# Patient Record
Sex: FEMALE | Race: WHITE | NOT HISPANIC OR LATINO | URBAN - METROPOLITAN AREA
[De-identification: names, ages, dates, MRNs, and addresses within clinical notes are randomized per-mention and may not be internally consistent; named-entity substitution may affect disease eponyms.]

---

## 2016-07-26 RX ORDER — TRAMADOL HYDROCHLORIDE 50 MG/1
2 TABLET ORAL
Qty: 0 | Refills: 0 | COMMUNITY
Start: 2016-07-26

## 2017-01-26 ENCOUNTER — INPATIENT (INPATIENT)
Facility: HOSPITAL | Age: 82
LOS: 1 days | Discharge: HOME HEALTH | End: 2017-01-28
Attending: FAMILY MEDICINE | Admitting: FAMILY MEDICINE
Payer: MEDICARE

## 2017-01-26 DIAGNOSIS — Z95.810 PRESENCE OF AUTOMATIC (IMPLANTABLE) CARDIAC DEFIBRILLATOR: Chronic | ICD-10-CM

## 2017-01-26 PROCEDURE — 71250 CT THORAX DX C-: CPT | Mod: 26

## 2017-01-26 PROCEDURE — 99285 EMERGENCY DEPT VISIT HI MDM: CPT

## 2017-01-26 PROCEDURE — 71010: CPT | Mod: 26

## 2017-01-26 PROCEDURE — 93010 ELECTROCARDIOGRAM REPORT: CPT

## 2017-01-27 PROCEDURE — 93010 ELECTROCARDIOGRAM REPORT: CPT

## 2017-01-28 PROCEDURE — 93010 ELECTROCARDIOGRAM REPORT: CPT

## 2017-01-28 NOTE — PROGRESS NOTE ADULT - SUBJECTIVE AND OBJECTIVE BOX
HPI:    86 y/o female with PMHx of HTN, Systolic CHF EF~30-45%, Atrial Fibrillation on Xarelto, Valvuar Cardiomyopathy, HLD, GERD, OA, and multiple hospitalizations for decompensated CHF. Patient is sent from [facility] with complaint of chest pain of acute onset that occurred at night. In ED CT Chest with pulmonary edema, elevated BNP 32,000. Patient admitted for diuresis with improvement in symptoms.           PAST MEDICAL & SURGICAL HISTORY:  Osteoarthritis of multiple joints, unspecified osteoarthritis type  Gastroesophageal reflux disease without esophagitis  Dyslipidemia  Essential hypertension  Paroxysmal atrial fibrillation  Coronary artery disease involving native coronary artery of native heart without angina pectoris  Chronic systolic congestive heart failure: EF 20-25%  AICD (automatic cardioverter/defibrillator) present      MEDICATIONS  (STANDING):    ACETAMINOPHEN (TYLENOL FORM) 650 MG=(2 x 325 MG TAB) ORAL Q6HP   ALUM - MAG HYDROXIDE-SIMETH (MAALOX PLUS FORM) 30 ML SUSP ORAL Q6HP   AMIODARONE HCL (CORDARONE FORM) 100 MG=(0.5 x 200 MG TAB) ORAL DAILY 11/08/2016 10:20   ASPIRIN 81 MG=1 CHEW ORAL DAILY 11/08/2016 10:20   ATORVASTATIN CALCIUM (LIPITOR FORM) 20 MG=1 TAB ORAL QHS 11/08/2016 22:22 BISACODYL (DULCOLAX FORM) 10 MG=(2 x 5 MG TBEC) ORAL DAILYP CALCIUM CARB./VIT.D 250-125 (OSCAL+D FORM) 1 TABLET TAB ORAL DAILY 11/08/2016 10:20   DOCUSATE SODIUM (COLACE FORM) 200 MG=(2 x 100 MG CAP) ORAL QHS 11/07/2016 21:53   ENALAPRIL MALEATE (VASOTEC FORM) 2.5 MG=1 TAB ORAL Q12H 11/08/2016 22:22   FAMOTIDINE (PEPCID FORM) 20 MG=1 TAB ORAL Q12H 11/08/2016 22:22   FUROSEMIDE (LASIX FORM) 40 MG=1 TAB ORAL DAILY 11/08/2016 10:20   LIDOCAINE 5 % PATCH (LIDODERM FORM) 2 PATCH TOPL Q24H 11/08/2016 10:20 LIDOCAINE --REMOVE-- PATCH(S) (LIDODERM **REMOVE** PATCH(S)) 1 REMOVE PTCH TDERMAL QHS 11/08/2016 22:22   METOPROLOL- XL(TOPROL-XL) (TOPROL XL FORM) 100 MG=1 TB24 ORAL DAILY 11/08/2016 10:20   Ondansetron IV[ZOFRAN] (ZOFRAN FORM) 4 MG=2 ML SOLN IV Q6HP   RIVAROXABAN (XARELTO) 15 MG=1 TAB ORAL CCS 11/08/2016 16:55   SENNA TABLETS (SENOKOT FORM) 2 TAB ORAL HSP SODIUM CHLORIDE FLUSH SOLUTION (NORMAL SALINE LOCK FLUSH) 5 ML SYRG IV PRN SODIUM CHLORIDE FLUSH SOLUTION (NORMAL SALINE LOCK FLUSH) 5 ML SYRG IV Q12H 11/08/2016 22:22   traMADol[ULTRAM] (ULTRAM FORM) 100 MG=(2 x 50 MG TAB) (7 Days) ORAL Q12HP 11/08/2016 22:27   Discontinued FUROSEMIDE IV (LASIX IV FORM) 20 MG=2 ML SOLN        Allergies    No Known Allergies    Intolerances        SOCIAL HISTORY: Denies tobacco, etoh abuse or illicit drug use    FAMILY HISTORY:  No pertinent family history in first degree relatives      REVIEW OF SYSTEMS:    CONSTITUTIONAL:  As per HPI.  HEENT:  Eyes:  No diplopia or blurred vision. ENT:  No earache, sore throat or runny nose.  CARDIOVASCULAR:  No pressure, squeezing, strangling, tightness, heaviness or aching about the chest, neck, axilla or epigastrium.  RESPIRATORY:  No cough, shortness of breath, PND or orthopnea.  GASTROINTESTINAL:  No nausea, vomiting or diarrhea.  GENITOURINARY:  No dysuria, frequency or urgency.  MUSCULOSKELETAL:  As per HPI.  SKIN:  No change in skin, hair or nails.  NEUROLOGIC:  No paresthesias, fasciculations, seizures or weakness.  PSYCHIATRIC:  No disorder of thought or mood.  ENDOCRINE:  No heat or cold intolerance, polyuria or polydipsia.  HEMATOLOGICAL:  No easy bruising or bleedings:  .     PHYSICAL EXAMINATION:    GENERAL APPEARANCE:  Pt. is not currently dyspneic, in no distress. Pt. is alert, oriented, and pleasant.  HEENT:  Pupils are normal and react normally. No icterus. Mucous membranes well colored.  NECK:  Supple. No lymphadenopathy. Jugular venous pressure not elevated. Carotids equal.   HEART:   The cardiac impulse has a normal quality. There are no murmurs, rubs or gallops noted  CHEST:  Chest is clear to auscultation. Normal respiratory effort.  ABDOMEN:  Soft and nontender.   EXTREMITIES:  There is no edema.   SKIN:  No rash or significant lesions are noted.    LABS:                  EKG:    TELEMETRY:    CARDIAC TESTS:    ICD Interrogation Performed: : [employer]; Model: [employer] CRT-D   Programmed Parameters: Mode: DDDRRate: 60 bpm; Zone I; :: 222 bpm; Therapies: ATP x 1, 30 J, 40 J, 40 J x 4; Zone II; :: 181 bpm; Therapies: ATP x 3, 30 J, 40 J, 40 J x 2   Atrial Lead: Atrial lead; P-wave amplitude: 1.0 mv; Impedance: 390 ohms; Threshold: 1.625 V@: 0.50 ms   Right Ventricular Lead: Right Ventricular lead; R-wave amplitude: 7.2 mv; RV Lead Impedance: 310 ohms; RV Threshold: 0.875 V@: 0.50 ms   Left Ventricular Lead: Left Ventricular lead; LV Lead Impedance: LV lead is OFF ohms   Measurements Comments: No VT/VF episodes detected or delivered; Shock Impedance: 36 ohms; Battery Status: Good; % Bi-V Paced: N/A; Underlying Rhythm: sinus rhythm; Comments: No VT detected or any therapies delivered.   Last episode of atrial fibrillation was in Nov. 2016. PAF <1%.   LV lead is OFF.   Assessment and Plan: :: Normal ICD function, RV only mode.   No detections or therapies delivered      RADIOLOGY & ADDITIONAL STUDIES:    ASSESSMENT & PLAN:    This is a 86 y/o female with HTN, Systolic CHF EF~30-45%, Atrial Fibrillation on Xarelto, Valvuar Cardiomyopathy, HLD, GERD, OA, and multiple hospitalizations for decompensated CHF admitted for pulmonary edema:     #Acute on chronic systolic heart failure  - daily weights  - strict I's & O's  - sodium restriction  Low sodium diet.  Read all food labels for sodium content.  Weigh youself every day on the same scale at the same time.  Record and track your weight.  Tell your doctor of any significant weight gain.  - unclear trigger for CHF exaccerbation.   - currently improving and not hypoxic.   - continue po lasix   - repeat Echo pending, already with AICD and normal interrogation on 1/26/17. Echo on 7/16 with EF 35%.   - trop neg x 2, not ACS.   - EKG largely unchanged from previous   - Continue Lopressor 100 mg Daily   - Continue Enalapril 2.5 mg BID   - Continue Atorvastatin

## 2017-02-01 DIAGNOSIS — I50.23 ACUTE ON CHRONIC SYSTOLIC (CONGESTIVE) HEART FAILURE: ICD-10-CM

## 2017-02-01 DIAGNOSIS — E78.5 HYPERLIPIDEMIA, UNSPECIFIED: ICD-10-CM

## 2017-02-01 DIAGNOSIS — I42.8 OTHER CARDIOMYOPATHIES: ICD-10-CM

## 2017-02-01 DIAGNOSIS — I11.0 HYPERTENSIVE HEART DISEASE WITH HEART FAILURE: ICD-10-CM

## 2017-02-01 DIAGNOSIS — I48.0 PAROXYSMAL ATRIAL FIBRILLATION: ICD-10-CM

## 2017-02-01 DIAGNOSIS — K21.9 GASTRO-ESOPHAGEAL REFLUX DISEASE WITHOUT ESOPHAGITIS: ICD-10-CM

## 2017-02-01 DIAGNOSIS — E87.6 HYPOKALEMIA: ICD-10-CM

## 2017-03-01 ENCOUNTER — INPATIENT (INPATIENT)
Facility: HOSPITAL | Age: 82
LOS: 1 days | Discharge: HOME | End: 2017-03-03
Attending: HOSPITALIST | Admitting: HOSPITALIST
Payer: MEDICARE

## 2017-03-01 VITALS
HEIGHT: 64 IN | HEART RATE: 75 BPM | SYSTOLIC BLOOD PRESSURE: 133 MMHG | WEIGHT: 145.06 LBS | RESPIRATION RATE: 18 BRPM | OXYGEN SATURATION: 100 % | DIASTOLIC BLOOD PRESSURE: 78 MMHG | TEMPERATURE: 98 F

## 2017-03-01 DIAGNOSIS — Z95.810 PRESENCE OF AUTOMATIC (IMPLANTABLE) CARDIAC DEFIBRILLATOR: Chronic | ICD-10-CM

## 2017-03-01 LAB
ALBUMIN SERPL ELPH-MCNC: 2.9 G/DL — LOW (ref 3.3–5)
ALP SERPL-CCNC: 101 U/L — SIGNIFICANT CHANGE UP (ref 40–120)
ALT FLD-CCNC: 80 U/L — HIGH (ref 12–78)
ANION GAP SERPL CALC-SCNC: 9 MMOL/L — SIGNIFICANT CHANGE UP (ref 5–17)
AST SERPL-CCNC: 82 U/L — HIGH (ref 15–37)
BASOPHILS # BLD AUTO: 0 K/UL — SIGNIFICANT CHANGE UP (ref 0–0.2)
BASOPHILS NFR BLD AUTO: 0.7 % — SIGNIFICANT CHANGE UP (ref 0–2)
BILIRUB SERPL-MCNC: 0.4 MG/DL — SIGNIFICANT CHANGE UP (ref 0.2–1.2)
BUN SERPL-MCNC: 13 MG/DL — SIGNIFICANT CHANGE UP (ref 7–23)
CALCIUM SERPL-MCNC: 8.6 MG/DL — SIGNIFICANT CHANGE UP (ref 8.5–10.1)
CHLORIDE SERPL-SCNC: 101 MMOL/L — SIGNIFICANT CHANGE UP (ref 96–108)
CK SERPL-CCNC: 46 U/L — SIGNIFICANT CHANGE UP (ref 26–192)
CO2 SERPL-SCNC: 29 MMOL/L — SIGNIFICANT CHANGE UP (ref 22–31)
CREAT SERPL-MCNC: 0.8 MG/DL — SIGNIFICANT CHANGE UP (ref 0.5–1.3)
EOSINOPHIL # BLD AUTO: 0.1 K/UL — SIGNIFICANT CHANGE UP (ref 0–0.5)
EOSINOPHIL NFR BLD AUTO: 1.4 % — SIGNIFICANT CHANGE UP (ref 0–6)
GLUCOSE SERPL-MCNC: 107 MG/DL — HIGH (ref 70–99)
HCT VFR BLD CALC: 31.8 % — LOW (ref 34.5–45)
HGB BLD-MCNC: 10 G/DL — LOW (ref 11.5–15.5)
INR BLD: 1.16 RATIO — SIGNIFICANT CHANGE UP (ref 0.88–1.16)
LYMPHOCYTES # BLD AUTO: 1 K/UL — SIGNIFICANT CHANGE UP (ref 1–3.3)
LYMPHOCYTES # BLD AUTO: 17.7 % — SIGNIFICANT CHANGE UP (ref 13–44)
MAGNESIUM SERPL-MCNC: 2 MG/DL — SIGNIFICANT CHANGE UP (ref 1.8–2.4)
MAGNESIUM SERPL-MCNC: 2 MG/DL — SIGNIFICANT CHANGE UP (ref 1.8–2.4)
MCHC RBC-ENTMCNC: 27.2 PG — SIGNIFICANT CHANGE UP (ref 27–34)
MCHC RBC-ENTMCNC: 31.5 GM/DL — LOW (ref 32–36)
MCV RBC AUTO: 86.5 FL — SIGNIFICANT CHANGE UP (ref 80–100)
MONOCYTES # BLD AUTO: 0.4 K/UL — SIGNIFICANT CHANGE UP (ref 0–0.9)
MONOCYTES NFR BLD AUTO: 6.9 % — SIGNIFICANT CHANGE UP (ref 2–14)
NEUTROPHILS # BLD AUTO: 4.1 K/UL — SIGNIFICANT CHANGE UP (ref 1.8–7.4)
NEUTROPHILS NFR BLD AUTO: 73.3 % — SIGNIFICANT CHANGE UP (ref 43–77)
NT-PROBNP SERPL-SCNC: HIGH PG/ML (ref 0–450)
PLATELET # BLD AUTO: 214 K/UL — SIGNIFICANT CHANGE UP (ref 150–400)
POTASSIUM SERPL-MCNC: 4 MMOL/L — SIGNIFICANT CHANGE UP (ref 3.5–5.3)
POTASSIUM SERPL-SCNC: 4 MMOL/L — SIGNIFICANT CHANGE UP (ref 3.5–5.3)
PROT SERPL-MCNC: 6.1 GM/DL — SIGNIFICANT CHANGE UP (ref 6–8.3)
PROTHROM AB SERPL-ACNC: 12.8 SEC — SIGNIFICANT CHANGE UP (ref 10–13.1)
RBC # BLD: 3.67 M/UL — LOW (ref 3.8–5.2)
RBC # FLD: 15.8 % — HIGH (ref 10.3–14.5)
SODIUM SERPL-SCNC: 139 MMOL/L — SIGNIFICANT CHANGE UP (ref 135–145)
TROPONIN I SERPL-MCNC: 0.04 NG/ML — SIGNIFICANT CHANGE UP (ref 0.01–0.04)
WBC # BLD: 5.6 K/UL — SIGNIFICANT CHANGE UP (ref 3.8–10.5)
WBC # FLD AUTO: 5.6 K/UL — SIGNIFICANT CHANGE UP (ref 3.8–10.5)

## 2017-03-01 PROCEDURE — 71010: CPT | Mod: 26

## 2017-03-01 PROCEDURE — 93010 ELECTROCARDIOGRAM REPORT: CPT

## 2017-03-01 PROCEDURE — 99285 EMERGENCY DEPT VISIT HI MDM: CPT

## 2017-03-01 RX ORDER — TRAMADOL HYDROCHLORIDE 50 MG/1
50 TABLET ORAL EVERY 6 HOURS
Qty: 0 | Refills: 0 | Status: DISCONTINUED | OUTPATIENT
Start: 2017-03-01 | End: 2017-03-03

## 2017-03-01 RX ORDER — ASPIRIN/CALCIUM CARB/MAGNESIUM 324 MG
81 TABLET ORAL DAILY
Qty: 0 | Refills: 0 | Status: DISCONTINUED | OUTPATIENT
Start: 2017-03-01 | End: 2017-03-03

## 2017-03-01 RX ORDER — ASPIRIN/CALCIUM CARB/MAGNESIUM 324 MG
324 TABLET ORAL DAILY
Qty: 0 | Refills: 0 | Status: DISCONTINUED | OUTPATIENT
Start: 2017-03-01 | End: 2017-03-01

## 2017-03-01 RX ORDER — METOPROLOL TARTRATE 50 MG
100 TABLET ORAL DAILY
Qty: 0 | Refills: 0 | Status: DISCONTINUED | OUTPATIENT
Start: 2017-03-01 | End: 2017-03-03

## 2017-03-01 RX ORDER — SPIRONOLACTONE 25 MG/1
25 TABLET, FILM COATED ORAL DAILY
Qty: 0 | Refills: 0 | Status: DISCONTINUED | OUTPATIENT
Start: 2017-03-01 | End: 2017-03-03

## 2017-03-01 RX ORDER — DIGOXIN 250 MCG
0.12 TABLET ORAL DAILY
Qty: 0 | Refills: 0 | Status: DISCONTINUED | OUTPATIENT
Start: 2017-03-01 | End: 2017-03-03

## 2017-03-01 RX ORDER — FUROSEMIDE 40 MG
40 TABLET ORAL ONCE
Qty: 0 | Refills: 0 | Status: COMPLETED | OUTPATIENT
Start: 2017-03-01 | End: 2017-03-01

## 2017-03-01 RX ORDER — ATORVASTATIN CALCIUM 80 MG/1
20 TABLET, FILM COATED ORAL AT BEDTIME
Qty: 0 | Refills: 0 | Status: DISCONTINUED | OUTPATIENT
Start: 2017-03-01 | End: 2017-03-03

## 2017-03-01 RX ORDER — FUROSEMIDE 40 MG
40 TABLET ORAL
Qty: 0 | Refills: 0 | Status: DISCONTINUED | OUTPATIENT
Start: 2017-03-01 | End: 2017-03-03

## 2017-03-01 RX ORDER — LATANOPROST 0.05 MG/ML
1 SOLUTION/ DROPS OPHTHALMIC; TOPICAL AT BEDTIME
Qty: 0 | Refills: 0 | Status: DISCONTINUED | OUTPATIENT
Start: 2017-03-01 | End: 2017-03-03

## 2017-03-01 RX ORDER — ASPIRIN/CALCIUM CARB/MAGNESIUM 324 MG
81 TABLET ORAL DAILY
Qty: 0 | Refills: 0 | Status: DISCONTINUED | OUTPATIENT
Start: 2017-03-01 | End: 2017-03-01

## 2017-03-01 RX ORDER — RIVAROXABAN 15 MG-20MG
20 KIT ORAL DAILY
Qty: 0 | Refills: 0 | Status: DISCONTINUED | OUTPATIENT
Start: 2017-03-01 | End: 2017-03-02

## 2017-03-01 RX ORDER — AMIODARONE HYDROCHLORIDE 400 MG/1
200 TABLET ORAL DAILY
Qty: 0 | Refills: 0 | Status: DISCONTINUED | OUTPATIENT
Start: 2017-03-01 | End: 2017-03-03

## 2017-03-01 RX ADMIN — SPIRONOLACTONE 25 MILLIGRAM(S): 25 TABLET, FILM COATED ORAL at 18:55

## 2017-03-01 RX ADMIN — Medication 324 MILLIGRAM(S): at 12:31

## 2017-03-01 RX ADMIN — Medication 100 MILLIGRAM(S): at 15:08

## 2017-03-01 RX ADMIN — Medication 40 MILLIGRAM(S): at 12:31

## 2017-03-01 RX ADMIN — Medication 0.12 MILLIGRAM(S): at 18:55

## 2017-03-01 RX ADMIN — AMIODARONE HYDROCHLORIDE 200 MILLIGRAM(S): 400 TABLET ORAL at 15:08

## 2017-03-01 RX ADMIN — Medication 40 MILLIGRAM(S): at 18:55

## 2017-03-01 NOTE — ED ADULT NURSE NOTE - CHIEF COMPLAINT QUOTE
pt states complaints of chest pain onset last night. pt states that she was given tramadol this am prior to arrival for pain.

## 2017-03-01 NOTE — H&P ADULT - ASSESSMENT
#Acute decompensated systolic heart failure  -iv lasix, intake and output, daily weight, cardiology evaluation     #Aflutter  -ct her home meds     #dvt pr- on xarelto     #Above discussed with pt, daughter at bedside, all questions have been answered

## 2017-03-01 NOTE — ED ADULT NURSE REASSESSMENT NOTE - NS ED NURSE REASSESS COMMENT FT1
pt denies any complaints at this time. states that she is hungry, told her that I will give her meal tray once her results return. pt on cardiac monitor. no s/s of distress present.

## 2017-03-01 NOTE — ED PROVIDER NOTE - OBJECTIVE STATEMENT
84 yo F with PMHx of HTN, Systolic CHF EF~30-45%, Atrial Fibrillation on Xarelto, Valvuar Cardiomyopathy, HLD, GERD, OA, and multiple hospitalizations for decompensated CHF, last January 28, 2017, presents via EMS with CC of chest pain.

## 2017-03-01 NOTE — ED PROVIDER NOTE - PMH
Chronic systolic congestive heart failure  EF 20-25%  Coronary artery disease involving native coronary artery of native heart without angina pectoris    Dyslipidemia    Essential hypertension    Gastroesophageal reflux disease without esophagitis    Osteoarthritis of multiple joints, unspecified osteoarthritis type    Paroxysmal atrial fibrillation

## 2017-03-01 NOTE — CONSULT NOTE ADULT - SUBJECTIVE AND OBJECTIVE BOX
Patient is a 85y old  Female who presents with a chief complaint of chest pain and shortness of breath.      HPI:  84 y/o female with prior medical history of ischemic cardiomyopathy, LVEF 29%, s/p BiV ICD implantation, chronic afib s/p ablation in 4/16, and ICD firing subsequently and the ICD battery was changed per family, hx of HTN, Hyperlipidemia, recently off xarelto secondary to gross hematuria, presented with c/o SOB and AYERS and PND, chest pain.  Chest pain described as substernal, 8/10 with radiation to the right shoulder, resolved spontaneously after the tramadol.  She walks with a walker secondary to gait instability.    NYHA class III-IV.  Hospitalized in 1/17 with decompensated heart failure.        PAST MEDICAL & SURGICAL HISTORY:  Osteoarthritis of multiple joints, unspecified osteoarthritis type  Gastroesophageal reflux disease without esophagitis  Dyslipidemia  Essential hypertension  Paroxysmal atrial fibrillation  Coronary artery disease involving native coronary artery of native heart without angina pectoris  Chronic systolic congestive heart failure: EF 20-25%  AICD (automatic cardioverter/defibrillator) present      MEDICATIONS  (STANDING):  amiodarone    Tablet 200milliGRAM(s) Oral daily  atorvastatin 20milliGRAM(s) Oral at bedtime  metoprolol succinate ER 100milliGRAM(s) Oral daily  latanoprost 0.005% Ophthalmic Solution 1Drop(s) Both EYES at bedtime  furosemide   Injectable 40milliGRAM(s) IV Push two times a day  aspirin  chewable 81milliGRAM(s) Oral daily  rivaroxaban 20milliGRAM(s) Oral daily    MEDICATIONS  (PRN):  traMADol 50milliGRAM(s) Oral every 6 hours PRN Moderate Pain (4 - 6)      FAMILY HISTORY:  No pertinent family history in first degree relatives      SOCIAL HISTORY:  non smoker, no alcohol use        REVIEW OF SYSTEMS:  CONSTITUTIONAL:  No night sweats.  c/o fatigue, malaise.  HEENT:  Eyes:  No visual changes.  No eye pain.  No eye discharge.   ENT:  No runny nose.  No epistaxis.  No sinus pain.  No sore throat.  No odynophagia.  No ear pain.  No congestion.  RESPIRATORY:  No cough.  No wheeze.  No hemoptysis.  c/o  shortness of breath, AYERS   CARDIOVASCULAR:  No chest pains.  No palpitations. c/o shortness of breath, orthopnea or PND.  GASTROINTESTINAL:  No abdominal pain.  No nausea or vomiting.  No diarrhea or constipation.  No hematemesis.  No hematochezia.  No melena.  GENITOURINARY:  No urgency.  No frequency.  No dysuria.  No hematuria.  No obstructive symptoms.  No discharge.  No pain.  No significant abnormal bleeding.  MUSCULOSKELETAL:  No musculoskeletal pain.  No joint swelling.  No arthritis.  NEUROLOGICAL:  No tingling or numbness or weakness.  PSYCHIATRIC:  No confusion  SKIN:  No rashes.  No lesions.  No wounds.  ENDOCRINE:  No unexplained weight loss.  No polydipsia.  No polyuria.  No polyphagia.  HEMATOLOGIC:  No anemia.  No purpura.  No petechiae.  No prolonged or excessive bleeding.   ALLERGIC AND IMMUNOLOGIC:  No pruritus.  No swelling.         Vital Signs Last 24 Hrs  T(C): 36.7, Max: 36.7 (03-01 @ 08:44)  T(F): 98, Max: 98 (03-01 @ 08:44)  HR: 74 (72 - 75)  BP: 132/78 (95/63 - 133/78)  BP(mean): --  RR: 16 (16 - 18)  SpO2: 99% (99% - 100%)    PHYSICAL EXAM-    Constitutional: no acute distress  Head: Head is normocephalic and atraumatic.      Neck: The patient's neck is supple without enlargement,positive JVD    Cardiovascular: Regular rate and rhythm without S3, S4. No murmurs or rubs are appreciated.      Respiratory: crackles b/l basal    Abdomen: Soft, nontender, nondistended with positive bowel sounds.      Extremity: No tenderness.trace pedal edema    Neurologic: The patient is alert and oriented.      Skin: No rash, no obvious lesions noted.      Psychiatric: The patient appears to be emotionally stable.      INTERPRETATION OF TELEMETRY:sinus rythm, occasional pacing.    ECG:sinus rythm, T wave inversion in inferior leads, I and aVL, poor R wave progression.artifact at baseline in V6    I&O's Detail    I & Os for current day (as of 01 Mar 2017 15:10)  =============================================  IN:    Total IN: 0 ml  ---------------------------------------------  OUT:    Voided: 300 ml    Total OUT: 300 ml  ---------------------------------------------  Total NET: -300 ml      LABS:                        10.0   5.6   )-----------( 214      ( 01 Mar 2017 09:05 )             31.8     01 Mar 2017 09:05    139    |  101    |  13     ----------------------------<  107    4.0     |  29     |  0.80     Ca    8.6        01 Mar 2017 09:05  Mg     2.0       01 Mar 2017 09:05    TPro  6.1    /  Alb  2.9    /  TBili  0.4    /  DBili  x      /  AST  82     /  ALT  80     /  AlkPhos  101    01 Mar 2017 09:05    CARDIAC MARKERS ( 01 Mar 2017 12:03 )  0.040 ng/mL / x     / x     / x     / x      CARDIAC MARKERS ( 01 Mar 2017 09:05 )  0.041 ng/mL / x     / 46 U/L / x     / x          PT/INR - ( 01 Mar 2017 09:05 )   PT: 12.8 sec;   INR: 1.16 ratio             I&O's Summary    I & Os for current day (as of 01 Mar 2017 15:10)  =============================================  IN: 0 ml / OUT: 300 ml / NET: -300 ml    BNPSerum Pro-Brain Natriuretic Peptide: [id number] pg/mL (03-01 @ 09:05)    RADIOLOGY & ADDITIONAL STUDIES:

## 2017-03-01 NOTE — H&P ADULT - NSHPPHYSICALEXAM_GEN_ALL_CORE
General: WN/WD NAD  Neurology: A&Ox3, nonfocal,   Respiratory: air entry equal b/l, rales present b/l   CVS:  S1S2, no murmurs  Abdominal: Soft, NT, ND +BS,   Extremities: pitting edema 1+  Skin- no rash, no ulcer  Psychiatric- mood stable

## 2017-03-01 NOTE — PATIENT PROFILE ADULT. - ABILITY TO HEAR (WITH HEARING AID OR HEARING APPLIANCE IF NORMALLY USED):
Mildly to Moderately Impaired: difficulty hearing in some environments or speaker may need to increase volume or speak distinctly/left hearing aids at [place]

## 2017-03-01 NOTE — ED ADULT NURSE REASSESSMENT NOTE - NS ED NURSE REASSESS COMMENT FT1
pt resting on stretcher, denies any complaints at this time. breathing even and unlabored. no s/s of distress present.

## 2017-03-01 NOTE — H&P ADULT - HISTORY OF PRESENT ILLNESS
84 y/o female was relatively alright till yesterday. Today in am she started short of breath was gradually getting worse associated with chest discomfort. No aggravating factor, no relieving factor. At assisted living facility noted to have increase in weight      -discharged in January from  after she was managed for systolic heart failure

## 2017-03-01 NOTE — H&P ADULT - NSHPLABSRESULTS_GEN_ALL_CORE
10.0   5.6   )-----------( 214      ( 01 Mar 2017 09:05 )             31.8   01 Mar 2017 09:05    139    |  101    |  13     ----------------------------<  107    4.0     |  29     |  0.80     Ca    8.6        01 Mar 2017 09:05  Mg     2.0       01 Mar 2017 09:05    TPro  6.1    /  Alb  2.9    /  TBili  0.4    /  DBili  x      /  AST  82     /  ALT  80     /  AlkPhos  101    01 Mar 2017 09:05

## 2017-03-01 NOTE — ED PROVIDER NOTE - NS ED MD SCRIBE ATTENDING SCRIBE SECTIONS
DISPOSITION/PAST MEDICAL/SURGICAL/SOCIAL HISTORY/REVIEW OF SYSTEMS/RESULTS/PROGRESS NOTE/HISTORY OF PRESENT ILLNESS/PHYSICAL EXAM

## 2017-03-01 NOTE — ED PROVIDER NOTE - MEDICAL DECISION MAKING DETAILS
Pt currently calm with plans to receive labs and imaging for further eval and tx. Pt currently calm with plans to receive labs and imaging for further eval and tx.    Pt with normal Troponin, Elevated BNP, signs of pulmonary vascular congestion on CXR and fluid overload on exam.  CHF exacerbation.  Lasix given in ED.  Admit to medicine for further evaluation and management.

## 2017-03-01 NOTE — CONSULT NOTE ADULT - ASSESSMENT
Acute on chronic decompensated HFrEF , known LVEF 29%, hypervolemic, NYHA class III- unclear etiology for decompensation.  Will get ICD interrogated.  WIll continue enalpril and toprol at home doses.  Will continue lasix with close monitoring of her renal function and electrolytes.  Strict I/O and daily wt checks. Low sodium diet. Nutrition education.  WIll start her on digoxin daily and add spirnolactone.  check magnesium level.    HTN, Hyperlipidemia- continue current meds    CAD- will need outpt ischemic evaluation. Pt to f/u with me after discharge in my office.    Chronic afib- s/p ablation in past, off anticoagulation secondary to hematuria.    Thank you for allowing me to participate in the care of this patient. Please feel free to contact me with any questions.

## 2017-03-02 LAB
ALBUMIN SERPL ELPH-MCNC: 2.9 G/DL — LOW (ref 3.3–5)
ANION GAP SERPL CALC-SCNC: 8 MMOL/L — SIGNIFICANT CHANGE UP (ref 5–17)
BUN SERPL-MCNC: 11 MG/DL — SIGNIFICANT CHANGE UP (ref 7–23)
CALCIUM SERPL-MCNC: 8.4 MG/DL — LOW (ref 8.5–10.1)
CHLORIDE SERPL-SCNC: 102 MMOL/L — SIGNIFICANT CHANGE UP (ref 96–108)
CO2 SERPL-SCNC: 31 MMOL/L — SIGNIFICANT CHANGE UP (ref 22–31)
CREAT SERPL-MCNC: 0.76 MG/DL — SIGNIFICANT CHANGE UP (ref 0.5–1.3)
GLUCOSE SERPL-MCNC: 94 MG/DL — SIGNIFICANT CHANGE UP (ref 70–99)
PHOSPHATE SERPL-MCNC: 3.5 MG/DL — SIGNIFICANT CHANGE UP (ref 2.5–4.5)
POTASSIUM SERPL-MCNC: 3.2 MMOL/L — LOW (ref 3.5–5.3)
POTASSIUM SERPL-SCNC: 3.2 MMOL/L — LOW (ref 3.5–5.3)
SODIUM SERPL-SCNC: 141 MMOL/L — SIGNIFICANT CHANGE UP (ref 135–145)

## 2017-03-02 PROCEDURE — 93010 ELECTROCARDIOGRAM REPORT: CPT

## 2017-03-02 RX ORDER — HEPARIN SODIUM 5000 [USP'U]/ML
5000 INJECTION INTRAVENOUS; SUBCUTANEOUS EVERY 8 HOURS
Qty: 0 | Refills: 0 | Status: DISCONTINUED | OUTPATIENT
Start: 2017-03-02 | End: 2017-03-03

## 2017-03-02 RX ORDER — POTASSIUM CHLORIDE 20 MEQ
40 PACKET (EA) ORAL EVERY 4 HOURS
Qty: 0 | Refills: 0 | Status: COMPLETED | OUTPATIENT
Start: 2017-03-02 | End: 2017-03-02

## 2017-03-02 RX ADMIN — Medication 0.12 MILLIGRAM(S): at 06:11

## 2017-03-02 RX ADMIN — Medication 2.5 MILLIGRAM(S): at 18:06

## 2017-03-02 RX ADMIN — AMIODARONE HYDROCHLORIDE 200 MILLIGRAM(S): 400 TABLET ORAL at 06:11

## 2017-03-02 RX ADMIN — ATORVASTATIN CALCIUM 20 MILLIGRAM(S): 80 TABLET, FILM COATED ORAL at 22:19

## 2017-03-02 RX ADMIN — HEPARIN SODIUM 5000 UNIT(S): 5000 INJECTION INTRAVENOUS; SUBCUTANEOUS at 18:03

## 2017-03-02 RX ADMIN — SPIRONOLACTONE 25 MILLIGRAM(S): 25 TABLET, FILM COATED ORAL at 06:09

## 2017-03-02 RX ADMIN — TRAMADOL HYDROCHLORIDE 50 MILLIGRAM(S): 50 TABLET ORAL at 18:06

## 2017-03-02 RX ADMIN — Medication 40 MILLIGRAM(S): at 06:10

## 2017-03-02 RX ADMIN — HEPARIN SODIUM 5000 UNIT(S): 5000 INJECTION INTRAVENOUS; SUBCUTANEOUS at 22:19

## 2017-03-02 RX ADMIN — ATORVASTATIN CALCIUM 20 MILLIGRAM(S): 80 TABLET, FILM COATED ORAL at 00:06

## 2017-03-02 RX ADMIN — Medication 100 MILLIGRAM(S): at 06:11

## 2017-03-02 RX ADMIN — Medication 40 MILLIGRAM(S): at 18:04

## 2017-03-02 RX ADMIN — TRAMADOL HYDROCHLORIDE 50 MILLIGRAM(S): 50 TABLET ORAL at 01:40

## 2017-03-02 NOTE — PROGRESS NOTE ADULT - ASSESSMENT
#Acute decompensated systolic heart failure  -iv lasix, intake and output, daily weight, cardiology evaluation appreciated   -ICD interrogation     #Aflutter  -ct her home meds   -off AC due to hematuria   -digoxin added to regimen    #DVT pr- SQ heparin

## 2017-03-02 NOTE — PROGRESS NOTE ADULT - ASSESSMENT
Acute on chronic decompensated HFrEF , known LVEF 29%, hypervolemic, NYHA class III- unclear etiology for decompensation.  ICD interrogated- no events noted. her SVC lead was turned off in past.  WIll continue enalpril and toprol at home doses.  Will continue  iv lasix with close monitoring of her renal function and electrolytes. ordered potassium.  Strict I/O and daily wt checks. Low sodium diet. Nutrition education.  continue digoxin  spirnolactone.      HTN, Hyperlipidemia- continue current meds    CAD- will need outpt ischemic evaluation. Pt to f/u with me after discharge in my office.    Chronic afib- s/p ablation in past, off anticoagulation secondary to hematuria. Pt reluctant to restart anticoagulation.    Thank you for allowing me to participate in the care of this patient. Please feel free to contact me with any questions.

## 2017-03-02 NOTE — PROGRESS NOTE ADULT - SUBJECTIVE AND OBJECTIVE BOX
Patient is a 85y old  Female who presents with a chief complaint of chest pain and shortness of breath.      HPI:  84 y/o female with prior medical history of ischemic cardiomyopathy, LVEF 29%, s/p BiV ICD implantation, chronic afib s/p ablation in 4/16, and ICD firing subsequently and the ICD battery was changed per family, hx of HTN, Hyperlipidemia, recently off xarelto secondary to gross hematuria, presented with c/o SOB and AYERS and PND, chest pain.  Chest pain described as substernal, 8/10 with radiation to the right shoulder, resolved spontaneously after the tramadol.  She walks with a walker secondary to gait instability.    NYHA class III-IV.  Hospitalized in 1/17 with decompensated heart failure.    3/2- Pt seen and examined by me. Says SOB is better today.        PAST MEDICAL & SURGICAL HISTORY:  Osteoarthritis of multiple joints, unspecified osteoarthritis type  Gastroesophageal reflux disease without esophagitis  Dyslipidemia  Essential hypertension  Paroxysmal atrial fibrillation  Coronary artery disease involving native coronary artery of native heart without angina pectoris  Chronic systolic congestive heart failure: EF 20-25%  AICD (automatic cardioverter/defibrillator) present      MEDICATIONS  (STANDING):  amiodarone    Tablet 200milliGRAM(s) Oral daily  atorvastatin 20milliGRAM(s) Oral at bedtime  metoprolol succinate ER 100milliGRAM(s) Oral daily  latanoprost 0.005% Ophthalmic Solution 1Drop(s) Both EYES at bedtime  furosemide   Injectable 40milliGRAM(s) IV Push two times a day  aspirin  chewable 81milliGRAM(s) Oral daily  rivaroxaban 20milliGRAM(s) Oral daily    MEDICATIONS  (PRN):  traMADol 50milliGRAM(s) Oral every 6 hours PRN Moderate Pain (4 - 6)      FAMILY HISTORY:  No pertinent family history in first degree relatives      SOCIAL HISTORY:  non smoker, no alcohol use        REVIEW OF SYSTEMS:  CONSTITUTIONAL:  No night sweats.  c/o fatigue, malaise.  HEENT:  Eyes:  No visual changes.  No eye pain.  No eye discharge.   ENT:  No runny nose.  No epistaxis.  No sinus pain.  No sore throat.  No odynophagia.  No ear pain.  No congestion.  RESPIRATORY:  No cough.  No wheeze.  No hemoptysis.  c/o  shortness of breath, AYERS   CARDIOVASCULAR:  No chest pains.  No palpitations. c/o shortness of breath, orthopnea or PND.  GASTROINTESTINAL:  No abdominal pain.  No nausea or vomiting.  No diarrhea or constipation.  No hematemesis.  No hematochezia.  No melena.  GENITOURINARY:  No urgency.  No frequency.  No dysuria.  No hematuria.  No obstructive symptoms.  No discharge.  No pain.  No significant abnormal bleeding.  MUSCULOSKELETAL:  No musculoskeletal pain.  No joint swelling.  No arthritis.  NEUROLOGICAL:  No tingling or numbness or weakness.  PSYCHIATRIC:  No confusion  SKIN:  No rashes.  No lesions.  No wounds.  ENDOCRINE:  No unexplained weight loss.  No polydipsia.  No polyuria.  No polyphagia.  HEMATOLOGIC:  No anemia.  No purpura.  No petechiae.  No prolonged or excessive bleeding.   ALLERGIC AND IMMUNOLOGIC:  No pruritus.  No swelling.         Vital Signs Last 24 Hrs  T(C): 36.7, Max: 36.7 (03-01 @ 08:44)  T(F): 98, Max: 98 (03-01 @ 08:44)  HR: 74 (72 - 75)  BP: 132/78 (95/63 - 133/78)  BP(mean): --  RR: 16 (16 - 18)  SpO2: 99% (99% - 100%)    PHYSICAL EXAM-    Constitutional: no acute distress  Head: Head is normocephalic and atraumatic.      Neck: The patient's neck is supple without enlargement,positive JVD    Cardiovascular: Regular rate and rhythm without S3, S4. No murmurs or rubs are appreciated.      Respiratory: crackles b/l basal    Abdomen: Soft, nontender, nondistended with positive bowel sounds.      Extremity: No tenderness.trace pedal edema    Neurologic: The patient is alert and oriented.      Skin: No rash, no obvious lesions noted.      Psychiatric: The patient appears to be emotionally stable.      INTERPRETATION OF TELEMETRY:sinus rythm, occasional pacing.    ECG:sinus rythm, T wave inversion in inferior leads, I and aVL, poor R wave progression.artifact at baseline in V6    I&O's Detail    I & Os for current day (as of 01 Mar 2017 15:10)  =============================================  IN:    Total IN: 0 ml  ---------------------------------------------  OUT:    Voided: 300 ml    Total OUT: 300 ml  ---------------------------------------------  Total NET: -300 ml      LABS:                        10.0   5.6   )-----------( 214      ( 01 Mar 2017 09:05 )             31.8     01 Mar 2017 09:05    139    |  101    |  13     ----------------------------<  107    4.0     |  29     |  0.80     Ca    8.6        01 Mar 2017 09:05  Mg     2.0       01 Mar 2017 09:05    TPro  6.1    /  Alb  2.9    /  TBili  0.4    /  DBili  x      /  AST  82     /  ALT  80     /  AlkPhos  101    01 Mar 2017 09:05    CARDIAC MARKERS ( 01 Mar 2017 12:03 )  0.040 ng/mL / x     / x     / x     / x      CARDIAC MARKERS ( 01 Mar 2017 09:05 )  0.041 ng/mL / x     / 46 U/L / x     / x          PT/INR - ( 01 Mar 2017 09:05 )   PT: 12.8 sec;   INR: 1.16 ratio             I&O's Summary    I & Os for current day (as of 01 Mar 2017 15:10)  =============================================  IN: 0 ml / OUT: 300 ml / NET: -300 ml    BNPSerum Pro-Brain Natriuretic Peptide: [id number] pg/mL (03-01 @ 09:05)    RADIOLOGY & ADDITIONAL STUDIES:

## 2017-03-02 NOTE — PROGRESS NOTE ADULT - SUBJECTIVE AND OBJECTIVE BOX
Patient is a 85y old  Female who presents with a chief complaint of I started to have fluttering and a little chest pain last night (01 Mar 2017 18:20)        HPI:  84 y/o female was relatively alright till one day prior to admission. on the day of admission she started short of breath was gradually getting worse associated with chest discomfort. No aggravating factor, no relieving factor. At assisted living facility noted to have increase in weight      -discharged in January from  after she was managed for systolic heart failure (01 Mar 2017 14:12)      #Review of system- rest of review of systems are negative except as mentioned in HPI    PHYSICAL EXAM:  Vital Signs Last 24 Hrs  T(C): 36.3, Max: 36.8 (03-01 @ 22:04)  T(F): 97.4, Max: 98.3 (03-01 @ 22:04)  HR: 71 (64 - 75)  BP: 113/56 (113/56 - 132/78)  BP(mean): --  RR: 17 (16 - 17)  SpO2: 100% (97% - 100%)  GENERAL: comfortable   HEAD:  Atraumatic, Normocephalic  EYES: EOMI, PERRLA, conjunctiva and sclera clear  HEENT: Moist mucous membranes  NECK: Supple, No JVD  NERVOUS SYSTEM:  Alert & Oriented X3, Motor Strength 5/5 B/L upper and lower extremities; DTRs 2+ intact and symmetric  CHEST/LUNG: Clear to auscultation bilaterally; No rales, rhonchi, wheezing, or rubs  HEART: Regular rate and rhythm; No murmurs, rubs, or gallops  ABDOMEN: Soft, Nontender, Nondistended; Bowel sounds present  GENITOURINARY- Voiding, no palpable bladder  EXTREMITIES:  2+ Peripheral Pulses, No clubbing, cyanosis, or edema  MUSCULOSKELETAL No muscle tenderness, Muscle tone normal, No joint tenderness, no Joint swelling, Joint range of motion-normal  SKIN-no rash, no lesion    LABS:                        10.0   5.6   )-----------( 214      ( 01 Mar 2017 09:05 )             31.8     02 Mar 2017 06:20    141    |  102    |  11     ----------------------------<  94     3.2     |  31     |  0.76     Ca    8.4        02 Mar 2017 06:20  Phos  3.5       02 Mar 2017 06:20  Mg     2.0       01 Mar 2017 16:54    TPro  x      /  Alb  2.9    /  TBili  x      /  DBili  x      /  AST  x      /  ALT  x      /  AlkPhos  x      02 Mar 2017 06:20    PT/INR - ( 01 Mar 2017 09:05 )   PT: 12.8 sec;   INR: 1.16 ratio               CAPILLARY BLOOD GLUCOSE      CARDIAC MARKERS ( 01 Mar 2017 12:03 )  0.040 ng/mL / x     / x     / x     / x      CARDIAC MARKERS ( 01 Mar 2017 09:05 )  0.041 ng/mL / x     / 46 U/L / x     / x            Standing medicine  traMADol 50milliGRAM(s) Oral every 6 hours PRN  amiodarone    Tablet 200milliGRAM(s) Oral daily  atorvastatin 20milliGRAM(s) Oral at bedtime  metoprolol succinate ER 100milliGRAM(s) Oral daily  latanoprost 0.005% Ophthalmic Solution 1Drop(s) Both EYES at bedtime  furosemide   Injectable 40milliGRAM(s) IV Push two times a day  aspirin  chewable 81milliGRAM(s) Oral daily  digoxin     Tablet 0.125milliGRAM(s) Oral daily  spironolactone 25milliGRAM(s) Oral daily  heparin  Injectable 5000Unit(s) SubCutaneous every 8 hours

## 2017-03-03 VITALS
OXYGEN SATURATION: 100 % | SYSTOLIC BLOOD PRESSURE: 119 MMHG | HEART RATE: 67 BPM | TEMPERATURE: 98 F | RESPIRATION RATE: 16 BRPM | DIASTOLIC BLOOD PRESSURE: 68 MMHG

## 2017-03-03 LAB
ALBUMIN SERPL ELPH-MCNC: 3 G/DL — LOW (ref 3.3–5)
ANION GAP SERPL CALC-SCNC: 9 MMOL/L — SIGNIFICANT CHANGE UP (ref 5–17)
BUN SERPL-MCNC: 14 MG/DL — SIGNIFICANT CHANGE UP (ref 7–23)
CALCIUM SERPL-MCNC: 8.4 MG/DL — LOW (ref 8.5–10.1)
CHLORIDE SERPL-SCNC: 102 MMOL/L — SIGNIFICANT CHANGE UP (ref 96–108)
CO2 SERPL-SCNC: 31 MMOL/L — SIGNIFICANT CHANGE UP (ref 22–31)
CREAT SERPL-MCNC: 0.78 MG/DL — SIGNIFICANT CHANGE UP (ref 0.5–1.3)
DIGOXIN SERPL-MCNC: 0.53 NG/ML — LOW (ref 0.8–2)
GLUCOSE SERPL-MCNC: 92 MG/DL — SIGNIFICANT CHANGE UP (ref 70–99)
PHOSPHATE SERPL-MCNC: 3.3 MG/DL — SIGNIFICANT CHANGE UP (ref 2.5–4.5)
POTASSIUM SERPL-MCNC: 3.3 MMOL/L — LOW (ref 3.5–5.3)
POTASSIUM SERPL-SCNC: 3.3 MMOL/L — LOW (ref 3.5–5.3)
SODIUM SERPL-SCNC: 142 MMOL/L — SIGNIFICANT CHANGE UP (ref 135–145)

## 2017-03-03 RX ORDER — SPIRONOLACTONE 25 MG/1
1 TABLET, FILM COATED ORAL
Qty: 30 | Refills: 0 | OUTPATIENT
Start: 2017-03-03 | End: 2017-04-02

## 2017-03-03 RX ORDER — POTASSIUM CHLORIDE 20 MEQ
10 PACKET (EA) ORAL ONCE
Qty: 0 | Refills: 0 | Status: COMPLETED | OUTPATIENT
Start: 2017-03-03 | End: 2017-03-03

## 2017-03-03 RX ORDER — POTASSIUM CHLORIDE 20 MEQ
1 PACKET (EA) ORAL
Qty: 7 | Refills: 0 | OUTPATIENT
Start: 2017-03-03 | End: 2017-03-10

## 2017-03-03 RX ORDER — FUROSEMIDE 40 MG
40 TABLET ORAL DAILY
Qty: 0 | Refills: 0 | Status: DISCONTINUED | OUTPATIENT
Start: 2017-03-03 | End: 2017-03-03

## 2017-03-03 RX ORDER — POTASSIUM CHLORIDE 20 MEQ
10 PACKET (EA) ORAL
Qty: 0 | Refills: 0 | Status: DISCONTINUED | OUTPATIENT
Start: 2017-03-03 | End: 2017-03-03

## 2017-03-03 RX ORDER — DIGOXIN 250 MCG
1 TABLET ORAL
Qty: 30 | Refills: 0 | OUTPATIENT
Start: 2017-03-03 | End: 2017-04-02

## 2017-03-03 RX ORDER — METOPROLOL TARTRATE 50 MG
1 TABLET ORAL
Qty: 30 | Refills: 0 | OUTPATIENT
Start: 2017-03-03 | End: 2017-04-02

## 2017-03-03 RX ORDER — ATORVASTATIN CALCIUM 80 MG/1
1 TABLET, FILM COATED ORAL
Qty: 0 | Refills: 0 | COMMUNITY
Start: 2017-03-03

## 2017-03-03 RX ORDER — POTASSIUM CHLORIDE 20 MEQ
20 PACKET (EA) ORAL ONCE
Qty: 0 | Refills: 0 | Status: COMPLETED | OUTPATIENT
Start: 2017-03-03 | End: 2017-03-03

## 2017-03-03 RX ADMIN — Medication 20 MILLIEQUIVALENT(S): at 15:20

## 2017-03-03 RX ADMIN — Medication 2.5 MILLIGRAM(S): at 11:05

## 2017-03-03 RX ADMIN — Medication 81 MILLIGRAM(S): at 11:05

## 2017-03-03 RX ADMIN — SPIRONOLACTONE 25 MILLIGRAM(S): 25 TABLET, FILM COATED ORAL at 06:54

## 2017-03-03 RX ADMIN — TRAMADOL HYDROCHLORIDE 50 MILLIGRAM(S): 50 TABLET ORAL at 00:37

## 2017-03-03 RX ADMIN — Medication 0.12 MILLIGRAM(S): at 11:05

## 2017-03-03 RX ADMIN — AMIODARONE HYDROCHLORIDE 200 MILLIGRAM(S): 400 TABLET ORAL at 06:53

## 2017-03-03 RX ADMIN — Medication 100 MILLIGRAM(S): at 06:53

## 2017-03-03 RX ADMIN — Medication 10 MILLIEQUIVALENT(S): at 11:07

## 2017-03-03 RX ADMIN — TRAMADOL HYDROCHLORIDE 50 MILLIGRAM(S): 50 TABLET ORAL at 00:07

## 2017-03-03 RX ADMIN — Medication 40 MILLIGRAM(S): at 06:53

## 2017-03-03 RX ADMIN — HEPARIN SODIUM 5000 UNIT(S): 5000 INJECTION INTRAVENOUS; SUBCUTANEOUS at 06:52

## 2017-03-03 NOTE — DISCHARGE NOTE ADULT - PLAN OF CARE
take your medicine, follow up in cardiology office for further management low salt diet, follow up in cardiology office for further management

## 2017-03-03 NOTE — DISCHARGE NOTE ADULT - CARE PLAN
Principal Discharge DX:	Acute systolic congestive heart failure  Goal:	take your medicine, follow up in cardiology office for further management  Instructions for follow-up, activity and diet:	low salt diet, follow up in cardiology office for further management

## 2017-03-03 NOTE — DISCHARGE NOTE ADULT - MEDICATION SUMMARY - MEDICATIONS TO TAKE
I will START or STAY ON the medications listed below when I get home from the hospital:    spironolactone 25 mg oral tablet  -- 1 tab(s) by mouth once a day  -- Indication: For heart failure    aspirin 81 mg oral tablet  -- 1 tab(s) by mouth once a day  -- Indication: For home meds    traMADol 50 mg oral tablet  -- 1 tab(s) by mouth every 6 hours, As needed, Moderate Pain (4 - 6)  -- Indication: For home meds for pain    enalapril 2.5 mg oral tablet  -- 1 tab(s) by mouth 2 times a day  -- Indication: For heart fialure    amiodarone 200 mg oral tablet  -- 1 tab(s) by mouth once a day  -- Indication: For Aflutter    digoxin 125 mcg (0.125 mg) oral tablet  -- 1 tab(s) by mouth once a day  -- Indication: For Atrail flutter    atorvastatin 20 mg oral tablet  -- 1 tab(s) by mouth once a day (at bedtime)  -- Indication: For hld    metoprolol succinate 100 mg oral tablet, extended release  -- 1 tab(s) by mouth once a day  -- Indication: For Atrial flutter and heart failure    Lasix 40 mg oral tablet  -- 1 tab(s) by mouth once a day  -- Indication: For heart failure    Zantac 150 oral tablet  -- 1 tab(s) by mouth 2 times a day  -- Indication: For home meds    K-Tab 20 mEq oral tablet, extended release  -- 1 tab(s) by mouth once a day  -- It is very important that you take or use this exactly as directed.  Do not skip doses or discontinue unless directed by your doctor.  Medication should be taken with plenty of water.  Take with food or milk.    -- Indication: For with lasix only    latanoprost 0.005% ophthalmic solution  -- 1 drop(s) to each affected eye once a day (in the evening)  -- Indication: For glaucoma    Calcium 600+D oral tablet  -- 1 tab(s) by mouth once a day  -- Indication: For supplement

## 2017-03-03 NOTE — DISCHARGE NOTE ADULT - HOSPITAL COURSE
Patient is a 85y old  Female who presents with a chief complaint of I started to have fluttering and a little chest pain last night (01 Mar 2017 18:20)      HPI:  86 y/o female was relatively alright till yesterday. Today in am she started short of breath was gradually getting worse associated with chest discomfort. No aggravating factor, no relieving factor. At assisted living facility noted to have increase in weight      -discharged in January from  after she was managed for systolic heart failure (01 Mar 2017 14:12)      #Review of system- rest of review of systems are negative except as mentioned in HPI    PHYSICAL EXAM:  Vital Signs Last 24 Hrs  T(C): 36.5, Max: 36.7 (03-02 @ 17:59)  T(F): 97.7, Max: 98.1 (03-02 @ 21:35)  HR: 69 (66 - 69)  BP: 112/56 (108/- - 116/53)  BP(mean): 53 (53 - 53)  RR: 18 (17 - 18)  SpO2: 99% (98% - 100%)  GENERAL: comfortable   HEAD:  Atraumatic, Normocephalic  EYES: EOMI, PERRLA, conjunctiva and sclera clear  HEENT: Moist mucous membranes  NECK: Supple, No JVD  NERVOUS SYSTEM:  Alert & Oriented X3, Motor Strength 5/5 B/L upper and lower extremities; DTRs 2+ intact and symmetric  CHEST/LUNG: Clear to auscultation bilaterally; No rales, rhonchi, wheezing, or rubs  HEART: Regular rate and rhythm; No murmurs, rubs, or gallops  ABDOMEN: Soft, Nontender, Nondistended; Bowel sounds present  GENITOURINARY- Voiding, no palpable bladder  EXTREMITIES:  2+ Peripheral Pulses, No clubbing, cyanosis, or edema  MUSCULOSKELTAL- No muscle tenderness, Muscle tone normal, No joint tenderness, no Joint swelling, Joint range of motion-normal  SKIN-no rash, no lesion    LABS:    03 Mar 2017 06:45    142    |  102    |  14     ----------------------------<  92     3.3     |  31     |  0.78     Ca    8.4        03 Mar 2017 06:45  Phos  3.3       03 Mar 2017 06:45  Mg     2.0       01 Mar 2017 16:54    TPro  x      /  Alb  3.0    /  TBili  x      /  DBili  x      /  AST  x      /  ALT  x      /  AlkPhos  x      03 Mar 2017 06:45      Standing medicine  traMADol 50milliGRAM(s) Oral every 6 hours PRN  amiodarone    Tablet 200milliGRAM(s) Oral daily  atorvastatin 20milliGRAM(s) Oral at bedtime  metoprolol succinate ER 100milliGRAM(s) Oral daily  latanoprost 0.005% Ophthalmic Solution 1Drop(s) Both EYES at bedtime  aspirin  chewable 81milliGRAM(s) Oral daily  digoxin     Tablet 0.125milliGRAM(s) Oral daily  spironolactone 25milliGRAM(s) Oral daily  heparin  Injectable 5000Unit(s) SubCutaneous every 8 hours  enalapril 2.5milliGRAM(s) Oral daily  furosemide   Injectable 40milliGRAM(s) IV Push daily  potassium chloride   Powder 20milliEquivalent(s) Oral once    A/P    #Acute decompensated systolic heart failure  -appears now euvolemic  -d/c on home dose of lasix      #Aflutter  -ct her home meds   -off AC due to hematuria and pt has refused to take anticoagulation. Discussed with her about risk of stroke, she understood and verbalized me back and still has refused it   -digoxin added to regimen during this hospitalization     #Further management as an outpt with cardiologist for possible ischemia evaluation and further management     #follow up with PMD on monday to get your electrolytes checked up for further management     #d/c today     #time spent more than 30 minutes

## 2017-03-03 NOTE — PROGRESS NOTE ADULT - ASSESSMENT
Acute on chronic decompensated HFrEF , known LVEF 29%, hypervolemic, NYHA class III- unclear etiology for decompensation.  ICD interrogated- no events noted. her SVC lead was turned off in past.  WIll continue enalpril and toprol at home doses.  Will decrease  iv lasix  to daily dose and with close monitoring of her renal function and electrolytes. ordered potassium.  Strict I/O and daily wt checks. Low sodium diet. Nutrition education.  continue digoxin  spirnolactone.      HTN, Hyperlipidemia- continue current meds    CAD- will need outpt ischemic evaluation. Pt to f/u with me after discharge in my office.    Chronic afib- s/p ablation in past, off anticoagulation secondary to hematuria. Pt reluctant to restart anticoagulation.    Thank you for allowing me to participate in the care of this patient. Please feel free to contact me with any questions.

## 2017-03-03 NOTE — DISCHARGE NOTE ADULT - MEDICATION SUMMARY - MEDICATIONS TO STOP TAKING
I will STOP taking the medications listed below when I get home from the hospital:    Xarelto 15 mg oral tablet  -- 1 tab(s) by mouth once a day (in the evening)

## 2017-03-03 NOTE — DISCHARGE NOTE ADULT - MEDICATION SUMMARY - MEDICATIONS TO CHANGE
I will SWITCH the dose or number of times a day I take the medications listed below when I get home from the hospital:    metoprolol succinate 200 mg oral tablet, extended release  -- 1 tab(s) by mouth once a day    potassium chloride 20 mEq oral granule, extended release  -- 1 tab(s) by mouth 2 times a day

## 2017-03-03 NOTE — PROGRESS NOTE ADULT - SUBJECTIVE AND OBJECTIVE BOX
Patient is a 85y old  Female who presents with a chief complaint of chest pain and shortness of breath.      HPI:  86 y/o female with prior medical history of ischemic cardiomyopathy, LVEF 29%, s/p BiV ICD implantation, chronic afib s/p ablation in 4/16, and ICD firing subsequently and the ICD battery was changed per family, hx of HTN, Hyperlipidemia, recently off xarelto secondary to gross hematuria, presented with c/o SOB and AYERS and PND, chest pain.  Chest pain described as substernal, 8/10 with radiation to the right shoulder, resolved spontaneously after the tramadol.  She walks with a walker secondary to gait instability.    NYHA class III-IV.  Hospitalized in 1/17 with decompensated heart failure.    3/2- Pt seen and examined by me. Says SOB is better today.  3/3- Pt seen and examined today. Imporvement in SOB noted.        PAST MEDICAL & SURGICAL HISTORY:  Osteoarthritis of multiple joints, unspecified osteoarthritis type  Gastroesophageal reflux disease without esophagitis  Dyslipidemia  Essential hypertension  Paroxysmal atrial fibrillation  Coronary artery disease involving native coronary artery of native heart without angina pectoris  Chronic systolic congestive heart failure: EF 20-25%  AICD (automatic cardioverter/defibrillator) present      MEDICATIONS  (STANDING):  amiodarone    Tablet 200milliGRAM(s) Oral daily  atorvastatin 20milliGRAM(s) Oral at bedtime  metoprolol succinate ER 100milliGRAM(s) Oral daily  latanoprost 0.005% Ophthalmic Solution 1Drop(s) Both EYES at bedtime  furosemide   Injectable 40milliGRAM(s) IV Push two times a day  aspirin  chewable 81milliGRAM(s) Oral daily  rivaroxaban 20milliGRAM(s) Oral daily    MEDICATIONS  (PRN):  traMADol 50milliGRAM(s) Oral every 6 hours PRN Moderate Pain (4 - 6)      FAMILY HISTORY:  No pertinent family history in first degree relatives      SOCIAL HISTORY:  non smoker, no alcohol use        REVIEW OF SYSTEMS:  CONSTITUTIONAL:  No night sweats.  c/o fatigue, malaise.  HEENT:  Eyes:  No visual changes.  No eye pain.  No eye discharge.   ENT:  No runny nose.  No epistaxis.  No sinus pain.  No sore throat.  No odynophagia.  No ear pain.  No congestion.  RESPIRATORY:  No cough.  No wheeze.  No hemoptysis.  c/o  shortness of breath, AYERS   CARDIOVASCULAR:  No chest pains.  No palpitations. c/o shortness of breath, orthopnea or PND.  GASTROINTESTINAL:  No abdominal pain.  No nausea or vomiting.  No diarrhea or constipation.  No hematemesis.  No hematochezia.  No melena.  GENITOURINARY:  No urgency.  No frequency.  No dysuria.  No hematuria.  No obstructive symptoms.  No discharge.  No pain.  No significant abnormal bleeding.  MUSCULOSKELETAL:  No musculoskeletal pain.  No joint swelling.  No arthritis.  NEUROLOGICAL:  No tingling or numbness or weakness.  PSYCHIATRIC:  No confusion  SKIN:  No rashes.  No lesions.  No wounds.  ENDOCRINE:  No unexplained weight loss.  No polydipsia.  No polyuria.  No polyphagia.  HEMATOLOGIC:  No anemia.  No purpura.  No petechiae.  No prolonged or excessive bleeding.   ALLERGIC AND IMMUNOLOGIC:  No pruritus.  No swelling.         Vital Signs Last 24 Hrs  T(C): 36.7, Max: 36.7 (03-01 @ 08:44)  T(F): 98, Max: 98 (03-01 @ 08:44)  HR: 74 (72 - 75)  BP: 132/78 (95/63 - 133/78)  BP(mean): --  RR: 16 (16 - 18)  SpO2: 99% (99% - 100%)    PHYSICAL EXAM-    Constitutional: no acute distress  Head: Head is normocephalic and atraumatic.      Neck: The patient's neck is supple without enlargement,positive JVD    Cardiovascular: Regular rate and rhythm without S3, S4. No murmurs or rubs are appreciated.      Respiratory: crackles b/l basal    Abdomen: Soft, nontender, nondistended with positive bowel sounds.      Extremity: No tenderness.trace pedal edema    Neurologic: The patient is alert and oriented.      Skin: No rash, no obvious lesions noted.      Psychiatric: The patient appears to be emotionally stable.      INTERPRETATION OF TELEMETRY:sinus rythm, occasional pacing.    ECG:sinus rythm, T wave inversion in inferior leads, I and aVL, poor R wave progression.artifact at baseline in V6    I&O's Detail    I & Os for current day (as of 01 Mar 2017 15:10)  =============================================  IN:    Total IN: 0 ml  ---------------------------------------------  OUT:    Voided: 300 ml    Total OUT: 300 ml  ---------------------------------------------  Total NET: -300 ml      LABS:                        10.0   5.6   )-----------( 214      ( 01 Mar 2017 09:05 )             31.8     01 Mar 2017 09:05    139    |  101    |  13     ----------------------------<  107    4.0     |  29     |  0.80     Ca    8.6        01 Mar 2017 09:05  Mg     2.0       01 Mar 2017 09:05    TPro  6.1    /  Alb  2.9    /  TBili  0.4    /  DBili  x      /  AST  82     /  ALT  80     /  AlkPhos  101    01 Mar 2017 09:05    CARDIAC MARKERS ( 01 Mar 2017 12:03 )  0.040 ng/mL / x     / x     / x     / x      CARDIAC MARKERS ( 01 Mar 2017 09:05 )  0.041 ng/mL / x     / 46 U/L / x     / x          PT/INR - ( 01 Mar 2017 09:05 )   PT: 12.8 sec;   INR: 1.16 ratio             I&O's Summary    I & Os for current day (as of 01 Mar 2017 15:10)  =============================================  IN: 0 ml / OUT: 300 ml / NET: -300 ml    BNPSerum Pro-Brain Natriuretic Peptide: [id number] pg/mL (03-01 @ 09:05)    RADIOLOGY & ADDITIONAL STUDIES:

## 2017-03-03 NOTE — DISCHARGE NOTE ADULT - CONDITIONS AT DISCHARGE
Follow up with Dr. Iverson 3/9 at 11:30 am  [facility]  [address]  [phone]

## 2017-03-03 NOTE — DISCHARGE NOTE ADULT - PATIENT PORTAL LINK FT
“You can access the [facility] Patient Portal, offered by [facility], by registering with the following website: [email or web address]”

## 2017-03-03 NOTE — DISCHARGE NOTE ADULT - ABILITY TO HEAR (WITH HEARING AID OR HEARING APPLIANCE IF NORMALLY USED):
Mildly to Moderately Impaired: difficulty hearing in some environments or speaker may need to increase volume or speak distinctly/left hearing aids at [place]

## 2017-03-03 NOTE — DISCHARGE NOTE ADULT - NS AS DC HF EDUCATION INSTRUCTIONS
Report weight gain of 2 or more pounds in one day or 3 or more pounds in one week, worsening shortness of breath, fatigue, weakness, increased swelling of hands and feet to primary care provider/Low salt diet/Activities as tolerated/Monitor Weight Daily/Call Primary Care Provider for follow-up after discharge

## 2017-03-03 NOTE — DISCHARGE NOTE ADULT - NS AS DC STROKE ED MATERIALS
Risk Factors for Stroke/Need for Followup After Discharge/Stroke Warning Signs and Symptoms/Call 911 for Stroke/Prescribed Medications/Stroke Education Booklet

## 2017-03-06 DIAGNOSIS — Z95.0 PRESENCE OF CARDIAC PACEMAKER: ICD-10-CM

## 2017-03-06 DIAGNOSIS — K21.9 GASTRO-ESOPHAGEAL REFLUX DISEASE WITHOUT ESOPHAGITIS: ICD-10-CM

## 2017-03-06 DIAGNOSIS — I25.5 ISCHEMIC CARDIOMYOPATHY: ICD-10-CM

## 2017-03-06 DIAGNOSIS — I48.0 PAROXYSMAL ATRIAL FIBRILLATION: ICD-10-CM

## 2017-03-06 DIAGNOSIS — I11.0 HYPERTENSIVE HEART DISEASE WITH HEART FAILURE: ICD-10-CM

## 2017-03-06 DIAGNOSIS — I25.10 ATHEROSCLEROTIC HEART DISEASE OF NATIVE CORONARY ARTERY WITHOUT ANGINA PECTORIS: ICD-10-CM

## 2017-03-06 DIAGNOSIS — E78.5 HYPERLIPIDEMIA, UNSPECIFIED: ICD-10-CM

## 2017-03-06 DIAGNOSIS — I50.21 ACUTE SYSTOLIC (CONGESTIVE) HEART FAILURE: ICD-10-CM

## 2017-03-09 DIAGNOSIS — I48.92 UNSPECIFIED ATRIAL FLUTTER: ICD-10-CM

## 2017-08-13 ENCOUNTER — EMERGENCY (EMERGENCY)
Facility: HOSPITAL | Age: 82
LOS: 0 days | Discharge: HOME | End: 2017-08-13
Attending: EMERGENCY MEDICINE | Admitting: INTERNAL MEDICINE
Payer: MEDICARE

## 2017-08-13 VITALS
DIASTOLIC BLOOD PRESSURE: 80 MMHG | SYSTOLIC BLOOD PRESSURE: 134 MMHG | WEIGHT: 130.07 LBS | TEMPERATURE: 98 F | HEART RATE: 60 BPM | OXYGEN SATURATION: 100 % | RESPIRATION RATE: 16 BRPM

## 2017-08-13 VITALS
TEMPERATURE: 98 F | OXYGEN SATURATION: 100 % | SYSTOLIC BLOOD PRESSURE: 145 MMHG | RESPIRATION RATE: 15 BRPM | HEART RATE: 66 BPM | DIASTOLIC BLOOD PRESSURE: 74 MMHG
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
## 2017-08-13 DIAGNOSIS — Z95.810 PRESENCE OF AUTOMATIC (IMPLANTABLE) CARDIAC DEFIBRILLATOR: ICD-10-CM

## 2017-08-13 DIAGNOSIS — I48.0 PAROXYSMAL ATRIAL FIBRILLATION: ICD-10-CM
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
## 2017-08-13 DIAGNOSIS — I50.22 CHRONIC SYSTOLIC (CONGESTIVE) HEART FAILURE: ICD-10-CM

## 2017-08-13 DIAGNOSIS — E78.5 HYPERLIPIDEMIA, UNSPECIFIED: ICD-10-CM

## 2017-08-13 DIAGNOSIS — Z95.810 PRESENCE OF AUTOMATIC (IMPLANTABLE) CARDIAC DEFIBRILLATOR: Chronic | ICD-10-CM

## 2017-08-13 DIAGNOSIS — S01.91XA LACERATION WITHOUT FOREIGN BODY OF UNSPECIFIED PART OF HEAD, INITIAL ENCOUNTER: ICD-10-CM
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
## 2017-08-13 DIAGNOSIS — Z79.82 LONG TERM (CURRENT) USE OF ASPIRIN: ICD-10-CM

## 2017-08-13 DIAGNOSIS — K21.9 GASTRO-ESOPHAGEAL REFLUX DISEASE WITHOUT ESOPHAGITIS: ICD-10-CM

## 2017-08-13 DIAGNOSIS — I10 ESSENTIAL (PRIMARY) HYPERTENSION: ICD-10-CM

## 2017-08-13 DIAGNOSIS — M50.30 OTHER CERVICAL DISC DEGENERATION, UNSPECIFIED CERVICAL REGION: ICD-10-CM

## 2017-08-13 DIAGNOSIS — M15.9 POLYOSTEOARTHRITIS, UNSPECIFIED: ICD-10-CM

## 2017-08-13 DIAGNOSIS — I25.10 ATHEROSCLEROTIC HEART DISEASE OF NATIVE CORONARY ARTERY WITHOUT ANGINA PECTORIS: ICD-10-CM

## 2017-08-13 DIAGNOSIS — S01.81XA LACERATION WITHOUT FOREIGN BODY OF OTHER PART OF HEAD, INITIAL ENCOUNTER: ICD-10-CM

## 2017-08-13 PROCEDURE — 72125 CT NECK SPINE W/O DYE: CPT | Mod: 26

## 2017-08-13 PROCEDURE — 12013 RPR F/E/E/N/L/M 2.6-5.0 CM: CPT

## 2017-08-13 PROCEDURE — 93010 ELECTROCARDIOGRAM REPORT: CPT

## 2017-08-13 PROCEDURE — 99284 EMERGENCY DEPT VISIT MOD MDM: CPT | Mod: 25

## 2017-08-13 PROCEDURE — 70450 CT HEAD/BRAIN W/O DYE: CPT | Mod: 26

## 2017-08-13 NOTE — ED PROVIDER NOTE - CONSTITUTIONAL, MLM
normal... Well appearing elderly female, well nourished, awake, alert, oriented to person, place, time/situation and in no apparent distress.

## 2017-08-13 NOTE — ED PROVIDER NOTE - MEDICAL DECISION MAKING DETAILS
86F on asa with head laceration s/p trip and fall. no LOC, no neuro deficit. pt acting at [facility], no complaints other than laceration. will obtain ct head, c-spine eval for intracranaial bleed or cervical fracture. no need for tdap, given within last year. laceration repair. plan to d/c home if scans wnl. - Michele Raya MD 86F on asa with head laceration s/p trip and fall. no LOC, no neuro deficit. [facility] other [facility]. will obtain ct head, c-spine eval for intracranial bleed or cervical fracture. no need for tdap, given within last year. laceration repair. plan to d/c home if scans wnl. - Michele Raya MD

## 2017-08-13 NOTE — ED PROVIDER NOTE - OBJECTIVE STATEMENT
86F with pmh HTN, Systolic CHF EF~30-45%, Atrial Fibrillation on Xarelto, Valvuar Cardiomyopathy, HLD, GERD, OA, presents with head laceration s/p mechanical trip and fall. tripped over feet while walking without walker (typically uses walker for ambulation), feet tangled, fell striking head on air conditioner. no LOC. no headache, dizziness, blurry vision, numbness, weakness. no neck pain. no cp, sob. no recent illnesses, f/c, n/v/d. acting at baseline per daughter. 86F with pmh HTN, Systolic CHF EF~30-45%, Atrial Fibrillation (only on asa), Valvuar Cardiomyopathy, HLD, GERD, OA, presents with head laceration s/p mechanical trip and fall. tripped over feet while walking without walker (typically uses walker for ambulation), feet tangled, fell striking head on air conditioner. +bleeding from forehead. no LOC. no symptoms preceding fall. no headache, dizziness, blurry vision, numbness, weakness. no neck pain. no cp, sob. no recent illnesses, f/c, n/v/d. acting at baseline per daughter. last tetanus <1 year ago.

## 2017-08-13 NOTE — ED PROVIDER NOTE - MUSCULOSKELETAL, MLM
No midline C, T, L ttp. or stepoffs, Full ROm bilat UE and LE, no joint or bony ttp,. +joint deformity to bilat fingers (per pt at baseline)

## 2017-08-13 NOTE — ED PROVIDER NOTE - CARE PLAN
Principal Discharge DX:	Laceration of forehead, initial encounter  Secondary Diagnosis:	Traumatic injury of head, initial encounter Principal Discharge DX:	Laceration of forehead, initial encounter  Instructions for follow-up, activity and diet:	1. Please keep wound clean and dry, may wash, do not scrub area  2. Follow-up with your primary care doctor or in ED in 7 days for suture removal/wound eval  3. Return immediately for any worsening or concerning symptoms including any signs or symptoms of infection, headache, dizziness, neurological symptoms  Secondary Diagnosis:	Traumatic injury of head, initial encounter

## 2017-08-13 NOTE — ED ADULT NURSE REASSESSMENT NOTE - NS ED NURSE REASSESS COMMENT FT1
Pt recvd from night shift. no acute distress, daughter at the bedside. Call bell in reach  continued monitoring in place.

## 2017-08-13 NOTE — ED PROVIDER NOTE - PROGRESS NOTE DETAILS
pt updated on imaging results. feels well. lac being repaired by PA. discussed proper follow-up and indications to return to ED with pt and daughter. stable for d/c once lac repaired. - Michele Raya MD

## 2017-08-13 NOTE — ED PROVIDER NOTE - PLAN OF CARE
1. Please keep wound clean and dry, may wash, do not scrub area  2. Follow-up with your primary care doctor or in ED in 7 days for suture removal/wound eval  3. Return immediately for any worsening or concerning symptoms including any signs or symptoms of infection, headache, dizziness, neurological symptoms

## 2017-08-30 PROBLEM — Z00.00 ENCOUNTER FOR PREVENTIVE HEALTH EXAMINATION: Status: ACTIVE | Noted: 2017-08-30

## 2017-10-13 NOTE — ED ADULT TRIAGE NOTE - BMI (KG/M2)
Flank Pain  Flank pain refers to pain that is located on the side of the body between the upper abdomen and the back. The pain may occur over a short period of time (acute) or may be long-term or reoccurring (chronic). It may be mild or severe. Flank pain can be caused by many things.  CAUSES   Some of the more common causes of flank pain include:  · Muscle strains.    · Muscle spasms.    · A disease of your spine (vertebral disk disease).    · A lung infection (pneumonia).    · Fluid around your lungs (pulmonary edema).    · A kidney infection.    · Kidney stones.    · A very painful skin rash caused by the chickenpox virus (shingles).    · Gallbladder disease.    HOME CARE INSTRUCTIONS   Home care will depend on the cause of your pain. In general,  · Rest as directed by your caregiver.  · Drink enough fluids to keep your urine clear or pale yellow.  · Only take over-the-counter or prescription medicines as directed by your caregiver. Some medicines may help relieve the pain.  · Tell your caregiver about any changes in your pain.  · Follow up with your caregiver as directed.  SEEK IMMEDIATE MEDICAL CARE IF:   · Your pain is not controlled with medicine.    · You have new or worsening symptoms.  · Your pain increases.    · You have abdominal pain.    · You have shortness of breath.    · You have persistent nausea or vomiting.    · You have swelling in your abdomen.    · You feel faint or pass out.    · You have blood in your urine.  · You have a fever or persistent symptoms for more than 2-3 days.  · You have a fever and your symptoms suddenly get worse.  MAKE SURE YOU:   · Understand these instructions.  · Will watch your condition.  · Will get help right away if you are not doing well or get worse.     This information is not intended to replace advice given to you by your health care provider. Make sure you discuss any questions you have with your health care provider.     Document Released: 02/08/2007 Document  Revised: 09/11/2013 Document Reviewed: 08/01/2013  [employer] Interactive Patient Education ©2016 [employer]      Muscle Strain  A muscle strain is an injury that occurs when a muscle is stretched beyond its normal length. Usually a small number of muscle fibers are torn when this happens. Muscle strain is rated in degrees. First-degree strains have the least amount of muscle fiber tearing and pain. Second-degree and third-degree strains have increasingly more tearing and pain.   Usually, recovery from muscle strain takes 1-2 weeks. Complete healing takes 5-6 weeks.   CAUSES   Muscle strain happens when a sudden, violent force placed on a muscle stretches it too far. This may occur with lifting, sports, or a fall.   RISK FACTORS  Muscle strain is especially common in athletes.   SIGNS AND SYMPTOMS  At the site of the muscle strain, there may be:  · Pain.  · Bruising.  · Swelling.  · Difficulty using the muscle due to pain or lack of normal function.  DIAGNOSIS   Your health care provider will perform a physical exam and ask about your medical history.  TREATMENT   Often, the best treatment for a muscle strain is resting, icing, and applying cold compresses to the injured area.    HOME CARE INSTRUCTIONS   · Use the PRICE method of treatment to promote muscle healing during the first 2-3 days after your injury. The PRICE method involves:  ¨ Protecting the muscle from being injured again.  ¨ Restricting your activity and resting the injured body part.  ¨ Icing your injury. To do this, put ice in a plastic bag. Place a towel between your skin and the bag. Then, apply the ice and leave it on from 15-20 minutes each hour. After the third day, switch to moist heat packs.  ¨ Apply compression to the injured area with a splint or elastic bandage. Be careful not to wrap it too tightly. This may interfere with blood circulation or increase swelling.  ¨ Elevate the injured body part above the level of your heart as often as  you can.  · Only take over-the-counter or prescription medicines for pain, discomfort, or fever as directed by your health care provider.  · Warming up prior to exercise helps to prevent future muscle strains.  SEEK MEDICAL CARE IF:   · You have increasing pain or swelling in the injured area.  · You have numbness, tingling, or a significant loss of strength in the injured area.  MAKE SURE YOU:   · Understand these instructions.  · Will watch your condition.  · Will get help right away if you are not doing well or get worse.     This information is not intended to replace advice given to you by your health care provider. Make sure you discuss any questions you have with your health care provider.     Document Released: 12/18/2006 Document Revised: 10/08/2014 Document Reviewed: 07/17/2014  [employer] Interactive Patient Education ©2016 [employer]       24.9

## 2018-02-16 ENCOUNTER — INPATIENT (INPATIENT)
Facility: HOSPITAL | Age: 83
LOS: 5 days | Discharge: NURSING, REHAB OR LONG-TERM CARE | End: 2018-02-22
Attending: INTERNAL MEDICINE | Admitting: INTERNAL MEDICINE
Payer: MEDICARE

## 2018-02-16 VITALS
HEART RATE: 66 BPM | DIASTOLIC BLOOD PRESSURE: 79 MMHG | HEIGHT: 63 IN | WEIGHT: 130.07 LBS | OXYGEN SATURATION: 95 % | RESPIRATION RATE: 16 BRPM | TEMPERATURE: 100 F | SYSTOLIC BLOOD PRESSURE: 144 MMHG

## 2018-02-16 DIAGNOSIS — Z95.810 PRESENCE OF AUTOMATIC (IMPLANTABLE) CARDIAC DEFIBRILLATOR: Chronic | ICD-10-CM

## 2018-02-16 LAB
ANISOCYTOSIS BLD QL: SLIGHT — SIGNIFICANT CHANGE UP
BASOPHILS # BLD AUTO: 0.1 K/UL — SIGNIFICANT CHANGE UP (ref 0–0.2)
BASOPHILS NFR BLD AUTO: 1.8 % — SIGNIFICANT CHANGE UP (ref 0–2)
ELLIPTOCYTES BLD QL SMEAR: SLIGHT — SIGNIFICANT CHANGE UP
EOSINOPHIL # BLD AUTO: 0 K/UL — SIGNIFICANT CHANGE UP (ref 0–0.5)
EOSINOPHIL NFR BLD AUTO: 0.4 % — SIGNIFICANT CHANGE UP (ref 0–6)
HCT VFR BLD CALC: 32.6 % — LOW (ref 34.5–45)
HGB BLD-MCNC: 10.6 G/DL — LOW (ref 11.5–15.5)
HYPOCHROMIA BLD QL: SLIGHT — SIGNIFICANT CHANGE UP
LACTATE SERPL-SCNC: 0.7 MMOL/L — SIGNIFICANT CHANGE UP (ref 0.7–2)
LYMPHOCYTES # BLD AUTO: 0.4 K/UL — LOW (ref 1–3.3)
LYMPHOCYTES # BLD AUTO: 6 % — LOW (ref 13–44)
MANUAL DIF COMMENT BLD-IMP: SIGNIFICANT CHANGE UP
MCHC RBC-ENTMCNC: 29.4 PG — SIGNIFICANT CHANGE UP (ref 27–34)
MCHC RBC-ENTMCNC: 32.4 GM/DL — SIGNIFICANT CHANGE UP (ref 32–36)
MCV RBC AUTO: 90.6 FL — SIGNIFICANT CHANGE UP (ref 80–100)
MONOCYTES # BLD AUTO: 0.5 K/UL — SIGNIFICANT CHANGE UP (ref 0–0.9)
MONOCYTES NFR BLD AUTO: 8.1 % — SIGNIFICANT CHANGE UP (ref 2–14)
NEUTROPHILS # BLD AUTO: 5.4 K/UL — SIGNIFICANT CHANGE UP (ref 1.8–7.4)
NEUTROPHILS NFR BLD AUTO: 83.8 % — HIGH (ref 43–77)
OVALOCYTES BLD QL SMEAR: SLIGHT — SIGNIFICANT CHANGE UP
PLAT MORPH BLD: NORMAL — SIGNIFICANT CHANGE UP
PLATELET # BLD AUTO: 182 K/UL — SIGNIFICANT CHANGE UP (ref 150–400)
PLATELET COUNT - ESTIMATE: NORMAL — SIGNIFICANT CHANGE UP
POIKILOCYTOSIS BLD QL AUTO: SLIGHT — SIGNIFICANT CHANGE UP
RBC # BLD: 3.6 M/UL — LOW (ref 3.8–5.2)
RBC # FLD: 13.2 % — SIGNIFICANT CHANGE UP (ref 10.3–14.5)
RBC BLD AUTO: (no result)
SCHISTOCYTES BLD QL AUTO: SLIGHT — SIGNIFICANT CHANGE UP
WBC # BLD: 6.5 K/UL — SIGNIFICANT CHANGE UP (ref 3.8–10.5)
WBC # FLD AUTO: 6.5 K/UL — SIGNIFICANT CHANGE UP (ref 3.8–10.5)

## 2018-02-16 PROCEDURE — 93010 ELECTROCARDIOGRAM REPORT: CPT

## 2018-02-16 PROCEDURE — 71045 X-RAY EXAM CHEST 1 VIEW: CPT | Mod: 26

## 2018-02-16 PROCEDURE — 99284 EMERGENCY DEPT VISIT MOD MDM: CPT

## 2018-02-16 RX ORDER — SODIUM CHLORIDE 9 MG/ML
3 INJECTION INTRAMUSCULAR; INTRAVENOUS; SUBCUTANEOUS ONCE
Qty: 0 | Refills: 0 | Status: COMPLETED | OUTPATIENT
Start: 2018-02-16 | End: 2018-02-16

## 2018-02-16 RX ORDER — ACETAMINOPHEN 500 MG
650 TABLET ORAL ONCE
Qty: 0 | Refills: 0 | Status: COMPLETED | OUTPATIENT
Start: 2018-02-16 | End: 2018-02-16

## 2018-02-16 RX ORDER — SODIUM CHLORIDE 9 MG/ML
1000 INJECTION INTRAMUSCULAR; INTRAVENOUS; SUBCUTANEOUS ONCE
Qty: 0 | Refills: 0 | Status: COMPLETED | OUTPATIENT
Start: 2018-02-16 | End: 2018-02-16

## 2018-02-16 RX ADMIN — SODIUM CHLORIDE 1000 MILLILITER(S): 9 INJECTION INTRAMUSCULAR; INTRAVENOUS; SUBCUTANEOUS at 23:08

## 2018-02-16 RX ADMIN — Medication 650 MILLIGRAM(S): at 20:55

## 2018-02-16 RX ADMIN — SODIUM CHLORIDE 3 MILLILITER(S): 9 INJECTION INTRAMUSCULAR; INTRAVENOUS; SUBCUTANEOUS at 23:08

## 2018-02-16 NOTE — ED ADULT NURSE NOTE - OBJECTIVE STATEMENT
Pt sent in from [facility] d/t fevers and abdominal discomfort. Pt poor historian hx of dementia.

## 2018-02-16 NOTE — ED PROVIDER NOTE - OBJECTIVE STATEMENT
85 y/o f w/ pmhx of CHF, Afib, AICD, arthritis, presents to ED c/o fever and feeling sick. Pt lives in assisted living, this AM pt was weak and [facility] staff said she had low-grade fever, was weak/tired and had nausea. Some dysuria and coughing. Denies vomiting, diarrhea, HA, SOB, CP, or any other acute complaints. Currently on ASA. NKDA.

## 2018-02-16 NOTE — ED PROVIDER NOTE - NEUROLOGICAL, MLM
Alert and oriented to self, place, season but not day/month. Speech normal. CN II-XII intact. No focal motor-sensory deficit.

## 2018-02-16 NOTE — ED PROVIDER NOTE - MEDICAL DECISION MAKING DETAILS
87 y/o female BIBA from assisted living regarding low grade fever, cough, no SOB, generalized weakness, ?dysuria. Pt not acutely ill appearing, plan for labs including urine, RVP, CXR, monitor, observe, reassess. 85 y/o female BIBA from assisted living regarding low grade fever, cough, no SOB, generalized weakness, ?dysuria. Pt not acutely ill appearing, plan for fever w/u: labs including urine, RVP, CXR, monitor, observe, reassess.

## 2018-02-16 NOTE — ED PROVIDER NOTE - ENMT, MLM
Airway patent, Nasal mucosa clear. Mouth with moist mucosa. Throat has no vesicles, no oropharyngeal exudates and uvula is midline. +dentures.

## 2018-02-17 LAB
APPEARANCE UR: CLEAR — SIGNIFICANT CHANGE UP
BACTERIA # UR AUTO: (no result)
BILIRUB UR-MCNC: NEGATIVE — SIGNIFICANT CHANGE UP
COLOR SPEC: YELLOW — SIGNIFICANT CHANGE UP
DIFF PNL FLD: (no result)
EPI CELLS # UR: SIGNIFICANT CHANGE UP
FLUAV H1 2009 PAND RNA SPEC QL NAA+PROBE: DETECTED
GLUCOSE UR QL: NEGATIVE MG/DL — SIGNIFICANT CHANGE UP
KETONES UR-MCNC: NEGATIVE — SIGNIFICANT CHANGE UP
LEUKOCYTE ESTERASE UR-ACNC: NEGATIVE — SIGNIFICANT CHANGE UP
NITRITE UR-MCNC: NEGATIVE — SIGNIFICANT CHANGE UP
PH UR: 5 — SIGNIFICANT CHANGE UP (ref 5–8)
PROT UR-MCNC: 15 MG/DL
RAPID RVP RESULT: DETECTED
RBC CASTS # UR COMP ASSIST: (no result) /HPF (ref 0–4)
SP GR SPEC: 1.01 — SIGNIFICANT CHANGE UP (ref 1.01–1.02)
UROBILINOGEN FLD QL: NEGATIVE MG/DL — SIGNIFICANT CHANGE UP
WBC UR QL: SIGNIFICANT CHANGE UP

## 2018-02-17 PROCEDURE — 76705 ECHO EXAM OF ABDOMEN: CPT | Mod: 26

## 2018-02-17 RX ORDER — ACETAMINOPHEN 500 MG
650 TABLET ORAL EVERY 6 HOURS
Qty: 0 | Refills: 0 | Status: DISCONTINUED | OUTPATIENT
Start: 2018-02-17 | End: 2018-02-17

## 2018-02-17 RX ORDER — TRAMADOL HYDROCHLORIDE 50 MG/1
100 TABLET ORAL
Qty: 0 | Refills: 0 | Status: DISCONTINUED | OUTPATIENT
Start: 2018-02-17 | End: 2018-02-22

## 2018-02-17 RX ORDER — ALBUTEROL 90 UG/1
1 AEROSOL, METERED ORAL EVERY 4 HOURS
Qty: 0 | Refills: 0 | Status: DISCONTINUED | OUTPATIENT
Start: 2018-02-17 | End: 2018-02-22

## 2018-02-17 RX ORDER — AMIODARONE HYDROCHLORIDE 400 MG/1
200 TABLET ORAL DAILY
Qty: 0 | Refills: 0 | Status: DISCONTINUED | OUTPATIENT
Start: 2018-02-17 | End: 2018-02-18

## 2018-02-17 RX ORDER — POTASSIUM CHLORIDE 20 MEQ
20 PACKET (EA) ORAL DAILY
Qty: 0 | Refills: 0 | Status: DISCONTINUED | OUTPATIENT
Start: 2018-02-17 | End: 2018-02-22

## 2018-02-17 RX ORDER — LATANOPROST 0.05 MG/ML
1 SOLUTION/ DROPS OPHTHALMIC; TOPICAL AT BEDTIME
Qty: 0 | Refills: 0 | Status: DISCONTINUED | OUTPATIENT
Start: 2018-02-17 | End: 2018-02-22

## 2018-02-17 RX ORDER — ONDANSETRON 8 MG/1
4 TABLET, FILM COATED ORAL EVERY 6 HOURS
Qty: 0 | Refills: 0 | Status: DISCONTINUED | OUTPATIENT
Start: 2018-02-17 | End: 2018-02-22

## 2018-02-17 RX ORDER — ATORVASTATIN CALCIUM 80 MG/1
20 TABLET, FILM COATED ORAL AT BEDTIME
Qty: 0 | Refills: 0 | Status: DISCONTINUED | OUTPATIENT
Start: 2018-02-17 | End: 2018-02-17

## 2018-02-17 RX ORDER — SENNA PLUS 8.6 MG/1
2 TABLET ORAL AT BEDTIME
Qty: 0 | Refills: 0 | Status: DISCONTINUED | OUTPATIENT
Start: 2018-02-17 | End: 2018-02-22

## 2018-02-17 RX ORDER — ENOXAPARIN SODIUM 100 MG/ML
30 INJECTION SUBCUTANEOUS EVERY 24 HOURS
Qty: 0 | Refills: 0 | Status: DISCONTINUED | OUTPATIENT
Start: 2018-02-17 | End: 2018-02-22

## 2018-02-17 RX ORDER — ALBUTEROL 90 UG/1
2.5 AEROSOL, METERED ORAL EVERY 6 HOURS
Qty: 0 | Refills: 0 | Status: DISCONTINUED | OUTPATIENT
Start: 2018-02-17 | End: 2018-02-22

## 2018-02-17 RX ORDER — LACTULOSE 10 G/15ML
20 SOLUTION ORAL AT BEDTIME
Qty: 0 | Refills: 0 | Status: DISCONTINUED | OUTPATIENT
Start: 2018-02-17 | End: 2018-02-22

## 2018-02-17 RX ORDER — PANTOPRAZOLE SODIUM 20 MG/1
40 TABLET, DELAYED RELEASE ORAL
Qty: 0 | Refills: 0 | Status: DISCONTINUED | OUTPATIENT
Start: 2018-02-17 | End: 2018-02-22

## 2018-02-17 RX ORDER — ASPIRIN/CALCIUM CARB/MAGNESIUM 324 MG
81 TABLET ORAL DAILY
Qty: 0 | Refills: 0 | Status: DISCONTINUED | OUTPATIENT
Start: 2018-02-17 | End: 2018-02-22

## 2018-02-17 RX ORDER — DIGOXIN 250 MCG
0.12 TABLET ORAL DAILY
Qty: 0 | Refills: 0 | Status: DISCONTINUED | OUTPATIENT
Start: 2018-02-17 | End: 2018-02-18

## 2018-02-17 RX ORDER — METOPROLOL TARTRATE 50 MG
100 TABLET ORAL DAILY
Qty: 0 | Refills: 0 | Status: DISCONTINUED | OUTPATIENT
Start: 2018-02-17 | End: 2018-02-18

## 2018-02-17 RX ORDER — IBUPROFEN 200 MG
200 TABLET ORAL EVERY 6 HOURS
Qty: 0 | Refills: 0 | Status: DISCONTINUED | OUTPATIENT
Start: 2018-02-17 | End: 2018-02-22

## 2018-02-17 RX ADMIN — Medication 81 MILLIGRAM(S): at 12:07

## 2018-02-17 RX ADMIN — PANTOPRAZOLE SODIUM 40 MILLIGRAM(S): 20 TABLET, DELAYED RELEASE ORAL at 12:07

## 2018-02-17 RX ADMIN — Medication 30 MILLIGRAM(S): at 18:07

## 2018-02-17 RX ADMIN — LATANOPROST 1 DROP(S): 0.05 SOLUTION/ DROPS OPHTHALMIC; TOPICAL at 22:13

## 2018-02-17 RX ADMIN — ENOXAPARIN SODIUM 30 MILLIGRAM(S): 100 INJECTION SUBCUTANEOUS at 10:46

## 2018-02-17 RX ADMIN — Medication 75 MILLIGRAM(S): at 00:29

## 2018-02-17 RX ADMIN — TRAMADOL HYDROCHLORIDE 100 MILLIGRAM(S): 50 TABLET ORAL at 18:08

## 2018-02-17 RX ADMIN — Medication 1 TABLET(S): at 12:07

## 2018-02-17 RX ADMIN — Medication 20 MILLIEQUIVALENT(S): at 12:07

## 2018-02-17 NOTE — H&P ADULT - HISTORY OF PRESENT ILLNESS
poor historian. patient is an 85 y/o f w/ pmhx of chronic systolic dysfunction with EF 25-30% s/p AICD placement, pAfib not on AC, arthritis, presents to ED from assisted ling c/o fever and feeling sick. Pt lives in assisted living and states she was having fever and was sent to ED for further workup, states occasional dry cough but feels okay otherwise.  no SOB currently but feeling very tired and weak.  able to and eat and drink well but decreased appetite compared to before.    PMD - as per HPI  PSH - as per HPI  ALL - see below

## 2018-02-17 NOTE — H&P ADULT - ASSESSMENT
pt is 87 yo female with PMH as per hPI here with fever and found be positive for flu    * influenza A - start tamiflu and contineu spportive care  - add cough suppresant  - oxygen prn  - will not given IVF at this time as pt tolerating po and given CHF hx     * chronic systolic dysfunction/pafib - EF 25-30 % on last echo  - given elevated cr 1.3 (baseline ~0.8) will hold off ACEI and lasix for today and resume in AM  - monitor and replete lytes as needed  - continue dig, aldactone, amio, BB, statin, aspirin  - off AC ?falls  - EKG - paced rhythm 60s    * ARF on CKD 2 - baseline cr ~0.8  - will urge po intake and check in AM  - hold nephrotoxic meds and renal dose     * Arthritis - ? Rheumatoid vs severe OA  - pt is 85 yo female with PMH as per hPI here with fever and found be positive for flu    * influenza A - start tamiflu and contineu spportive care  - add cough suppresant  - oxygen prn  - will not given IVF at this time as pt tolerating po and given CHF hx     * chronic systolic dysfunction/pafib - EF 25-30 % on last echo  - given elevated cr 1.3 (baseline ~0.8) will hold off ACEI and lasix for today and resume in AM  - monitor and replete lytes as needed  - continue dig, aldactone, amio, BB, statin, aspirin  - off AC ?falls  - EKG - paced rhythm 60s    * ARF on CKD 2 - baseline cr ~0.8  - will urge po intake and check in AM  - hold nephrotoxic meds and renal dose     * Arthritis - ? Rheumatoid vs severe OA  - will d/w family to see if we can obtain more info  - continue tramadol and Tylenol for pain prn     code status - DNR/DNI  dispo - AL early next week. await PT hao pt is 87 yo female with PMH as per hPI here with fever and found be positive for flu    * influenza A - start tamiflu and contineu spportive care  - add cough suppresant  - oxygen prn  - will not given IVF at this time as pt tolerating po and given CHF hx     * tranaminitis - ?2/2 flu  - will discontinue tylenol and given motrin prn mild pain or for temp   - check hepatitis panel and hepatic panel in AM  - uS ABD - plan for liver CT once improved inpt vs outpt    * chronic systolic dysfunction/pafib - EF 25-30 % on last echo  - given elevated cr 1.3 (baseline ~0.8) will hold off ACEI and lasix for today and resume in AM  - monitor and replete lytes as needed  - continue dig, aldactone, amio, BB, statin, aspirin  - off AC ?falls  - EKG - paced rhythm 60s    * ARF on CKD 2 - baseline cr ~0.8  - will urge po intake and check in AM  - hold nephrotoxic meds and renal dose     * Arthritis - ? Rheumatoid vs severe OA  - will d/w family to see if we can obtain more info  - continue tramadol and Tylenol for pain prn     code status - DNR/DNI  dispo - AL early next week. await PT eval

## 2018-02-17 NOTE — H&P ADULT - NSHPLABSRESULTS_GEN_ALL_CORE
RVP + flu                          10.6   6.5   )-----------( 182      ( 2018 20:22 )             32.6         136  |  99  |  24<H>  ----------------------------<  91  4.0   |  29  |  1.36<H>    Ca    8.1<L>      2018 23:51    TPro  6.6  /  Alb  3.2<L>  /  TBili  0.6  /  DBili  x   /  AST  756<H>  /  ALT  838<H>  /  AlkPhos  33<L>          LIVER FUNCTIONS - ( 2018 23:51 )  Alb: 3.2 g/dL / Pro: 6.6 gm/dL / ALK PHOS: 33 U/L / ALT: 838 U/L / AST: 756 U/L / GGT: x             Urinalysis Basic - ( 2018 00:00 )    Color: Yellow / Appearance: Clear / S.015 / pH: x  Gluc: x / Ketone: Negative  / Bili: Negative / Urobili: Negative mg/dL   Blood: x / Protein: 15 mg/dL / Nitrite: Negative   Leuk Esterase: Negative / RBC: 3-5 /HPF / WBC 3-5   Sq Epi: x / Non Sq Epi: Few / Bacteria: Few        Lactate, Blood: 0.7 mmol/L ( @ 20:22)        < from: US Transthoracic Echocardiogram w/Doppler Complete (16 @ 08:18) >  IMPRESSION: Enlargement of the left ventricle with generalized   hypokinesia and an estimated ejection fraction of 20-25%. Enlargement of   the left atrium with severe mitral insufficiency. Enlargement of the   right atrium with moderate tricuspid insufficiency. Mild aortic   insufficiency. A pacemaker wire was incidentally noted.    < end of copied text >    < from: Xray Chest 1 View AP/PA. (17 @ 09:34) >    Minimal pulmonary vascular congestion with trace bilateral pleural   effusions    < end of copied text >    EKG - paced rhythm 60s (my read)

## 2018-02-17 NOTE — H&P ADULT - NSHPPHYSICALEXAM_GEN_ALL_CORE
GEN: lying in bed, NAD, not ill appearing   HEENT:   NC/AT, EOMI  CV:  +S1, +S2, RRR  RESP:   few crackles at bases b/l no wheezing good air entry b/l   BREAST:  not examined  GI:  abdomen soft, non-tender, non-distended, normoactive BS  RECTAL:  not examined  :  not examined  MSK:   normal muscle tone  EXT:  deformities of hand c/w arthiritis  NEURO:  AAOX3, no focal neurological deficits  SKIN:  no rashes

## 2018-02-17 NOTE — H&P ADULT - NSHPREVIEWOFSYSTEMS_GEN_ALL_CORE
ROS:   All 10 systems reviewed and found to be negative with the exception of what has been described above.as per HPI

## 2018-02-18 LAB
ALBUMIN SERPL ELPH-MCNC: 2.8 G/DL — LOW (ref 3.3–5)
ALP SERPL-CCNC: 34 U/L — LOW (ref 40–120)
ALT FLD-CCNC: 1049 U/L — HIGH (ref 12–78)
ANION GAP SERPL CALC-SCNC: 5 MMOL/L — SIGNIFICANT CHANGE UP (ref 5–17)
AST SERPL-CCNC: 829 U/L — HIGH (ref 15–37)
BILIRUB DIRECT SERPL-MCNC: 0.2 MG/DL — SIGNIFICANT CHANGE UP (ref 0–0.2)
BILIRUB INDIRECT FLD-MCNC: 0.4 MG/DL — SIGNIFICANT CHANGE UP (ref 0.2–1)
BILIRUB SERPL-MCNC: 0.6 MG/DL — SIGNIFICANT CHANGE UP (ref 0.2–1.2)
BUN SERPL-MCNC: 26 MG/DL — HIGH (ref 7–23)
CALCIUM SERPL-MCNC: 8.1 MG/DL — LOW (ref 8.5–10.1)
CHLORIDE SERPL-SCNC: 101 MMOL/L — SIGNIFICANT CHANGE UP (ref 96–108)
CK SERPL-CCNC: 48 U/L — SIGNIFICANT CHANGE UP (ref 26–192)
CK SERPL-CCNC: 51 U/L — SIGNIFICANT CHANGE UP (ref 26–192)
CO2 SERPL-SCNC: 31 MMOL/L — SIGNIFICANT CHANGE UP (ref 22–31)
CREAT SERPL-MCNC: 1.29 MG/DL — SIGNIFICANT CHANGE UP (ref 0.5–1.3)
CULTURE RESULTS: SIGNIFICANT CHANGE UP
DIGOXIN SERPL-MCNC: 3.56 NG/ML — CRITICAL HIGH (ref 0.8–2)
GLUCOSE BLDC GLUCOMTR-MCNC: 124 MG/DL — HIGH (ref 70–99)
GLUCOSE SERPL-MCNC: 110 MG/DL — HIGH (ref 70–99)
HAV IGM SER-ACNC: SIGNIFICANT CHANGE UP
HAV IGM SER-ACNC: SIGNIFICANT CHANGE UP
HBV CORE IGM SER-ACNC: SIGNIFICANT CHANGE UP
HBV CORE IGM SER-ACNC: SIGNIFICANT CHANGE UP
HBV SURFACE AG SER-ACNC: SIGNIFICANT CHANGE UP
HBV SURFACE AG SER-ACNC: SIGNIFICANT CHANGE UP
HCT VFR BLD CALC: 33.4 % — LOW (ref 34.5–45)
HCV AB S/CO SERPL IA: 0.14 S/CO — SIGNIFICANT CHANGE UP
HCV AB S/CO SERPL IA: 0.14 S/CO — SIGNIFICANT CHANGE UP
HCV AB SERPL-IMP: SIGNIFICANT CHANGE UP
HCV AB SERPL-IMP: SIGNIFICANT CHANGE UP
HGB BLD-MCNC: 10.4 G/DL — LOW (ref 11.5–15.5)
INR BLD: 1.19 RATIO — HIGH (ref 0.88–1.16)
LACTATE SERPL-SCNC: 2.1 MMOL/L — HIGH (ref 0.7–2)
LACTATE SERPL-SCNC: 2.2 MMOL/L — HIGH (ref 0.7–2)
MCHC RBC-ENTMCNC: 28.6 PG — SIGNIFICANT CHANGE UP (ref 27–34)
MCHC RBC-ENTMCNC: 31.2 GM/DL — LOW (ref 32–36)
MCV RBC AUTO: 91.6 FL — SIGNIFICANT CHANGE UP (ref 80–100)
PLATELET # BLD AUTO: 150 K/UL — SIGNIFICANT CHANGE UP (ref 150–400)
POTASSIUM SERPL-MCNC: 4.3 MMOL/L — SIGNIFICANT CHANGE UP (ref 3.5–5.3)
POTASSIUM SERPL-SCNC: 4.3 MMOL/L — SIGNIFICANT CHANGE UP (ref 3.5–5.3)
PROT SERPL-MCNC: 5.9 GM/DL — LOW (ref 6–8.3)
PROTHROM AB SERPL-ACNC: 12.9 SEC — HIGH (ref 9.8–12.7)
RBC # BLD: 3.65 M/UL — LOW (ref 3.8–5.2)
RBC # FLD: 13.5 % — SIGNIFICANT CHANGE UP (ref 10.3–14.5)
SODIUM SERPL-SCNC: 137 MMOL/L — SIGNIFICANT CHANGE UP (ref 135–145)
SPECIMEN SOURCE: SIGNIFICANT CHANGE UP
TROPONIN I SERPL-MCNC: 0.08 NG/ML — HIGH (ref 0.01–0.04)
TROPONIN I SERPL-MCNC: 0.09 NG/ML — HIGH (ref 0.01–0.04)
TROPONIN I SERPL-MCNC: 0.1 NG/ML — HIGH (ref 0.01–0.04)
WBC # BLD: 4.9 K/UL — SIGNIFICANT CHANGE UP (ref 3.8–10.5)
WBC # FLD AUTO: 4.9 K/UL — SIGNIFICANT CHANGE UP (ref 3.8–10.5)

## 2018-02-18 PROCEDURE — 71045 X-RAY EXAM CHEST 1 VIEW: CPT | Mod: 26

## 2018-02-18 PROCEDURE — 74177 CT ABD & PELVIS W/CONTRAST: CPT | Mod: 26

## 2018-02-18 RX ORDER — SODIUM CHLORIDE 9 MG/ML
500 INJECTION INTRAMUSCULAR; INTRAVENOUS; SUBCUTANEOUS
Qty: 0 | Refills: 0 | Status: DISCONTINUED | OUTPATIENT
Start: 2018-02-18 | End: 2018-02-22

## 2018-02-18 RX ORDER — SODIUM CHLORIDE 9 MG/ML
500 INJECTION INTRAMUSCULAR; INTRAVENOUS; SUBCUTANEOUS ONCE
Qty: 0 | Refills: 0 | Status: DISCONTINUED | OUTPATIENT
Start: 2018-02-18 | End: 2018-02-22

## 2018-02-18 RX ORDER — OVINE DIGOXIN IMMUNE FAB 40 MG/1
80 INJECTION, POWDER, FOR SOLUTION INTRAVENOUS ONCE
Qty: 0 | Refills: 0 | Status: COMPLETED | OUTPATIENT
Start: 2018-02-18 | End: 2018-02-18

## 2018-02-18 RX ORDER — SODIUM CHLORIDE 9 MG/ML
1000 INJECTION INTRAMUSCULAR; INTRAVENOUS; SUBCUTANEOUS
Qty: 0 | Refills: 0 | Status: DISCONTINUED | OUTPATIENT
Start: 2018-02-18 | End: 2018-02-22

## 2018-02-18 RX ADMIN — AMIODARONE HYDROCHLORIDE 200 MILLIGRAM(S): 400 TABLET ORAL at 05:34

## 2018-02-18 RX ADMIN — ONDANSETRON 4 MILLIGRAM(S): 8 TABLET, FILM COATED ORAL at 18:38

## 2018-02-18 RX ADMIN — ENOXAPARIN SODIUM 30 MILLIGRAM(S): 100 INJECTION SUBCUTANEOUS at 13:45

## 2018-02-18 RX ADMIN — OVINE DIGOXIN IMMUNE FAB 100 MILLIGRAM(S): 40 INJECTION, POWDER, FOR SOLUTION INTRAVENOUS at 16:43

## 2018-02-18 RX ADMIN — Medication 30 MILLIGRAM(S): at 17:38

## 2018-02-18 RX ADMIN — PANTOPRAZOLE SODIUM 40 MILLIGRAM(S): 20 TABLET, DELAYED RELEASE ORAL at 05:33

## 2018-02-18 RX ADMIN — TRAMADOL HYDROCHLORIDE 100 MILLIGRAM(S): 50 TABLET ORAL at 06:31

## 2018-02-18 RX ADMIN — Medication 30 MILLIGRAM(S): at 05:34

## 2018-02-18 RX ADMIN — Medication 0.12 MILLIGRAM(S): at 05:34

## 2018-02-18 RX ADMIN — ALBUTEROL 2.5 MILLIGRAM(S): 90 AEROSOL, METERED ORAL at 11:06

## 2018-02-18 RX ADMIN — TRAMADOL HYDROCHLORIDE 100 MILLIGRAM(S): 50 TABLET ORAL at 22:27

## 2018-02-18 RX ADMIN — Medication 81 MILLIGRAM(S): at 13:44

## 2018-02-18 RX ADMIN — Medication 1 TABLET(S): at 13:46

## 2018-02-18 RX ADMIN — Medication 100 MILLIGRAM(S): at 05:34

## 2018-02-18 RX ADMIN — Medication 20 MILLIEQUIVALENT(S): at 13:45

## 2018-02-18 RX ADMIN — TRAMADOL HYDROCHLORIDE 100 MILLIGRAM(S): 50 TABLET ORAL at 05:34

## 2018-02-18 NOTE — PROGRESS NOTE ADULT - ASSESSMENT
pt is 85 yo female with PMH as per hPI here with fever and found be positive for flu    * influenza A - start Tamiflu and continue supportive care  - add cough suppressant  - oxygen prn  - will not given IVF at this time as pt tolerating po and given CHF hx     *hypotension, nausea - likely secondary to dig toxicity, less likely worsening sepsis  - rapid response called 2/18  - Dig level 3.5 --> digifab     * transaminitis - ?2/2 flu  - will discontinue tylenol and given motrin prn mild pain or for temp   - check hepatitis panel and hepatic panel in AM  - uS ABD - plan for liver CT once improved inpt vs outpt  -GI consult appreciated - CT abdomen/pelvis, hepatitis panel and autoimmune labs pending.     * chronic systolic dysfunction/pafib - EF 25-30 % on last echo  - given elevated cr 1.3 (baseline ~0.8) will hold off ACEI and lasix for today and resume in AM  - monitor and replete lytes as needed  - continue dig, aldactone, BB, statin, aspirin  - off AC ?falls  - EKG - paced rhythm 60s  - d/c amio    * ARF on CKD 2 - baseline cr ~0.8  - will urge po intake and check in AM  - hold nephrotoxic meds and renal dose     * Arthritis - ? Rheumatoid vs severe OA  - will d/w family to see if we can obtain more info  - continue tramadol and Tylenol for pain prn     code status - DNR/DNI  dispo - AL early next week. await PT eval       Plan discussed with pt, will notiify dtr. pt is 87 yo female with PMH as per hPI here with fever and found be positive for flu    * hypotension/n/v/d/abd pain - 2/2 dig toxicity and unlikley sepsis  - lactate improving with IVF and hypotension resolved  - digifab 2 vials given as per protocol and will check level in AM  - case d/w dr vazquez who is covering dr navarro.   - d/c amio, dig, metoprolol.  will resume BB once BP improves     * elevated troponin - likely 2/2 ischemia 2/2 hypoperfusion in setting of hypotension  - trending down  - case d/w dr. vazquez, dr navarro to see in AM     * influenza A - cont Tamiflu 30 mg bid renal dosing and continue supportive care  - cough suppressant  - oxygen prn    * transaminitis - ?2/2 flu vs amio toxicity vs autoimmune hepatitis  - case d/w dr dyer will send autoimmune workup  - stopped statin, tylenol, amio   - will discontinue tylenol and given motrin prn mild pain or for temp   - check hepatitis panel and hepatic panel in AM  - uS ABD - plan for liver CT once improved inpt vs outpt  -GI consult appreciated - CT abdomen/pelvis, hepatitis panel and autoimmune labs pending.     * chronic systolic dysfunction/pafib - EF 25-30 % on last echo  - cautous with fluid administration.  - given elevated cr 1.3 (baseline ~0.8) will hold off ACEI and lasix for today and resume in AM  - monitor and replete lytes as needed  - continue dig, aldactone, BB, statin, aspirin  - off AC 2/2 heamturia as per previous cardio notes and pt was also reluctant to start this  - EKG - paced rhythm 60s  - d/c amio, dig, metoprolol     * ARF on CKD 2 - baseline cr ~0.8  - will urge po intake and check in AM  - hold nephrotoxic meds and renal dose     * Arthritis - ? Rheumatoid vs severe OA  - will d/w family to see if we can obtain more info  - continue tramadol and Tylenol for pain prn     code status - DNR/DNI  dispo - AL early next week. await PT eval

## 2018-02-18 NOTE — PROGRESS NOTE ADULT - SUBJECTIVE AND OBJECTIVE BOX
PMD - Dr Penny at Assisted living    History of Present Illness:  Chief Complaint/Reason for Admission: fever  History of Present Illness:   poor historian. patient is an 85 y/o f w/ pmhx of chronic systolic dysfunction with EF 25-30% s/p AICD placement, pAfib not on AC, arthritis, presents to ED from assisted ling c/o fever and feeling sick. Pt lives in assisted living and states she was having fever and was sent to ED for further workup, states occasional dry cough but feels okay otherwise.  no SOB currently but feeling very tired and weak.  able to and eat and drink well but decreased appetite compared to before.    2/18: Rapid response called for hypotension, nausea and confusion.     Review of Systems: All 10 systems reviewed and found to be negative with the exception of what has been described above.as per HPI    ICU Vital Signs Last 24 Hrs  T(C): 36.5 (18 Feb 2018 11:03), Max: 36.9 (17 Feb 2018 19:20)  T(F): 97.7 (18 Feb 2018 11:03), Max: 98.4 (17 Feb 2018 19:20)  HR: 3 (18 Feb 2018 11:06) (3 - 67)  BP: 113/55 (18 Feb 2018 11:03) (50/31 - 133/65)  BP(mean): --  ABP: --  ABP(mean): --  RR: 16 (18 Feb 2018 11:03) (16 - 18)  SpO2: 97% (18 Feb 2018 11:06) (95% - 100%)      Physical Exam:   GEN: lying in bed, NAD, not ill appearing   HEENT:   NC/AT, EOMI  CV:  +S1, +S2, RRR  RESP:   few crackles at bases b/l no wheezing good air entry b/l   BREAST:  not examined  GI:  abdomen soft, non-tender, non-distended, normoactive BS  RECTAL:  not examined  :  not examined  MSK:   normal muscle tone  EXT:  deformities of hand c/w arthiritis  NEURO:  AAOX3, no focal neurological deficits  SKIN:  no rashes      Labs   Labs, Radiology, Cardiology, and Other Results: RVP + flu                  10.4   4.9   )-----------( 150      ( 18 Feb 2018 06:30 )             33.4     02-18    137  |  101  |  26<H>  ----------------------------<  110<H>  4.3   |  31  |  1.29    Ca    8.1<L>      18 Feb 2018 06:30    TPro  5.9<L>  /  Alb  2.8<L>  /  TBili  0.6  /  DBili  0.2  /  AST  829<H>  /  ALT  1049<H>  /  AlkPhos  34<L>  02-18    CARDIAC MARKERS ( 18 Feb 2018 09:50 )  0.093 ng/mL / x     / 51 U/L / x     / x        LIVER FUNCTIONS - ( 18 Feb 2018 06:30 )  Alb: 2.8 g/dL / Pro: 5.9 gm/dL / ALK PHOS: 34 U/L / ALT: 1049 U/L / AST: 829 U/L / GGT: x           PT/INR - ( 18 Feb 2018 06:30 )   PT: 12.9 sec;   INR: 1.19 ratio      Lactate, Blood: 2.2 mmol/L (02-18 @ 10:17)        Radiology:    	< from: US Transthoracic Echocardiogram w/Doppler Complete (07.22.16 @ 08:18) >  	IMPRESSION: Enlargement of the left ventricle with generalized   	hypokinesia and an estimated ejection fraction of 20-25%. Enlargement of   	the left atrium with severe mitral insufficiency. Enlargement of the   	right atrium with moderate tricuspid insufficiency. Mild aortic   	insufficiency. A pacemaker wire was incidentally noted.    	< end of copied text >    	< from: Xray Chest 1 View AP/PA. (03.01.17 @ 09:34) >    	Minimal pulmonary vascular congestion with trace bilateral pleural   	effusions    	< end of copied text >    	EKG - paced rhythm 60s (my read)      < from: Xray Chest 1 View-PORTABLE IMMEDIATE (02.18.18 @ 11:26) >      Impression:    No focal consolidation    < end of copied text > PMD - Dr Penny at [facility]isted living    History of Present Illness:  Chief Complaint/Reason for Admission: fever  History of Present Illness:   poor historian. patient is an 87 y/o f w/ pmhx of chronic systolic dysfunction with EF 25-30% s/p AICD placement, pAfib not on AC, arthritis, presents to ED from assisted ling c/o fever and feeling sick. Pt lives in assisted living and states she was having fever and was sent to ED for further workup, states occasional dry cough but feels okay otherwise.  no SOB currently but feeling very tired and weak.  able to and eat and drink well but decreased appetite compared to before.    2/18: Rapid response called for hypotension, nausea and confusion. pt noted to have SBP 50s and AMS 2/2 hypotension with ?syncope episode, case d/w residents and intensivist dr parrish.   pt c/o abd pain and had dig level sent which was elevated to 3.5 and pt transferred to CCU for digifab.  BP improved with 1 liter fluid bolus with mentation improving.  states she feels weak no cp, sob. paced rhythm on monitor. dtr updated via phone and hen at bedside.     Review of Systems: All 10 systems reviewed and found to be negative with the exception of what has been described above as per HPI    ICU Vital Signs Last 24 Hrs  T(C): 36.5 (18 Feb 2018 11:03), Max: 36.9 (17 Feb 2018 19:20)  T(F): 97.7 (18 Feb 2018 11:03), Max: 98.4 (17 Feb 2018 19:20)  HR: 3 (18 Feb 2018 11:06) (3 - 67)  BP: 113/55 (18 Feb 2018 11:03) (50/31 - 133/65)  BP(mean): --  ABP: --  ABP(mean): --  RR: 16 (18 Feb 2018 11:03) (16 - 18)  SpO2: 97% (18 Feb 2018 11:06) (95% - 100%)      Physical Exam:   GEN: lying in bed, NAD, not ill appearing   HEENT:   NC/AT, EOMI  CV:  +S1, +S2, RRR  RESP:   few crackles at bases b/l no wheezing good air entry b/l   BREAST:  not examined  GI:  abdomen soft, non-tender, non-distended, normoactive BS  RECTAL:  not examined  :  not examined  MSK:   normal muscle tone  EXT:  deformities of hand c/w arthiritis  NEURO:  somnolent   SKIN:  no rashes      Labs   Labs, Radiology, Cardiology, and Other Results: RVP + flu                  10.4   4.9   )-----------( 150      ( 18 Feb 2018 06:30 )             33.4     02-18    137  |  101  |  26<H>  ----------------------------<  110<H>  4.3   |  31  |  1.29    Ca    8.1<L>      18 Feb 2018 06:30    TPro  5.9<L>  /  Alb  2.8<L>  /  TBili  0.6  /  DBili  0.2  /  AST  829<H>  /  ALT  1049<H>  /  AlkPhos  34<L>  02-18    CARDIAC MARKERS ( 18 Feb 2018 09:50 )  0.093 ng/mL / x     / 51 U/L / x     / x        LIVER FUNCTIONS - ( 18 Feb 2018 06:30 )  Alb: 2.8 g/dL / Pro: 5.9 gm/dL / ALK PHOS: 34 U/L / ALT: 1049 U/L / AST: 829 U/L / GGT: x           PT/INR - ( 18 Feb 2018 06:30 )   PT: 12.9 sec;   INR: 1.19 ratio      Lactate, Blood: 2.2 mmol/L (02-18 @ 10:17)        Radiology:    	< from: US Transthoracic Echocardiogram w/Doppler Complete (07.22.16 @ 08:18) >  	IMPRESSION: Enlargement of the left ventricle with generalized   	hypokinesia and an estimated ejection fraction of 20-25%. Enlargement of   	the left atrium with severe mitral insufficiency. Enlargement of the   	right atrium with moderate tricuspid insufficiency. Mild aortic   	insufficiency. A pacemaker wire was incidentally noted.    	< end of copied text >    	< from: Xray Chest 1 View AP/PA. (03.01.17 @ 09:34) >    	Minimal pulmonary vascular congestion with trace bilateral pleural   	effusions    	< end of copied text >    	EKG - paced rhythm 60s (my read)      < from: Xray Chest 1 View-PORTABLE IMMEDIATE (02.18.18 @ 11:26) >      Impression:    No focal consolidation    < end of copied text >

## 2018-02-18 NOTE — CHART NOTE - NSCHARTNOTEFT_GEN_A_CORE
RAPID RESPONSE RESIDENT NOTE    Rapid response was called as the pt was found to be confused and hypotensive at her bed. Was 'fine' earlier today as per RN- had her breakfast. Now pt is somnolent but arousable, unable to provide hx, does c/o abdominal pain    PE:  HR 66 BP 50/31 RR 14   GEN: somnolent, arousable , no distress  CV: [id number]   Pulm: CTA b/l, no r/r/w  Abd- mild tenderness, no guarding/rebound  ext: no edema    A/P  Confusion due to hypotension, hypovolemia. Stat 1 liter NS now  monitor for volume overload in this pt  check lactate, troponin, CXR, blood culture    Repeat BP after bolus    d/w Nahid Mittal

## 2018-02-19 LAB
ALBUMIN SERPL ELPH-MCNC: 2.4 G/DL — LOW (ref 3.3–5)
ALP SERPL-CCNC: 56 U/L — SIGNIFICANT CHANGE UP (ref 40–120)
ALT FLD-CCNC: 722 U/L — HIGH (ref 12–78)
ANION GAP SERPL CALC-SCNC: 7 MMOL/L — SIGNIFICANT CHANGE UP (ref 5–17)
AST SERPL-CCNC: 454 U/L — HIGH (ref 15–37)
BILIRUB DIRECT SERPL-MCNC: 0.2 MG/DL — SIGNIFICANT CHANGE UP (ref 0–0.2)
BILIRUB INDIRECT FLD-MCNC: 0.3 MG/DL — SIGNIFICANT CHANGE UP (ref 0.2–1)
BILIRUB SERPL-MCNC: 0.5 MG/DL — SIGNIFICANT CHANGE UP (ref 0.2–1.2)
BUN SERPL-MCNC: 17 MG/DL — SIGNIFICANT CHANGE UP (ref 7–23)
CALCIUM SERPL-MCNC: 7.5 MG/DL — LOW (ref 8.5–10.1)
CHLORIDE SERPL-SCNC: 104 MMOL/L — SIGNIFICANT CHANGE UP (ref 96–108)
CO2 SERPL-SCNC: 26 MMOL/L — SIGNIFICANT CHANGE UP (ref 22–31)
CREAT SERPL-MCNC: 0.87 MG/DL — SIGNIFICANT CHANGE UP (ref 0.5–1.3)
DIGOXIN SERPL-MCNC: 2.35 NG/ML — HIGH (ref 0.8–2)
DIGOXIN SERPL-MCNC: 2.63 NG/ML — CRITICAL HIGH (ref 0.8–2)
GLUCOSE SERPL-MCNC: 92 MG/DL — SIGNIFICANT CHANGE UP (ref 70–99)
HCT VFR BLD CALC: 30.9 % — LOW (ref 34.5–45)
HGB BLD-MCNC: 9.9 G/DL — LOW (ref 11.5–15.5)
MAGNESIUM SERPL-MCNC: 1.8 MG/DL — SIGNIFICANT CHANGE UP (ref 1.6–2.6)
MCHC RBC-ENTMCNC: 29.2 PG — SIGNIFICANT CHANGE UP (ref 27–34)
MCHC RBC-ENTMCNC: 32 GM/DL — SIGNIFICANT CHANGE UP (ref 32–36)
MCV RBC AUTO: 91.2 FL — SIGNIFICANT CHANGE UP (ref 80–100)
PHOSPHATE SERPL-MCNC: 2.1 MG/DL — LOW (ref 2.5–4.5)
PLATELET # BLD AUTO: 138 K/UL — LOW (ref 150–400)
POTASSIUM SERPL-MCNC: 4 MMOL/L — SIGNIFICANT CHANGE UP (ref 3.5–5.3)
POTASSIUM SERPL-SCNC: 4 MMOL/L — SIGNIFICANT CHANGE UP (ref 3.5–5.3)
PROT SERPL-MCNC: 5.4 GM/DL — LOW (ref 6–8.3)
RBC # BLD: 3.39 M/UL — LOW (ref 3.8–5.2)
RBC # FLD: 13.4 % — SIGNIFICANT CHANGE UP (ref 10.3–14.5)
SODIUM SERPL-SCNC: 137 MMOL/L — SIGNIFICANT CHANGE UP (ref 135–145)
WBC # BLD: 9.5 K/UL — SIGNIFICANT CHANGE UP (ref 3.8–10.5)
WBC # FLD AUTO: 9.5 K/UL — SIGNIFICANT CHANGE UP (ref 3.8–10.5)

## 2018-02-19 RX ORDER — CIPROFLOXACIN LACTATE 400MG/40ML
400 VIAL (ML) INTRAVENOUS ONCE
Qty: 0 | Refills: 0 | Status: COMPLETED | OUTPATIENT
Start: 2018-02-19 | End: 2018-02-19

## 2018-02-19 RX ORDER — METRONIDAZOLE 500 MG
500 TABLET ORAL ONCE
Qty: 0 | Refills: 0 | Status: COMPLETED | OUTPATIENT
Start: 2018-02-19 | End: 2018-02-19

## 2018-02-19 RX ORDER — CIPROFLOXACIN LACTATE 400MG/40ML
400 VIAL (ML) INTRAVENOUS EVERY 12 HOURS
Qty: 0 | Refills: 0 | Status: DISCONTINUED | OUTPATIENT
Start: 2018-02-19 | End: 2018-02-22

## 2018-02-19 RX ORDER — METRONIDAZOLE 500 MG
TABLET ORAL
Qty: 0 | Refills: 0 | Status: DISCONTINUED | OUTPATIENT
Start: 2018-02-19 | End: 2018-02-22

## 2018-02-19 RX ORDER — CIPROFLOXACIN LACTATE 400MG/40ML
VIAL (ML) INTRAVENOUS
Qty: 0 | Refills: 0 | Status: DISCONTINUED | OUTPATIENT
Start: 2018-02-19 | End: 2018-02-22

## 2018-02-19 RX ORDER — SODIUM,POTASSIUM PHOSPHATES 278-250MG
2 POWDER IN PACKET (EA) ORAL THREE TIMES A DAY
Qty: 0 | Refills: 0 | Status: COMPLETED | OUTPATIENT
Start: 2018-02-19 | End: 2018-02-20

## 2018-02-19 RX ORDER — METRONIDAZOLE 500 MG
500 TABLET ORAL EVERY 8 HOURS
Qty: 0 | Refills: 0 | Status: DISCONTINUED | OUTPATIENT
Start: 2018-02-19 | End: 2018-02-22

## 2018-02-19 RX ADMIN — ALBUTEROL 2.5 MILLIGRAM(S): 90 AEROSOL, METERED ORAL at 14:15

## 2018-02-19 RX ADMIN — Medication 81 MILLIGRAM(S): at 11:47

## 2018-02-19 RX ADMIN — Medication 2 PACKET(S): at 17:52

## 2018-02-19 RX ADMIN — ENOXAPARIN SODIUM 30 MILLIGRAM(S): 100 INJECTION SUBCUTANEOUS at 10:19

## 2018-02-19 RX ADMIN — Medication 200 MILLIGRAM(S): at 22:38

## 2018-02-19 RX ADMIN — TRAMADOL HYDROCHLORIDE 100 MILLIGRAM(S): 50 TABLET ORAL at 06:49

## 2018-02-19 RX ADMIN — TRAMADOL HYDROCHLORIDE 100 MILLIGRAM(S): 50 TABLET ORAL at 17:52

## 2018-02-19 RX ADMIN — PANTOPRAZOLE SODIUM 40 MILLIGRAM(S): 20 TABLET, DELAYED RELEASE ORAL at 09:53

## 2018-02-19 RX ADMIN — Medication 1 TABLET(S): at 11:47

## 2018-02-19 RX ADMIN — Medication 100 MILLIGRAM(S): at 22:39

## 2018-02-19 RX ADMIN — Medication 30 MILLIGRAM(S): at 17:52

## 2018-02-19 RX ADMIN — ALBUTEROL 2.5 MILLIGRAM(S): 90 AEROSOL, METERED ORAL at 09:28

## 2018-02-19 RX ADMIN — LATANOPROST 1 DROP(S): 0.05 SOLUTION/ DROPS OPHTHALMIC; TOPICAL at 06:45

## 2018-02-19 RX ADMIN — Medication 200 MILLIGRAM(S): at 10:19

## 2018-02-19 RX ADMIN — SODIUM CHLORIDE 75 MILLILITER(S): 9 INJECTION INTRAMUSCULAR; INTRAVENOUS; SUBCUTANEOUS at 15:10

## 2018-02-19 RX ADMIN — LATANOPROST 1 DROP(S): 0.05 SOLUTION/ DROPS OPHTHALMIC; TOPICAL at 22:39

## 2018-02-19 RX ADMIN — Medication 100 MILLIGRAM(S): at 10:19

## 2018-02-19 RX ADMIN — Medication 20 MILLIEQUIVALENT(S): at 11:47

## 2018-02-19 RX ADMIN — Medication 30 MILLIGRAM(S): at 06:49

## 2018-02-19 RX ADMIN — Medication 2 PACKET(S): at 22:39

## 2018-02-19 NOTE — PHYSICAL THERAPY INITIAL EVALUATION ADULT - PERTINENT HX OF CURRENT PROBLEM, REHAB EVAL
86M 86F presents to ED from assisted ling c/o fever and feeling sick. Pt lives in assisted living and states she was having fever and was sent to ED for further workup, states occasional dry cough but feels okay otherwise. pt is a poor historian. on 2/18/18, Rapid response called for hypotension, nausea and confusion, pt transferred to CCU.

## 2018-02-19 NOTE — PROGRESS NOTE ADULT - SUBJECTIVE AND OBJECTIVE BOX
Patient is a 86y old  Female who presents with a chief complaint of fever (17 Feb 2018 09:13)      Subective:  Still some left sided pain.  No diarrhea or bleeding    PAST MEDICAL & SURGICAL HISTORY:  Osteoarthritis of multiple joints, unspecified osteoarthritis type  Gastroesophageal reflux disease without esophagitis  Dyslipidemia  Essential hypertension  Paroxysmal atrial fibrillation  Coronary artery disease involving native coronary artery of native heart without angina pectoris  Chronic systolic congestive heart failure: EF 20-25%  AICD (automatic cardioverter/defibrillator) present      MEDICATIONS  (STANDING):  ALBUTerol    0.083% 2.5 milliGRAM(s) Nebulizer every 6 hours  ALBUTerol    90 MICROgram(s) HFA Inhaler 1 Puff(s) Inhalation every 4 hours  aspirin  chewable 81 milliGRAM(s) Oral daily  ciprofloxacin   IVPB      ciprofloxacin   IVPB 400 milliGRAM(s) IV Intermittent every 12 hours  enoxaparin Injectable 30 milliGRAM(s) SubCutaneous every 24 hours  latanoprost 0.005% Ophthalmic Solution 1 Drop(s) Both EYES at bedtime  metroNIDAZOLE  IVPB      metroNIDAZOLE  IVPB 500 milliGRAM(s) IV Intermittent every 8 hours  multivitamin 1 Tablet(s) Oral daily  oseltamivir 30 milliGRAM(s) Oral two times a day  pantoprazole    Tablet 40 milliGRAM(s) Oral before breakfast  potassium chloride    Tablet ER 20 milliEquivalent(s) Oral daily  sodium chloride 0.9% Bolus 500 milliLiter(s) IV Bolus once  sodium chloride 0.9%. 500 milliLiter(s) (500 mL/Hr) IV Continuous <Continuous>  sodium chloride 0.9%. 1000 milliLiter(s) (75 mL/Hr) IV Continuous <Continuous>  traMADol 100 milliGRAM(s) Oral two times a day    MEDICATIONS  (PRN):  aluminum hydroxide/magnesium hydroxide/simethicone Suspension 30 milliLiter(s) Oral every 4 hours PRN Dyspepsia  guaiFENesin    Syrup 200 milliGRAM(s) Oral every 6 hours PRN Cough  ibuprofen  Tablet 200 milliGRAM(s) Oral every 6 hours PRN pain 1-4 fever> 100.4F  lactulose Syrup 20 Gram(s) Oral at bedtime PRN constipation  ondansetron Injectable 4 milliGRAM(s) IV Push every 6 hours PRN Nausea  senna 2 Tablet(s) Oral at bedtime PRN Constipation      REVIEW OF SYSTEMS:    RESPIRATORY: No shortness of breath  CARDIOVASCULAR: No chest pain  All other review of systems is negative unless indicated above.    Vital Signs Last 24 Hrs  T(C): 36.7 (19 Feb 2018 16:03), Max: 37 (19 Feb 2018 00:46)  T(F): 98 (19 Feb 2018 16:03), Max: 98.6 (19 Feb 2018 00:46)  HR: 64 (19 Feb 2018 14:26) (60 - 67)  BP: 101/51 (19 Feb 2018 14:00) (83/55 - 130/64)  BP(mean): 63 (19 Feb 2018 14:00) (46 - 82)  RR: 19 (19 Feb 2018 14:00) (12 - 21)  SpO2: 92% (19 Feb 2018 12:00) (76% - 100%)    PHYSICAL EXAM:    Constitutional: NAD, well-developed  Respiratory: CTAB  Cardiovascular: S1 and S2, RRR  Gastrointestinal: BS+, soft, mild left sided tenderness  Extremities: No peripheral edema  Psychiatric: Normal mood, normal affect    LABS:                        9.9    9.5   )-----------( 138      ( 19 Feb 2018 05:50 )             30.9     02-19    137  |  104  |  17  ----------------------------<  92  4.0   |  26  |  0.87    Ca    7.5<L>      19 Feb 2018 05:50  Phos  2.1     02-19  Mg     1.8     02-19    TPro  5.4<L>  /  Alb  2.4<L>  /  TBili  0.5  /  DBili  0.2  /  AST  454<H>  /  ALT  722<H>  /  AlkPhos  56  02-19    PT/INR - ( 18 Feb 2018 06:30 )   PT: 12.9 sec;   INR: 1.19 ratio           LIVER FUNCTIONS - ( 19 Feb 2018 05:50 )  Alb: 2.4 g/dL / Pro: 5.4 gm/dL / ALK PHOS: 56 U/L / ALT: 722 U/L / AST: 454 U/L / GGT: x             RADIOLOGY & ADDITIONAL STUDIES:

## 2018-02-19 NOTE — PROGRESS NOTE ADULT - ASSESSMENT
pt is 87 yo female with PMH as per hPI here with fever and found be positive for flu    * hypotension/n/v/d/abd pain - 2/2 dig toxicity and unlikley sepsis  - lactate improving with IVF and hypotension resolved  - digifab 2 vials given as per protocol - dig level 2.6 this AM. Will recheck in afternoon.  - case d/w dr vazquez who is covering dr navarro.   - d/c amio, dig, metoprolol.  will resume BB once BP improves     * elevated troponin - likely 2/2 ischemia 2/2 hypoperfusion in setting of hypotension  - trending down  - case d/w dr. vazquez, dr navarro     * influenza A - cont Tamiflu 30 mg bid renal dosing and continue supportive care  - cough suppressant  - oxygen prn    * transaminitis - ?2/2 flu vs amio toxicity vs autoimmune hepatitis  - case d/w dr dyer will send autoimmune workup  - stopped statin, tylenol, amio   - will discontinue tylenol and given motrin prn mild pain or for temp   - hepatic panel normal, autoimmune labs pending  - uS ABD - plan for liver CT once improved inpt vs outpt  -GI consult appreciated     *colitis of descending and sigmoid colon, likely infectious vs ischemic  -CT abdomen/pelvis shows multiple small liver cysts and hypodensities, but no solid mass. Also noted was colitis in the descending colon and sigmoid colon, likely of infectious etiology.  -Start cipro and flagyl     * chronic systolic dysfunction/pafib - EF 25-30 % on last echo  - cautous with fluid administration.  - given elevated cr 1.3 (baseline ~0.8) will resume ACEI and lasix as Cr improves  - monitor and replete lytes as needed  -continue statin and ASA  - off AC 2/2 hematuria as per previous cardio notes and pt was also reluctant to start this  - EKG - paced rhythm 60s  - d/c amio, dig, metoprolol     * ARF on CKD 2 - baseline cr ~0.8  - will urge po intake and check in AM  - hold nephrotoxic meds and renal dose     * Arthritis - ? Rheumatoid vs severe OA  - will d/w family to see if we can obtain more info  - continue tramadol and Tylenol for pain prn     code status - DNR/DNI  dispo - likely rehab in 2-3 days pt is 87 yo female with PMH as per hPI here with fever and found be positive for flu    * digoxin toxicity - pt with hypotension/n/v/d and abd pain  - hypotension n/v/d resolved after digifab   - digifab 2 vials given as per protocol - dig level 2.35 this AM. Will recheck in am.  can remain elevated for 20 hrs after digifab adminsitration.  - await cardio input   - d/c amio, dig, metoprolol.  will resume BB once BP improves     * elevated troponin - likely 2/2 ischemia 2/2 hypoperfusion in setting of hypotension  - cardio eval pending  - continue aspirin. statin held due to elvated LFTs    * influenza A - cont Tamiflu 30 mg bid renal dosing and continue supportive care  - cough suppressant  - oxygen prn    * transaminitis - ?2/2 flu vs amio toxicity vs autoimmune hepatitis  - case d/w dr dyer will send autoimmune workup  - stopped statin, tylenol, amio   - will discontinue tylenol and given motrin prn mild pain or for temp   - hepatic panel normal, autoimmune labs pending  - uS ABD - plan for liver CT once improved inpt vs outpt  -GI consult appreciated     *colitis of descending and sigmoid colon, likely infectious vs ischemic  -CT abdomen/pelvis shows multiple small liver cysts and hypodensities, but no solid mass. Also noted was colitis in the descending colon and sigmoid colon, likely of infectious etiology.  -Start cipro and flagyl, case d/w dr dyer     * chronic systolic dysfunction/pafib - EF 25-30 % on last echo  - cautous with fluid administration.  - given elevated cr 1.3 (baseline ~0.8) will resume ACEI and lasix as Cr improves  - monitor and replete lytes as needed  -continue statin and ASA  - off AC 2/2 hematuria as per previous cardio notes and pt was also reluctant to start this  - EKG - paced rhythm 60s  - d/c amio, dig, metoprolol     * ARF on CKD 2 - baseline cr ~0.8  - will urge po intake and check in AM  - hold nephrotoxic meds and renal dose     * Arthritis - ? Rheumatoid vs severe OA  - will d/w family to see if we can obtain more info  - continue tramadol and Tylenol for pain prn     code status - DNR/DNI  dispo - likely rehab in 2-3 days pt is 87 yo female with PMH as per hPI here with fever and found be positive for flu    * digoxin toxicity - pt with hypotension/n/v/d and abd pain  - hypotension n/v/d resolved after digifab   - digifab 2 vials given as per protocol - dig level 2.35 this AM. Will recheck in am.  can remain elevated for 20 hrs after digifab adminsitration.  - await cardio input   - d/c amio, dig, metoprolol.  will resume BB once BP improves     * elevated troponin - likely 2/2 ischemia 2/2 hypoperfusion in setting of hypotension  - cardio eval pending  - continue aspirin. statin held due to elvated LFTs    * influenza A - cont Tamiflu 30 mg bid renal dosing and continue supportive care  - cough suppressant  - oxygen prn    * metabolic encephalopathy - 2/2 colitis.  resolving    * transaminitis - ?2/2 flu vs amio toxicity vs autoimmune hepatitis  - case d/w dr dyer will send autoimmune workup  - stopped statin, tylenol, amio   - will discontinue tylenol and given motrin prn mild pain or for temp   - hepatic panel normal, autoimmune labs pending  - uS ABD - plan for liver CT once improved inpt vs outpt  -GI consult appreciated     *colitis of descending and sigmoid colon, likely infectious vs ischemic  -CT abdomen/pelvis shows multiple small liver cysts and hypodensities, but no solid mass. Also noted was colitis in the descending colon and sigmoid colon, likely of infectious etiology.  -Start cipro and flagyl, case d/w dr dyer     * chronic systolic dysfunction/pafib - EF 25-30 % on last echo  - cautous with fluid administration.  - given elevated cr 1.3 (baseline ~0.8) will resume ACEI and lasix as Cr improves  - monitor and replete lytes as needed  -continue statin and ASA  - off AC 2/2 hematuria as per previous cardio notes and pt was also reluctant to start this  - EKG - paced rhythm 60s  - d/c amio, dig, metoprolol     * ARF on CKD 2 - baseline cr ~0.8  - will urge po intake and check in AM  - hold nephrotoxic meds and renal dose     * Arthritis - ? Rheumatoid vs severe OA  - will d/w family to see if we can obtain more info  - continue tramadol and Tylenol for pain prn     code status - DNR/DNI  dispo - likely rehab in 2-3 days

## 2018-02-19 NOTE — PROGRESS NOTE ADULT - ASSESSMENT
Imp:  Probably mild ischemic colitis  LFTs, likely from hypotension    Rec:  Agree with abx for colitis  Follow LFTs, F/U autoimmune markers

## 2018-02-19 NOTE — PROGRESS NOTE ADULT - SUBJECTIVE AND OBJECTIVE BOX
PMD - Dr Penny at [facility]    History of Present Illness:  Chief Complaint/Reason for Admission: fever  History of Present Illness:   poor historian. patient is an 87 y/o f w/ pmhx of chronic systolic dysfunction with EF 25-30% s/p AICD placement, pAfib not on AC, arthritis, presents to ED from assisted ling c/o fever and feeling sick. Pt lives in assisted living and states she was having fever and was sent to ED for further workup, states occasional dry cough but feels okay otherwise.  no SOB currently but feeling very tired and weak.  able to and eat and drink well but decreased appetite compared to before.    2/18: Rapid response called for hypotension, nausea and confusion. pt noted to have SBP 50s and AMS 2/2 hypotension with ?syncope episode, case d/w residents and intensivist dr parrish.   pt c/o abd pain and had dig level sent which was elevated to 3.5 and pt transferred to CCU for digifab.  BP improved with 1 liter fluid bolus with mentation improving.  states she feels weak no cp, sob. paced rhythm on monitor. dtr updated via phone and hen at bedside.   2/19: patient states she is feeling better than yesterday, experiencing body aches today. Also c/o LLQ pain.    Review of Systems: All 10 systems reviewed and found to be negative with the exception of what has been described above as per HPI    ICU Vital Signs Last 24 Hrs  T(C): 36.4 (19 Feb 2018 07:39), Max: 37 (19 Feb 2018 00:46)  T(F): 97.6 (19 Feb 2018 07:39), Max: 98.6 (19 Feb 2018 00:46)  HR: 64 (19 Feb 2018 11:00) (60 - 67)  BP: 83/55 (19 Feb 2018 11:00) (83/55 - 134/71)  BP(mean): 62 (19 Feb 2018 11:00) (46 - 87)  ABP: --  ABP(mean): --  RR: 20 (19 Feb 2018 11:00) (12 - 20)  SpO2: 88% (19 Feb 2018 11:00) (76% - 100%)      Physical Exam:   GEN: lying in bed, NAD, not ill appearing   HEENT:   NC/AT, EOMI  CV:  +S1, +S2, RRR  RESP:   few crackles at bases b/l no wheezing good air entry b/l   BREAST:  not examined  GI:  abdomen soft, tenderness to palpation in LLQ, non-distended, normoactive BS  RECTAL:  not examined  :  not examined  MSK:   normal muscle tone  EXT:  deformities of hand c/w arthiritis  NEURO:  somnolent   SKIN:  no rashes    Labs   Labs, Radiology, Cardiology, and Other Results: RVP + flu                  9.9    9.5   )-----------( 138      ( 19 Feb 2018 05:50 )             30.9     02-19    137  |  104  |  17  ----------------------------<  92  4.0   |  26  |  0.87    Ca    7.5<L>      19 Feb 2018 05:50  Phos  2.1     02-19  Mg     1.8     02-19    TPro  5.4<L>  /  Alb  2.4<L>  /  TBili  0.5  /  DBili  0.2  /  AST  454<H>  /  ALT  722<H>  /  AlkPhos  56  02-19    CARDIAC MARKERS ( 18 Feb 2018 22:45 )  0.105 ng/mL / x     / x     / x     / x      CARDIAC MARKERS ( 18 Feb 2018 14:19 )  0.084 ng/mL / x     / 48 U/L / x     / x      CARDIAC MARKERS ( 18 Feb 2018 09:50 )  0.093 ng/mL / x     / 51 U/L / x     / x        LIVER FUNCTIONS - ( 19 Feb 2018 05:50 )  Alb: 2.4 g/dL / Pro: 5.4 gm/dL / ALK PHOS: 56 U/L / ALT: 722 U/L / AST: 454 U/L / GGT: x           PT/INR - ( 18 Feb 2018 06:30 )   PT: 12.9 sec;   INR: 1.19 ratio      Lactate, Blood: 2.1 mmol/L (02-18 @ 14:19)      Radiology:    	< from: US Transthoracic Echocardiogram w/Doppler Complete (07.22.16 @ 08:18) >  	IMPRESSION: Enlargement of the left ventricle with generalized   	hypokinesia and an estimated ejection fraction of 20-25%. Enlargement of   	the left atrium with severe mitral insufficiency. Enlargement of the   	right atrium with moderate tricuspid insufficiency. Mild aortic   	insufficiency. A pacemaker wire was incidentally noted.    	< end of copied text >    	< from: Xray Chest 1 View AP/PA. (03.01.17 @ 09:34) >    	Minimal pulmonary vascular congestion with trace bilateral pleural   	effusions    	< end of copied text >    	EKG - paced rhythm 60s (my read)      < from: Xray Chest 1 View-PORTABLE IMMEDIATE (02.18.18 @ 11:26) >      Impression:    No focal consolidation    < end of copied text >      < from: CT Abdomen and Pelvis w/ Oral Cont and w/ IV Cont (02.18.18 @ 20:13) >  Impression:   -Multiple small liver cysts and hypodensities too small to characterize;   no solid mass corresponding to the ultrasound finding, which may   represent a hemangioma. Follow-up CT or ultrasound as clinically   warranted.  -Descending colon and sigmoid colitis likely of infectiousetiology, less   likely inflammatory or ischemic.  -Moderate compression deformity of the L3 vertebral body with slight bony   retropulsion, new since 2016.     < end of copied text >        MEDICATIONS  (STANDING):  ALBUTerol    0.083% 2.5 milliGRAM(s) Nebulizer every 6 hours  ALBUTerol    90 MICROgram(s) HFA Inhaler 1 Puff(s) Inhalation every 4 hours  aspirin  chewable 81 milliGRAM(s) Oral daily  ciprofloxacin   IVPB      ciprofloxacin   IVPB 400 milliGRAM(s) IV Intermittent every 12 hours  enoxaparin Injectable 30 milliGRAM(s) SubCutaneous every 24 hours  latanoprost 0.005% Ophthalmic Solution 1 Drop(s) Both EYES at bedtime  metroNIDAZOLE  IVPB      metroNIDAZOLE  IVPB 500 milliGRAM(s) IV Intermittent every 8 hours  multivitamin 1 Tablet(s) Oral daily  oseltamivir 30 milliGRAM(s) Oral two times a day  pantoprazole    Tablet 40 milliGRAM(s) Oral before breakfast  potassium chloride    Tablet ER 20 milliEquivalent(s) Oral daily  sodium chloride 0.9% Bolus 500 milliLiter(s) IV Bolus once  sodium chloride 0.9%. 500 milliLiter(s) (500 mL/Hr) IV Continuous <Continuous>  sodium chloride 0.9%. 1000 milliLiter(s) (75 mL/Hr) IV Continuous <Continuous>  traMADol 100 milliGRAM(s) Oral two times a day    MEDICATIONS  (PRN):  aluminum hydroxide/magnesium hydroxide/simethicone Suspension 30 milliLiter(s) Oral every 4 hours PRN Dyspepsia  guaiFENesin    Syrup 200 milliGRAM(s) Oral every 6 hours PRN Cough  ibuprofen  Tablet 200 milliGRAM(s) Oral every 6 hours PRN pain 1-4 fever> 100.4F  lactulose Syrup 20 Gram(s) Oral at bedtime PRN constipation  ondansetron Injectable 4 milliGRAM(s) IV Push every 6 hours PRN Nausea  senna 2 Tablet(s) Oral at bedtime PRN Constipation PMD - Dr Penny at [facility]    History of Present Illness:  Chief Complaint/Reason for Admission: fever  History of Present Illness:   poor historian. patient is an 85 y/o f w/ pmhx of chronic systolic dysfunction with EF 25-30% s/p AICD placement, pAfib not on AC, arthritis, presents to ED from assisted ling c/o fever and feeling sick. Pt lives in assisted living and states she was having fever and was sent to ED for further workup, states occasional dry cough but feels okay otherwise.  no SOB currently but feeling very tired and weak.  able to and eat and drink well but decreased appetite compared to before.    2/18: Rapid response called for hypotension, nausea and confusion. pt noted to have SBP 50s and AMS 2/2 hypotension with ?syncope episode, case d/w residents and intensivist dr parrish.   pt c/o abd pain and had dig level sent which was elevated to 3.5 and pt transferred to CCU for digifab.  BP improved with 1 liter fluid bolus with mentation improving.  states she feels weak no cp, sob. paced rhythm on monitor. dtr updated via phone and hen at bedside.   2/19: patient states she is feeling better than yesterday with abd pain improving.  experiencing body aches today. no n/v/d, no cp, sob.      Review of Systems: All 10 systems reviewed and found to be negative with the exception of what has been described above as per HPI    ICU Vital Signs Last 24 Hrs  T(C): 36.4 (19 Feb 2018 07:39), Max: 37 (19 Feb 2018 00:46)  T(F): 97.6 (19 Feb 2018 07:39), Max: 98.6 (19 Feb 2018 00:46)  HR: 64 (19 Feb 2018 11:00) (60 - 67)  BP: 83/55 (19 Feb 2018 11:00) (83/55 - 134/71)  BP(mean): 62 (19 Feb 2018 11:00) (46 - 87)  ABP: --  ABP(mean): --  RR: 20 (19 Feb 2018 11:00) (12 - 20)  SpO2: 88% (19 Feb 2018 11:00) (76% - 100%)      Physical Exam:   GEN: lying in bed, NAD, not ill appearing   HEENT:   NC/AT, EOMI  CV:  +S1, +S2, RRR  RESP:   few crackles at bases b/l no wheezing good air entry b/l   BREAST:  not examined  GI:  abdomen soft, tenderness to palpation in LLQ, non-distended, normoactive BS  RECTAL:  not examined  :  not examined  MSK:   normal muscle tone  EXT:  deformities of hand c/w arthiritis  NEURO:  somnolent   SKIN:  no rashes    Labs   Labs, Radiology, Cardiology, and Other Results: RVP + flu                  9.9    9.5   )-----------( 138      ( 19 Feb 2018 05:50 )             30.9     02-19    137  |  104  |  17  ----------------------------<  92  4.0   |  26  |  0.87    Ca    7.5<L>      19 Feb 2018 05:50  Phos  2.1     02-19  Mg     1.8     02-19    TPro  5.4<L>  /  Alb  2.4<L>  /  TBili  0.5  /  DBili  0.2  /  AST  454<H>  /  ALT  722<H>  /  AlkPhos  56  02-19    CARDIAC MARKERS ( 18 Feb 2018 22:45 )  0.105 ng/mL / x     / x     / x     / x      CARDIAC MARKERS ( 18 Feb 2018 14:19 )  0.084 ng/mL / x     / 48 U/L / x     / x      CARDIAC MARKERS ( 18 Feb 2018 09:50 )  0.093 ng/mL / x     / 51 U/L / x     / x        LIVER FUNCTIONS - ( 19 Feb 2018 05:50 )  Alb: 2.4 g/dL / Pro: 5.4 gm/dL / ALK PHOS: 56 U/L / ALT: 722 U/L / AST: 454 U/L / GGT: x           PT/INR - ( 18 Feb 2018 06:30 )   PT: 12.9 sec;   INR: 1.19 ratio      Lactate, Blood: 2.1 mmol/L (02-18 @ 14:19)    Digoxin Level, Serum (02.19.18 @ 12:17)    Digoxin Level, Serum: 2.35 ng/mL        Radiology:    	< from: US Transthoracic Echocardiogram w/Doppler Complete (07.22.16 @ 08:18) >  	IMPRESSION: Enlargement of the left ventricle with generalized   	hypokinesia and an estimated ejection fraction of 20-25%. Enlargement of   	the left atrium with severe mitral insufficiency. Enlargement of the   	right atrium with moderate tricuspid insufficiency. Mild aortic   	insufficiency. A pacemaker wire was incidentally noted.    	< end of copied text >    	< from: Xray Chest 1 View AP/PA. (03.01.17 @ 09:34) >    	Minimal pulmonary vascular congestion with trace bilateral pleural   	effusions    	< end of copied text >    	EKG - paced rhythm 60s (my read)      < from: Xray Chest 1 View-PORTABLE IMMEDIATE (02.18.18 @ 11:26) >      Impression:    No focal consolidation    < end of copied text >      < from: CT Abdomen and Pelvis w/ Oral Cont and w/ IV Cont (02.18.18 @ 20:13) >  Impression:   -Multiple small liver cysts and hypodensities too small to characterize;   no solid mass corresponding to the ultrasound finding, which may   represent a hemangioma. Follow-up CT or ultrasound as clinically   warranted.  -Descending colon and sigmoid colitis likely of infectiousetiology, less   likely inflammatory or ischemic.  -Moderate compression deformity of the L3 vertebral body with slight bony   retropulsion, new since 2016.     < end of copied text >        MEDICATIONS  (STANDING):  ALBUTerol    0.083% 2.5 milliGRAM(s) Nebulizer every 6 hours  ALBUTerol    90 MICROgram(s) HFA Inhaler 1 Puff(s) Inhalation every 4 hours  aspirin  chewable 81 milliGRAM(s) Oral daily  ciprofloxacin   IVPB      ciprofloxacin   IVPB 400 milliGRAM(s) IV Intermittent every 12 hours  enoxaparin Injectable 30 milliGRAM(s) SubCutaneous every 24 hours  latanoprost 0.005% Ophthalmic Solution 1 Drop(s) Both EYES at bedtime  metroNIDAZOLE  IVPB      metroNIDAZOLE  IVPB 500 milliGRAM(s) IV Intermittent every 8 hours  multivitamin 1 Tablet(s) Oral daily  oseltamivir 30 milliGRAM(s) Oral two times a day  pantoprazole    Tablet 40 milliGRAM(s) Oral before breakfast  potassium chloride    Tablet ER 20 milliEquivalent(s) Oral daily  sodium chloride 0.9% Bolus 500 milliLiter(s) IV Bolus once  sodium chloride 0.9%. 500 milliLiter(s) (500 mL/Hr) IV Continuous <Continuous>  sodium chloride 0.9%. 1000 milliLiter(s) (75 mL/Hr) IV Continuous <Continuous>  traMADol 100 milliGRAM(s) Oral two times a day    MEDICATIONS  (PRN):  aluminum hydroxide/magnesium hydroxide/simethicone Suspension 30 milliLiter(s) Oral every 4 hours PRN Dyspepsia  guaiFENesin    Syrup 200 milliGRAM(s) Oral every 6 hours PRN Cough  ibuprofen  Tablet 200 milliGRAM(s) Oral every 6 hours PRN pain 1-4 fever> 100.4F  lactulose Syrup 20 Gram(s) Oral at bedtime PRN constipation  ondansetron Injectable 4 milliGRAM(s) IV Push every 6 hours PRN Nausea  senna 2 Tablet(s) Oral at bedtime PRN Constipation

## 2018-02-20 LAB
ANA TITR SER: NEGATIVE — SIGNIFICANT CHANGE UP
ANION GAP SERPL CALC-SCNC: 7 MMOL/L — SIGNIFICANT CHANGE UP (ref 5–17)
BUN SERPL-MCNC: 11 MG/DL — SIGNIFICANT CHANGE UP (ref 7–23)
CALCIUM SERPL-MCNC: 7.5 MG/DL — LOW (ref 8.5–10.1)
CHLORIDE SERPL-SCNC: 106 MMOL/L — SIGNIFICANT CHANGE UP (ref 96–108)
CO2 SERPL-SCNC: 25 MMOL/L — SIGNIFICANT CHANGE UP (ref 22–31)
CREAT SERPL-MCNC: 0.88 MG/DL — SIGNIFICANT CHANGE UP (ref 0.5–1.3)
DIGOXIN SERPL-MCNC: 1.69 NG/ML — SIGNIFICANT CHANGE UP (ref 0.8–2)
GLUCOSE SERPL-MCNC: 108 MG/DL — HIGH (ref 70–99)
HCT VFR BLD CALC: 31.6 % — LOW (ref 34.5–45)
HGB BLD-MCNC: 9.8 G/DL — LOW (ref 11.5–15.5)
MAGNESIUM SERPL-MCNC: 1.7 MG/DL — SIGNIFICANT CHANGE UP (ref 1.6–2.6)
MCHC RBC-ENTMCNC: 28.6 PG — SIGNIFICANT CHANGE UP (ref 27–34)
MCHC RBC-ENTMCNC: 30.9 GM/DL — LOW (ref 32–36)
MCV RBC AUTO: 92.6 FL — SIGNIFICANT CHANGE UP (ref 80–100)
PHOSPHATE SERPL-MCNC: 1.5 MG/DL — LOW (ref 2.5–4.5)
PLATELET # BLD AUTO: 143 K/UL — LOW (ref 150–400)
POTASSIUM SERPL-MCNC: 4.3 MMOL/L — SIGNIFICANT CHANGE UP (ref 3.5–5.3)
POTASSIUM SERPL-SCNC: 4.3 MMOL/L — SIGNIFICANT CHANGE UP (ref 3.5–5.3)
RBC # BLD: 3.41 M/UL — LOW (ref 3.8–5.2)
RBC # FLD: 14.2 % — SIGNIFICANT CHANGE UP (ref 10.3–14.5)
SODIUM SERPL-SCNC: 138 MMOL/L — SIGNIFICANT CHANGE UP (ref 135–145)
WBC # BLD: 11.3 K/UL — HIGH (ref 3.8–10.5)
WBC # FLD AUTO: 11.3 K/UL — HIGH (ref 3.8–10.5)

## 2018-02-20 RX ORDER — SODIUM,POTASSIUM PHOSPHATES 278-250MG
2 POWDER IN PACKET (EA) ORAL THREE TIMES A DAY
Qty: 0 | Refills: 0 | Status: DISCONTINUED | OUTPATIENT
Start: 2018-02-20 | End: 2018-02-22

## 2018-02-20 RX ORDER — METOPROLOL TARTRATE 50 MG
12.5 TABLET ORAL
Qty: 0 | Refills: 0 | Status: DISCONTINUED | OUTPATIENT
Start: 2018-02-20 | End: 2018-02-22

## 2018-02-20 RX ORDER — MAGNESIUM SULFATE 500 MG/ML
2 VIAL (ML) INJECTION ONCE
Qty: 0 | Refills: 0 | Status: COMPLETED | OUTPATIENT
Start: 2018-02-20 | End: 2018-02-20

## 2018-02-20 RX ADMIN — SODIUM CHLORIDE 75 MILLILITER(S): 9 INJECTION INTRAMUSCULAR; INTRAVENOUS; SUBCUTANEOUS at 05:28

## 2018-02-20 RX ADMIN — Medication 100 MILLIGRAM(S): at 21:26

## 2018-02-20 RX ADMIN — Medication 100 MILLIGRAM(S): at 05:27

## 2018-02-20 RX ADMIN — Medication 2 PACKET(S): at 21:26

## 2018-02-20 RX ADMIN — PANTOPRAZOLE SODIUM 40 MILLIGRAM(S): 20 TABLET, DELAYED RELEASE ORAL at 08:02

## 2018-02-20 RX ADMIN — Medication 81 MILLIGRAM(S): at 12:28

## 2018-02-20 RX ADMIN — TRAMADOL HYDROCHLORIDE 100 MILLIGRAM(S): 50 TABLET ORAL at 17:32

## 2018-02-20 RX ADMIN — Medication 30 MILLIGRAM(S): at 17:32

## 2018-02-20 RX ADMIN — ALBUTEROL 2.5 MILLIGRAM(S): 90 AEROSOL, METERED ORAL at 08:25

## 2018-02-20 RX ADMIN — Medication 2 PACKET(S): at 14:37

## 2018-02-20 RX ADMIN — ENOXAPARIN SODIUM 30 MILLIGRAM(S): 100 INJECTION SUBCUTANEOUS at 10:51

## 2018-02-20 RX ADMIN — Medication 200 MILLIGRAM(S): at 17:32

## 2018-02-20 RX ADMIN — Medication 2 PACKET(S): at 05:28

## 2018-02-20 RX ADMIN — Medication 100 MILLIGRAM(S): at 14:37

## 2018-02-20 RX ADMIN — Medication 200 MILLIGRAM(S): at 05:27

## 2018-02-20 RX ADMIN — Medication 50 GRAM(S): at 12:41

## 2018-02-20 RX ADMIN — Medication 30 MILLIGRAM(S): at 05:27

## 2018-02-20 RX ADMIN — LATANOPROST 1 DROP(S): 0.05 SOLUTION/ DROPS OPHTHALMIC; TOPICAL at 21:26

## 2018-02-20 RX ADMIN — Medication 12.5 MILLIGRAM(S): at 17:37

## 2018-02-20 RX ADMIN — ALBUTEROL 2.5 MILLIGRAM(S): 90 AEROSOL, METERED ORAL at 13:40

## 2018-02-20 RX ADMIN — SODIUM CHLORIDE 75 MILLILITER(S): 9 INJECTION INTRAMUSCULAR; INTRAVENOUS; SUBCUTANEOUS at 21:26

## 2018-02-20 RX ADMIN — ALBUTEROL 2.5 MILLIGRAM(S): 90 AEROSOL, METERED ORAL at 20:20

## 2018-02-20 RX ADMIN — Medication 1 TABLET(S): at 12:28

## 2018-02-20 RX ADMIN — Medication 20 MILLIEQUIVALENT(S): at 12:28

## 2018-02-20 RX ADMIN — TRAMADOL HYDROCHLORIDE 100 MILLIGRAM(S): 50 TABLET ORAL at 05:28

## 2018-02-20 NOTE — PHYSICAL THERAPY INITIAL EVALUATION ADULT - DIAGNOSIS, PT EVAL
influenza, hypotension/n/v/d/abd pain - 2/2 dig toxicity and unlikely sepsis
influenza, hypotension/n/v/d/abd pain - 2/2 dig toxicity and unlikely sepsis

## 2018-02-20 NOTE — PROGRESS NOTE ADULT - ASSESSMENT
pt is 85 yo female with PMH as per hPI here with fever and found be positive for flu    * digoxin toxicity - pt with hypotension/n/v/d and abd pain - resolving, no longer in toxic range  - digifab 2 vials given as per protocol - dig level 1.69 this AM.   - cardi consult apprecaited  - d/c amio, dig, metoprolol.  will resume BB once BP improves     * elevated troponin - likely 2/2 ischemia 2/2 hypoperfusion in setting of hypotension  - cardio eval pending  - continue aspirin. statin held due to elvated LFTs    * influenza A - cont Tamiflu 30 mg bid renal dosing and continue supportive care  - cough suppressant  - oxygen prn    * metabolic encephalopathy - 2/2 colitis.  resolving    * transaminitis - ?2/2 flu vs amio toxicity vs autoimmune hepatitis  - case d/w dr dyer will send autoimmune workup  - stopped statin, tylenol, amio   - will discontinue tylenol and given motrin prn mild pain or for temp   - hepatic panel normal, autoimmune labs pending  - uS ABD - plan for liver CT once improved inpt vs outpt  -GI consult appreciated     *colitis of descending and sigmoid colon, likely infectious vs ischemic  -CT abdomen/pelvis shows multiple small liver cysts and hypodensities, but no solid mass. Also noted was colitis in the descending colon and sigmoid colon, likely of infectious etiology.  - cont cipro and flagyl, day 2 , case d/w dr dyer     * chronic systolic dysfunction/pafib - EF 25-30 % on last echo  - cautous with fluid administration.  - given elevated cr 1.3 (baseline ~0.8) will resume ACEI and lasix as Cr improves  - monitor and replete lytes as needed  -continue statin and ASA  - off AC 2/2 hematuria as per previous cardio notes and pt was also reluctant to start this  - EKG - paced rhythm 60s  - d/c amio, dig, metoprolol     * ARF on CKD 2 - baseline cr ~0.8  - will urge po intake and check in AM  - hold nephrotoxic meds and renal dose     * Arthritis - ? Rheumatoid vs severe OA  - will d/w family to see if we can obtain more info  - continue tramadol and Tylenol for pain prn     code status - DNR/DNI  dispo - likely rehab in 2-3 days

## 2018-02-20 NOTE — PHYSICAL THERAPY INITIAL EVALUATION ADULT - PERTINENT HX OF CURRENT PROBLEM, REHAB EVAL
86F presents to ED from assisted living c/o fever and feeling sick. Pt lives in assisted living and states she was having fever and was sent to ED for further workup, states occasional dry cough but feels okay otherwise. pt is a poor historian. on 2/18/18, Rapid response called for hypotension, nausea and confusion, pt transferred to CCU.

## 2018-02-20 NOTE — PROGRESS NOTE ADULT - SUBJECTIVE AND OBJECTIVE BOX
PMD - Dr Penny at Assisted living    History of Present Illness:  Chief Complaint/Reason for Admission: fever  History of Present Illness:   poor historian. patient is an 85 y/o f w/ pmhx of chronic systolic dysfunction with EF 25-30% s/p AICD placement, pAfib not on AC, arthritis, presents to ED from assisted ling c/o fever and feeling sick. Pt lives in assisted living and states she was having fever and was sent to ED for further workup, states occasional dry cough but feels okay otherwise.  no SOB currently but feeling very tired and weak.  able to and eat and drink well but decreased appetite compared to before.    2/18: Rapid response called for hypotension, nausea and confusion. pt noted to have SBP 50s and AMS 2/2 hypotension with ?syncope episode, case d/w residents and intensivist dr parrish.   pt c/o abd pain and had dig level sent which was elevated to 3.5 and pt transferred to CCU for digifab.  BP improved with 1 liter fluid bolus with mentation improving.  states she feels weak no cp, sob. paced rhythm on monitor. dtr updated via phone and at her bedside.   2/19: patient states she is feeling better than yesterday with abd pain improving.  experiencing body aches today. no n/v/d, no cp, sob.    2/20 - states some dizziness this morning.  one episode of diarrhea this morning.      Review of Systems: All 10 systems reviewed and found to be negative with the exception of what has been described above as per HPI    ICU Vital Signs Last 24 Hrs  T(C): 36.7 (20 Feb 2018 12:26), Max: 37 (20 Feb 2018 05:00)  T(F): 98.1 (20 Feb 2018 12:26), Max: 98.6 (20 Feb 2018 05:00)  HR: 68 (20 Feb 2018 11:00) (63 - 84)  BP: 122/53 (20 Feb 2018 11:00) (87/42 - 131/99)  BP(mean): 71 (20 Feb 2018 11:00) (53 - 105)  ABP: --  ABP(mean): --  RR: 18 (20 Feb 2018 11:00) (14 - 23)  SpO2: 81% (20 Feb 2018 11:00) (81% - 100%)    Physical Exam:   GEN: lying in bed, NAD, not ill appearing   HEENT:   NC/AT, EOMI  CV:  +S1, +S2, RRR  RESP:   few crackles at bases b/l no wheezing good air entry b/l   BREAST:  not examined  GI:  abdomen soft, tenderness to palpation in LLQ without guarding,  non-distended, normoactive BS  RECTAL:  not examined  :  not examined  MSK:   normal muscle tone  EXT:  deformities of hand c/w arthiritis  NEURO:  somnolent   SKIN:  no rashes    Labs   Labs, Radiology, Cardiology, and Other Results: RVP + flu                                 9.8    11.3  )-----------( 143      ( 20 Feb 2018 11:15 )             31.6     02-20    138  |  106  |  11  ----------------------------<  108<H>  4.3   |  25  |  0.88    Ca    7.5<L>      20 Feb 2018 11:15  Phos  1.5     02-20  Mg     1.7     02-20    TPro  5.4<L>  /  Alb  2.4<L>  /  TBili  0.5  /  DBili  0.2  /  AST  454<H>  /  ALT  722<H>  /  AlkPhos  56  02-19    CARDIAC MARKERS ( 18 Feb 2018 22:45 )  0.105 ng/mL / x     / x     / x     / x      CARDIAC MARKERS ( 18 Feb 2018 14:19 )  0.084 ng/mL / x     / 48 U/L / x     / x          LIVER FUNCTIONS - ( 19 Feb 2018 05:50 )  Alb: 2.4 g/dL / Pro: 5.4 gm/dL / ALK PHOS: 56 U/L / ALT: 722 U/L / AST: 454 U/L / GGT: x               Digoxin Level, Serum (02.20.18 @ 06:07)    Digoxin Level, Serum: 1.69 ng/mL    Radiology:    	< from: US Transthoracic Echocardiogram w/Doppler Complete (07.22.16 @ 08:18) >  	IMPRESSION: Enlargement of the left ventricle with generalized   	hypokinesia and an estimated ejection fraction of 20-25%. Enlargement of   	the left atrium with severe mitral insufficiency. Enlargement of the   	right atrium with moderate tricuspid insufficiency. Mild aortic   	insufficiency. A pacemaker wire was incidentally noted.    	< end of copied text >    	< from: Xray Chest 1 View AP/PA. (03.01.17 @ 09:34) >    	Minimal pulmonary vascular congestion with trace bilateral pleural   	effusions    	< end of copied text >    	EKG - paced rhythm 60s (my read)      < from: Xray Chest 1 View-PORTABLE IMMEDIATE (02.18.18 @ 11:26) >      Impression:    No focal consolidation    < end of copied text >      < from: CT Abdomen and Pelvis w/ Oral Cont and w/ IV Cont (02.18.18 @ 20:13) >  Impression:   -Multiple small liver cysts and hypodensities too small to characterize;   no solid mass corresponding to the ultrasound finding, which may   represent a hemangioma. Follow-up CT or ultrasound as clinically   warranted.  -Descending colon and sigmoid colitis likely of infectiousetiology, less   likely inflammatory or ischemic.  -Moderate compression deformity of the L3 vertebral body with slight bony   retropulsion, new since 2016.     < end of copied text >    MEDICATIONS  (STANDING):  ALBUTerol    0.083% 2.5 milliGRAM(s) Nebulizer every 6 hours  ALBUTerol    90 MICROgram(s) HFA Inhaler 1 Puff(s) Inhalation every 4 hours  aspirin  chewable 81 milliGRAM(s) Oral daily  ciprofloxacin   IVPB      ciprofloxacin   IVPB 400 milliGRAM(s) IV Intermittent every 12 hours  enoxaparin Injectable 30 milliGRAM(s) SubCutaneous every 24 hours  latanoprost 0.005% Ophthalmic Solution 1 Drop(s) Both EYES at bedtime  magnesium sulfate  IVPB 2 Gram(s) IV Intermittent once  metroNIDAZOLE  IVPB      metroNIDAZOLE  IVPB 500 milliGRAM(s) IV Intermittent every 8 hours  multivitamin 1 Tablet(s) Oral daily  oseltamivir 30 milliGRAM(s) Oral two times a day  pantoprazole    Tablet 40 milliGRAM(s) Oral before breakfast  potassium chloride    Tablet ER 20 milliEquivalent(s) Oral daily  potassium phosphate / sodium phosphate powder 2 Packet(s) Oral three times a day  sodium chloride 0.9% Bolus 500 milliLiter(s) IV Bolus once  sodium chloride 0.9%. 500 milliLiter(s) (500 mL/Hr) IV Continuous <Continuous>  sodium chloride 0.9%. 1000 milliLiter(s) (75 mL/Hr) IV Continuous <Continuous>  traMADol 100 milliGRAM(s) Oral two times a day    MEDICATIONS  (PRN):  aluminum hydroxide/magnesium hydroxide/simethicone Suspension 30 milliLiter(s) Oral every 4 hours PRN Dyspepsia  guaiFENesin    Syrup 200 milliGRAM(s) Oral every 6 hours PRN Cough  ibuprofen  Tablet 200 milliGRAM(s) Oral every 6 hours PRN pain 1-4 fever> 100.4F  lactulose Syrup 20 Gram(s) Oral at bedtime PRN constipation  ondansetron Injectable 4 milliGRAM(s) IV Push every 6 hours PRN Nausea  senna 2 Tablet(s) Oral at bedtime PRN Constipation

## 2018-02-20 NOTE — CONSULT NOTE ADULT - SUBJECTIVE AND OBJECTIVE BOX
HPI:  poor historian. patient is an 85 y/o f w/ pmhx of chronic systolic dysfunction with EF 25-30% s/p AICD placement, pAfib not on AC, arthritis, presents to ED from assisted ling c/o fever and feeling sick. Pt lives in assisted living and states she was having fever and was sent to ED for further workup, states occasional dry cough but feels okay otherwise.  no SOB currently but feeling very tired and weak.  able to and eat and drink well but decreased appetite compared to before.  --------------------  Here found to be pos for influenza. However, also noted to have very high and rising transaminases, nearing 1000. Was on amio, just stopped. Did run very mild elevation in transaminases in the past.    PMD - as per HPI  PSH - as per HPI  ALL - see below (17 Feb 2018 09:13)      PAST MEDICAL & SURGICAL HISTORY:  Osteoarthritis of multiple joints, unspecified osteoarthritis type  Gastroesophageal reflux disease without esophagitis  Dyslipidemia  Essential hypertension  Paroxysmal atrial fibrillation  Coronary artery disease involving native coronary artery of native heart without angina pectoris  Chronic systolic congestive heart failure: EF 20-25%  AICD (automatic cardioverter/defibrillator) present      MEDICATIONS  (STANDING):  ALBUTerol    0.083% 2.5 milliGRAM(s) Nebulizer every 6 hours  ALBUTerol    90 MICROgram(s) HFA Inhaler 1 Puff(s) Inhalation every 4 hours  aspirin  chewable 81 milliGRAM(s) Oral daily  digoxin     Tablet 0.125 milliGRAM(s) Oral daily  enoxaparin Injectable 30 milliGRAM(s) SubCutaneous every 24 hours  latanoprost 0.005% Ophthalmic Solution 1 Drop(s) Both EYES at bedtime  multivitamin 1 Tablet(s) Oral daily  oseltamivir 30 milliGRAM(s) Oral two times a day  pantoprazole    Tablet 40 milliGRAM(s) Oral before breakfast  potassium chloride    Tablet ER 20 milliEquivalent(s) Oral daily  sodium chloride 0.9% Bolus 500 milliLiter(s) IV Bolus once  sodium chloride 0.9%. 500 milliLiter(s) (500 mL/Hr) IV Continuous <Continuous>  traMADol 100 milliGRAM(s) Oral two times a day    MEDICATIONS  (PRN):  aluminum hydroxide/magnesium hydroxide/simethicone Suspension 30 milliLiter(s) Oral every 4 hours PRN Dyspepsia  guaiFENesin    Syrup 200 milliGRAM(s) Oral every 6 hours PRN Cough  ibuprofen  Tablet 200 milliGRAM(s) Oral every 6 hours PRN pain 1-4 fever> 100.4F  lactulose Syrup 20 Gram(s) Oral at bedtime PRN constipation  ondansetron Injectable 4 milliGRAM(s) IV Push every 6 hours PRN Nausea  senna 2 Tablet(s) Oral at bedtime PRN Constipation      Allergies    Bananas (Angioedema)  Cantelope (Angioedema)  Flonase (Angioedema)  honeydews (Angioedema)  Metformin HCL-Pioglitazone HCL (Nephrotoxicity; Angioedema; Faint)  Raspberries (Angioedema)  Seafood (Swelling; Angioedema)  strawberry (Angioedema)    Intolerances        SOCIAL HISTORY:    FAMILY HISTORY:  No pertinent family history in first degree relatives      ROS  As above  Otherwise unremarkable    Vital Signs Last 24 Hrs  T(C): 36.6 (18 Feb 2018 04:49), Max: 36.9 (17 Feb 2018 19:20)  T(F): 97.9 (18 Feb 2018 04:49), Max: 98.4 (17 Feb 2018 19:20)  HR: 62 (18 Feb 2018 04:49) (59 - 66)  BP: 114/59 (18 Feb 2018 04:49) (105/63 - 133/65)  BP(mean): --  RR: 16 (18 Feb 2018 04:49) (16 - 18)  SpO2: 100% (18 Feb 2018 04:49) (99% - 100%)    Constitutional: NAD, well-developed  Respiratory: CTAB  Cardiovascular: S1 and S2,   Gastrointestinal: BS+, soft, moderate periumbilical tenderness  Extremities: No peripheral edema  Psychiatric: Normal mood, normal affect  Skin: No rashes    LABS:                        10.4   4.9   )-----------( 150      ( 18 Feb 2018 06:30 )             33.4     02-18    137  |  101  |  26<H>  ----------------------------<  110<H>  4.3   |  31  |  1.29    Ca    8.1<L>      18 Feb 2018 06:30    TPro  5.9<L>  /  Alb  2.8<L>  /  TBili  0.6  /  DBili  0.2  /  AST  829<H>  /  ALT  1049<H>  /  AlkPhos  34<L>  02-18    PT/INR - ( 18 Feb 2018 06:30 )   PT: 12.9 sec;   INR: 1.19 ratio           LIVER FUNCTIONS - ( 18 Feb 2018 06:30 )  Alb: 2.8 g/dL / Pro: 5.9 gm/dL / ALK PHOS: 34 U/L / ALT: 1049 U/L / AST: 829 U/L / GGT: x             RADIOLOGY & ADDITIONAL STUDIES:
Cardiology Consultation    HPI: poor historian. patient is an 87 y/o f w/ pmhx of chronic systolic dysfunction with EF 25-30% s/p AICD placement, pAfib not on AC, arthritis, presents to ED from assisted ling c/o fever and feeling sick. Pt lives in assisted living and states she was having fever and was sent to ED for further workup, states occasional dry cough but feels okay otherwise.  no SOB currently but feeling very tired and weak.  able to and eat and drink well but decreased appetite compared to before.    2/20- Mild dizziness this AM- not improved. Feels tired, weak. No CP.     PMD - as per HPI  PSH - as per HPI  ALL - see below (17 Feb 2018 09:13)    PAST MEDICAL & SURGICAL HISTORY:  Osteoarthritis of multiple joints, unspecified osteoarthritis type  Gastroesophageal reflux disease without esophagitis  Dyslipidemia  Essential hypertension  Paroxysmal atrial fibrillation  Coronary artery disease involving native coronary artery of native heart without angina pectoris  Chronic systolic congestive heart failure: EF 20-25%  AICD (automatic cardioverter/defibrillator) present    Allergies  Cantelope (Angioedema)  Flonase (Angioedema)  honeydews (Angioedema)  Metformin HCL-Pioglitazone HCL (Nephrotoxicity; Angioedema; Faint)  Raspberries (Angioedema)  strawberry (Angioedema)    Intolerances    SOCIAL HISTORY: Denies tobacco, etoh abuse or illicit drug use    FAMILY HISTORY: No pertinent family history in first degree relatives    MEDICATIONS  (STANDING):  ALBUTerol    0.083% 2.5 milliGRAM(s) Nebulizer every 6 hours  ALBUTerol    90 MICROgram(s) HFA Inhaler 1 Puff(s) Inhalation every 4 hours  aspirin  chewable 81 milliGRAM(s) Oral daily  ciprofloxacin   IVPB      ciprofloxacin   IVPB 400 milliGRAM(s) IV Intermittent every 12 hours  enoxaparin Injectable 30 milliGRAM(s) SubCutaneous every 24 hours  latanoprost 0.005% Ophthalmic Solution 1 Drop(s) Both EYES at bedtime  metroNIDAZOLE  IVPB      metroNIDAZOLE  IVPB 500 milliGRAM(s) IV Intermittent every 8 hours  multivitamin 1 Tablet(s) Oral daily  oseltamivir 30 milliGRAM(s) Oral two times a day  pantoprazole    Tablet 40 milliGRAM(s) Oral before breakfast  potassium chloride    Tablet ER 20 milliEquivalent(s) Oral daily  sodium chloride 0.9% Bolus 500 milliLiter(s) IV Bolus once  sodium chloride 0.9%. 500 milliLiter(s) (500 mL/Hr) IV Continuous <Continuous>  sodium chloride 0.9%. 1000 milliLiter(s) (75 mL/Hr) IV Continuous <Continuous>  traMADol 100 milliGRAM(s) Oral two times a day    MEDICATIONS  (PRN):  aluminum hydroxide/magnesium hydroxide/simethicone Suspension 30 milliLiter(s) Oral every 4 hours PRN Dyspepsia  guaiFENesin    Syrup 200 milliGRAM(s) Oral every 6 hours PRN Cough  ibuprofen  Tablet 200 milliGRAM(s) Oral every 6 hours PRN pain 1-4 fever> 100.4F  lactulose Syrup 20 Gram(s) Oral at bedtime PRN constipation  ondansetron Injectable 4 milliGRAM(s) IV Push every 6 hours PRN Nausea  senna 2 Tablet(s) Oral at bedtime PRN Constipation      Vital Signs Last 24 Hrs  T(C): 36.7 (20 Feb 2018 08:07), Max: 37 (20 Feb 2018 05:00)  T(F): 98.1 (20 Feb 2018 08:07), Max: 98.6 (20 Feb 2018 05:00)  HR: 63 (20 Feb 2018 09:06) (63 - 84)  BP: 131/99 (20 Feb 2018 09:00) (83/55 - 131/99)  BP(mean): 105 (20 Feb 2018 09:00) (53 - 105)  RR: 23 (20 Feb 2018 09:00) (14 - 23)  SpO2: 100% (19 Feb 2018 21:00) (88% - 100%)    REVIEW OF SYSTEMS:    CONSTITUTIONAL:  As per HPI.  HEENT:  Eyes:  No diplopia or blurred vision. ENT:  No earache, sore throat or runny nose.  CARDIOVASCULAR:  No pressure, squeezing, strangling, tightness, heaviness or aching about the chest, neck, axilla or epigastrium.  RESPIRATORY:  No cough, shortness of breath, PND or orthopnea.  GASTROINTESTINAL:  No nausea, vomiting or diarrhea.  GENITOURINARY:  No dysuria, frequency or urgency.  MUSCULOSKELETAL:  As per HPI.  NEUROLOGIC:  No paresthesias, fasciculations, seizures or weakness.  PSYCHIATRIC:  No disorder of thought or mood.  ENDOCRINE:  No heat or cold intolerance, polyuria or polydipsia.  HEMATOLOGICAL:  No easy bruising or bleedings.     PHYSICAL EXAMINATION:    GENERAL APPEARANCE:  Pt. is not currently dyspneic, in no distress. Pt. is alert, oriented, and pleasant.  HEENT:  Pupils are normal and react normally. No icterus. Mucous membranes well colored.  NECK:  Supple. No lymphadenopathy. Jugular venous pressure not elevated. Carotids equal.   HEART:   The cardiac impulse has a normal quality. There are no murmurs, rubs or gallops noted  CHEST:  Chest is clear to auscultation. Normal respiratory effort.  ABDOMEN:  Soft and nontender.   EXTREMITIES:  There is no edema.   SKIN:  No rash or significant lesions are noted.    I&O's Summary    19 Feb 2018 07:01  -  20 Feb 2018 07:00  --------------------------------------------------------  IN: 1634 mL / OUT: 550 mL / NET: 1084 mL    20 Feb 2018 07:01  -  20 Feb 2018 10:00  --------------------------------------------------------  IN: 0 mL / OUT: 150 mL / NET: -150 mL    LABS:                        9.9    9.5   )-----------( 138      ( 19 Feb 2018 05:50 )             30.9     02-19    137  |  104  |  17  ----------------------------<  92  4.0   |  26  |  0.87    Ca    7.5<L>      19 Feb 2018 05:50  Phos  2.1     02-19  Mg     1.8     02-19    TPro  5.4<L>  /  Alb  2.4<L>  /  TBili  0.5  /  DBili  0.2  /  AST  454<H>  /  ALT  722<H>  /  AlkPhos  56  02-19    LIVER FUNCTIONS - ( 19 Feb 2018 05:50 )  Alb: 2.4 g/dL / Pro: 5.4 gm/dL / ALK PHOS: 56 U/L / ALT: 722 U/L / AST: 454 U/L / GGT: x             CARDIAC MARKERS ( 18 Feb 2018 22:45 )  0.105 ng/mL / x     / x     / x     / x      CARDIAC MARKERS ( 18 Feb 2018 14:19 )  0.084 ng/mL / x     / 48 U/L / x     / x        EKG: < from: 12 Lead ECG (02.16.18 @ 20:09) >  Atrial-paced rhythm  Anterior infarct , age undetermined  Abnormal ECG  When compared with ECG of 13-AUG-2017 06:43,  Anterior infarct is now Present  Non-specific change in ST segment in Anterior leads  Nonspecific T wave abnormality, worse in Lateral leads  QT has shortened    < end of copied text >      TELEMETRY: Apaced    CARDIAC TESTS: < from: US Transthoracic Echocardiogram w/Doppler Complete (07.22.16 @ 08:18) >  IMPRESSION: Enlargement of the left ventricle with generalized   hypokinesia and an estimated ejection fraction of 20-25%. Enlargement of   the left atrium with severe mitral insufficiency. Enlargement of the   right atrium with moderate tricuspid insufficiency. Mild aortic   insufficiency. A pacemaker wire was incidentally noted.    < end of copied text >      RADIOLOGY & ADDITIONAL STUDIES:   < from: CT Abdomen and Pelvis w/ Oral Cont and w/ IV Cont (02.18.18 @ 20:13) >    Impression:   -Multiple small liver cysts and hypodensities too small to characterize;   no solid mass corresponding to the ultrasound finding, which may   represent a hemangioma. Follow-up CT or ultrasound as clinically   warranted.  -Descending colon and sigmoid colitis likely of infectiousetiology, less   likely inflammatory or ischemic.  -Moderate compression deformity of the L3 vertebral body with slight bony   retropulsion, new since 2016.     < end of copied text >    ASSESSMENT & PLAN:

## 2018-02-20 NOTE — CONSULT NOTE ADULT - ASSESSMENT
A/P- 87 yo female with PMH as per hPI here with fever and found be positive for flu.    1. Digoxin toxicity a/w level of 2.69, now leve is 1.69 after digifab. No further digoxin especially in setting of recent KATIA, advanced age.     2. HTN- slowly resuming BP meds. Restart Bbl today at outpt doses.    3. +Troponins- low level. Likely demand ischemia in setting of low EF, flu. Check 2Decho. Conservative medical mgmt. No ischemic eval at this time.     4. +Flu A- continue Tamiflu as per primary team. Avoid excessive fluids in setting of low EF.    5. Elevated LFTs-  ?2/2 flu vs amio toxicity vs autoimmune hepatitis. GI following. No further Amio, statin for now. W/u ongoing.     6. Colitis of descending and sigmoid colon- w/u. mgmt as per GI.     7. Chronic systolic dysfunction- last know LVEF 25-30%. Watch for signs of fluid overload. Daily wts, strict I/Os. Resume outpt ACEi, Bbl, lasix.     8. PAF- Not on AC 2/2 hematuria and pts wishes. Cont asa.     9. DVT proph.     10. Continue CCU observation today.
Imp:  1) very high and rising LFTs  2) influenza  3) Abdominal pain  4) Liver lesion    Rec:  CT abd/pel with PO/IV contrast  Check autoimmune markers and viral hepatitis panel  Holding amio for now

## 2018-02-20 NOTE — PHYSICAL THERAPY INITIAL EVALUATION ADULT - DISCHARGE DISPOSITION, PT EVAL
[facility] vs [facility] with home PT. PT will continue to update d/c recommendations based on pt's functional progress with PT while inpatient.

## 2018-02-21 ENCOUNTER — TRANSCRIPTION ENCOUNTER (OUTPATIENT)
Age: 83
End: 2018-02-21

## 2018-02-21 LAB
ALBUMIN SERPL ELPH-MCNC: 2.4 G/DL — LOW (ref 3.3–5)
ALP SERPL-CCNC: 43 U/L — SIGNIFICANT CHANGE UP (ref 40–120)
ALT FLD-CCNC: 384 U/L — HIGH (ref 12–78)
ANION GAP SERPL CALC-SCNC: 7 MMOL/L — SIGNIFICANT CHANGE UP (ref 5–17)
AST SERPL-CCNC: 146 U/L — HIGH (ref 15–37)
BILIRUB SERPL-MCNC: 0.4 MG/DL — SIGNIFICANT CHANGE UP (ref 0.2–1.2)
BUN SERPL-MCNC: 6 MG/DL — LOW (ref 7–23)
CALCIUM SERPL-MCNC: 7.7 MG/DL — LOW (ref 8.5–10.1)
CHLORIDE SERPL-SCNC: 108 MMOL/L — SIGNIFICANT CHANGE UP (ref 96–108)
CO2 SERPL-SCNC: 27 MMOL/L — SIGNIFICANT CHANGE UP (ref 22–31)
CREAT SERPL-MCNC: 0.91 MG/DL — SIGNIFICANT CHANGE UP (ref 0.5–1.3)
GLUCOSE SERPL-MCNC: 115 MG/DL — HIGH (ref 70–99)
HCT VFR BLD CALC: 30.4 % — LOW (ref 34.5–45)
HGB BLD-MCNC: 9.6 G/DL — LOW (ref 11.5–15.5)
MCHC RBC-ENTMCNC: 29.1 PG — SIGNIFICANT CHANGE UP (ref 27–34)
MCHC RBC-ENTMCNC: 31.5 GM/DL — LOW (ref 32–36)
MCV RBC AUTO: 92.5 FL — SIGNIFICANT CHANGE UP (ref 80–100)
PLATELET # BLD AUTO: 143 K/UL — LOW (ref 150–400)
POTASSIUM SERPL-MCNC: 4.7 MMOL/L — SIGNIFICANT CHANGE UP (ref 3.5–5.3)
POTASSIUM SERPL-SCNC: 4.7 MMOL/L — SIGNIFICANT CHANGE UP (ref 3.5–5.3)
PROT SERPL-MCNC: 5.4 GM/DL — LOW (ref 6–8.3)
RBC # BLD: 3.29 M/UL — LOW (ref 3.8–5.2)
RBC # FLD: 13.8 % — SIGNIFICANT CHANGE UP (ref 10.3–14.5)
SMOOTH MUSCLE AB SER-ACNC: SIGNIFICANT CHANGE UP
SODIUM SERPL-SCNC: 142 MMOL/L — SIGNIFICANT CHANGE UP (ref 135–145)
WBC # BLD: 8.8 K/UL — SIGNIFICANT CHANGE UP (ref 3.8–10.5)
WBC # FLD AUTO: 8.8 K/UL — SIGNIFICANT CHANGE UP (ref 3.8–10.5)

## 2018-02-21 PROCEDURE — 93306 TTE W/DOPPLER COMPLETE: CPT | Mod: 26

## 2018-02-21 RX ORDER — IBUPROFEN 200 MG
1 TABLET ORAL
Qty: 0 | Refills: 0 | COMMUNITY
Start: 2018-02-21

## 2018-02-21 RX ORDER — TRAMADOL HYDROCHLORIDE 50 MG/1
2 TABLET ORAL
Qty: 0 | Refills: 0 | COMMUNITY
Start: 2018-02-21

## 2018-02-21 RX ORDER — FUROSEMIDE 40 MG
1 TABLET ORAL
Qty: 0 | Refills: 0 | COMMUNITY

## 2018-02-21 RX ORDER — ALBUTEROL 90 UG/1
1 AEROSOL, METERED ORAL
Qty: 0 | Refills: 0 | COMMUNITY
Start: 2018-02-21

## 2018-02-21 RX ORDER — ASPIRIN/CALCIUM CARB/MAGNESIUM 324 MG
1 TABLET ORAL
Qty: 0 | Refills: 0 | COMMUNITY
Start: 2018-02-21

## 2018-02-21 RX ORDER — METOPROLOL TARTRATE 50 MG
12.5 TABLET ORAL
Qty: 0 | Refills: 0 | COMMUNITY
Start: 2018-02-21

## 2018-02-21 RX ADMIN — Medication 30 MILLIGRAM(S): at 17:52

## 2018-02-21 RX ADMIN — ALBUTEROL 2.5 MILLIGRAM(S): 90 AEROSOL, METERED ORAL at 01:41

## 2018-02-21 RX ADMIN — Medication 20 MILLIEQUIVALENT(S): at 11:00

## 2018-02-21 RX ADMIN — Medication 1 TABLET(S): at 11:00

## 2018-02-21 RX ADMIN — Medication 2 PACKET(S): at 21:53

## 2018-02-21 RX ADMIN — ALBUTEROL 2.5 MILLIGRAM(S): 90 AEROSOL, METERED ORAL at 14:00

## 2018-02-21 RX ADMIN — ENOXAPARIN SODIUM 30 MILLIGRAM(S): 100 INJECTION SUBCUTANEOUS at 10:58

## 2018-02-21 RX ADMIN — Medication 100 MILLIGRAM(S): at 21:52

## 2018-02-21 RX ADMIN — ALBUTEROL 2.5 MILLIGRAM(S): 90 AEROSOL, METERED ORAL at 09:32

## 2018-02-21 RX ADMIN — Medication 12.5 MILLIGRAM(S): at 17:52

## 2018-02-21 RX ADMIN — Medication 200 MILLIGRAM(S): at 17:52

## 2018-02-21 RX ADMIN — Medication 100 MILLIGRAM(S): at 05:35

## 2018-02-21 RX ADMIN — Medication 100 MILLIGRAM(S): at 14:25

## 2018-02-21 RX ADMIN — TRAMADOL HYDROCHLORIDE 100 MILLIGRAM(S): 50 TABLET ORAL at 17:52

## 2018-02-21 RX ADMIN — LATANOPROST 1 DROP(S): 0.05 SOLUTION/ DROPS OPHTHALMIC; TOPICAL at 21:52

## 2018-02-21 RX ADMIN — Medication 200 MILLIGRAM(S): at 05:35

## 2018-02-21 RX ADMIN — Medication 30 MILLIGRAM(S): at 05:35

## 2018-02-21 RX ADMIN — TRAMADOL HYDROCHLORIDE 100 MILLIGRAM(S): 50 TABLET ORAL at 06:10

## 2018-02-21 RX ADMIN — PANTOPRAZOLE SODIUM 40 MILLIGRAM(S): 20 TABLET, DELAYED RELEASE ORAL at 05:35

## 2018-02-21 RX ADMIN — ALBUTEROL 2.5 MILLIGRAM(S): 90 AEROSOL, METERED ORAL at 20:17

## 2018-02-21 RX ADMIN — Medication 2 PACKET(S): at 05:35

## 2018-02-21 RX ADMIN — Medication 12.5 MILLIGRAM(S): at 05:35

## 2018-02-21 RX ADMIN — Medication 81 MILLIGRAM(S): at 11:00

## 2018-02-21 RX ADMIN — TRAMADOL HYDROCHLORIDE 100 MILLIGRAM(S): 50 TABLET ORAL at 05:35

## 2018-02-21 RX ADMIN — Medication 2 PACKET(S): at 14:35

## 2018-02-21 NOTE — DISCHARGE NOTE ADULT - CARE PLAN
Principal Discharge DX:	Digoxin toxicity  Goal:	prevent further damange  Assessment and plan of treatment:	stop digoxin  Secondary Diagnosis:	Influenza  Goal:	feel better  Assessment and plan of treatment:	complete tamiflu  Secondary Diagnosis:	Chronic systolic congestive heart failure  Goal:	cardiology follow up  Assessment and plan of treatment:	see your cardiologist to slowly resume your heart failure medications as your BP improves. Principal Discharge DX:	Digoxin toxicity  Goal:	prevent further damange  Assessment and plan of treatment:	stop digoxin  Secondary Diagnosis:	Influenza  Goal:	feel better  Assessment and plan of treatment:	complete tamiflu  Secondary Diagnosis:	Chronic systolic congestive heart failure  Goal:	cardiology follow up in 1 week to review meds and repeat labs (bmp)  Assessment and plan of treatment:	see your cardiologist to slowly resume your heart failure medications as your BP improves.  Low dose lasix 20mg restarted.  Do not take if blood pressure too low.  Secondary Diagnosis:	Colitis  Goal:	complete course antibiotics as prescribed  Assessment and plan of treatment:	Follow up with Dr. Julien

## 2018-02-21 NOTE — PROGRESS NOTE ADULT - SUBJECTIVE AND OBJECTIVE BOX
Cardiology Progress Note    HPI: poor historian. patient is an 87 y/o f w/ pmhx of chronic systolic dysfunction with EF 25-30% s/p AICD placement, pAfib not on AC, arthritis, presents to ED from assisted ling c/o fever and feeling sick. Pt lives in assisted living and states she was having fever and was sent to ED for further workup, states occasional dry cough but feels okay otherwise.  no SOB currently but feeling very tired and weak.  able to and eat and drink well but decreased appetite compared to before.    2/20- Mild dizziness this AM- not improved. Feels tired, weak. No CP.     2/21- No CP/SOB. Feels tired. PT this AM.    PMD - as per HPI  PSH - as per HPI  ALL - see below (17 Feb 2018 09:13)    PAST MEDICAL & SURGICAL HISTORY:  Osteoarthritis of multiple joints, unspecified osteoarthritis type  Gastroesophageal reflux disease without esophagitis  Dyslipidemia  Essential hypertension  Paroxysmal atrial fibrillation  Coronary artery disease involving native coronary artery of native heart without angina pectoris  Chronic systolic congestive heart failure: EF 20-25%  AICD (automatic cardioverter/defibrillator) present    Allergies  Cantelope (Angioedema)  Flonase (Angioedema)  honeydews (Angioedema)  Metformin HCL-Pioglitazone HCL (Nephrotoxicity; Angioedema; Faint)  Raspberries (Angioedema)  strawberry (Angioedema)    Intolerances    SOCIAL HISTORY: Denies tobacco, etoh abuse or illicit drug use    FAMILY HISTORY: No pertinent family history in first degree relatives    MEDICATIONS  (STANDING):  ALBUTerol    0.083% 2.5 milliGRAM(s) Nebulizer every 6 hours  ALBUTerol    90 MICROgram(s) HFA Inhaler 1 Puff(s) Inhalation every 4 hours  aspirin  chewable 81 milliGRAM(s) Oral daily  ciprofloxacin   IVPB      ciprofloxacin   IVPB 400 milliGRAM(s) IV Intermittent every 12 hours  enoxaparin Injectable 30 milliGRAM(s) SubCutaneous every 24 hours  latanoprost 0.005% Ophthalmic Solution 1 Drop(s) Both EYES at bedtime  metroNIDAZOLE  IVPB      metroNIDAZOLE  IVPB 500 milliGRAM(s) IV Intermittent every 8 hours  multivitamin 1 Tablet(s) Oral daily  oseltamivir 30 milliGRAM(s) Oral two times a day  pantoprazole    Tablet 40 milliGRAM(s) Oral before breakfast  potassium chloride    Tablet ER 20 milliEquivalent(s) Oral daily  sodium chloride 0.9% Bolus 500 milliLiter(s) IV Bolus once  sodium chloride 0.9%. 500 milliLiter(s) (500 mL/Hr) IV Continuous <Continuous>  sodium chloride 0.9%. 1000 milliLiter(s) (75 mL/Hr) IV Continuous <Continuous>  traMADol 100 milliGRAM(s) Oral two times a day    MEDICATIONS  (PRN):  aluminum hydroxide/magnesium hydroxide/simethicone Suspension 30 milliLiter(s) Oral every 4 hours PRN Dyspepsia  guaiFENesin    Syrup 200 milliGRAM(s) Oral every 6 hours PRN Cough  ibuprofen  Tablet 200 milliGRAM(s) Oral every 6 hours PRN pain 1-4 fever> 100.4F  lactulose Syrup 20 Gram(s) Oral at bedtime PRN constipation  ondansetron Injectable 4 milliGRAM(s) IV Push every 6 hours PRN Nausea  senna 2 Tablet(s) Oral at bedtime PRN Constipation      Vital Signs Last 24 Hrs  T(C): 36.7 (20 Feb 2018 08:07), Max: 37 (20 Feb 2018 05:00)  T(F): 98.1 (20 Feb 2018 08:07), Max: 98.6 (20 Feb 2018 05:00)  HR: 63 (20 Feb 2018 09:06) (63 - 84)  BP: 131/99 (20 Feb 2018 09:00) (83/55 - 131/99)  BP(mean): 105 (20 Feb 2018 09:00) (53 - 105)  RR: 23 (20 Feb 2018 09:00) (14 - 23)  SpO2: 100% (19 Feb 2018 21:00) (88% - 100%)    REVIEW OF SYSTEMS:    CONSTITUTIONAL:  As per HPI.  HEENT:  Eyes:  No diplopia or blurred vision. ENT:  No earache, sore throat or runny nose.  CARDIOVASCULAR:  No pressure, squeezing, strangling, tightness, heaviness or aching about the chest, neck, axilla or epigastrium.  RESPIRATORY:  No cough, shortness of breath, PND or orthopnea.  GASTROINTESTINAL:  No nausea, vomiting or diarrhea.  GENITOURINARY:  No dysuria, frequency or urgency.  MUSCULOSKELETAL:  As per HPI.  NEUROLOGIC:  No paresthesias, fasciculations, seizures or weakness.  PSYCHIATRIC:  No disorder of thought or mood.  ENDOCRINE:  No heat or cold intolerance, polyuria or polydipsia.  HEMATOLOGICAL:  No easy bruising or bleedings.     PHYSICAL EXAMINATION:    GENERAL APPEARANCE:  Pt. is not currently dyspneic, in no distress. Pt. is alert, oriented, and pleasant.  HEENT:  Pupils are normal and react normally. No icterus. Mucous membranes well colored.  NECK:  Supple. No lymphadenopathy. Jugular venous pressure not elevated. Carotids equal.   HEART:   The cardiac impulse has a normal quality. There are no murmurs, rubs or gallops noted  CHEST:  Chest is clear to auscultation. Normal respiratory effort.  ABDOMEN:  Soft and nontender.   EXTREMITIES:  There is no edema.   SKIN:  No rash or significant lesions are noted.    I&O's Summary    19 Feb 2018 07:01  -  20 Feb 2018 07:00  --------------------------------------------------------  IN: 1634 mL / OUT: 550 mL / NET: 1084 mL    20 Feb 2018 07:01  -  20 Feb 2018 10:00  --------------------------------------------------------  IN: 0 mL / OUT: 150 mL / NET: -150 mL    LABS:                        9.9    9.5   )-----------( 138      ( 19 Feb 2018 05:50 )             30.9     02-19    137  |  104  |  17  ----------------------------<  92  4.0   |  26  |  0.87    Ca    7.5<L>      19 Feb 2018 05:50  Phos  2.1     02-19  Mg     1.8     02-19    TPro  5.4<L>  /  Alb  2.4<L>  /  TBili  0.5  /  DBili  0.2  /  AST  454<H>  /  ALT  722<H>  /  AlkPhos  56  02-19    LIVER FUNCTIONS - ( 19 Feb 2018 05:50 )  Alb: 2.4 g/dL / Pro: 5.4 gm/dL / ALK PHOS: 56 U/L / ALT: 722 U/L / AST: 454 U/L / GGT: x             CARDIAC MARKERS ( 18 Feb 2018 22:45 )  0.105 ng/mL / x     / x     / x     / x      CARDIAC MARKERS ( 18 Feb 2018 14:19 )  0.084 ng/mL / x     / 48 U/L / x     / x        EKG: < from: 12 Lead ECG (02.16.18 @ 20:09) >  Atrial-paced rhythm  Anterior infarct , age undetermined  Abnormal ECG  When compared with ECG of 13-AUG-2017 06:43,  Anterior infarct is now Present  Non-specific change in ST segment in Anterior leads  Nonspecific T wave abnormality, worse in Lateral leads  QT has shortened    < end of copied text >      TELEMETRY: Apaced    CARDIAC TESTS: < from: US Transthoracic Echocardiogram w/Doppler Complete (07.22.16 @ 08:18) >  IMPRESSION: Enlargement of the left ventricle with generalized   hypokinesia and an estimated ejection fraction of 20-25%. Enlargement of   the left atrium with severe mitral insufficiency. Enlargement of the   right atrium with moderate tricuspid insufficiency. Mild aortic   insufficiency. A pacemaker wire was incidentally noted.    < end of copied text >      RADIOLOGY & ADDITIONAL STUDIES:   < from: CT Abdomen and Pelvis w/ Oral Cont and w/ IV Cont (02.18.18 @ 20:13) >    Impression:   -Multiple small liver cysts and hypodensities too small to characterize;   no solid mass corresponding to the ultrasound finding, which may   represent a hemangioma. Follow-up CT or ultrasound as clinically   warranted.  -Descending colon and sigmoid colitis likely of infectiousetiology, less   likely inflammatory or ischemic.  -Moderate compression deformity of the L3 vertebral body with slight bony   retropulsion, new since 2016.     < end of copied text >    ASSESSMENT & PLAN:

## 2018-02-21 NOTE — PROGRESS NOTE ADULT - SUBJECTIVE AND OBJECTIVE BOX
PMD - Dr Penny at [facility] (office notified as doctor on vacation)  cardio - dr min  GI - dr dyer     cc- fever  discharge dx - influenza, digoxin toxicity, ischemic colitis    History of Present Illness:   poor historian. patient is an 87 y/o f w/ pmhx of chronic systolic dysfunction with EF 25-30% s/p AICD placement, pAfib not on AC, arthritis, presents to ED from assisted living c/o fever.  pt was ntoed to be positive for influenza and completing her full course of tamiflu prior to discharge.   - pt had rapid response called for hypotension, nausea and confusion. pt noted to have SBP 50s and AMS 2/2 hypotension with ?syncope episode and noted to have digoxin toxicity with elvel ~3.5  which was likely contributing to presenting sx of lethargy and poor appetite and was trasnferred and administered digifab without incident. pt seen by cardiology and due to hypotension her CHF meds will need to be slowly resume as BP tolerates.  digoxin should not be restarted.  - noted to have elevated liver enzymes which have been improving and likley 2/2 hypotension as per GI.  pt maintained off tylenol, statin til LFTs normalize.. given abd pain ahd CT scan which showed liver cysts and also ischemic vs infectious colitis and pt will complete short course of abx as per GI recs. pt should f/u with dr manisha WARD as outpt after rehab and all above recs d/w dtr lulu.     Review of Systems: All 10 systems reviewed and found to be negative with the exception of what has been described above as per HPI  ICU Vital Signs Last 24 Hrs  T(C): 36.7 (21 Feb 2018 12:22), Max: 37.1 (21 Feb 2018 00:00)  T(F): 98 (21 Feb 2018 12:22), Max: 98.7 (21 Feb 2018 00:00)  HR: 65 (21 Feb 2018 14:00) (63 - 81)  BP: 125/59 (21 Feb 2018 14:00) (100/42 - 143/47)  BP(mean): 76 (21 Feb 2018 14:00) (56 - 83)  RR: 17 (21 Feb 2018 14:00) (8 - 20)  SpO2: 100% (21 Feb 2018 10:00) (95% - 100%)    Physical Exam:   GEN: lying in bed, NAD, not ill appearing   HEENT:   NC/AT, EOMI  CV:  +S1, +S2, RRR  RESP:   few crackles at bases b/l no wheezing good air entry b/l   BREAST:  not examined  GI:  abdomen soft, tenderness to palpation in LLQ without guarding improving,  non-distended, normoactive BS  RECTAL:  not examined  :  not examined  MSK:   normal muscle tone  EXT:  deformities of hand c/w arthiritis  NEURO:  somnolent   SKIN:  no rashes                         9.6    8.8   )-----------( 143      ( 21 Feb 2018 06:00 )             30.4       142  |  108  |  6<L>  ----------------------------<  115<H>  4.7   |  27  |  0.91    Ca    7.7<L>      21 Feb 2018 06:00  Phos  1.5     02-20  Mg     1.7     02-20  TPro  5.4<L>  /  Alb  2.4<L>  /  TBili  0.4  /  DBili  x   /  AST  146<H>  /  ALT  384<H>  /  AlkPhos  43  02-21    ECHO - pending - can be f/u dr min as outpt     Radiology:    	< from: US Transthoracic Echocardiogram w/Doppler Complete (07.22.16 @ 08:18) >  	IMPRESSION: Enlargement of the left ventricle with generalized   	hypokinesia and an estimated ejection fraction of 20-25%. Enlargement of   	the left atrium with severe mitral insufficiency. Enlargement of the   	right atrium with moderate tricuspid insufficiency. Mild aortic   	insufficiency. A pacemaker wire was incidentally noted.    < from: CT Abdomen and Pelvis w/ Oral Cont and w/ IV Cont (02.18.18 @ 20:13) >  Impression:   -Multiple small liver cysts and hypodensities too small to characterize;   no solid mass corresponding to the ultrasound finding, which may   represent a hemangioma. Follow-up CT or ultrasound as clinically   warranted.  -Descending colon and sigmoid colitis likely of infectious etiology, less   likely inflammatory or ischemic.  -Moderate compression deformity of the L3 vertebral body with slight bony   retropulsion, new since 2016.     Assessment and Plan:     pt is 85 yo female with PMH as per hPI here with fever and found be positive for flu    * digoxin toxicity - pt with hypotension/n/v/d and abd pain - resolving, no longer in toxic range  - s/p digifab  - pt will be discharged off amio 2/2 LFTs. digoxin should not be restarted    * elevated troponin - likely 2/2 ischemia 2/2 hypoperfusion in setting of hypotension.  continue aspirin.  no statin 2/2 LFTs    * influenza A - cont Tamiflu 30 mg bid renal dosing  will complete prior to dc    * transaminitis - ?2/2 flu vs amio toxicity vs hypotension  - GI eval noted and will d/c off amio and statin. avoid tylenol. motrin prn for pain    *colitis of descending and sigmoid colon- cipro flagy lfor short course for likley ischemic colitis til 2/24     * chronic systolic dysfunction/pafib - EF 25-30 % on last echo, ECHO results pending and will f/u on cardio eval as outpt  - given slwly improving BP only restarted on enalapril 2.5 mg daily (on bid as outpt)  - restarted metoprolol 12.5 mg q12h   - d/c dig, amio  - once BP improves resume home dose of lasix 40 mg daily and 60 mg MWF and monitor cr. KATIA has resolved  - hold aldactone 25 mg dialy for now given BP  -continue ASA ( no statin due to elevated LFTs)  - off AC 2/2 hematuria as per previous cardio notes and pt was also reluctant to start this  - EKG - paced rhythm 60s    * KATIA - resolved    * hypophos - repleted      total time spent on dc 40 mins  eryn coulter agreeable to GIOVANNA and so is the pt

## 2018-02-21 NOTE — DISCHARGE NOTE ADULT - HOSPITAL COURSE
PMD - Dr Penny at [facility] (office notified as doctor on vacation)    discharge dx - influenza, digoxin toxicity, ischemic colitis    History of Present Illness:   poor historian. patient is an 87 y/o f w/ pmhx of chronic systolic dysfunction with EF 25-30% s/p AICD placement, pAfib not on AC, arthritis, presents to ED from assisted living c/o fever.  pt was ntoed to be positive for influenza and completing her full course of tamiflu prior to discharge.   - pt had rapid response called for hypotension, nausea and confusion. pt noted to have SBP 50s and AMS 2/2 hypotension with ?syncope episode and noted to have digoxin toxicity with elvel ~3.5  which was likely contributing to presenting sx of lethargy and poor appetite and was trasnferred and administered digifab without incident. pt seen by cardiology and due to hypotension her CHF meds will need to be slowly resume as BP tolerates.  digoxin should not be restarted.  - noted to have elevated liver enzymes which have been improving and likley 2/2 hypotension as per GI.  pt maintained off tylenol, statin til LFTs normalize.. given abd pain ahd CT scan which showed liver cysts and also ischemic vs infectious colitis and pt will complete short course of abx as per GI recs. pt should f/u with dr manisha WARD as outpt after rehab and all above recs d/w dtr lulu.     Review of Systems: All 10 systems reviewed and found to be negative with the exception of what has been described above as per HPI             9.6    8.8   )-----------( 143      ( 21 Feb 2018 06:00 )             30.4       142  |  108  |  6<L>  ----------------------------<  115<H>  4.7   |  27  |  0.91    Ca    7.7<L>      21 Feb 2018 06:00  Phos  1.5     02-20  Mg     1.7     02-20  TPro  5.4<L>  /  Alb  2.4<L>  /  TBili  0.4  /  DBili  x   /  AST  146<H>  /  ALT  384<H>  /  AlkPhos  43  02-21    ECHO - pending - can be f/u dr min as outpt     < from: CT Abdomen and Pelvis w/ Oral Cont and w/ IV Cont (02.18.18 @ 20:13) >  Impression:   -Multiple small liver cysts and hypodensities too small to characterize;   no solid mass corresponding to the ultrasound finding, which may   represent a hemangioma. Follow-up CT or ultrasound as clinically   warranted.  -Descending colon and sigmoid colitis likely of infectious etiology, less   likely inflammatory or ischemic.  -Moderate compression deformity of the L3 vertebral body with slight bony   retropulsion, new since 2016.     Assessment and Plan:     pt is 85 yo female with PMH as per hPI here with fever and found be positive for flu    * digoxin toxicity - pt with hypotension/n/v/d and abd pain - resolving, no longer in toxic range  - s/p digifab  - pt will be discharged off amio 2/2 LFTs. digoxin should not be restarted    * elevated troponin - likely 2/2 ischemia 2/2 hypoperfusion in setting of hypotension.  continue aspirin.  no statin 2/2 LFTs    * influenza A - cont Tamiflu 30 mg bid renal dosing  will complete prior to dc    * transaminitis - ?2/2 flu vs amio toxicity vs hypotension  - GI eval noted and will d/c off amio and statin. avoid tylenol. motrin prn for pain    *colitis of descending and sigmoid colon- cipro flagy lfor short course for uzielley ischemic colitis til 2/24     * chronic systolic dysfunction/pafib - EF 25-30 % on last echo, ECHO results pending and will f/u on cardio eval as outpt  - given slwly improving BP only restarted on enalapril 2.5 mg daily (on bid as outpt)  - restarted metoprolol 12.5 mg q12h   - d/c dig, amio  - once BP improves resume home dose of lasix 40 mg daily and 60 mg MWF and monitor cr. KATIA has resolved  - hold aldactone 25 mg dialy for now given BP  -continue ASA ( no statin due to elevated LFTs)  - off AC 2/2 hematuria as per previous cardio notes and pt was also reluctant to start this  - EKG - paced rhythm 60s    total time spent on dc 40 mins  dtr yfn agreeable to GIOVANNA and so is the pt 2/22 Vital Signs Last 24 Hrs  T(C): 36.6 (22 Feb 2018 08:31), Max: 37.1 (22 Feb 2018 00:00)  T(F): 97.8 (22 Feb 2018 08:31), Max: 98.8 (22 Feb 2018 00:00)  HR: 66 (22 Feb 2018 07:00) (63 - 84)  BP: 121/58 (22 Feb 2018 07:00) (115/65 - 140/69)  BP(mean): 73 (22 Feb 2018 07:00) (69 - 110)  RR: 13 (22 Feb 2018 07:00) (11 - 22)  SpO2: 100% (22 Feb 2018 07:00) (91% - 100%) on room air    physical: gen: comfortably seated in chair, nad, non acutely ill appearing;  Neuro: AAOx3;  CV:+S1/S2, RRR; Resp: CTA b/l, good O2 sats on room air;  GI: abd soft, non ttp; ext: moving all    2/21-   PMD - Dr Penny at [facility] (office notified as doctor on vacation)  discharge dx - influenza, digoxin toxicity, ischemic colitis  History of Present Illness:   poor historian. patient is an 85 y/o f w/ pmhx of chronic systolic dysfunction with EF 25-30% s/p AICD placement, pAfib not on AC, arthritis, presents to ED from assisted living c/o fever.  pt was ntoed to be positive for influenza and completing her full course of tamiflu prior to discharge.   - pt had rapid response called for hypotension, nausea and confusion. pt noted to have SBP 50s and AMS 2/2 hypotension with ?syncope episode and noted to have digoxin toxicity with elvel ~3.5  which was likely contributing to presenting sx of lethargy and poor appetite and was trasnferred and administered digifab without incident. pt seen by cardiology and due to hypotension her CHF meds will need to be slowly resume as BP tolerates.  digoxin should not be restarted.  - noted to have elevated liver enzymes which have been improving and likley 2/2 hypotension as per GI.  pt maintained off tylenol, statin til LFTs normalize.. given abd pain ahd CT scan which showed liver cysts and also ischemic vs infectious colitis and pt will complete short course of abx as per GI recs. pt should f/u with dr manisha WARD as outpt after rehab and all above recs d/w dtr lulu.     Review of Systems: All 10 systems reviewed and found to be negative with the exception of what has been described above as per HPI             9.6    8.8   )-----------( 143      ( 21 Feb 2018 06:00 )             30.4       142  |  108  |  6<L>  ----------------------------<  115<H>  4.7   |  27  |  0.91    Ca    7.7<L>      21 Feb 2018 06:00  Phos  1.5     02-20  Mg     1.7     02-20  TPro  5.4<L>  /  Alb  2.4<L>  /  TBili  0.4  /  DBili  x   /  AST  146<H>  /  ALT  384<H>  /  AlkPhos  43  02-21    ECHO -EF 30% , mod MR and mod AR    < from: CT Abdomen and Pelvis w/ Oral Cont and w/ IV Cont (02.18.18 @ 20:13) >  Impression:   -Multiple small liver cysts and hypodensities too small to characterize;  no solid mass corresponding to the ultrasound finding, which may  represent a hemangioma. Follow-up CT or ultrasound as clinically   warranted.   -Descending colon and sigmoid colitis likely of infectious etiology, less likely inflammatory or ischemic.    -Moderate compression deformity of the L3 vertebral body with slight bony  retropulsion, new since 2016.     Assessment and Plan:   pt is 85 yo female with PMH as per hPI here with fever and found be positive for flu  1.  digoxin toxicity - pt with hypotension/n/v/d and abd pain - resolving, no longer in toxic range  - s/p digifab  - pt will be discharged off amio 2/2 LFTs. digoxin should not be restarted  2.  elevated troponin - likely 2/2 ischemia 2/2 hypoperfusion in setting of hypotension.  continue aspirin.  no statin 2/2 LFTs  3.  influenza A - cont Tamiflu 30 mg bid renal dosing  will complete prior to dc  4.  transaminitis - ?2/2 flu vs amio toxicity vs hypotension.   - GI eval noted and will d/c off amio and statin. avoid tylenol. motrin prn for pain  5. colitis of descending and sigmoid colon- cipro flagy lfor short course for uzielley ischemic colitis til 2/24   6.  chronic systolic dysfunction/pafib - EF 25-30 % on last echo, ECHO results pending and will f/u on cardio eval as outpt.    - given slwly improving BP only restarted on enalapril 2.5 mg daily (on bid as outpt)  - restarted metoprolol 12.5 mg q12h   - d/c dig, amio;  hold aldactone 25 mg dialy for now given BP  -continue ASA ( no statin due to elevated LFTs);    off AC 2/2 hematuria as per previous cardio notes and pt was also reluctant to start this  - EKG - paced rhythm 60s.  total time spent on dc 40 mins  dtr yfn agreeable to GIOVANNA and so is the pt    2/22- resume low dose lasix , f/u Cardio in 1 week for med review, bp check and repeat labs   2/22- Stable for d/c to rehab today.

## 2018-02-21 NOTE — DISCHARGE NOTE ADULT - MEDICATION SUMMARY - MEDICATIONS TO TAKE
I will START or STAY ON the medications listed below when I get home from the hospital:    Flagyl 500 mg oral tablet  -- 1 tab(s) by mouth 3 times a day til 2/24/18  -- Indication: For antibiotic    traMADol 50 mg oral tablet  -- 2 tab(s) by mouth 2 times a day  -- Indication: For pain med    ibuprofen 200 mg oral tablet  -- 1 tab(s) by mouth every 6 hours, As needed, pain 1-4 fever> 100.4F  -- Indication: For pain med    aspirin 81 mg oral tablet, chewable  -- 1 tab(s) by mouth once a day  -- Indication: For Herart medicine    enalapril 2.5 mg oral tablet  -- 1 tab(s) by mouth once a day  -- Indication: For blood pressure med    metoprolol  -- 12.5 milligram(s) by mouth 2 times a day  -- Indication: For blood pressure medication    albuterol 2.5 mg/3 mL (0.083%) inhalation solution  -- 1 unit(s) inhaled every 6 hours  -- Indication: For breathing treatment    guaiFENesin 100 mg/5 mL oral liquid  -- 10 milliliter(s) by mouth every 6 hours, As needed, Cough  -- Indication: For cough medicine    lactulose 10 g/15 mL oral syrup  -- 30 milliliter(s) by mouth once a day (at bedtime), As Needed constipation  -- Indication: For for constipation    latanoprost 0.005% ophthalmic solution  -- 1 drop(s) to each affected eye once a day (in the evening)  -- Indication: For eye drops    omeprazole 20 mg oral delayed release capsule  -- 1 cap(s) by mouth once a day  -- Indication: For for stomach    Cipro 500 mg oral tablet  -- 1 tab(s) by mouth every 12 hours til 2/24/18  -- Indication: For antibiotic    Multiple Vitamins oral tablet  -- 1 tab(s) by mouth once a day  -- Indication: For vitamin    Calcium 600+D oral tablet  -- 1 tab(s) by mouth once a day  -- Indication: For vitamin I will START or STAY ON the medications listed below when I get home from the hospital:    Flagyl 500 mg oral tablet  -- 1 tab(s) by mouth 3 times a day til 2/24/18  -- Indication: For antibiotic    traMADol 50 mg oral tablet  -- 2 tab(s) by mouth 2 times a day  -- Indication: For pain med    ibuprofen 200 mg oral tablet  -- 1 tab(s) by mouth every 6 hours, As needed, pain 1-4 fever> 100.4F  -- Indication: For pain med    aspirin 81 mg oral tablet, chewable  -- 1 tab(s) by mouth once a day  -- Indication: For Herart medicine    enalapril 2.5 mg oral tablet  -- 1 tab(s) by mouth once a day  -- Indication: For blood pressure med    metoprolol  -- 12.5 milligram(s) by mouth 2 times a day  -- Indication: For blood pressure medication    albuterol 2.5 mg/3 mL (0.083%) inhalation solution  -- 1 unit(s) inhaled every 6 hours  -- Indication: For breathing treatment    Lasix 20 mg oral tablet  -- 1 tab(s) by mouth once a day  -- Indication: For heart medicine    guaiFENesin 100 mg/5 mL oral liquid  -- 10 milliliter(s) by mouth every 6 hours, As needed, Cough  -- Indication: For cough medicine    lactulose 10 g/15 mL oral syrup  -- 30 milliliter(s) by mouth once a day (at bedtime), As Needed constipation  -- Indication: For for constipation    latanoprost 0.005% ophthalmic solution  -- 1 drop(s) to each affected eye once a day (in the evening)  -- Indication: For eye drops    omeprazole 20 mg oral delayed release capsule  -- 1 cap(s) by mouth once a day  -- Indication: For for stomach    Cipro 500 mg oral tablet  -- 1 tab(s) by mouth every 12 hours til 2/24/18  -- Indication: For antibiotic    Multiple Vitamins oral tablet  -- 1 tab(s) by mouth once a day  -- Indication: For vitamin    Calcium 600+D oral tablet  -- 1 tab(s) by mouth once a day  -- Indication: For vitamin

## 2018-02-21 NOTE — DISCHARGE NOTE ADULT - MEDICATION SUMMARY - MEDICATIONS TO STOP TAKING
I will STOP taking the medications listed below when I get home from the hospital:    amiodarone 200 mg oral tablet  -- 1 tab(s) by mouth once a day    atorvastatin 20 mg oral tablet  -- 1 tab(s) by mouth once a day (at bedtime)    spironolactone 25 mg oral tablet  -- 1 tab(s) by mouth once a day    digoxin 125 mcg (0.125 mg) oral tablet  -- 1 tab(s) by mouth once a day    K-Tab 20 mEq oral tablet, extended release  -- 1 tab(s) by mouth once a day  -- It is very important that you take or use this exactly as directed.  Do not skip doses or discontinue unless directed by your doctor.  Medication should be taken with plenty of water.  Take with food or milk.    Lasix 40 mg oral tablet  -- 1 tab(s) by mouth once a day and 1.5 tabs on MWF I will STOP taking the medications listed below when I get home from the hospital:    amiodarone 200 mg oral tablet  -- 1 tab(s) by mouth once a day    atorvastatin 20 mg oral tablet  -- 1 tab(s) by mouth once a day (at bedtime)    spironolactone 25 mg oral tablet  -- 1 tab(s) by mouth once a day    digoxin 125 mcg (0.125 mg) oral tablet  -- 1 tab(s) by mouth once a day    K-Tab 20 mEq oral tablet, extended release  -- 1 tab(s) by mouth once a day  -- It is very important that you take or use this exactly as directed.  Do not skip doses or discontinue unless directed by your doctor.  Medication should be taken with plenty of water.  Take with food or milk.

## 2018-02-21 NOTE — DISCHARGE NOTE ADULT - PLAN OF CARE
prevent further damange stop digoxin feel better complete tamiflu cardiology follow up see your cardiologist to slowly resume your heart failure medications as your BP improves. cardiology follow up in 1 week to review meds and repeat labs (bmp) see your cardiologist to slowly resume your heart failure medications as your BP improves.  Low dose lasix 20mg restarted.  Do not take if blood pressure too low. complete course antibiotics as prescribed Follow up with Dr. Julien

## 2018-02-21 NOTE — PROGRESS NOTE ADULT - ASSESSMENT
Imp:  Resolving ischemic colitis  LFTs dropping, likely was from hypotensive    Rec:  Complete short course abx for ischemic colitis  Reconsult prn

## 2018-02-21 NOTE — DISCHARGE NOTE ADULT - PATIENT PORTAL LINK FT
You can access the [facility] Patient Portal, offered by [facility], by registering with the following website: [email or web address]

## 2018-02-21 NOTE — PROGRESS NOTE ADULT - SUBJECTIVE AND OBJECTIVE BOX
Patient is a 86y old  Female who presents with a chief complaint of fever (17 Feb 2018 09:13)      Subective:  Feels good. No pain. LFTs dropping.    PAST MEDICAL & SURGICAL HISTORY:  Osteoarthritis of multiple joints, unspecified osteoarthritis type  Gastroesophageal reflux disease without esophagitis  Dyslipidemia  Essential hypertension  Paroxysmal atrial fibrillation  Coronary artery disease involving native coronary artery of native heart without angina pectoris  Chronic systolic congestive heart failure: EF 20-25%  AICD (automatic cardioverter/defibrillator) present      MEDICATIONS  (STANDING):  ALBUTerol    0.083% 2.5 milliGRAM(s) Nebulizer every 6 hours  ALBUTerol    90 MICROgram(s) HFA Inhaler 1 Puff(s) Inhalation every 4 hours  aspirin  chewable 81 milliGRAM(s) Oral daily  ciprofloxacin   IVPB      ciprofloxacin   IVPB 400 milliGRAM(s) IV Intermittent every 12 hours  enoxaparin Injectable 30 milliGRAM(s) SubCutaneous every 24 hours  latanoprost 0.005% Ophthalmic Solution 1 Drop(s) Both EYES at bedtime  metoprolol     tartrate 12.5 milliGRAM(s) Oral two times a day  metroNIDAZOLE  IVPB      metroNIDAZOLE  IVPB 500 milliGRAM(s) IV Intermittent every 8 hours  multivitamin 1 Tablet(s) Oral daily  oseltamivir 30 milliGRAM(s) Oral two times a day  pantoprazole    Tablet 40 milliGRAM(s) Oral before breakfast  potassium chloride    Tablet ER 20 milliEquivalent(s) Oral daily  potassium phosphate / sodium phosphate powder 2 Packet(s) Oral three times a day  sodium chloride 0.9% Bolus 500 milliLiter(s) IV Bolus once  sodium chloride 0.9%. 500 milliLiter(s) (500 mL/Hr) IV Continuous <Continuous>  sodium chloride 0.9%. 1000 milliLiter(s) (75 mL/Hr) IV Continuous <Continuous>  traMADol 100 milliGRAM(s) Oral two times a day    MEDICATIONS  (PRN):  aluminum hydroxide/magnesium hydroxide/simethicone Suspension 30 milliLiter(s) Oral every 4 hours PRN Dyspepsia  guaiFENesin    Syrup 200 milliGRAM(s) Oral every 6 hours PRN Cough  ibuprofen  Tablet 200 milliGRAM(s) Oral every 6 hours PRN pain 1-4 fever> 100.4F  lactulose Syrup 20 Gram(s) Oral at bedtime PRN constipation  ondansetron Injectable 4 milliGRAM(s) IV Push every 6 hours PRN Nausea  senna 2 Tablet(s) Oral at bedtime PRN Constipation      REVIEW OF SYSTEMS:    RESPIRATORY: No shortness of breath  CARDIOVASCULAR: No chest pain  All other review of systems is negative unless indicated above.    Vital Signs Last 24 Hrs  T(C): 36.9 (21 Feb 2018 05:00), Max: 37.1 (21 Feb 2018 00:00)  T(F): 98.4 (21 Feb 2018 05:00), Max: 98.7 (21 Feb 2018 00:00)  HR: 67 (21 Feb 2018 06:00) (63 - 84)  BP: 134/63 (21 Feb 2018 06:00) (100/42 - 143/47)  BP(mean): 81 (21 Feb 2018 06:00) (56 - 105)  RR: 15 (21 Feb 2018 06:00) (8 - 23)  SpO2: 95% (21 Feb 2018 06:00) (81% - 100%)    PHYSICAL EXAM:    Constitutional: NAD, well-developed  Respiratory: CTAB  Cardiovascular: S1 and S2,   Gastrointestinal: BS+, soft, NT/ND  Extremities: No peripheral edema  Psychiatric: Normal mood, normal affect    LABS:                        9.6    8.8   )-----------( 143      ( 21 Feb 2018 06:00 )             30.4     02-21    142  |  108  |  6<L>  ----------------------------<  115<H>  4.7   |  27  |  0.91    Ca    7.7<L>      21 Feb 2018 06:00  Phos  1.5     02-20  Mg     1.7     02-20    TPro  5.4<L>  /  Alb  2.4<L>  /  TBili  0.4  /  DBili  x   /  AST  146<H>  /  ALT  384<H>  /  AlkPhos  43  02-21      LIVER FUNCTIONS - ( 21 Feb 2018 06:00 )  Alb: 2.4 g/dL / Pro: 5.4 gm/dL / ALK PHOS: 43 U/L / ALT: 384 U/L / AST: 146 U/L / GGT: x             RADIOLOGY & ADDITIONAL STUDIES:

## 2018-02-21 NOTE — DISCHARGE NOTE ADULT - MEDICATION SUMMARY - MEDICATIONS TO CHANGE
I will SWITCH the dose or number of times a day I take the medications listed below when I get home from the hospital:    enalapril 2.5 mg oral tablet  -- 1 tab(s) by mouth 2 times a day    metoprolol succinate 100 mg oral tablet, extended release  -- 1 tab(s) by mouth once a day I will SWITCH the dose or number of times a day I take the medications listed below when I get home from the hospital:    enalapril 2.5 mg oral tablet  -- 1 tab(s) by mouth 2 times a day    metoprolol succinate 100 mg oral tablet, extended release  -- 1 tab(s) by mouth once a day    Lasix 40 mg oral tablet  -- 1 tab(s) by mouth once a day and 1.5 tabs on MWF

## 2018-02-22 VITALS — TEMPERATURE: 98 F

## 2018-02-22 LAB
CULTURE RESULTS: SIGNIFICANT CHANGE UP
CULTURE RESULTS: SIGNIFICANT CHANGE UP
SPECIMEN SOURCE: SIGNIFICANT CHANGE UP
SPECIMEN SOURCE: SIGNIFICANT CHANGE UP

## 2018-02-22 RX ORDER — FUROSEMIDE 40 MG
2 TABLET ORAL
Qty: 0 | Refills: 0 | COMMUNITY
Start: 2018-02-22

## 2018-02-22 RX ORDER — FUROSEMIDE 40 MG
1 TABLET ORAL
Qty: 0 | Refills: 0 | COMMUNITY
Start: 2018-02-22

## 2018-02-22 RX ORDER — FUROSEMIDE 40 MG
20 TABLET ORAL DAILY
Qty: 0 | Refills: 0 | Status: DISCONTINUED | OUTPATIENT
Start: 2018-02-22 | End: 2018-02-22

## 2018-02-22 RX ADMIN — ALBUTEROL 2.5 MILLIGRAM(S): 90 AEROSOL, METERED ORAL at 01:14

## 2018-02-22 RX ADMIN — Medication 100 MILLIGRAM(S): at 06:03

## 2018-02-22 RX ADMIN — ENOXAPARIN SODIUM 30 MILLIGRAM(S): 100 INJECTION SUBCUTANEOUS at 09:38

## 2018-02-22 RX ADMIN — TRAMADOL HYDROCHLORIDE 100 MILLIGRAM(S): 50 TABLET ORAL at 06:04

## 2018-02-22 RX ADMIN — Medication 20 MILLIEQUIVALENT(S): at 11:23

## 2018-02-22 RX ADMIN — Medication 20 MILLIGRAM(S): at 09:37

## 2018-02-22 RX ADMIN — Medication 200 MILLIGRAM(S): at 07:09

## 2018-02-22 RX ADMIN — Medication 2 PACKET(S): at 06:03

## 2018-02-22 RX ADMIN — Medication 1 TABLET(S): at 11:23

## 2018-02-22 RX ADMIN — Medication 12.5 MILLIGRAM(S): at 06:03

## 2018-02-22 RX ADMIN — Medication 81 MILLIGRAM(S): at 11:23

## 2018-02-22 RX ADMIN — TRAMADOL HYDROCHLORIDE 100 MILLIGRAM(S): 50 TABLET ORAL at 07:00

## 2018-02-22 RX ADMIN — Medication 30 MILLIGRAM(S): at 06:04

## 2018-02-22 RX ADMIN — PANTOPRAZOLE SODIUM 40 MILLIGRAM(S): 20 TABLET, DELAYED RELEASE ORAL at 06:03

## 2018-03-01 DIAGNOSIS — T46.0X5A ADVERSE EFFECT OF CARDIAC-STIMULANT GLYCOSIDES AND DRUGS OF SIMILAR ACTION, INITIAL ENCOUNTER: ICD-10-CM

## 2018-03-01 DIAGNOSIS — Z79.82 LONG TERM (CURRENT) USE OF ASPIRIN: ICD-10-CM

## 2018-03-01 DIAGNOSIS — E78.5 HYPERLIPIDEMIA, UNSPECIFIED: ICD-10-CM

## 2018-03-01 DIAGNOSIS — G92 TOXIC ENCEPHALOPATHY: ICD-10-CM

## 2018-03-01 DIAGNOSIS — N17.9 ACUTE KIDNEY FAILURE, UNSPECIFIED: ICD-10-CM

## 2018-03-01 DIAGNOSIS — K55.9 VASCULAR DISORDER OF INTESTINE, UNSPECIFIED: ICD-10-CM

## 2018-03-01 DIAGNOSIS — I50.22 CHRONIC SYSTOLIC (CONGESTIVE) HEART FAILURE: ICD-10-CM

## 2018-03-01 DIAGNOSIS — I95.9 HYPOTENSION, UNSPECIFIED: ICD-10-CM

## 2018-03-01 DIAGNOSIS — I48.0 PAROXYSMAL ATRIAL FIBRILLATION: ICD-10-CM

## 2018-03-01 DIAGNOSIS — N18.2 CHRONIC KIDNEY DISEASE, STAGE 2 (MILD): ICD-10-CM

## 2018-03-01 DIAGNOSIS — J10.1 INFLUENZA DUE TO OTHER IDENTIFIED INFLUENZA VIRUS WITH OTHER RESPIRATORY MANIFESTATIONS: ICD-10-CM

## 2018-03-29 ENCOUNTER — EMERGENCY (EMERGENCY)
Facility: HOSPITAL | Age: 83
LOS: 0 days | Discharge: HOME | End: 2018-03-29
Attending: EMERGENCY MEDICINE | Admitting: EMERGENCY MEDICINE
Payer: MEDICARE

## 2018-03-29 VITALS
HEART RATE: 83 BPM | TEMPERATURE: 98 F | DIASTOLIC BLOOD PRESSURE: 73 MMHG | OXYGEN SATURATION: 99 % | RESPIRATION RATE: 18 BRPM | WEIGHT: 130.07 LBS | SYSTOLIC BLOOD PRESSURE: 127 MMHG | HEIGHT: 63 IN

## 2018-03-29 DIAGNOSIS — I25.10 ATHEROSCLEROTIC HEART DISEASE OF NATIVE CORONARY ARTERY WITHOUT ANGINA PECTORIS: ICD-10-CM

## 2018-03-29 DIAGNOSIS — I50.22 CHRONIC SYSTOLIC (CONGESTIVE) HEART FAILURE: ICD-10-CM

## 2018-03-29 DIAGNOSIS — I48.0 PAROXYSMAL ATRIAL FIBRILLATION: ICD-10-CM

## 2018-03-29 DIAGNOSIS — E78.5 HYPERLIPIDEMIA, UNSPECIFIED: ICD-10-CM

## 2018-03-29 DIAGNOSIS — K21.9 GASTRO-ESOPHAGEAL REFLUX DISEASE WITHOUT ESOPHAGITIS: ICD-10-CM

## 2018-03-29 DIAGNOSIS — Z95.810 PRESENCE OF AUTOMATIC (IMPLANTABLE) CARDIAC DEFIBRILLATOR: ICD-10-CM

## 2018-03-29 DIAGNOSIS — I11.0 HYPERTENSIVE HEART DISEASE WITH HEART FAILURE: ICD-10-CM

## 2018-03-29 DIAGNOSIS — R07.9 CHEST PAIN, UNSPECIFIED: ICD-10-CM

## 2018-03-29 DIAGNOSIS — M19.90 UNSPECIFIED OSTEOARTHRITIS, UNSPECIFIED SITE: ICD-10-CM

## 2018-03-29 DIAGNOSIS — Z79.82 LONG TERM (CURRENT) USE OF ASPIRIN: ICD-10-CM

## 2018-03-29 DIAGNOSIS — Z95.810 PRESENCE OF AUTOMATIC (IMPLANTABLE) CARDIAC DEFIBRILLATOR: Chronic | ICD-10-CM

## 2018-03-29 LAB
ALBUMIN SERPL ELPH-MCNC: 3.1 G/DL — LOW (ref 3.3–5)
ALP SERPL-CCNC: 46 U/L — SIGNIFICANT CHANGE UP (ref 40–120)
ALT FLD-CCNC: 22 U/L — SIGNIFICANT CHANGE UP (ref 12–78)
ANION GAP SERPL CALC-SCNC: 6 MMOL/L — SIGNIFICANT CHANGE UP (ref 5–17)
APTT BLD: 34.4 SEC — SIGNIFICANT CHANGE UP (ref 27.5–37.4)
AST SERPL-CCNC: 27 U/L — SIGNIFICANT CHANGE UP (ref 15–37)
BASOPHILS # BLD AUTO: 0.03 K/UL — SIGNIFICANT CHANGE UP (ref 0–0.2)
BASOPHILS NFR BLD AUTO: 0.5 % — SIGNIFICANT CHANGE UP (ref 0–2)
BILIRUB SERPL-MCNC: 0.4 MG/DL — SIGNIFICANT CHANGE UP (ref 0.2–1.2)
BUN SERPL-MCNC: 22 MG/DL — SIGNIFICANT CHANGE UP (ref 7–23)
CALCIUM SERPL-MCNC: 8.5 MG/DL — SIGNIFICANT CHANGE UP (ref 8.5–10.1)
CHLORIDE SERPL-SCNC: 105 MMOL/L — SIGNIFICANT CHANGE UP (ref 96–108)
CO2 SERPL-SCNC: 29 MMOL/L — SIGNIFICANT CHANGE UP (ref 22–31)
CREAT SERPL-MCNC: 1.28 MG/DL — SIGNIFICANT CHANGE UP (ref 0.5–1.3)
EOSINOPHIL # BLD AUTO: 0.29 K/UL — SIGNIFICANT CHANGE UP (ref 0–0.5)
EOSINOPHIL NFR BLD AUTO: 4.4 % — SIGNIFICANT CHANGE UP (ref 0–6)
GLUCOSE SERPL-MCNC: 94 MG/DL — SIGNIFICANT CHANGE UP (ref 70–99)
HCT VFR BLD CALC: 27.4 % — LOW (ref 34.5–45)
HGB BLD-MCNC: 8.9 G/DL — LOW (ref 11.5–15.5)
IMM GRANULOCYTES NFR BLD AUTO: 0.5 % — SIGNIFICANT CHANGE UP (ref 0–1.5)
INR BLD: 1.15 RATIO — SIGNIFICANT CHANGE UP (ref 0.88–1.16)
LIDOCAIN IGE QN: 158 U/L — SIGNIFICANT CHANGE UP (ref 73–393)
LYMPHOCYTES # BLD AUTO: 1.11 K/UL — SIGNIFICANT CHANGE UP (ref 1–3.3)
LYMPHOCYTES # BLD AUTO: 17 % — SIGNIFICANT CHANGE UP (ref 13–44)
MAGNESIUM SERPL-MCNC: 1.9 MG/DL — SIGNIFICANT CHANGE UP (ref 1.6–2.6)
MCHC RBC-ENTMCNC: 29.5 PG — SIGNIFICANT CHANGE UP (ref 27–34)
MCHC RBC-ENTMCNC: 32.5 GM/DL — SIGNIFICANT CHANGE UP (ref 32–36)
MCV RBC AUTO: 90.7 FL — SIGNIFICANT CHANGE UP (ref 80–100)
MONOCYTES # BLD AUTO: 0.48 K/UL — SIGNIFICANT CHANGE UP (ref 0–0.9)
MONOCYTES NFR BLD AUTO: 7.4 % — SIGNIFICANT CHANGE UP (ref 2–14)
NEUTROPHILS # BLD AUTO: 4.58 K/UL — SIGNIFICANT CHANGE UP (ref 1.8–7.4)
NEUTROPHILS NFR BLD AUTO: 70.2 % — SIGNIFICANT CHANGE UP (ref 43–77)
NRBC # BLD: 0 /100 WBCS — SIGNIFICANT CHANGE UP (ref 0–0)
PLATELET # BLD AUTO: 244 K/UL — SIGNIFICANT CHANGE UP (ref 150–400)
POTASSIUM SERPL-MCNC: 3.9 MMOL/L — SIGNIFICANT CHANGE UP (ref 3.5–5.3)
POTASSIUM SERPL-SCNC: 3.9 MMOL/L — SIGNIFICANT CHANGE UP (ref 3.5–5.3)
PROT SERPL-MCNC: 6.7 GM/DL — SIGNIFICANT CHANGE UP (ref 6–8.3)
PROTHROM AB SERPL-ACNC: 12.5 SEC — SIGNIFICANT CHANGE UP (ref 9.8–12.7)
RBC # BLD: 3.02 M/UL — LOW (ref 3.8–5.2)
RBC # FLD: 15 % — HIGH (ref 10.3–14.5)
SODIUM SERPL-SCNC: 140 MMOL/L — SIGNIFICANT CHANGE UP (ref 135–145)
TROPONIN I SERPL-MCNC: 0.03 NG/ML — SIGNIFICANT CHANGE UP (ref 0.01–0.04)
WBC # BLD: 6.52 K/UL — SIGNIFICANT CHANGE UP (ref 3.8–10.5)
WBC # FLD AUTO: 6.52 K/UL — SIGNIFICANT CHANGE UP (ref 3.8–10.5)

## 2018-03-29 PROCEDURE — 93010 ELECTROCARDIOGRAM REPORT: CPT

## 2018-03-29 PROCEDURE — 71045 X-RAY EXAM CHEST 1 VIEW: CPT | Mod: 26

## 2018-03-29 PROCEDURE — 99283 EMERGENCY DEPT VISIT LOW MDM: CPT

## 2018-03-29 RX ORDER — OXYCODONE AND ACETAMINOPHEN 5; 325 MG/1; MG/1
1 TABLET ORAL ONCE
Qty: 0 | Refills: 0 | Status: DISCONTINUED | OUTPATIENT
Start: 2018-03-29 | End: 2018-03-29

## 2018-03-29 RX ADMIN — OXYCODONE AND ACETAMINOPHEN 1 TABLET(S): 5; 325 TABLET ORAL at 16:59

## 2018-03-29 NOTE — ED ADULT TRIAGE NOTE - CHIEF COMPLAINT QUOTE
Pt from [place] [facility], complaining of chest pain with radiating pain to left jaw on and off for 1 week.  Denies fever, chills.

## 2018-03-29 NOTE — ED ADULT NURSE NOTE - PMH
Chronic systolic congestive heart failure  EF 20-25%  Coronary artery disease involving native coronary artery of native heart without angina pectoris    Dyslipidemia    Essential hypertension    Gastroesophageal reflux disease without esophagitis    Osteoarthritis of multiple joints, unspecified osteoarthritis type    Paroxysmal atrial fibrillation
Statement Selected

## 2018-03-29 NOTE — ED PROVIDER NOTE - OBJECTIVE STATEMENT
Pt is an 87 y/o F presenting to the ED with c/o generalized L sided pain, onset 3 days ago. Pt states that pain starts in her L upper cheek, radiates down her L neck and upper throat, all the way to the toes. Pain has been intermittent. Pt is an 87 y/o F, with PMHx of HTN, CAD s/p AICD, and arthritis, presenting to the ED with c/o generalized L sided pain, onset 3 days ago. Pt states that pain starts in her L upper cheek, radiates down her L neck and upper throat, all the way to the toes. Pain has been intermittent since onset. Denies recent fevers, chills, N/V/D, HA, and trauma. She attributed the sxs to her arthritis but states the pain is present despite taking her pain medications.

## 2018-03-29 NOTE — ED PROVIDER NOTE - MEDICAL DECISION MAKING DETAILS
85 yo F with atypical CP. Labs including cardiac enzymes, CXR, pain control. Likely discharge to home. Pt currently feels well. No chest pain. Trop negative 2with sxs intermittent over the last few days. Unlikely cardiac in origin.  Appropriate for discharge to home. Precautions to return to the ED given. Daughter at bedside to provide transport back to patient;s [facility].

## 2018-03-29 NOTE — ED ADULT NURSE NOTE - CHIEF COMPLAINT QUOTE
Pt from [place] [facility], complaining of chest pain with radiating pain to left jaw on and off for 1 week.  Denies fever, chills.

## 2018-03-29 NOTE — ED ADULT NURSE NOTE - CHPI ED SYMPTOMS NEG
no cough/no syncope/no nausea/no vomiting/no chills/no dizziness/no diaphoresis/no fever/no shortness of breath

## 2018-04-22 ENCOUNTER — INPATIENT (INPATIENT)
Facility: HOSPITAL | Age: 83
LOS: 5 days | Discharge: HOME | End: 2018-04-28
Attending: INTERNAL MEDICINE | Admitting: INTERNAL MEDICINE
Payer: MEDICARE

## 2018-04-22 VITALS
WEIGHT: 130.07 LBS | OXYGEN SATURATION: 99 % | TEMPERATURE: 98 F | DIASTOLIC BLOOD PRESSURE: 74 MMHG | RESPIRATION RATE: 18 BRPM | HEART RATE: 90 BPM | SYSTOLIC BLOOD PRESSURE: 118 MMHG | HEIGHT: 62 IN

## 2018-04-22 DIAGNOSIS — Z95.810 PRESENCE OF AUTOMATIC (IMPLANTABLE) CARDIAC DEFIBRILLATOR: Chronic | ICD-10-CM

## 2018-04-22 LAB
ADD ON TEST-SPECIMEN IN LAB: SIGNIFICANT CHANGE UP
ALBUMIN SERPL ELPH-MCNC: 3.1 G/DL — LOW (ref 3.3–5)
ALP SERPL-CCNC: 54 U/L — SIGNIFICANT CHANGE UP (ref 40–120)
ALT FLD-CCNC: 40 U/L — SIGNIFICANT CHANGE UP (ref 12–78)
ANION GAP SERPL CALC-SCNC: 9 MMOL/L — SIGNIFICANT CHANGE UP (ref 5–17)
AST SERPL-CCNC: 47 U/L — HIGH (ref 15–37)
BASOPHILS # BLD AUTO: 0.04 K/UL — SIGNIFICANT CHANGE UP (ref 0–0.2)
BASOPHILS NFR BLD AUTO: 0.5 % — SIGNIFICANT CHANGE UP (ref 0–2)
BILIRUB SERPL-MCNC: 0.4 MG/DL — SIGNIFICANT CHANGE UP (ref 0.2–1.2)
BUN SERPL-MCNC: 25 MG/DL — HIGH (ref 7–23)
CALCIUM SERPL-MCNC: 8.5 MG/DL — SIGNIFICANT CHANGE UP (ref 8.5–10.1)
CHLORIDE SERPL-SCNC: 107 MMOL/L — SIGNIFICANT CHANGE UP (ref 96–108)
CO2 SERPL-SCNC: 25 MMOL/L — SIGNIFICANT CHANGE UP (ref 22–31)
CREAT SERPL-MCNC: 1.07 MG/DL — SIGNIFICANT CHANGE UP (ref 0.5–1.3)
EOSINOPHIL # BLD AUTO: 0.64 K/UL — HIGH (ref 0–0.5)
EOSINOPHIL NFR BLD AUTO: 8.6 % — HIGH (ref 0–6)
GLUCOSE SERPL-MCNC: 95 MG/DL — SIGNIFICANT CHANGE UP (ref 70–99)
HCT VFR BLD CALC: 27.5 % — LOW (ref 34.5–45)
HGB BLD-MCNC: 8.9 G/DL — LOW (ref 11.5–15.5)
IMM GRANULOCYTES NFR BLD AUTO: 0.5 % — SIGNIFICANT CHANGE UP (ref 0–1.5)
LYMPHOCYTES # BLD AUTO: 1.31 K/UL — SIGNIFICANT CHANGE UP (ref 1–3.3)
LYMPHOCYTES # BLD AUTO: 17.5 % — SIGNIFICANT CHANGE UP (ref 13–44)
MCHC RBC-ENTMCNC: 28.6 PG — SIGNIFICANT CHANGE UP (ref 27–34)
MCHC RBC-ENTMCNC: 32.4 GM/DL — SIGNIFICANT CHANGE UP (ref 32–36)
MCV RBC AUTO: 88.4 FL — SIGNIFICANT CHANGE UP (ref 80–100)
MONOCYTES # BLD AUTO: 0.53 K/UL — SIGNIFICANT CHANGE UP (ref 0–0.9)
MONOCYTES NFR BLD AUTO: 7.1 % — SIGNIFICANT CHANGE UP (ref 2–14)
NEUTROPHILS # BLD AUTO: 4.92 K/UL — SIGNIFICANT CHANGE UP (ref 1.8–7.4)
NEUTROPHILS NFR BLD AUTO: 65.8 % — SIGNIFICANT CHANGE UP (ref 43–77)
NRBC # BLD: 0 /100 WBCS — SIGNIFICANT CHANGE UP (ref 0–0)
NT-PROBNP SERPL-SCNC: HIGH PG/ML (ref 0–450)
PLATELET # BLD AUTO: 224 K/UL — SIGNIFICANT CHANGE UP (ref 150–400)
POTASSIUM SERPL-MCNC: 4 MMOL/L — SIGNIFICANT CHANGE UP (ref 3.5–5.3)
POTASSIUM SERPL-SCNC: 4 MMOL/L — SIGNIFICANT CHANGE UP (ref 3.5–5.3)
PROT SERPL-MCNC: 6.4 GM/DL — SIGNIFICANT CHANGE UP (ref 6–8.3)
RBC # BLD: 3.11 M/UL — LOW (ref 3.8–5.2)
RBC # FLD: 14.6 % — HIGH (ref 10.3–14.5)
SODIUM SERPL-SCNC: 141 MMOL/L — SIGNIFICANT CHANGE UP (ref 135–145)
TROPONIN I SERPL-MCNC: 0.03 NG/ML — SIGNIFICANT CHANGE UP (ref 0.01–0.04)
WBC # BLD: 7.48 K/UL — SIGNIFICANT CHANGE UP (ref 3.8–10.5)
WBC # FLD AUTO: 7.48 K/UL — SIGNIFICANT CHANGE UP (ref 3.8–10.5)

## 2018-04-22 PROCEDURE — 93010 ELECTROCARDIOGRAM REPORT: CPT

## 2018-04-22 PROCEDURE — 71045 X-RAY EXAM CHEST 1 VIEW: CPT | Mod: 26

## 2018-04-22 PROCEDURE — 99285 EMERGENCY DEPT VISIT HI MDM: CPT | Mod: 25

## 2018-04-22 RX ORDER — FUROSEMIDE 40 MG
40 TABLET ORAL ONCE
Qty: 0 | Refills: 0 | Status: COMPLETED | OUTPATIENT
Start: 2018-04-22 | End: 2018-04-22

## 2018-04-22 RX ORDER — ASPIRIN/CALCIUM CARB/MAGNESIUM 324 MG
325 TABLET ORAL ONCE
Qty: 0 | Refills: 0 | Status: COMPLETED | OUTPATIENT
Start: 2018-04-22 | End: 2018-04-22

## 2018-04-22 RX ADMIN — Medication 40 MILLIGRAM(S): at 23:36

## 2018-04-22 NOTE — ED ADULT NURSE NOTE - OBJECTIVE STATEMENT
pt presents to ED BIBEMS from [facility] s/p episode of chest pain radiating to back which has since resolved PTA after administration of percocet. pt given        Pt presents to the ED with complaints of chest pain which she states has resolved, pt took percocet at sunrise for pain. EMS gave 2 baby ASA in route. Pt in no distress at triage. pt presents to ED BIBEMS from [facility] s/p episode of chest pain radiating to back which has since resolved PTA after administration of percocet. pt describing fluttering feeling in chest at time of onset of pain this afternoon. pt given 2 ASA en route by EMS. pt w/ extensive cardiac hx- AICD, cardiomyopathy, CHF, HTN, a fib. pt a&ox3. denies pain and distress at this time. no acute distress noted.

## 2018-04-22 NOTE — ED PROVIDER NOTE - MUSCULOSKELETAL, MLM
Spine appears normal, range of motion is not limited, no muscle or joint tenderness. Mild bilat edema.

## 2018-04-22 NOTE — ED ADULT TRIAGE NOTE - CHIEF COMPLAINT QUOTE
Pt presents to the ED with complaints of chest pain which she states has resolved, pt took percocet at sunrise for pain. EMS gave 2 baby ASA in route. Pt in no distress at triage.

## 2018-04-22 NOTE — ED PROVIDER NOTE - OBJECTIVE STATEMENT
86 y/o female with PMHx CAD, CHF, HTN, GERD, OA, dyslipidemia, presents to the ED BIBEMS from [facility] assisted [facility] c/o fluttering chest pain. +pain in right shoulder radiating to back. +dyspnea +weakness. Denies abd pain. States she has experienced similar sx in past. Took percocet PTA. As per EMS, pt was tachy in 140s and visibly SOB. PCP Dr. Gumaan. Cardio Dr. Galdamez

## 2018-04-22 NOTE — ED PROVIDER NOTE - PROGRESS NOTE DETAILS
AJM: concern for chf given pulm edema, + bnp, chest pain, sob. admitted to tele under dr alonso. jorge a given.

## 2018-04-23 DIAGNOSIS — R07.9 CHEST PAIN, UNSPECIFIED: ICD-10-CM

## 2018-04-23 DIAGNOSIS — I25.10 ATHEROSCLEROTIC HEART DISEASE OF NATIVE CORONARY ARTERY WITHOUT ANGINA PECTORIS: ICD-10-CM

## 2018-04-23 DIAGNOSIS — I50.22 CHRONIC SYSTOLIC (CONGESTIVE) HEART FAILURE: ICD-10-CM

## 2018-04-23 DIAGNOSIS — I10 ESSENTIAL (PRIMARY) HYPERTENSION: ICD-10-CM

## 2018-04-23 DIAGNOSIS — I48.0 PAROXYSMAL ATRIAL FIBRILLATION: ICD-10-CM

## 2018-04-23 LAB
DIGITOXIN REPORTING LIMIT: 5 NG/ML — SIGNIFICANT CHANGE UP
DIGITOXIN SERPL-MCNC: SIGNIFICANT CHANGE UP NG/ML (ref 10–30)
TROPONIN I SERPL-MCNC: 0.04 NG/ML — SIGNIFICANT CHANGE UP (ref 0.01–0.04)

## 2018-04-23 RX ORDER — ACETAMINOPHEN 500 MG
650 TABLET ORAL EVERY 6 HOURS
Qty: 0 | Refills: 0 | Status: DISCONTINUED | OUTPATIENT
Start: 2018-04-23 | End: 2018-04-28

## 2018-04-23 RX ORDER — MOXIFLOXACIN HYDROCHLORIDE TABLETS, 400 MG 400 MG/1
1 TABLET, FILM COATED ORAL
Qty: 0 | Refills: 0 | COMMUNITY

## 2018-04-23 RX ORDER — ONDANSETRON 8 MG/1
4 TABLET, FILM COATED ORAL EVERY 6 HOURS
Qty: 0 | Refills: 0 | Status: DISCONTINUED | OUTPATIENT
Start: 2018-04-23 | End: 2018-04-28

## 2018-04-23 RX ORDER — FUROSEMIDE 40 MG
40 TABLET ORAL DAILY
Qty: 0 | Refills: 0 | Status: DISCONTINUED | OUTPATIENT
Start: 2018-04-23 | End: 2018-04-24

## 2018-04-23 RX ORDER — METRONIDAZOLE 500 MG
1 TABLET ORAL
Qty: 0 | Refills: 0 | COMMUNITY

## 2018-04-23 RX ORDER — ASPIRIN/CALCIUM CARB/MAGNESIUM 324 MG
81 TABLET ORAL DAILY
Qty: 0 | Refills: 0 | Status: DISCONTINUED | OUTPATIENT
Start: 2018-04-23 | End: 2018-04-28

## 2018-04-23 RX ORDER — LATANOPROST 0.05 MG/ML
1 SOLUTION/ DROPS OPHTHALMIC; TOPICAL AT BEDTIME
Qty: 0 | Refills: 0 | Status: DISCONTINUED | OUTPATIENT
Start: 2018-04-23 | End: 2018-04-28

## 2018-04-23 RX ORDER — LANOLIN ALCOHOL/MO/W.PET/CERES
3 CREAM (GRAM) TOPICAL AT BEDTIME
Qty: 0 | Refills: 0 | Status: DISCONTINUED | OUTPATIENT
Start: 2018-04-23 | End: 2018-04-28

## 2018-04-23 RX ORDER — IBUPROFEN 200 MG
200 TABLET ORAL EVERY 6 HOURS
Qty: 0 | Refills: 0 | Status: DISCONTINUED | OUTPATIENT
Start: 2018-04-23 | End: 2018-04-28

## 2018-04-23 RX ORDER — OXYBUTYNIN CHLORIDE 5 MG
5 TABLET ORAL
Qty: 0 | Refills: 0 | Status: DISCONTINUED | OUTPATIENT
Start: 2018-04-23 | End: 2018-04-28

## 2018-04-23 RX ORDER — AMIODARONE HYDROCHLORIDE 400 MG/1
400 TABLET ORAL THREE TIMES A DAY
Qty: 0 | Refills: 0 | Status: DISCONTINUED | OUTPATIENT
Start: 2018-04-23 | End: 2018-04-28

## 2018-04-23 RX ORDER — METOPROLOL TARTRATE 50 MG
25 TABLET ORAL
Qty: 0 | Refills: 0 | Status: DISCONTINUED | OUTPATIENT
Start: 2018-04-23 | End: 2018-04-28

## 2018-04-23 RX ORDER — DOCUSATE SODIUM 100 MG
100 CAPSULE ORAL THREE TIMES A DAY
Qty: 0 | Refills: 0 | Status: DISCONTINUED | OUTPATIENT
Start: 2018-04-23 | End: 2018-04-28

## 2018-04-23 RX ORDER — HEPARIN SODIUM 5000 [USP'U]/ML
5000 INJECTION INTRAVENOUS; SUBCUTANEOUS EVERY 12 HOURS
Qty: 0 | Refills: 0 | Status: DISCONTINUED | OUTPATIENT
Start: 2018-04-23 | End: 2018-04-28

## 2018-04-23 RX ADMIN — Medication 3 MILLIGRAM(S): at 22:30

## 2018-04-23 RX ADMIN — Medication 40 MILLIGRAM(S): at 10:41

## 2018-04-23 RX ADMIN — LATANOPROST 1 DROP(S): 0.05 SOLUTION/ DROPS OPHTHALMIC; TOPICAL at 22:05

## 2018-04-23 RX ADMIN — Medication 5 MILLIGRAM(S): at 18:04

## 2018-04-23 RX ADMIN — HEPARIN SODIUM 5000 UNIT(S): 5000 INJECTION INTRAVENOUS; SUBCUTANEOUS at 18:04

## 2018-04-23 RX ADMIN — Medication 25 MILLIGRAM(S): at 18:04

## 2018-04-23 RX ADMIN — AMIODARONE HYDROCHLORIDE 400 MILLIGRAM(S): 400 TABLET ORAL at 22:06

## 2018-04-23 RX ADMIN — Medication 25 MILLIGRAM(S): at 08:11

## 2018-04-23 RX ADMIN — Medication 81 MILLIGRAM(S): at 13:17

## 2018-04-23 RX ADMIN — Medication 100 MILLIGRAM(S): at 13:18

## 2018-04-23 RX ADMIN — Medication 100 MILLIGRAM(S): at 22:05

## 2018-04-23 RX ADMIN — Medication 2.5 MILLIGRAM(S): at 13:22

## 2018-04-23 NOTE — H&P ADULT - NSHPPHYSICALEXAM_GEN_ALL_CORE
· EYES: Clear bilaterally, pupils equal, round and reactive to light.  · CARDIAC: Normal rate, regular rhythm.  Heart sounds S1, S2.  No murmurs, rubs or gallops.  · RESPIRATORY: Breath sounds clear and equal bilaterally.  · GASTROINTESTINAL: Abdomen soft, non-tender, no guarding.  · MUSCULOSKELETAL: Spine appears normal, range of motion is not limited, no muscle or joint tenderness. Mild bilat edema.  · NEUROLOGICAL: Alert and oriented, no focal deficits, no motor or sensory deficits.  · SKIN: Skin normal color for race, warm, dry and intact. No evidence of rash.

## 2018-04-23 NOTE — H&P ADULT - HISTORY OF PRESENT ILLNESS
88 y/o female with PMHx CAD, CHF, HTN, GERD, OA, dyslipidemia, presents to the ED BIBEMS from [facility] assisted [facility] c/o fluttering chest pain. +pain in right shoulder radiating to back. +weakness. Denies abd pain. States she has experienced similar sx in past. Took percocet PTA. As per EMS, pt was tachy in 140s and visibly SOB. In ED had episodes of sinus tach 140; no c/o at this time, not in pain; adm BB and admit to tele.

## 2018-04-23 NOTE — CONSULT NOTE ADULT - ASSESSMENT
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  AF started today and pt. converted to NSR during ICD interrogation.

## 2018-04-23 NOTE — ED ADULT NURSE REASSESSMENT NOTE - NS ED NURSE REASSESS COMMENT FT1
0109: report given to holding LEILANI Garcia. pt changed by RADHA Rodriguez, new clean diaper applied. pt updated on poc and agreeable. pt to be moved to [facility] area for holding overnight.
Patient received from LEILANI Steinberg. Patient resting comfortably in bed. Safety and comfort maintained Will continue to monitor.

## 2018-04-23 NOTE — H&P ADULT - ASSESSMENT
PALPITATIONS  SOB  CHRONIC PAIN SYNDROME    - poss PSVT, short lived  - tele  - trops  - card consult re: need for ablation  - prn analgesia for her arthritic pain  - poss ac diastolic HF, elevated BNP  - get echo if none recent  - interrogate PPM PALPITATIONS  SOB  CHRONIC PAIN SYNDROME  AC ON CHR SYSTOLIC HF    - poss PSVT, short lived  - tele  - trops  - card consult re: need for ablation  - prn analgesia for her arthritic pain  - interrogate PPM  - IV lasix

## 2018-04-23 NOTE — H&P ADULT - NSHPLABSRESULTS_GEN_ALL_CORE
8.9    7.48  )-----------( 224      ( 22 Apr 2018 22:15 )             27.5   04-22    141  |  107  |  25<H>  ----------------------------<  95  4.0   |  25  |  1.07    Ca    8.5      22 Apr 2018 22:15    TPro  6.4  /  Alb  3.1<L>  /  TBili  0.4  /  DBili  x   /  AST  47<H>  /  ALT  40  /  AlkPhos  54  04-22

## 2018-04-23 NOTE — CONSULT NOTE ADULT - SUBJECTIVE AND OBJECTIVE BOX
CARDIOLOGY AND ELECTROPHYSIOLOGY ASSESSMENT    HPI:  88 y/o female with PMHx CAD, CHF, HTN, GERD, OA, dyslipidemia, presents to the ED BIBEMS from [facility] assisted [facility] c/o fluttering chest pain. +pain in right shoulder radiating to back. +weakness. Denies abd pain. States she has experienced similar sx in past. Took percocet PTA. As per EMS, pt was tachy in 140s and visibly SOB. In ED had episodes of sinus tach 140; no c/o at this time, not in pain; adm BB and admit to tele. (23 Apr 2018 08:09)    4/23/18:  87 year old female w/h/o ICM with last EF measured at 30% in 2/2018 who presents with palpitations and chest pain.       PAST MEDICAL & SURGICAL HISTORY:  Osteoarthritis of multiple joints, unspecified osteoarthritis type  Gastroesophageal reflux disease without esophagitis  Dyslipidemia  Essential hypertension  Paroxysmal atrial fibrillation  Coronary artery disease involving native coronary artery of native heart without angina pectoris  Chronic systolic congestive heart failure: EF 20-25%  AICD (automatic cardioverter/defibrillator) present      MEDICATIONS  (STANDING):  aspirin  chewable 81 milliGRAM(s) Oral daily  docusate sodium 100 milliGRAM(s) Oral three times a day  enalapril 2.5 milliGRAM(s) Oral daily  furosemide   Injectable 40 milliGRAM(s) IV Push daily  heparin  Injectable 5000 Unit(s) SubCutaneous every 12 hours  latanoprost 0.005% Ophthalmic Solution 1 Drop(s) Both EYES at bedtime  metoprolol tartrate 25 milliGRAM(s) Oral two times a day  oxybutynin 5 milliGRAM(s) Oral two times a day    MEDICATIONS  (PRN):  acetaminophen   Tablet 650 milliGRAM(s) Oral every 6 hours PRN pain fever  ibuprofen  Tablet 200 milliGRAM(s) Oral every 6 hours PRN pain 1-4 fever> 100.4F  ondansetron Injectable 4 milliGRAM(s) IV Push every 6 hours PRN Nausea      Allergies    Cantelope (Angioedema)  Flonase (Angioedema)  honeydews (Angioedema)  Metformin HCL-Pioglitazone HCL (Nephrotoxicity; Angioedema; Faint)  Raspberries (Angioedema)  strawberry (Angioedema)    Intolerances        SOCIAL HISTORY: Denies tobacco, etoh abuse or illicit drug use    FAMILY HISTORY:  No pertinent family history in first degree relatives      Vital Signs Last 24 Hrs  T(C): 36.4 (23 Apr 2018 16:06), Max: 36.8 (22 Apr 2018 22:04)  T(F): 97.5 (23 Apr 2018 16:06), Max: 98.3 (22 Apr 2018 22:04)  HR: 99 (23 Apr 2018 16:06) (79 - 120)  BP: 121/70 (23 Apr 2018 16:06) (107/62 - 128/65)  BP(mean): --  RR: 18 (23 Apr 2018 16:06) (18 - 21)  SpO2: 99% (23 Apr 2018 16:06) (92% - 100%)    REVIEW OF SYSTEMS:    CONSTITUTIONAL:  As per HPI.  HEENT:  Eyes:  No diplopia or blurred vision. ENT:  No earache, sore throat or runny nose.  CARDIOVASCULAR:  No pressure, squeezing, strangling, tightness, heaviness or aching about the chest, neck, axilla or epigastrium.  RESPIRATORY:  No cough, shortness of breath, PND or orthopnea.  GASTROINTESTINAL:  No nausea, vomiting or diarrhea.  GENITOURINARY:  No dysuria, frequency or urgency.  MUSCULOSKELETAL:  As per HPI.  SKIN:  No change in skin, hair or nails.  NEUROLOGIC:  No paresthesias, fasciculations, seizures or weakness.  PSYCHIATRIC:  No disorder of thought or mood.  ENDOCRINE:  No heat or cold intolerance, polyuria or polydipsia.  HEMATOLOGICAL:  No easy bruising or bleedings:  .     PHYSICAL EXAMINATION:    GENERAL APPEARANCE:  Pt. is not currently dyspneic, in no distress. Pt. is alert, oriented, and pleasant.  HEENT:  Pupils are normal and react normally. No icterus. Mucous membranes well colored.  NECK:  Supple. No lymphadenopathy. Jugular venous pressure not elevated. Carotids equal.   HEART:   The cardiac impulse has a normal quality. There are no murmurs, rubs or gallops noted  CHEST:  Chest is clear to auscultation. Normal respiratory effort.  ABDOMEN:  Soft and nontender.   EXTREMITIES:  There is no edema.   SKIN:  No rash or significant lesions are noted.    I&O's Summary      LABS:                        8.9    7.48  )-----------( 224      ( 22 Apr 2018 22:15 )             27.5   04-22    141  |  107  |  25<H>  ----------------------------<  95  4.0   |  25  |  1.07    Ca    8.5      22 Apr 2018 22:15    TPro  6.4  /  Alb  3.1<L>  /  TBili  0.4  /  DBili  x   /  AST  47<H>  /  ALT  40  /  AlkPhos  54  04-22  LIVER FUNCTIONS - ( 22 Apr 2018 22:15 )  Alb: 3.1 g/dL / Pro: 6.4 gm/dL / ALK PHOS: 54 U/L / ALT: 40 U/L / AST: 47 U/L / GGT: x           CARDIAC MARKERS ( 23 Apr 2018 09:02 )  0.036 ng/mL / x     / x     / x     / x      CARDIAC MARKERS ( 23 Apr 2018 04:45 )  0.038 ng/mL / x     / x     / x     / x      CARDIAC MARKERS ( 23 Apr 2018 01:28 )  0.035 ng/mL / x     / x     / x     / x      CARDIAC MARKERS ( 22 Apr 2018 22:15 )  0.034 ng/mL / x     / x     / x     / x        EKG:    < from: 12 Lead ECG (04.22.18 @ 22:12) >  Ventricular Rate 96 BPM    Atrial Rate 96 BPM    P-R Interval 184 ms    QRS Duration 114 ms    Q-T Interval 402 ms    QTC Calculation(Bezet) 507 ms    P Axis 87 degrees    R Axis -11 degrees    T Axis 49 degrees    Diagnosis Line Sinus rhythm with Premature atrial complexes  Non-specific intra-ventricular conduction delay  When compared with ECG of 29-MAR-2018 16:19,  Premature atrial complexes are now Present  Criteria for Septal infarct are no longer Present  Confirmed by Palla MD, Mark ([id number]) on 4/23/2018 10:54:56 AM    < end of copied text >      TELEMETRY:    Regular, wide complex tachycardia (same width as native QRS) at 130-140bpm

## 2018-04-24 LAB
ANION GAP SERPL CALC-SCNC: 11 MMOL/L — SIGNIFICANT CHANGE UP (ref 5–17)
BUN SERPL-MCNC: 33 MG/DL — HIGH (ref 7–23)
CALCIUM SERPL-MCNC: 8.7 MG/DL — SIGNIFICANT CHANGE UP (ref 8.5–10.1)
CHLORIDE SERPL-SCNC: 102 MMOL/L — SIGNIFICANT CHANGE UP (ref 96–108)
CO2 SERPL-SCNC: 25 MMOL/L — SIGNIFICANT CHANGE UP (ref 22–31)
CREAT SERPL-MCNC: 1.27 MG/DL — SIGNIFICANT CHANGE UP (ref 0.5–1.3)
GLUCOSE SERPL-MCNC: 111 MG/DL — HIGH (ref 70–99)
HCT VFR BLD CALC: 28.9 % — LOW (ref 34.5–45)
HGB BLD-MCNC: 9.6 G/DL — LOW (ref 11.5–15.5)
MCHC RBC-ENTMCNC: 28.7 PG — SIGNIFICANT CHANGE UP (ref 27–34)
MCHC RBC-ENTMCNC: 33.2 GM/DL — SIGNIFICANT CHANGE UP (ref 32–36)
MCV RBC AUTO: 86.5 FL — SIGNIFICANT CHANGE UP (ref 80–100)
NRBC # BLD: 0 /100 WBCS — SIGNIFICANT CHANGE UP (ref 0–0)
PLATELET # BLD AUTO: 243 K/UL — SIGNIFICANT CHANGE UP (ref 150–400)
POTASSIUM SERPL-MCNC: 3.5 MMOL/L — SIGNIFICANT CHANGE UP (ref 3.5–5.3)
POTASSIUM SERPL-SCNC: 3.5 MMOL/L — SIGNIFICANT CHANGE UP (ref 3.5–5.3)
RBC # BLD: 3.34 M/UL — LOW (ref 3.8–5.2)
RBC # FLD: 14.6 % — HIGH (ref 10.3–14.5)
SODIUM SERPL-SCNC: 138 MMOL/L — SIGNIFICANT CHANGE UP (ref 135–145)
WBC # BLD: 6.46 K/UL — SIGNIFICANT CHANGE UP (ref 3.8–10.5)
WBC # FLD AUTO: 6.46 K/UL — SIGNIFICANT CHANGE UP (ref 3.8–10.5)

## 2018-04-24 RX ORDER — OXYCODONE AND ACETAMINOPHEN 5; 325 MG/1; MG/1
1 TABLET ORAL EVERY 6 HOURS
Qty: 0 | Refills: 0 | Status: DISCONTINUED | OUTPATIENT
Start: 2018-04-24 | End: 2018-04-28

## 2018-04-24 RX ORDER — FUROSEMIDE 40 MG
40 TABLET ORAL DAILY
Qty: 0 | Refills: 0 | Status: DISCONTINUED | OUTPATIENT
Start: 2018-04-25 | End: 2018-04-28

## 2018-04-24 RX ORDER — POTASSIUM CHLORIDE 20 MEQ
40 PACKET (EA) ORAL ONCE
Qty: 0 | Refills: 0 | Status: COMPLETED | OUTPATIENT
Start: 2018-04-24 | End: 2018-04-24

## 2018-04-24 RX ADMIN — OXYCODONE AND ACETAMINOPHEN 1 TABLET(S): 5; 325 TABLET ORAL at 20:02

## 2018-04-24 RX ADMIN — ONDANSETRON 4 MILLIGRAM(S): 8 TABLET, FILM COATED ORAL at 10:39

## 2018-04-24 RX ADMIN — Medication 100 MILLIGRAM(S): at 05:12

## 2018-04-24 RX ADMIN — LATANOPROST 1 DROP(S): 0.05 SOLUTION/ DROPS OPHTHALMIC; TOPICAL at 21:10

## 2018-04-24 RX ADMIN — Medication 40 MILLIGRAM(S): at 05:12

## 2018-04-24 RX ADMIN — Medication 5 MILLIGRAM(S): at 05:12

## 2018-04-24 RX ADMIN — Medication 650 MILLIGRAM(S): at 05:12

## 2018-04-24 RX ADMIN — HEPARIN SODIUM 5000 UNIT(S): 5000 INJECTION INTRAVENOUS; SUBCUTANEOUS at 17:37

## 2018-04-24 RX ADMIN — AMIODARONE HYDROCHLORIDE 400 MILLIGRAM(S): 400 TABLET ORAL at 14:43

## 2018-04-24 RX ADMIN — AMIODARONE HYDROCHLORIDE 400 MILLIGRAM(S): 400 TABLET ORAL at 21:10

## 2018-04-24 RX ADMIN — AMIODARONE HYDROCHLORIDE 400 MILLIGRAM(S): 400 TABLET ORAL at 05:12

## 2018-04-24 RX ADMIN — Medication 200 MILLIGRAM(S): at 12:35

## 2018-04-24 RX ADMIN — Medication 5 MILLIGRAM(S): at 17:37

## 2018-04-24 RX ADMIN — OXYCODONE AND ACETAMINOPHEN 1 TABLET(S): 5; 325 TABLET ORAL at 13:14

## 2018-04-24 RX ADMIN — Medication 81 MILLIGRAM(S): at 12:09

## 2018-04-24 RX ADMIN — Medication 100 MILLIGRAM(S): at 21:10

## 2018-04-24 RX ADMIN — Medication 100 MILLIGRAM(S): at 14:43

## 2018-04-24 RX ADMIN — Medication 40 MILLIEQUIVALENT(S): at 12:09

## 2018-04-24 RX ADMIN — HEPARIN SODIUM 5000 UNIT(S): 5000 INJECTION INTRAVENOUS; SUBCUTANEOUS at 05:12

## 2018-04-24 RX ADMIN — OXYCODONE AND ACETAMINOPHEN 1 TABLET(S): 5; 325 TABLET ORAL at 21:11

## 2018-04-25 LAB
ADD ON TEST-SPECIMEN IN LAB: SIGNIFICANT CHANGE UP
ALBUMIN SERPL ELPH-MCNC: 3.3 G/DL — SIGNIFICANT CHANGE UP (ref 3.3–5)
ALP SERPL-CCNC: 53 U/L — SIGNIFICANT CHANGE UP (ref 40–120)
ALT FLD-CCNC: 47 U/L — SIGNIFICANT CHANGE UP (ref 12–78)
ANION GAP SERPL CALC-SCNC: 7 MMOL/L — SIGNIFICANT CHANGE UP (ref 5–17)
AST SERPL-CCNC: 42 U/L — HIGH (ref 15–37)
BILIRUB DIRECT SERPL-MCNC: 0.1 MG/DL — SIGNIFICANT CHANGE UP (ref 0–0.2)
BILIRUB INDIRECT FLD-MCNC: 0.3 MG/DL — SIGNIFICANT CHANGE UP (ref 0.2–1)
BILIRUB SERPL-MCNC: 0.4 MG/DL — SIGNIFICANT CHANGE UP (ref 0.2–1.2)
BUN SERPL-MCNC: 30 MG/DL — HIGH (ref 7–23)
CALCIUM SERPL-MCNC: 8.4 MG/DL — LOW (ref 8.5–10.1)
CHLORIDE SERPL-SCNC: 103 MMOL/L — SIGNIFICANT CHANGE UP (ref 96–108)
CO2 SERPL-SCNC: 29 MMOL/L — SIGNIFICANT CHANGE UP (ref 22–31)
CREAT SERPL-MCNC: 1.29 MG/DL — SIGNIFICANT CHANGE UP (ref 0.5–1.3)
GLUCOSE SERPL-MCNC: 93 MG/DL — SIGNIFICANT CHANGE UP (ref 70–99)
POTASSIUM SERPL-MCNC: 4.4 MMOL/L — SIGNIFICANT CHANGE UP (ref 3.5–5.3)
POTASSIUM SERPL-SCNC: 4.4 MMOL/L — SIGNIFICANT CHANGE UP (ref 3.5–5.3)
PROT SERPL-MCNC: 6.6 GM/DL — SIGNIFICANT CHANGE UP (ref 6–8.3)
SODIUM SERPL-SCNC: 139 MMOL/L — SIGNIFICANT CHANGE UP (ref 135–145)

## 2018-04-25 RX ADMIN — LATANOPROST 1 DROP(S): 0.05 SOLUTION/ DROPS OPHTHALMIC; TOPICAL at 21:52

## 2018-04-25 RX ADMIN — Medication 100 MILLIGRAM(S): at 13:36

## 2018-04-25 RX ADMIN — OXYCODONE AND ACETAMINOPHEN 1 TABLET(S): 5; 325 TABLET ORAL at 13:36

## 2018-04-25 RX ADMIN — OXYCODONE AND ACETAMINOPHEN 1 TABLET(S): 5; 325 TABLET ORAL at 21:22

## 2018-04-25 RX ADMIN — Medication 5 MILLIGRAM(S): at 05:54

## 2018-04-25 RX ADMIN — Medication 81 MILLIGRAM(S): at 12:02

## 2018-04-25 RX ADMIN — OXYCODONE AND ACETAMINOPHEN 1 TABLET(S): 5; 325 TABLET ORAL at 12:10

## 2018-04-25 RX ADMIN — HEPARIN SODIUM 5000 UNIT(S): 5000 INJECTION INTRAVENOUS; SUBCUTANEOUS at 05:54

## 2018-04-25 RX ADMIN — OXYCODONE AND ACETAMINOPHEN 1 TABLET(S): 5; 325 TABLET ORAL at 06:51

## 2018-04-25 RX ADMIN — Medication 40 MILLIGRAM(S): at 05:57

## 2018-04-25 RX ADMIN — Medication 3 MILLIGRAM(S): at 21:52

## 2018-04-25 RX ADMIN — AMIODARONE HYDROCHLORIDE 400 MILLIGRAM(S): 400 TABLET ORAL at 13:36

## 2018-04-25 RX ADMIN — OXYCODONE AND ACETAMINOPHEN 1 TABLET(S): 5; 325 TABLET ORAL at 05:54

## 2018-04-25 RX ADMIN — HEPARIN SODIUM 5000 UNIT(S): 5000 INJECTION INTRAVENOUS; SUBCUTANEOUS at 18:04

## 2018-04-25 RX ADMIN — Medication 2.5 MILLIGRAM(S): at 05:54

## 2018-04-25 RX ADMIN — Medication 5 MILLIGRAM(S): at 18:04

## 2018-04-25 RX ADMIN — Medication 25 MILLIGRAM(S): at 05:55

## 2018-04-25 RX ADMIN — AMIODARONE HYDROCHLORIDE 400 MILLIGRAM(S): 400 TABLET ORAL at 21:52

## 2018-04-25 RX ADMIN — AMIODARONE HYDROCHLORIDE 400 MILLIGRAM(S): 400 TABLET ORAL at 05:54

## 2018-04-25 RX ADMIN — Medication 100 MILLIGRAM(S): at 05:54

## 2018-04-25 RX ADMIN — Medication 100 MILLIGRAM(S): at 21:52

## 2018-04-25 NOTE — PHYSICAL THERAPY INITIAL EVALUATION ADULT - MODALITIES TREATMENT COMMENTS
pt left seated in chair post Eval; chair alarm donned; HM in place; callbell in reach; pt instructed not to get up alone; call nursing for assist; rj well; denied pain; RN Martita made aware pt OOB

## 2018-04-26 LAB
ALBUMIN SERPL ELPH-MCNC: 3.1 G/DL — LOW (ref 3.3–5)
ALP SERPL-CCNC: 59 U/L — SIGNIFICANT CHANGE UP (ref 40–120)
ALT FLD-CCNC: 58 U/L — SIGNIFICANT CHANGE UP (ref 12–78)
ANION GAP SERPL CALC-SCNC: 9 MMOL/L — SIGNIFICANT CHANGE UP (ref 5–17)
AST SERPL-CCNC: 50 U/L — HIGH (ref 15–37)
BILIRUB SERPL-MCNC: 0.4 MG/DL — SIGNIFICANT CHANGE UP (ref 0.2–1.2)
BUN SERPL-MCNC: 30 MG/DL — HIGH (ref 7–23)
CALCIUM SERPL-MCNC: 8.2 MG/DL — LOW (ref 8.5–10.1)
CHLORIDE SERPL-SCNC: 103 MMOL/L — SIGNIFICANT CHANGE UP (ref 96–108)
CO2 SERPL-SCNC: 26 MMOL/L — SIGNIFICANT CHANGE UP (ref 22–31)
CREAT SERPL-MCNC: 1.29 MG/DL — SIGNIFICANT CHANGE UP (ref 0.5–1.3)
GLUCOSE SERPL-MCNC: 109 MG/DL — HIGH (ref 70–99)
HCT VFR BLD CALC: 27.1 % — LOW (ref 34.5–45)
HGB BLD-MCNC: 8.9 G/DL — LOW (ref 11.5–15.5)
MAGNESIUM SERPL-MCNC: 2 MG/DL — SIGNIFICANT CHANGE UP (ref 1.6–2.6)
MCHC RBC-ENTMCNC: 29.4 PG — SIGNIFICANT CHANGE UP (ref 27–34)
MCHC RBC-ENTMCNC: 32.8 GM/DL — SIGNIFICANT CHANGE UP (ref 32–36)
MCV RBC AUTO: 89.4 FL — SIGNIFICANT CHANGE UP (ref 80–100)
NRBC # BLD: 0 /100 WBCS — SIGNIFICANT CHANGE UP (ref 0–0)
PLATELET # BLD AUTO: 222 K/UL — SIGNIFICANT CHANGE UP (ref 150–400)
POTASSIUM SERPL-MCNC: 4 MMOL/L — SIGNIFICANT CHANGE UP (ref 3.5–5.3)
POTASSIUM SERPL-SCNC: 4 MMOL/L — SIGNIFICANT CHANGE UP (ref 3.5–5.3)
PROT SERPL-MCNC: 6.2 GM/DL — SIGNIFICANT CHANGE UP (ref 6–8.3)
RBC # BLD: 3.03 M/UL — LOW (ref 3.8–5.2)
RBC # FLD: 14.4 % — SIGNIFICANT CHANGE UP (ref 10.3–14.5)
SODIUM SERPL-SCNC: 138 MMOL/L — SIGNIFICANT CHANGE UP (ref 135–145)
WBC # BLD: 5.86 K/UL — SIGNIFICANT CHANGE UP (ref 3.8–10.5)
WBC # FLD AUTO: 5.86 K/UL — SIGNIFICANT CHANGE UP (ref 3.8–10.5)

## 2018-04-26 RX ORDER — CALCIUM CARBONATE 500(1250)
1 TABLET ORAL ONCE
Qty: 0 | Refills: 0 | Status: COMPLETED | OUTPATIENT
Start: 2018-04-26 | End: 2018-04-27

## 2018-04-26 RX ADMIN — OXYCODONE AND ACETAMINOPHEN 1 TABLET(S): 5; 325 TABLET ORAL at 20:12

## 2018-04-26 RX ADMIN — Medication 5 MILLIGRAM(S): at 18:02

## 2018-04-26 RX ADMIN — Medication 100 MILLIGRAM(S): at 22:39

## 2018-04-26 RX ADMIN — Medication 5 MILLIGRAM(S): at 06:00

## 2018-04-26 RX ADMIN — OXYCODONE AND ACETAMINOPHEN 1 TABLET(S): 5; 325 TABLET ORAL at 14:45

## 2018-04-26 RX ADMIN — Medication 25 MILLIGRAM(S): at 18:02

## 2018-04-26 RX ADMIN — ONDANSETRON 4 MILLIGRAM(S): 8 TABLET, FILM COATED ORAL at 18:19

## 2018-04-26 RX ADMIN — AMIODARONE HYDROCHLORIDE 400 MILLIGRAM(S): 400 TABLET ORAL at 14:05

## 2018-04-26 RX ADMIN — Medication 3 MILLIGRAM(S): at 22:39

## 2018-04-26 RX ADMIN — AMIODARONE HYDROCHLORIDE 400 MILLIGRAM(S): 400 TABLET ORAL at 06:00

## 2018-04-26 RX ADMIN — OXYCODONE AND ACETAMINOPHEN 1 TABLET(S): 5; 325 TABLET ORAL at 14:04

## 2018-04-26 RX ADMIN — Medication 81 MILLIGRAM(S): at 12:45

## 2018-04-26 RX ADMIN — HEPARIN SODIUM 5000 UNIT(S): 5000 INJECTION INTRAVENOUS; SUBCUTANEOUS at 06:00

## 2018-04-26 RX ADMIN — Medication 100 MILLIGRAM(S): at 06:00

## 2018-04-26 RX ADMIN — Medication 100 MILLIGRAM(S): at 14:05

## 2018-04-26 RX ADMIN — AMIODARONE HYDROCHLORIDE 400 MILLIGRAM(S): 400 TABLET ORAL at 22:39

## 2018-04-26 RX ADMIN — HEPARIN SODIUM 5000 UNIT(S): 5000 INJECTION INTRAVENOUS; SUBCUTANEOUS at 18:02

## 2018-04-27 LAB
ALBUMIN SERPL ELPH-MCNC: 3.4 G/DL — SIGNIFICANT CHANGE UP (ref 3.3–5)
ALP SERPL-CCNC: 70 U/L — SIGNIFICANT CHANGE UP (ref 40–120)
ALT FLD-CCNC: 75 U/L — SIGNIFICANT CHANGE UP (ref 12–78)
ANION GAP SERPL CALC-SCNC: 10 MMOL/L — SIGNIFICANT CHANGE UP (ref 5–17)
AST SERPL-CCNC: 66 U/L — HIGH (ref 15–37)
BILIRUB SERPL-MCNC: 0.4 MG/DL — SIGNIFICANT CHANGE UP (ref 0.2–1.2)
BUN SERPL-MCNC: 29 MG/DL — HIGH (ref 7–23)
CALCIUM SERPL-MCNC: 8.6 MG/DL — SIGNIFICANT CHANGE UP (ref 8.5–10.1)
CHLORIDE SERPL-SCNC: 101 MMOL/L — SIGNIFICANT CHANGE UP (ref 96–108)
CK SERPL-CCNC: 81 U/L — SIGNIFICANT CHANGE UP (ref 26–192)
CO2 SERPL-SCNC: 26 MMOL/L — SIGNIFICANT CHANGE UP (ref 22–31)
CREAT SERPL-MCNC: 1.3 MG/DL — SIGNIFICANT CHANGE UP (ref 0.5–1.3)
GLUCOSE SERPL-MCNC: 110 MG/DL — HIGH (ref 70–99)
HCT VFR BLD CALC: 29.6 % — LOW (ref 34.5–45)
HGB BLD-MCNC: 9.5 G/DL — LOW (ref 11.5–15.5)
MCHC RBC-ENTMCNC: 28.8 PG — SIGNIFICANT CHANGE UP (ref 27–34)
MCHC RBC-ENTMCNC: 32.1 GM/DL — SIGNIFICANT CHANGE UP (ref 32–36)
MCV RBC AUTO: 89.7 FL — SIGNIFICANT CHANGE UP (ref 80–100)
NRBC # BLD: 0 /100 WBCS — SIGNIFICANT CHANGE UP (ref 0–0)
PLATELET # BLD AUTO: 234 K/UL — SIGNIFICANT CHANGE UP (ref 150–400)
POTASSIUM SERPL-MCNC: 4.8 MMOL/L — SIGNIFICANT CHANGE UP (ref 3.5–5.3)
POTASSIUM SERPL-SCNC: 4.8 MMOL/L — SIGNIFICANT CHANGE UP (ref 3.5–5.3)
PROT SERPL-MCNC: 6.7 GM/DL — SIGNIFICANT CHANGE UP (ref 6–8.3)
RBC # BLD: 3.3 M/UL — LOW (ref 3.8–5.2)
RBC # FLD: 14.4 % — SIGNIFICANT CHANGE UP (ref 10.3–14.5)
SODIUM SERPL-SCNC: 137 MMOL/L — SIGNIFICANT CHANGE UP (ref 135–145)
TROPONIN I SERPL-MCNC: 0.02 NG/ML — SIGNIFICANT CHANGE UP (ref 0.01–0.04)
TROPONIN I SERPL-MCNC: 0.03 NG/ML — SIGNIFICANT CHANGE UP (ref 0.01–0.04)
TROPONIN I SERPL-MCNC: 0.03 NG/ML — SIGNIFICANT CHANGE UP (ref 0.01–0.04)
WBC # BLD: 6.4 K/UL — SIGNIFICANT CHANGE UP (ref 3.8–10.5)
WBC # FLD AUTO: 6.4 K/UL — SIGNIFICANT CHANGE UP (ref 3.8–10.5)

## 2018-04-27 PROCEDURE — 71045 X-RAY EXAM CHEST 1 VIEW: CPT | Mod: 26

## 2018-04-27 PROCEDURE — 93010 ELECTROCARDIOGRAM REPORT: CPT | Mod: 76

## 2018-04-27 RX ORDER — NITROGLYCERIN 6.5 MG
0.4 CAPSULE, EXTENDED RELEASE ORAL ONCE
Qty: 0 | Refills: 0 | Status: COMPLETED | OUTPATIENT
Start: 2018-04-27 | End: 2018-04-27

## 2018-04-27 RX ORDER — ASPIRIN/CALCIUM CARB/MAGNESIUM 324 MG
325 TABLET ORAL ONCE
Qty: 0 | Refills: 0 | Status: COMPLETED | OUTPATIENT
Start: 2018-04-27 | End: 2018-04-27

## 2018-04-27 RX ADMIN — Medication 5 MILLIGRAM(S): at 18:38

## 2018-04-27 RX ADMIN — OXYCODONE AND ACETAMINOPHEN 1 TABLET(S): 5; 325 TABLET ORAL at 12:54

## 2018-04-27 RX ADMIN — Medication 40 MILLIGRAM(S): at 06:44

## 2018-04-27 RX ADMIN — Medication 100 MILLIGRAM(S): at 21:44

## 2018-04-27 RX ADMIN — HEPARIN SODIUM 5000 UNIT(S): 5000 INJECTION INTRAVENOUS; SUBCUTANEOUS at 06:45

## 2018-04-27 RX ADMIN — Medication 5 MILLIGRAM(S): at 06:45

## 2018-04-27 RX ADMIN — Medication 3 MILLIGRAM(S): at 21:44

## 2018-04-27 RX ADMIN — Medication 0.4 MILLIGRAM(S): at 07:01

## 2018-04-27 RX ADMIN — Medication 81 MILLIGRAM(S): at 14:31

## 2018-04-27 RX ADMIN — Medication 325 MILLIGRAM(S): at 07:01

## 2018-04-27 RX ADMIN — AMIODARONE HYDROCHLORIDE 400 MILLIGRAM(S): 400 TABLET ORAL at 21:44

## 2018-04-27 RX ADMIN — AMIODARONE HYDROCHLORIDE 400 MILLIGRAM(S): 400 TABLET ORAL at 06:45

## 2018-04-27 RX ADMIN — Medication 2.5 MILLIGRAM(S): at 06:45

## 2018-04-27 RX ADMIN — AMIODARONE HYDROCHLORIDE 400 MILLIGRAM(S): 400 TABLET ORAL at 14:31

## 2018-04-27 RX ADMIN — Medication 1 TABLET(S): at 01:09

## 2018-04-27 RX ADMIN — OXYCODONE AND ACETAMINOPHEN 1 TABLET(S): 5; 325 TABLET ORAL at 02:52

## 2018-04-27 RX ADMIN — Medication 25 MILLIGRAM(S): at 06:45

## 2018-04-27 RX ADMIN — HEPARIN SODIUM 5000 UNIT(S): 5000 INJECTION INTRAVENOUS; SUBCUTANEOUS at 18:37

## 2018-04-27 RX ADMIN — OXYCODONE AND ACETAMINOPHEN 1 TABLET(S): 5; 325 TABLET ORAL at 21:44

## 2018-04-27 RX ADMIN — OXYCODONE AND ACETAMINOPHEN 1 TABLET(S): 5; 325 TABLET ORAL at 11:44

## 2018-04-27 RX ADMIN — Medication 100 MILLIGRAM(S): at 14:31

## 2018-04-27 RX ADMIN — Medication 100 MILLIGRAM(S): at 06:45

## 2018-04-27 NOTE — CHART NOTE - NSCHARTNOTEFT_GEN_A_CORE
Called by RN at 0610 today for this pt admitted for paroxysmal Afib with RVR for progressively worsening chest pain. Pt was seen and examined at bedside. She reports feeling chest pain that originates in the right temporal region radiating to her throat and then to her chest. Pt is unable to quantify on pain scale, but describes the pain as worsening and similar in nature to chest pain she experienced PTA. Pain is worsened with deep breaths and pt feels as though she "is not catching enough air with breaths". Pt also notes feeling nauseous. As per RN and chart review, pt is being loaded on amiodarone. Tele monitoring reveals sinus rhythm in the 80s-90s. All other vitals stable and within normal limits.      VS          PHYSICAL EXAM  Gen: elderly female lying in bed breathing via nasal cannula  Cardio: S1, S2 RRR  Pulm: Lungs CTA b/l  Extr: No pedal edema      A/P: 88 yo F with acute chest pain.  - STAT EKG shows no signs of ischemia or infarction.  - STAT Troponin  - F/U AM BMP  - STAT  mg  - Will sign out to hospitalist.      Deja Lamar MD PGY-1 Called by RN at 0610 today for this pt admitted for paroxysmal Afib with RVR for progressively worsening chest pain. Pt was seen and examined at bedside. She reports feeling chest pain that originates in the right temporal region radiating to her throat and then to her chest. Pt is unable to quantify on pain scale, but describes the pain as worsening and similar in nature to chest pain she experienced PTA. Pain is worsened with deep breaths and pt feels as though she "is not catching enough air with breaths". Pt also notes feeling nauseous. As per RN and chart review, pt is being loaded on amiodarone. Tele monitoring reveals sinus rhythm in the 80s-90s. All other vitals stable and within normal limits.      VS    127/78   HR 82  R 18  100% on RA   T 98.4      PHYSICAL EXAM  Gen: elderly female lying in bed breathing via nasal cannula  Cardio: S1, S2 RRR  Pulm: Lungs CTA b/l  Extr: No pedal edema      A/P: 88 yo F with acute chest pain.  - STAT EKG shows no signs of ischemia or infarction.  - STAT Troponin  - F/U AM BMP  - STAT  mg  - STAT Nitroglycerin  - Will sign out to hospitalist.      Djea Lamar MD PGY-1  D/w Dr. Fontaine (PGY-3) and Dr. Shoemaker

## 2018-04-27 NOTE — PROGRESS NOTE ADULT - ASSESSMENT
86 y/o female with PMHx CAD, CHF, HTN, GERD, OA, dyslipidemia, admitted with     1) Parox A fib + RVR: HR up to 150s  on tele  cont amiodarone loading as per EP/cardio  pt still refusing anticoagulation for stroke prophylaxis sec to hx of hematuria requiring PRBC transfusion 5 yrs ago   monitor on tele  f/u LFTs   keep k above 4 and Mg above 2    2) CHF, systolic, chronic:  change to po lasix  monitor    3) HTN:  cont home meds    4) DVT PPX: heparin s/q    poc discussed with pt, team
86 y/o female with PMHx CAD, CHF, HTN, GERD, OA, dyslipidemia, admitted with     1) Parox A fib + RVR: HR up to 150s  on tele  cont amiodarone loading as per EP/cardio  pt still refusing anticoagulation for stroke prophylaxis sec to hx of hematuria requiring PRBC transfusion 5 yrs ago   monitor on tele  f/u LFTs , AST increased to 50; recheck in am on 4/27  keep k above 4 and Mg above 2    2) CHF, systolic, chronic:   po lasix  monitor    3) HTN:  cont home meds    4) DVT PPX: heparin s/q    5) Anemia: acute on chronic:  check  stool for OB  cbc in am    6) Low Normal BP in am: became better spontaneously  monitor for now    poc discussed with pt, team,
86 y/o female with PMHx CAD, CHF, HTN, GERD, OA, dyslipidemia, admitted with     1) Parox A fib + RVR: HR up to 150s  on tele  cont amiodarone loading as per EP/cardio  pt still refusing anticoagulation for stroke prophylaxis sec to hx of hematuria requiring PRBC transfusion 5 yrs ago   monitor on tele  f/u LFTs , normal so far  keep k above 4 and Mg above 2    2) CHF, systolic, chronic:  change to po lasix  monitor    3) HTN:  cont home meds    4) DVT PPX: heparin s/q    poc discussed with pt, team, Dr. Cervantes
86 y/o female with PMHx CAD, CHF, HTN, GERD, OA, dyslipidemia, presents to the ED [facility] from [facility] c/o fluttering chest pain. +pain in right shoulder radiating to back. +weakness.     1. PAF- Now on amiodarone. Continue load for now with 400mg TID for 1 week and then 200mg daily. Pt does have a h/o transaminitis while on Amio (uncertain if this was culprit) previously- will d/w EP to make sure he is aware. Follow LFTs daily.     2. Anticoagulation- pt refusing LTA at this time. She does have a h/o prior bleed requiring transfusion. Cont asa.     3. CHF- reduced LV fxn- appears euvolemic. Can change to PO lasix today. Clear lungs on CXR. Cont Bbl, ACEi. S/p ICD.     4. HTN- stable, cont current regimen.     5. DVT proph, replete lytes.     6. CP- trops negative, no CP at present.
88 y/o female with PMHx CAD, CHF, HTN, GERD, OA, dyslipidemia, admitted with     1) Parox A fib + RVR: HR up to 150s  on tele  cont amiodarone loading as per EP/cardio  pt still refusing anticoagulation for stroke prophylaxis sec to hx of hematuria requiring PRBC transfusion 5 yrs ago   monitor on tele  f/u LFTs , AST increased to 50; recheck in am on 4/27  keep k above 4 and Mg above 2    2) CHF, systolic, chronic:   po lasix  monitor    3) HTN:  cont home meds    4) DVT PPX: heparin s/q    5) Anemia: acute on chronic:  check  stool for OB  cbc in am    6) Low Normal BP in am: became better spontaneously  monitor for now    7) Chest pain: serial cardiac enz negative  repeat CXR normal  monitor on tele for 24 hrs    poc discussed with pt, team,
88 y/o female with PMHx CAD, CHF, HTN, GERD, OA, dyslipidemia, presents to the ED [facility] from [facility] c/o fluttering chest pain. +pain in right shoulder radiating to back. +weakness.     1. PAF- Now on amiodarone. Continue load for now with 400mg TID for 1 week and then 200mg daily. Pt does have a h/o transaminitis while on Amio (uncertain if this was culprit) previously- will d/w EP to make sure he is aware. Follow LFTs daily.     2. Anticoagulation- pt refusing LTA at this time. She does have a h/o prior bleed requiring transfusion. Cont asa.     3. CHF- reduced LV fxn- appears euvolemic. Continue PO lasix. Clear lungs on CXR. Cont Bbl, ACEi. S/p ICD.     4. HTN- stable, cont current regimen.     5. DVT proph, replete lytes.     6. CP- trops negative, no CP at present.

## 2018-04-27 NOTE — PROGRESS NOTE ADULT - SUBJECTIVE AND OBJECTIVE BOX
c/c: palpitations  HPI: 88 y/o female with PMHx CAD, CHF, HTN, GERD, OA, dyslipidemia, presents to the ED BIBEMS from [facility] c/o fluttering chest pain. +pain in right shoulder radiating to back. +weakness. Denies abd pain. States she has experienced similar sx in past. Took percocet PTA. As per EMS, pt was tachy in 140s and visibly SOB. In ED had episodes of sinus tach 140;     24: HR up to 150s, A fib  no cp/sob  c/o nausea after morning pills  on amiodarone loading  still refusing anticoagulation for stroke prophylaxis sec to hx of hematuria transfusion 5 yrs ago         PHYSICAL EXAM:    Daily     Daily Weight in k.1 (2018 12:50)    ICU Vital Signs Last 24 Hrs  T(C): 36.3 (2018 09:51), Max: 36.6 (2018 20:58)  T(F): 97.4 (2018 09:51), Max: 97.8 (2018 20:58)  HR: 120 (2018 09:51) (90 - 150)  BP: 111/76 (2018 09:51) (97/67 - 127/84)  BP(mean): --  ABP: --  ABP(mean): --  RR: 18 (2018 09:51) (18 - 19)  SpO2: 100% (2018 09:51) (92% - 100%)      Constitutional: Well appearing  HEENT: Atraumatic,   Respiratory: Breath Sounds normal, no rhonchi/wheeze  Cardiovascular: N S1S2; irregular  Gastrointestinal: Abdomen soft, non tender, Bowel Sounds present  Extremities: No edema, peripheral pulses present  Neurological: AAO x 3, no gross focal motor deficits                          9.6    6.46  )-----------( 243      ( 2018 06:05 )             28.9       CBC Full  -  ( 2018 06:05 )  WBC Count : 6.46 K/uL  Hemoglobin : 9.6 g/dL  Hematocrit : 28.9 %  Platelet Count - Automated : 243 K/uL  Mean Cell Volume : 86.5 fl  Mean Cell Hemoglobin : 28.7 pg  Mean Cell Hemoglobin Concentration : 33.2 gm/dL  Auto Neutrophil # : x  Auto Lymphocyte # : x  Auto Monocyte # : x  Auto Eosinophil # : x  Auto Basophil # : x  Auto Neutrophil % : x  Auto Lymphocyte % : x  Auto Monocyte % : x  Auto Eosinophil % : x  Auto Basophil % : x      0424    138  |  102  |  33<H>  ----------------------------<  111<H>  3.5   |  25  |  1.27    Ca    8.7      2018 06:05    TPro  6.4  /  Alb  3.1<L>  /  TBili  0.4  /  DBili  x   /  AST  47<H>  /  ALT  40  /  AlkPhos  54  04-22      LIVER FUNCTIONS - ( 2018 22:15 )  Alb: 3.1 g/dL / Pro: 6.4 gm/dL / ALK PHOS: 54 U/L / ALT: 40 U/L / AST: 47 U/L / GGT: x                 CARDIAC MARKERS ( 2018 09:02 )  0.036 ng/mL / x     / x     / x     / x      CARDIAC MARKERS ( 2018 04:45 )  0.038 ng/mL / x     / x     / x     / x      CARDIAC MARKERS ( 2018 01:28 )  0.035 ng/mL / x     / x     / x     / x      CARDIAC MARKERS ( 2018 22:15 )  0.034 ng/mL / x     / x     / x     / x                    MEDICATIONS  (STANDING):  amiodarone    Tablet 400 milliGRAM(s) Oral three times a day  aspirin  chewable 81 milliGRAM(s) Oral daily  docusate sodium 100 milliGRAM(s) Oral three times a day  enalapril 2.5 milliGRAM(s) Oral daily  furosemide   Injectable 40 milliGRAM(s) IV Push daily  heparin  Injectable 5000 Unit(s) SubCutaneous every 12 hours  latanoprost 0.005% Ophthalmic Solution 1 Drop(s) Both EYES at bedtime  melatonin 3 milliGRAM(s) Oral at bedtime  metoprolol tartrate 25 milliGRAM(s) Oral two times a day  oxybutynin 5 milliGRAM(s) Oral two times a day  potassium chloride    Tablet ER 40 milliEquivalent(s) Oral once
Cardiology Progress Note    HPI: 86 y/o female with PMHx CAD, CHF, HTN, GERD, OA, dyslipidemia, presents to the ED BIBEMS from [facility] c/o fluttering chest pain. +pain in right shoulder radiating to back. +weakness. Denies abd pain. States she has experienced similar sx in past. Took percocet PTA. As per EMS, pt was tachy in 140s and visibly SOB. In ED had episodes of sinus tach 140; no c/o at this time, not in pain; adm BB and admit to tele. (23 Apr 2018 08:09)    4/24- Eating breakfast in chair. No CP/SOB. Feels betters. No events last pm. SR on tele now, PAF.    4/25- No events last pm. No CP/SOB. Tele- SR with APCs/PVCs.    PAST MEDICAL & SURGICAL HISTORY:  Osteoarthritis of multiple joints, unspecified osteoarthritis type  Gastroesophageal reflux disease without esophagitis  Dyslipidemia  Essential hypertension  Paroxysmal atrial fibrillation  Coronary artery disease involving native coronary artery of native heart without angina pectoris  Chronic systolic congestive heart failure: EF 20-25%  AICD (automatic cardioverter/defibrillator) present    Allergies  Cantelope (Angioedema)  Flonase (Angioedema)  honeydews (Angioedema)  Metformin HCL-Pioglitazone HCL (Nephrotoxicity; Angioedema; Faint)  Raspberries (Angioedema)  strawberry (Angioedema)    SOCIAL HISTORY: Denies tobacco, etoh abuse or illicit drug use    FAMILY HISTORY: No pertinent family history in first degree relatives    MEDICATIONS  (STANDING):  amiodarone    Tablet 400 milliGRAM(s) Oral three times a day  aspirin  chewable 81 milliGRAM(s) Oral daily  docusate sodium 100 milliGRAM(s) Oral three times a day  enalapril 2.5 milliGRAM(s) Oral daily  furosemide   Injectable 40 milliGRAM(s) IV Push daily  heparin  Injectable 5000 Unit(s) SubCutaneous every 12 hours  latanoprost 0.005% Ophthalmic Solution 1 Drop(s) Both EYES at bedtime  melatonin 3 milliGRAM(s) Oral at bedtime  metoprolol tartrate 25 milliGRAM(s) Oral two times a day  oxybutynin 5 milliGRAM(s) Oral two times a day    MEDICATIONS  (PRN):  acetaminophen   Tablet 650 milliGRAM(s) Oral every 6 hours PRN pain fever  ibuprofen  Tablet 200 milliGRAM(s) Oral every 6 hours PRN pain 1-4 fever> 100.4F  ondansetron Injectable 4 milliGRAM(s) IV Push every 6 hours PRN Nausea    Vital Signs Last 24 Hrs  T(C): 36.2 (24 Apr 2018 04:21), Max: 36.6 (23 Apr 2018 20:58)  T(F): 97.2 (24 Apr 2018 04:21), Max: 97.8 (23 Apr 2018 20:58)  HR: 90 (24 Apr 2018 04:21) (90 - 150)  BP: 97/67 (24 Apr 2018 04:21) (97/67 - 127/84)  BP(mean): --  RR: 18 (24 Apr 2018 04:21) (18 - 19)  SpO2: 98% (24 Apr 2018 04:21) (92% - 99%)    REVIEW OF SYSTEMS:    CONSTITUTIONAL:  As per HPI.  HEENT:  Eyes:  No diplopia or blurred vision. ENT:  No earache, sore throat or runny nose.  CARDIOVASCULAR:  No pressure, squeezing, strangling, tightness, heaviness or aching about the chest, neck, axilla or epigastrium.  RESPIRATORY:  No cough, shortness of breath, PND or orthopnea.  GASTROINTESTINAL:  No nausea, vomiting or diarrhea.  GENITOURINARY:  No dysuria, frequency or urgency.  MUSCULOSKELETAL:  As per HPI.  NEUROLOGIC:  No paresthesias, fasciculations, seizures or weakness.    PHYSICAL EXAMINATION:    GENERAL APPEARANCE:  Pt. is not currently dyspneic, in no distress. Pt. is alert, oriented, and pleasant.  HEENT:  Pupils are normal and react normally. No icterus. Mucous membranes well colored.  NECK:  Supple. No lymphadenopathy. Jugular venous pressure not elevated. Carotids equal.   HEART:   The cardiac impulse has a normal quality. There are no murmurs, rubs or gallops noted  CHEST:  Chest is clear to auscultation. Normal respiratory effort.  ABDOMEN:  Soft and nontender.   EXTREMITIES:  There is no edema.     I&O's Summary      LABS:                        9.6    6.46  )-----------( 243      ( 24 Apr 2018 06:05 )             28.9     04-24    138  |  102  |  33<H>  ----------------------------<  111<H>  3.5   |  25  |  1.27    Ca    8.7      24 Apr 2018 06:05    TPro  6.4  /  Alb  3.1<L>  /  TBili  0.4  /  DBili  x   /  AST  47<H>  /  ALT  40  /  AlkPhos  54  04-22    LIVER FUNCTIONS - ( 22 Apr 2018 22:15 )  Alb: 3.1 g/dL / Pro: 6.4 gm/dL / ALK PHOS: 54 U/L / ALT: 40 U/L / AST: 47 U/L / GGT: x           CARDIAC MARKERS ( 23 Apr 2018 09:02 )  0.036 ng/mL / x     / x     / x     / x      CARDIAC MARKERS ( 23 Apr 2018 04:45 )  0.038 ng/mL / x     / x     / x     / x      CARDIAC MARKERS ( 23 Apr 2018 01:28 )  0.035 ng/mL / x     / x     / x     / x      CARDIAC MARKERS ( 22 Apr 2018 22:15 )  0.034 ng/mL / x     / x     / x     / x       EKG: < from: 12 Lead ECG (04.22.18 @ 22:12) >  Sinus rhythm with Premature atrial complexes  Non-specific intra-ventricular conduction delay  When compared with ECG of 29-MAR-2018 16:19,  Premature atrial complexes are now Present  Criteria for Septal infarct are no longer Present    TELEMETRY: SR, PAF    CARDIAC TESTS: < from: Transthoracic Echocardiogram (02.21.18 @ 10:47) >     The left ventricle cavity is moderately dilated.   Mild concentric left ventricular hypertrophy is present.   Severely Reduced left ventricular systolic function.   Estimated left ventricular ejection fraction is 30 %.   The left atrium is mildly dilated.   Pleural effusion - is present..   Fibrocalcific changes noted to the mitral valve leaflets with preserved   leaflet excursion.   Moderate (2+) mitral regurgitation is present.   Fibrocalcific changes noted to the Aortic valve leaflets with preserved   leaflet excursion.   The aortic root is mildly dilated.   Moderate (2+) aortic regurgitation is present.      < end of copied text >      RADIOLOGY & ADDITIONAL STUDIES:   < from: Xray Chest 1 View AP/PA. (04.22.18 @ 22:38) >      The lungs are clear.  No pleural abnormality is seen.    Left chest wall pacemaker in place.  Cardiomegaly present.    < end of copied text >    ASSESSMENT & PLAN:
Cardiology Progress Note    HPI: 86 y/o female with PMHx CAD, CHF, HTN, GERD, OA, dyslipidemia, presents to the ED BIBEMS from [facility] c/o fluttering chest pain. +pain in right shoulder radiating to back. +weakness. Denies abd pain. States she has experienced similar sx in past. Took percocet PTA. As per EMS, pt was tachy in 140s and visibly SOB. In ED had episodes of sinus tach 140; no c/o at this time, not in pain; adm BB and admit to tele. (23 Apr 2018 08:09)    4/24- Eating breakfast in chair. No CP/SOB. Feels betters. No events last pm. SR on tele now, PAF.    PAST MEDICAL & SURGICAL HISTORY:  Osteoarthritis of multiple joints, unspecified osteoarthritis type  Gastroesophageal reflux disease without esophagitis  Dyslipidemia  Essential hypertension  Paroxysmal atrial fibrillation  Coronary artery disease involving native coronary artery of native heart without angina pectoris  Chronic systolic congestive heart failure: EF 20-25%  AICD (automatic cardioverter/defibrillator) present    Allergies  Cantelope (Angioedema)  Flonase (Angioedema)  honeydews (Angioedema)  Metformin HCL-Pioglitazone HCL (Nephrotoxicity; Angioedema; Faint)  Raspberries (Angioedema)  strawberry (Angioedema)    SOCIAL HISTORY: Denies tobacco, etoh abuse or illicit drug use    FAMILY HISTORY: No pertinent family history in first degree relatives    MEDICATIONS  (STANDING):  amiodarone    Tablet 400 milliGRAM(s) Oral three times a day  aspirin  chewable 81 milliGRAM(s) Oral daily  docusate sodium 100 milliGRAM(s) Oral three times a day  enalapril 2.5 milliGRAM(s) Oral daily  furosemide   Injectable 40 milliGRAM(s) IV Push daily  heparin  Injectable 5000 Unit(s) SubCutaneous every 12 hours  latanoprost 0.005% Ophthalmic Solution 1 Drop(s) Both EYES at bedtime  melatonin 3 milliGRAM(s) Oral at bedtime  metoprolol tartrate 25 milliGRAM(s) Oral two times a day  oxybutynin 5 milliGRAM(s) Oral two times a day    MEDICATIONS  (PRN):  acetaminophen   Tablet 650 milliGRAM(s) Oral every 6 hours PRN pain fever  ibuprofen  Tablet 200 milliGRAM(s) Oral every 6 hours PRN pain 1-4 fever> 100.4F  ondansetron Injectable 4 milliGRAM(s) IV Push every 6 hours PRN Nausea    Vital Signs Last 24 Hrs  T(C): 36.2 (24 Apr 2018 04:21), Max: 36.6 (23 Apr 2018 20:58)  T(F): 97.2 (24 Apr 2018 04:21), Max: 97.8 (23 Apr 2018 20:58)  HR: 90 (24 Apr 2018 04:21) (90 - 150)  BP: 97/67 (24 Apr 2018 04:21) (97/67 - 127/84)  BP(mean): --  RR: 18 (24 Apr 2018 04:21) (18 - 19)  SpO2: 98% (24 Apr 2018 04:21) (92% - 99%)    REVIEW OF SYSTEMS:    CONSTITUTIONAL:  As per HPI.  HEENT:  Eyes:  No diplopia or blurred vision. ENT:  No earache, sore throat or runny nose.  CARDIOVASCULAR:  No pressure, squeezing, strangling, tightness, heaviness or aching about the chest, neck, axilla or epigastrium.  RESPIRATORY:  No cough, shortness of breath, PND or orthopnea.  GASTROINTESTINAL:  No nausea, vomiting or diarrhea.  GENITOURINARY:  No dysuria, frequency or urgency.  MUSCULOSKELETAL:  As per HPI.  NEUROLOGIC:  No paresthesias, fasciculations, seizures or weakness.    PHYSICAL EXAMINATION:    GENERAL APPEARANCE:  Pt. is not currently dyspneic, in no distress. Pt. is alert, oriented, and pleasant.  HEENT:  Pupils are normal and react normally. No icterus. Mucous membranes well colored.  NECK:  Supple. No lymphadenopathy. Jugular venous pressure not elevated. Carotids equal.   HEART:   The cardiac impulse has a normal quality. There are no murmurs, rubs or gallops noted  CHEST:  Chest is clear to auscultation. Normal respiratory effort.  ABDOMEN:  Soft and nontender.   EXTREMITIES:  There is no edema.     I&O's Summary      LABS:                        9.6    6.46  )-----------( 243      ( 24 Apr 2018 06:05 )             28.9     04-24    138  |  102  |  33<H>  ----------------------------<  111<H>  3.5   |  25  |  1.27    Ca    8.7      24 Apr 2018 06:05    TPro  6.4  /  Alb  3.1<L>  /  TBili  0.4  /  DBili  x   /  AST  47<H>  /  ALT  40  /  AlkPhos  54  04-22    LIVER FUNCTIONS - ( 22 Apr 2018 22:15 )  Alb: 3.1 g/dL / Pro: 6.4 gm/dL / ALK PHOS: 54 U/L / ALT: 40 U/L / AST: 47 U/L / GGT: x           CARDIAC MARKERS ( 23 Apr 2018 09:02 )  0.036 ng/mL / x     / x     / x     / x      CARDIAC MARKERS ( 23 Apr 2018 04:45 )  0.038 ng/mL / x     / x     / x     / x      CARDIAC MARKERS ( 23 Apr 2018 01:28 )  0.035 ng/mL / x     / x     / x     / x      CARDIAC MARKERS ( 22 Apr 2018 22:15 )  0.034 ng/mL / x     / x     / x     / x       EKG: < from: 12 Lead ECG (04.22.18 @ 22:12) >  Sinus rhythm with Premature atrial complexes  Non-specific intra-ventricular conduction delay  When compared with ECG of 29-MAR-2018 16:19,  Premature atrial complexes are now Present  Criteria for Septal infarct are no longer Present    TELEMETRY: SR, PAF    CARDIAC TESTS: < from: Transthoracic Echocardiogram (02.21.18 @ 10:47) >     The left ventricle cavity is moderately dilated.   Mild concentric left ventricular hypertrophy is present.   Severely Reduced left ventricular systolic function.   Estimated left ventricular ejection fraction is 30 %.   The left atrium is mildly dilated.   Pleural effusion - is present..   Fibrocalcific changes noted to the mitral valve leaflets with preserved   leaflet excursion.   Moderate (2+) mitral regurgitation is present.   Fibrocalcific changes noted to the Aortic valve leaflets with preserved   leaflet excursion.   The aortic root is mildly dilated.   Moderate (2+) aortic regurgitation is present.      < end of copied text >      RADIOLOGY & ADDITIONAL STUDIES:   < from: Xray Chest 1 View AP/PA. (04.22.18 @ 22:38) >      The lungs are clear.  No pleural abnormality is seen.    Left chest wall pacemaker in place.  Cardiomegaly present.    < end of copied text >    ASSESSMENT & PLAN:
c/c: palpitations  HPI: 86 y/o female with PMHx CAD, CHF, HTN, GERD, OA, dyslipidemia, presents to the ED BIBEMS from [facility] c/o fluttering chest pain. +pain in right shoulder radiating to back. +weakness. Denies abd pain. States she has experienced similar sx in past. Took percocet PTA. As per EMS, pt was tachy in 140s and visibly SOB. In ED had episodes of sinus tach 140;     : HR up to 150s, A fib  no cp/sob  c/o nausea after morning pills  on amiodarone loading  still refusing anticoagulation for stroke prophylaxis sec to hx of hematuria transfusion 5 yrs ago    : HR still high A fib  no complaints  LFTs are fine    : slight increase in AST ,  50  no complaints  BP 95/67  Hb 8.9    : AST 67  had chest pain last night  not feeling good today  c/o some mild chest discomfort        PHYSICAL EXAM:    Daily     Daily Weight in k.9 (2018 07:08)    ICU Vital Signs Last 24 Hrs  T(C): 36.7 (2018 10:13), Max: 36.9 (2018 06:29)  T(F): 98.1 (2018 10:13), Max: 98.4 (2018 06:29)  HR: 77 (2018 11:37) (68 - 82)  BP: 95/56 (2018 10:13) (95/56 - 127/78)  BP(mean): --  ABP: --  ABP(mean): --  RR: 18 (2018 10:13) (18 - 18)  SpO2: 99% (2018 11:37) (96% - 100%)      Constitutional: Well appearing  HEENT: Atraumatic,   Respiratory: Breath Sounds normal, no rhonchi/wheeze  Cardiovascular: N S1S2; irregular  Gastrointestinal: Abdomen soft, non tender, Bowel Sounds present  Extremities: No edema, peripheral pulses present  Neurological: AAO x 3, no gross focal motor deficits                        9.5    6.40  )-----------( 234      ( 2018 06:21 )             29.6       CBC Full  -  ( 2018 06:21 )  WBC Count : 6.40 K/uL  Hemoglobin : 9.5 g/dL  Hematocrit : 29.6 %  Platelet Count - Automated : 234 K/uL  Mean Cell Volume : 89.7 fl  Mean Cell Hemoglobin : 28.8 pg  Mean Cell Hemoglobin Concentration : 32.1 gm/dL  Auto Neutrophil # : x  Auto Lymphocyte # : x  Auto Monocyte # : x  Auto Eosinophil # : x  Auto Basophil # : x  Auto Neutrophil % : x  Auto Lymphocyte % : x  Auto Monocyte % : x  Auto Eosinophil % : x  Auto Basophil % : x          137  |  101  |  29<H>  ----------------------------<  110<H>  4.8   |  26  |  1.30    Ca    8.6      2018 06:21  Mg     2.0         TPro  6.7  /  Alb  3.4  /  TBili  0.4  /  DBili  x   /  AST  66<H>  /  ALT  75  /  AlkPhos  70        LIVER FUNCTIONS - ( 2018 06:21 )  Alb: 3.4 g/dL / Pro: 6.7 gm/dL / ALK PHOS: 70 U/L / ALT: 75 U/L / AST: 66 U/L / GGT: x                 CARDIAC MARKERS ( 2018 11:16 )  0.027 ng/mL / x     / 81 U/L / x     / x      CARDIAC MARKERS ( 2018 06:21 )  0.027 ng/mL / x     / x     / x     / x                    MEDICATIONS  (STANDING):  amiodarone    Tablet 400 milliGRAM(s) Oral three times a day  aspirin  chewable 81 milliGRAM(s) Oral daily  docusate sodium 100 milliGRAM(s) Oral three times a day  enalapril 2.5 milliGRAM(s) Oral daily  furosemide    Tablet 40 milliGRAM(s) Oral daily  heparin  Injectable 5000 Unit(s) SubCutaneous every 12 hours  latanoprost 0.005% Ophthalmic Solution 1 Drop(s) Both EYES at bedtime  melatonin 3 milliGRAM(s) Oral at bedtime  metoprolol tartrate 25 milliGRAM(s) Oral two times a day  oxybutynin 5 milliGRAM(s) Oral two times a day
c/c: palpitations  HPI: 86 y/o female with PMHx CAD, CHF, HTN, GERD, OA, dyslipidemia, presents to the ED BIBEMS from [facility] c/o fluttering chest pain. +pain in right shoulder radiating to back. +weakness. Denies abd pain. States she has experienced similar sx in past. Took percocet PTA. As per EMS, pt was tachy in 140s and visibly SOB. In ED had episodes of sinus tach 140;     4/24: HR up to 150s, A fib  no cp/sob  c/o nausea after morning pills  on amiodarone loading  still refusing anticoagulation for stroke prophylaxis sec to hx of hematuria transfusion 5 yrs ago    4/25: HR still high A fib  no complaints  LFTs are fine    4/26: slight increase in AST ,  50  no complaints  BP 95/67  Hb 8.9      PHYSICAL EXAM:    Daily     Daily     ICU Vital Signs Last 24 Hrs  T(C): 36.4 (26 Apr 2018 10:19), Max: 36.9 (25 Apr 2018 21:07)  T(F): 97.5 (26 Apr 2018 10:19), Max: 98.4 (25 Apr 2018 21:07)  HR: 93 (26 Apr 2018 10:19) (78 - 93)  BP: 113/66 (26 Apr 2018 10:19) (95/51 - 113/66)  BP(mean): --  ABP: --  ABP(mean): --  RR: 18 (26 Apr 2018 10:19) (17 - 18)  SpO2: 100% (26 Apr 2018 10:19) (99% - 100%)      Constitutional: Well appearing  HEENT: Atraumatic,   Respiratory: Breath Sounds normal, no rhonchi/wheeze  Cardiovascular: N S1S2; irregular  Gastrointestinal: Abdomen soft, non tender, Bowel Sounds present  Extremities: No edema, peripheral pulses present  Neurological: AAO x 3, no gross focal motor deficits                          8.9    5.86  )-----------( 222      ( 26 Apr 2018 08:40 )             27.1       CBC Full  -  ( 26 Apr 2018 08:40 )  WBC Count : 5.86 K/uL  Hemoglobin : 8.9 g/dL  Hematocrit : 27.1 %  Platelet Count - Automated : 222 K/uL  Mean Cell Volume : 89.4 fl  Mean Cell Hemoglobin : 29.4 pg  Mean Cell Hemoglobin Concentration : 32.8 gm/dL  Auto Neutrophil # : x  Auto Lymphocyte # : x  Auto Monocyte # : x  Auto Eosinophil # : x  Auto Basophil # : x  Auto Neutrophil % : x  Auto Lymphocyte % : x  Auto Monocyte % : x  Auto Eosinophil % : x  Auto Basophil % : x      04-26    138  |  103  |  30<H>  ----------------------------<  109<H>  4.0   |  26  |  1.29    Ca    8.2<L>      26 Apr 2018 06:24  Mg     2.0     04-26    TPro  6.2  /  Alb  3.1<L>  /  TBili  0.4  /  DBili  x   /  AST  50<H>  /  ALT  58  /  AlkPhos  59  04-26      LIVER FUNCTIONS - ( 26 Apr 2018 06:24 )  Alb: 3.1 g/dL / Pro: 6.2 gm/dL / ALK PHOS: 59 U/L / ALT: 58 U/L / AST: 50 U/L / GGT: x                               MEDICATIONS  (STANDING):  amiodarone    Tablet 400 milliGRAM(s) Oral three times a day  aspirin  chewable 81 milliGRAM(s) Oral daily  docusate sodium 100 milliGRAM(s) Oral three times a day  enalapril 2.5 milliGRAM(s) Oral daily  furosemide    Tablet 40 milliGRAM(s) Oral daily  heparin  Injectable 5000 Unit(s) SubCutaneous every 12 hours  latanoprost 0.005% Ophthalmic Solution 1 Drop(s) Both EYES at bedtime  melatonin 3 milliGRAM(s) Oral at bedtime  metoprolol tartrate 25 milliGRAM(s) Oral two times a day  oxybutynin 5 milliGRAM(s) Oral two times a day
c/c: palpitations  HPI: 88 y/o female with PMHx CAD, CHF, HTN, GERD, OA, dyslipidemia, presents to the ED BIBEMS from [facility] c/o fluttering chest pain. +pain in right shoulder radiating to back. +weakness. Denies abd pain. States she has experienced similar sx in past. Took percocet PTA. As per EMS, pt was tachy in 140s and visibly SOB. In ED had episodes of sinus tach 140;     : HR up to 150s, A fib  no cp/sob  c/o nausea after morning pills  on amiodarone loading  still refusing anticoagulation for stroke prophylaxis sec to hx of hematuria transfusion 5 yrs ago    : HR still high A fib  no complaints  LFTs are fine      PHYSICAL EXAM:    Daily     Daily Weight in k.5 (2018 12:58)    ICU Vital Signs Last 24 Hrs  T(C): 36.8 (2018 05:26), Max: 36.8 (2018 15:55)  T(F): 98.3 (2018 05:26), Max: 98.3 (2018 05:26)  HR: 82 (2018 05:26) (82 - 132)  BP: 108/66 (2018 05:26) (95/60 - 108/66)  BP(mean): --  ABP: --  ABP(mean): --  RR: 18 (2018 05:26) (18 - 18)  SpO2: 95% (2018 05:26) (95% - 100%)      Constitutional: Well appearing  HEENT: Atraumatic,   Respiratory: Breath Sounds normal, no rhonchi/wheeze  Cardiovascular: N S1S2; irregular  Gastrointestinal: Abdomen soft, non tender, Bowel Sounds present  Extremities: No edema, peripheral pulses present  Neurological: AAO x 3, no gross focal motor deficits                          9.6    6.46  )-----------( 243      ( 2018 06:05 )             28.9       CBC Full  -  ( 2018 06:05 )  WBC Count : 6.46 K/uL  Hemoglobin : 9.6 g/dL  Hematocrit : 28.9 %  Platelet Count - Automated : 243 K/uL  Mean Cell Volume : 86.5 fl  Mean Cell Hemoglobin : 28.7 pg  Mean Cell Hemoglobin Concentration : 33.2 gm/dL  Auto Neutrophil # : x  Auto Lymphocyte # : x  Auto Monocyte # : x  Auto Eosinophil # : x  Auto Basophil # : x  Auto Neutrophil % : x  Auto Lymphocyte % : x  Auto Monocyte % : x  Auto Eosinophil % : x  Auto Basophil % : x          139  |  103  |  30<H>  ----------------------------<  93  4.4   |  29  |  1.29    Ca    8.4<L>      2018 06:05    TPro  6.6  /  Alb  3.3  /  TBili  0.4  /  DBili  0.1  /  AST  42<H>  /  ALT  47  /  AlkPhos  53        LIVER FUNCTIONS - ( 2018 06:05 )  Alb: 3.3 g/dL / Pro: 6.6 gm/dL / ALK PHOS: 53 U/L / ALT: 47 U/L / AST: 42 U/L / GGT: x                               MEDICATIONS  (STANDING):  amiodarone    Tablet 400 milliGRAM(s) Oral three times a day  aspirin  chewable 81 milliGRAM(s) Oral daily  docusate sodium 100 milliGRAM(s) Oral three times a day  enalapril 2.5 milliGRAM(s) Oral daily  furosemide    Tablet 40 milliGRAM(s) Oral daily  heparin  Injectable 5000 Unit(s) SubCutaneous every 12 hours  latanoprost 0.005% Ophthalmic Solution 1 Drop(s) Both EYES at bedtime  melatonin 3 milliGRAM(s) Oral at bedtime  metoprolol tartrate 25 milliGRAM(s) Oral two times a day  oxybutynin 5 milliGRAM(s) Oral two times a day

## 2018-04-28 ENCOUNTER — TRANSCRIPTION ENCOUNTER (OUTPATIENT)
Age: 83
End: 2018-04-28

## 2018-04-28 VITALS
TEMPERATURE: 98 F | RESPIRATION RATE: 16 BRPM | SYSTOLIC BLOOD PRESSURE: 102 MMHG | DIASTOLIC BLOOD PRESSURE: 54 MMHG | HEART RATE: 80 BPM | OXYGEN SATURATION: 98 %

## 2018-04-28 LAB
ALBUMIN SERPL ELPH-MCNC: 3.1 G/DL — LOW (ref 3.3–5)
ALP SERPL-CCNC: 59 U/L — SIGNIFICANT CHANGE UP (ref 40–120)
ALT FLD-CCNC: 74 U/L — SIGNIFICANT CHANGE UP (ref 12–78)
ANION GAP SERPL CALC-SCNC: 9 MMOL/L — SIGNIFICANT CHANGE UP (ref 5–17)
AST SERPL-CCNC: 59 U/L — HIGH (ref 15–37)
BILIRUB SERPL-MCNC: 0.5 MG/DL — SIGNIFICANT CHANGE UP (ref 0.2–1.2)
BUN SERPL-MCNC: 26 MG/DL — HIGH (ref 7–23)
CALCIUM SERPL-MCNC: 8.6 MG/DL — SIGNIFICANT CHANGE UP (ref 8.5–10.1)
CHLORIDE SERPL-SCNC: 101 MMOL/L — SIGNIFICANT CHANGE UP (ref 96–108)
CO2 SERPL-SCNC: 24 MMOL/L — SIGNIFICANT CHANGE UP (ref 22–31)
CREAT SERPL-MCNC: 1.28 MG/DL — SIGNIFICANT CHANGE UP (ref 0.5–1.3)
GLUCOSE SERPL-MCNC: 90 MG/DL — SIGNIFICANT CHANGE UP (ref 70–99)
POTASSIUM SERPL-MCNC: 4.4 MMOL/L — SIGNIFICANT CHANGE UP (ref 3.5–5.3)
POTASSIUM SERPL-SCNC: 4.4 MMOL/L — SIGNIFICANT CHANGE UP (ref 3.5–5.3)
PROT SERPL-MCNC: 6.4 GM/DL — SIGNIFICANT CHANGE UP (ref 6–8.3)
SODIUM SERPL-SCNC: 134 MMOL/L — LOW (ref 135–145)

## 2018-04-28 RX ORDER — POLYETHYLENE GLYCOL 3350 17 G/17G
17 POWDER, FOR SOLUTION ORAL
Qty: 510 | Refills: 0 | OUTPATIENT
Start: 2018-04-28 | End: 2018-05-27

## 2018-04-28 RX ORDER — METOPROLOL TARTRATE 50 MG
1 TABLET ORAL
Qty: 60 | Refills: 0 | OUTPATIENT
Start: 2018-04-28 | End: 2018-05-27

## 2018-04-28 RX ORDER — ASPIRIN/CALCIUM CARB/MAGNESIUM 324 MG
1 TABLET ORAL
Qty: 0 | Refills: 0 | COMMUNITY
Start: 2018-04-28

## 2018-04-28 RX ORDER — LACTULOSE 10 G/15ML
30 SOLUTION ORAL
Qty: 0 | Refills: 0 | COMMUNITY

## 2018-04-28 RX ORDER — POLYETHYLENE GLYCOL 3350 17 G/17G
17 POWDER, FOR SOLUTION ORAL DAILY
Qty: 0 | Refills: 0 | Status: DISCONTINUED | OUTPATIENT
Start: 2018-04-28 | End: 2018-04-28

## 2018-04-28 RX ORDER — DOCUSATE SODIUM 100 MG
3 CAPSULE ORAL
Qty: 0 | Refills: 0 | COMMUNITY
Start: 2018-04-28 | End: 2018-05-27

## 2018-04-28 RX ORDER — DOCUSATE SODIUM 100 MG
1 CAPSULE ORAL
Qty: 90 | Refills: 0 | OUTPATIENT
Start: 2018-04-28 | End: 2018-05-27

## 2018-04-28 RX ORDER — FUROSEMIDE 40 MG
20 TABLET ORAL DAILY
Qty: 0 | Refills: 0 | Status: DISCONTINUED | OUTPATIENT
Start: 2018-04-28 | End: 2018-04-28

## 2018-04-28 RX ORDER — OMEPRAZOLE 10 MG/1
1 CAPSULE, DELAYED RELEASE ORAL
Qty: 0 | Refills: 0 | COMMUNITY

## 2018-04-28 RX ORDER — AMIODARONE HYDROCHLORIDE 400 MG/1
2 TABLET ORAL
Qty: 45 | Refills: 0 | OUTPATIENT
Start: 2018-04-28 | End: 2018-05-27

## 2018-04-28 RX ORDER — OXYBUTYNIN CHLORIDE 5 MG
1 TABLET ORAL
Qty: 60 | Refills: 0 | OUTPATIENT
Start: 2018-04-28 | End: 2018-05-27

## 2018-04-28 RX ORDER — AMIODARONE HYDROCHLORIDE 400 MG/1
1 TABLET ORAL
Qty: 0 | Refills: 0 | COMMUNITY
Start: 2018-04-28 | End: 2018-05-27

## 2018-04-28 RX ADMIN — AMIODARONE HYDROCHLORIDE 400 MILLIGRAM(S): 400 TABLET ORAL at 13:25

## 2018-04-28 RX ADMIN — Medication 100 MILLIGRAM(S): at 07:02

## 2018-04-28 RX ADMIN — POLYETHYLENE GLYCOL 3350 17 GRAM(S): 17 POWDER, FOR SOLUTION ORAL at 13:24

## 2018-04-28 RX ADMIN — Medication 5 MILLIGRAM(S): at 07:02

## 2018-04-28 RX ADMIN — HEPARIN SODIUM 5000 UNIT(S): 5000 INJECTION INTRAVENOUS; SUBCUTANEOUS at 07:03

## 2018-04-28 RX ADMIN — Medication 100 MILLIGRAM(S): at 13:25

## 2018-04-28 RX ADMIN — Medication 81 MILLIGRAM(S): at 13:24

## 2018-04-28 RX ADMIN — Medication 20 MILLIGRAM(S): at 13:25

## 2018-04-28 RX ADMIN — AMIODARONE HYDROCHLORIDE 400 MILLIGRAM(S): 400 TABLET ORAL at 07:02

## 2018-04-28 NOTE — DISCHARGE NOTE ADULT - PATIENT PORTAL LINK FT
You can access the [facility] Patient Portal, offered by [facility], by registering with the following website: [email or web address]

## 2018-04-28 NOTE — DISCHARGE NOTE ADULT - CARE PLAN
Principal Discharge DX:	Acute on chronic systolic congestive heart failure  Goal:	sx resolved  Assessment and plan of treatment:	cont lasix  f/u with pcp/cardio in 2-3 days  pt refused anticoagulation  EF 30%

## 2018-04-28 NOTE — DISCHARGE NOTE ADULT - CARE PROVIDER_API CALL
Nicole Jolley (DO), Family Medicine  [address] [place] [address]  Phone: [phone]  Fax: [phone]    Te Cervantes (DO), Cardiology; Internal Medicine  [address] [place] [address]  Phone: [phone]  Fax: [phone]

## 2018-04-28 NOTE — DISCHARGE NOTE ADULT - MEDICATION SUMMARY - MEDICATIONS TO STOP TAKING
I will STOP taking the medications listed below when I get home from the hospital:    lactulose 10 g/15 mL oral syrup  -- 30 milliliter(s) by mouth once a day (at bedtime), As Needed constipation    ibuprofen 200 mg oral tablet  -- 1 tab(s) by mouth every 6 hours, As needed, pain 1-4 fever> 100.4F    guaiFENesin 100 mg/5 mL oral liquid  -- 10 milliliter(s) by mouth every 6 hours, As needed, Cough    Cipro 500 mg oral tablet  -- 1 tab(s) by mouth every 12 hours til 2/24/18    Flagyl 500 mg oral tablet  -- 1 tab(s) by mouth 3 times a day til 2/24/18    traMADol 50 mg oral tablet  -- 2 tab(s) by mouth 2 times a day MDD:4

## 2018-04-28 NOTE — DISCHARGE NOTE ADULT - HOSPITAL COURSE
86 y/o female with PMHx CAD, CHF, HTN, GERD, OA, dyslipidemia, admitted with     1) Parox A fib + RVR: HR up to 150s  monitored on tele  cont amiodarone loading as per EP/cardio : 400 mg orally three times a day till 4/30 and then 200 mg orally daily; check LFTs weekly. f/u with Dr. Cervantes in 2-3 days  pt still refusing anticoagulation for stroke prophylaxis sec to hx of hematuria requiring PRBC transfusion 5 yrs ago   keep k above 4 and Mg above 2    2) CHF, systolic, chronic:   po lasix 20      3) HTN:  cont home meds      5) Anemia: acute on chronic: stable  check stool for OB with your PCP      6) Low Normal BP in am: became better spontaneously  monitor for now  decrease lasix to 20 mg po daily    7) Chest pain: serial cardiac enz negative  repeat CXR normal  no more chect pain      poc discussed with pt, team    d/c to [facility] today    total time spent 40 min

## 2018-04-28 NOTE — DISCHARGE NOTE ADULT - MEDICATION SUMMARY - MEDICATIONS TO TAKE
I will START or STAY ON the medications listed below when I get home from the hospital:    aspirin 81 mg oral tablet, chewable  -- 1 tab(s) by mouth once a day  -- Indication: For .    oxyCODONE-acetaminophen 5 mg-325 mg oral tablet  -- 1 tab(s) by mouth every 6 hours, As needed, Moderate Pain (4 - 6) MDD:15 mg  -- Indication: For .    enalapril 2.5 mg oral tablet  -- 1 tab(s) by mouth once a day  -- Indication: For .    amiodarone 200 mg oral tablet  -- 2 tab(s) by mouth 3 times a day x 3 days; then  200 mg by mouth once  daily  -- Indication: For .    metoprolol tartrate 25 mg oral tablet  -- 1 tab(s) by mouth 2 times a day  -- Indication: For .    albuterol 2.5 mg/3 mL (0.083%) inhalation solution  -- 1 unit(s) inhaled every 6 hours  -- Indication: For .    Lasix 20 mg oral tablet  -- 1 tab(s) by mouth once a day  -- Indication: For .    docusate sodium 100 mg oral capsule  -- 1 cap(s) by mouth 3 times a day  -- Indication: For .    polyethylene glycol 3350 oral powder for reconstitution  -- 17 gram(s) by mouth once a day  -- Indication: For .    latanoprost 0.005% ophthalmic solution  -- 1 drop(s) to each affected eye once a day (in the evening)  -- Indication: For .    oxybutynin 5 mg oral tablet  -- 1 tab(s) by mouth 2 times a day  -- Indication: For .    Calcium 600+D oral tablet  -- 1 tab(s) by mouth once a day  -- Indication: For .    Multiple Vitamins oral tablet  -- 1 tab(s) by mouth once a day  -- Indication: For .

## 2018-04-28 NOTE — DISCHARGE NOTE ADULT - MEDICATION SUMMARY - MEDICATIONS TO CHANGE
I will SWITCH the dose or number of times a day I take the medications listed below when I get home from the hospital:    metoprolol  -- 12.5 milligram(s) by mouth 2 times a day

## 2018-04-28 NOTE — DISCHARGE NOTE ADULT - CONDITIONS AT DISCHARGE
Patient to follow up with cardiologist office closed advised to make appointment with Dr. Cervantes on Monday for CHF follow up.  Discharge instructions reviewed with patient and Joanie.  Discharge instructions faxed to [facility] per patient's request. Patient to follow up with cardiologist office closed advised to make appointment with Dr. Cervantes on Monday for CHF follow up.  Discharge instructions reviewed with patient and Joanie.  Discharge instructions faxed to [facility] per patient's request. Appointment scheduled with Dr. Medina 5/15/18 at 345pm as per Daughter

## 2018-05-01 DIAGNOSIS — D64.9 ANEMIA, UNSPECIFIED: ICD-10-CM

## 2018-05-01 DIAGNOSIS — I11.0 HYPERTENSIVE HEART DISEASE WITH HEART FAILURE: ICD-10-CM

## 2018-05-01 DIAGNOSIS — Z53.20 PROCEDURE AND TREATMENT NOT CARRIED OUT BECAUSE OF PATIENT'S DECISION FOR UNSPECIFIED REASONS: ICD-10-CM

## 2018-05-01 DIAGNOSIS — R07.9 CHEST PAIN, UNSPECIFIED: ICD-10-CM

## 2018-05-01 DIAGNOSIS — R00.2 PALPITATIONS: ICD-10-CM

## 2018-05-01 DIAGNOSIS — Z95.810 PRESENCE OF AUTOMATIC (IMPLANTABLE) CARDIAC DEFIBRILLATOR: ICD-10-CM

## 2018-05-01 DIAGNOSIS — M19.90 UNSPECIFIED OSTEOARTHRITIS, UNSPECIFIED SITE: ICD-10-CM

## 2018-05-01 DIAGNOSIS — Z88.8 ALLERGY STATUS TO OTHER DRUGS, MEDICAMENTS AND BIOLOGICAL SUBSTANCES STATUS: ICD-10-CM

## 2018-05-01 DIAGNOSIS — I48.0 PAROXYSMAL ATRIAL FIBRILLATION: ICD-10-CM

## 2018-05-01 DIAGNOSIS — Z79.82 LONG TERM (CURRENT) USE OF ASPIRIN: ICD-10-CM

## 2018-05-01 DIAGNOSIS — E78.5 HYPERLIPIDEMIA, UNSPECIFIED: ICD-10-CM

## 2018-05-01 DIAGNOSIS — G89.4 CHRONIC PAIN SYNDROME: ICD-10-CM

## 2018-05-01 DIAGNOSIS — Z91.018 ALLERGY TO OTHER FOODS: ICD-10-CM

## 2018-05-01 DIAGNOSIS — I50.23 ACUTE ON CHRONIC SYSTOLIC (CONGESTIVE) HEART FAILURE: ICD-10-CM

## 2018-05-01 DIAGNOSIS — K21.9 GASTRO-ESOPHAGEAL REFLUX DISEASE WITHOUT ESOPHAGITIS: ICD-10-CM

## 2018-05-01 DIAGNOSIS — I25.10 ATHEROSCLEROTIC HEART DISEASE OF NATIVE CORONARY ARTERY WITHOUT ANGINA PECTORIS: ICD-10-CM

## 2018-05-14 ENCOUNTER — INPATIENT (INPATIENT)
Facility: HOSPITAL | Age: 83
LOS: 2 days | Discharge: HOME HEALTH | End: 2018-05-17
Payer: MEDICARE

## 2018-05-14 VITALS
OXYGEN SATURATION: 98 % | RESPIRATION RATE: 18 BRPM | HEIGHT: 62 IN | DIASTOLIC BLOOD PRESSURE: 92 MMHG | HEART RATE: 91 BPM | SYSTOLIC BLOOD PRESSURE: 148 MMHG | WEIGHT: 119.93 LBS

## 2018-05-14 DIAGNOSIS — E78.5 HYPERLIPIDEMIA, UNSPECIFIED: ICD-10-CM

## 2018-05-14 DIAGNOSIS — I50.22 CHRONIC SYSTOLIC (CONGESTIVE) HEART FAILURE: ICD-10-CM

## 2018-05-14 DIAGNOSIS — D64.9 ANEMIA, UNSPECIFIED: ICD-10-CM

## 2018-05-14 DIAGNOSIS — I48.0 PAROXYSMAL ATRIAL FIBRILLATION: ICD-10-CM

## 2018-05-14 DIAGNOSIS — Z95.810 PRESENCE OF AUTOMATIC (IMPLANTABLE) CARDIAC DEFIBRILLATOR: Chronic | ICD-10-CM

## 2018-05-14 DIAGNOSIS — R07.9 CHEST PAIN, UNSPECIFIED: ICD-10-CM

## 2018-05-14 DIAGNOSIS — I10 ESSENTIAL (PRIMARY) HYPERTENSION: ICD-10-CM

## 2018-05-14 DIAGNOSIS — M15.9 POLYOSTEOARTHRITIS, UNSPECIFIED: ICD-10-CM

## 2018-05-14 DIAGNOSIS — K21.9 GASTRO-ESOPHAGEAL REFLUX DISEASE WITHOUT ESOPHAGITIS: ICD-10-CM

## 2018-05-14 LAB
ALBUMIN SERPL ELPH-MCNC: 3 G/DL — LOW (ref 3.3–5)
ALBUMIN SERPL ELPH-MCNC: 3.3 G/DL — SIGNIFICANT CHANGE UP (ref 3.3–5)
ALP SERPL-CCNC: 45 U/L — SIGNIFICANT CHANGE UP (ref 40–120)
ALP SERPL-CCNC: 49 U/L — SIGNIFICANT CHANGE UP (ref 40–120)
ALT FLD-CCNC: 37 U/L — SIGNIFICANT CHANGE UP (ref 12–78)
ALT FLD-CCNC: 43 U/L — SIGNIFICANT CHANGE UP (ref 12–78)
ANION GAP SERPL CALC-SCNC: 11 MMOL/L — SIGNIFICANT CHANGE UP (ref 5–17)
ANION GAP SERPL CALC-SCNC: 9 MMOL/L — SIGNIFICANT CHANGE UP (ref 5–17)
AST SERPL-CCNC: 27 U/L — SIGNIFICANT CHANGE UP (ref 15–37)
AST SERPL-CCNC: 29 U/L — SIGNIFICANT CHANGE UP (ref 15–37)
BASOPHILS # BLD AUTO: 0.03 K/UL — SIGNIFICANT CHANGE UP (ref 0–0.2)
BASOPHILS # BLD AUTO: 0.03 K/UL — SIGNIFICANT CHANGE UP (ref 0–0.2)
BASOPHILS NFR BLD AUTO: 0.5 % — SIGNIFICANT CHANGE UP (ref 0–2)
BASOPHILS NFR BLD AUTO: 0.6 % — SIGNIFICANT CHANGE UP (ref 0–2)
BILIRUB SERPL-MCNC: 0.6 MG/DL — SIGNIFICANT CHANGE UP (ref 0.2–1.2)
BILIRUB SERPL-MCNC: 0.7 MG/DL — SIGNIFICANT CHANGE UP (ref 0.2–1.2)
BUN SERPL-MCNC: 23 MG/DL — SIGNIFICANT CHANGE UP (ref 7–23)
BUN SERPL-MCNC: 24 MG/DL — HIGH (ref 7–23)
CALCIUM SERPL-MCNC: 8.3 MG/DL — LOW (ref 8.5–10.1)
CALCIUM SERPL-MCNC: 8.5 MG/DL — SIGNIFICANT CHANGE UP (ref 8.5–10.1)
CHLORIDE SERPL-SCNC: 104 MMOL/L — SIGNIFICANT CHANGE UP (ref 96–108)
CHLORIDE SERPL-SCNC: 105 MMOL/L — SIGNIFICANT CHANGE UP (ref 96–108)
CO2 SERPL-SCNC: 23 MMOL/L — SIGNIFICANT CHANGE UP (ref 22–31)
CO2 SERPL-SCNC: 24 MMOL/L — SIGNIFICANT CHANGE UP (ref 22–31)
CREAT SERPL-MCNC: 1.12 MG/DL — SIGNIFICANT CHANGE UP (ref 0.5–1.3)
CREAT SERPL-MCNC: 1.14 MG/DL — SIGNIFICANT CHANGE UP (ref 0.5–1.3)
EOSINOPHIL # BLD AUTO: 0.12 K/UL — SIGNIFICANT CHANGE UP (ref 0–0.5)
EOSINOPHIL # BLD AUTO: 0.13 K/UL — SIGNIFICANT CHANGE UP (ref 0–0.5)
EOSINOPHIL NFR BLD AUTO: 2.1 % — SIGNIFICANT CHANGE UP (ref 0–6)
EOSINOPHIL NFR BLD AUTO: 2.6 % — SIGNIFICANT CHANGE UP (ref 0–6)
GLUCOSE SERPL-MCNC: 88 MG/DL — SIGNIFICANT CHANGE UP (ref 70–99)
GLUCOSE SERPL-MCNC: 94 MG/DL — SIGNIFICANT CHANGE UP (ref 70–99)
HCT VFR BLD CALC: 26.5 % — LOW (ref 34.5–45)
HCT VFR BLD CALC: 28.7 % — LOW (ref 34.5–45)
HGB BLD-MCNC: 8.7 G/DL — LOW (ref 11.5–15.5)
HGB BLD-MCNC: 9.4 G/DL — LOW (ref 11.5–15.5)
IMM GRANULOCYTES NFR BLD AUTO: 0.2 % — SIGNIFICANT CHANGE UP (ref 0–1.5)
IMM GRANULOCYTES NFR BLD AUTO: 0.7 % — SIGNIFICANT CHANGE UP (ref 0–1.5)
LACTATE SERPL-SCNC: 1.5 MMOL/L — SIGNIFICANT CHANGE UP (ref 0.7–2)
LIDOCAIN IGE QN: 106 U/L — SIGNIFICANT CHANGE UP (ref 73–393)
LYMPHOCYTES # BLD AUTO: 0.97 K/UL — LOW (ref 1–3.3)
LYMPHOCYTES # BLD AUTO: 0.99 K/UL — LOW (ref 1–3.3)
LYMPHOCYTES # BLD AUTO: 17.2 % — SIGNIFICANT CHANGE UP (ref 13–44)
LYMPHOCYTES # BLD AUTO: 19.1 % — SIGNIFICANT CHANGE UP (ref 13–44)
MCHC RBC-ENTMCNC: 27.4 PG — SIGNIFICANT CHANGE UP (ref 27–34)
MCHC RBC-ENTMCNC: 27.6 PG — SIGNIFICANT CHANGE UP (ref 27–34)
MCHC RBC-ENTMCNC: 32.8 GM/DL — SIGNIFICANT CHANGE UP (ref 32–36)
MCHC RBC-ENTMCNC: 32.8 GM/DL — SIGNIFICANT CHANGE UP (ref 32–36)
MCV RBC AUTO: 83.3 FL — SIGNIFICANT CHANGE UP (ref 80–100)
MCV RBC AUTO: 84.4 FL — SIGNIFICANT CHANGE UP (ref 80–100)
MONOCYTES # BLD AUTO: 0.36 K/UL — SIGNIFICANT CHANGE UP (ref 0–0.9)
MONOCYTES # BLD AUTO: 0.39 K/UL — SIGNIFICANT CHANGE UP (ref 0–0.9)
MONOCYTES NFR BLD AUTO: 6.3 % — SIGNIFICANT CHANGE UP (ref 2–14)
MONOCYTES NFR BLD AUTO: 7.7 % — SIGNIFICANT CHANGE UP (ref 2–14)
NEUTROPHILS # BLD AUTO: 3.56 K/UL — SIGNIFICANT CHANGE UP (ref 1.8–7.4)
NEUTROPHILS # BLD AUTO: 4.21 K/UL — SIGNIFICANT CHANGE UP (ref 1.8–7.4)
NEUTROPHILS NFR BLD AUTO: 69.8 % — SIGNIFICANT CHANGE UP (ref 43–77)
NEUTROPHILS NFR BLD AUTO: 73.2 % — SIGNIFICANT CHANGE UP (ref 43–77)
NRBC # BLD: 0 /100 WBCS — SIGNIFICANT CHANGE UP (ref 0–0)
PLATELET # BLD AUTO: 214 K/UL — SIGNIFICANT CHANGE UP (ref 150–400)
PLATELET # BLD AUTO: 235 K/UL — SIGNIFICANT CHANGE UP (ref 150–400)
POTASSIUM SERPL-MCNC: 3.7 MMOL/L — SIGNIFICANT CHANGE UP (ref 3.5–5.3)
POTASSIUM SERPL-MCNC: 4.3 MMOL/L — SIGNIFICANT CHANGE UP (ref 3.5–5.3)
POTASSIUM SERPL-SCNC: 3.7 MMOL/L — SIGNIFICANT CHANGE UP (ref 3.5–5.3)
POTASSIUM SERPL-SCNC: 4.3 MMOL/L — SIGNIFICANT CHANGE UP (ref 3.5–5.3)
PROT SERPL-MCNC: 5.8 GM/DL — LOW (ref 6–8.3)
PROT SERPL-MCNC: 6.4 GM/DL — SIGNIFICANT CHANGE UP (ref 6–8.3)
RBC # BLD: 3.18 M/UL — LOW (ref 3.8–5.2)
RBC # BLD: 3.4 M/UL — LOW (ref 3.8–5.2)
RBC # FLD: 14.6 % — HIGH (ref 10.3–14.5)
RBC # FLD: 14.6 % — HIGH (ref 10.3–14.5)
SODIUM SERPL-SCNC: 137 MMOL/L — SIGNIFICANT CHANGE UP (ref 135–145)
SODIUM SERPL-SCNC: 139 MMOL/L — SIGNIFICANT CHANGE UP (ref 135–145)
TROPONIN I SERPL-MCNC: 0.03 NG/ML — SIGNIFICANT CHANGE UP (ref 0.01–0.04)
TROPONIN I SERPL-MCNC: 0.04 NG/ML — SIGNIFICANT CHANGE UP (ref 0.01–0.04)
WBC # BLD: 5.09 K/UL — SIGNIFICANT CHANGE UP (ref 3.8–10.5)
WBC # BLD: 5.75 K/UL — SIGNIFICANT CHANGE UP (ref 3.8–10.5)
WBC # FLD AUTO: 5.09 K/UL — SIGNIFICANT CHANGE UP (ref 3.8–10.5)
WBC # FLD AUTO: 5.75 K/UL — SIGNIFICANT CHANGE UP (ref 3.8–10.5)

## 2018-05-14 PROCEDURE — 74177 CT ABD & PELVIS W/CONTRAST: CPT | Mod: 26

## 2018-05-14 PROCEDURE — 99285 EMERGENCY DEPT VISIT HI MDM: CPT

## 2018-05-14 PROCEDURE — 99204 OFFICE O/P NEW MOD 45 MIN: CPT

## 2018-05-14 PROCEDURE — 93010 ELECTROCARDIOGRAM REPORT: CPT

## 2018-05-14 PROCEDURE — 71045 X-RAY EXAM CHEST 1 VIEW: CPT | Mod: 26

## 2018-05-14 PROCEDURE — 71275 CT ANGIOGRAPHY CHEST: CPT | Mod: 26

## 2018-05-14 RX ORDER — ASPIRIN/CALCIUM CARB/MAGNESIUM 324 MG
162 TABLET ORAL ONCE
Qty: 0 | Refills: 0 | Status: COMPLETED | OUTPATIENT
Start: 2018-05-14 | End: 2018-05-14

## 2018-05-14 RX ORDER — OXYCODONE AND ACETAMINOPHEN 5; 325 MG/1; MG/1
1 TABLET ORAL ONCE
Qty: 0 | Refills: 0 | Status: DISCONTINUED | OUTPATIENT
Start: 2018-05-14 | End: 2018-05-14

## 2018-05-14 RX ORDER — METOPROLOL TARTRATE 50 MG
25 TABLET ORAL
Qty: 0 | Refills: 0 | Status: DISCONTINUED | OUTPATIENT
Start: 2018-05-14 | End: 2018-05-15

## 2018-05-14 RX ORDER — DOCUSATE SODIUM 100 MG
300 CAPSULE ORAL AT BEDTIME
Qty: 0 | Refills: 0 | Status: DISCONTINUED | OUTPATIENT
Start: 2018-05-14 | End: 2018-05-17

## 2018-05-14 RX ORDER — ASPIRIN/CALCIUM CARB/MAGNESIUM 324 MG
81 TABLET ORAL DAILY
Qty: 0 | Refills: 0 | Status: DISCONTINUED | OUTPATIENT
Start: 2018-05-14 | End: 2018-05-17

## 2018-05-14 RX ORDER — AMIODARONE HYDROCHLORIDE 400 MG/1
200 TABLET ORAL DAILY
Qty: 0 | Refills: 0 | Status: DISCONTINUED | OUTPATIENT
Start: 2018-05-14 | End: 2018-05-17

## 2018-05-14 RX ORDER — POLYETHYLENE GLYCOL 3350 17 G/17G
17 POWDER, FOR SOLUTION ORAL DAILY
Qty: 0 | Refills: 0 | Status: DISCONTINUED | OUTPATIENT
Start: 2018-05-14 | End: 2018-05-17

## 2018-05-14 RX ORDER — FUROSEMIDE 40 MG
40 TABLET ORAL DAILY
Qty: 0 | Refills: 0 | Status: DISCONTINUED | OUTPATIENT
Start: 2018-05-14 | End: 2018-05-15

## 2018-05-14 RX ORDER — OXYCODONE AND ACETAMINOPHEN 5; 325 MG/1; MG/1
1 TABLET ORAL
Qty: 0 | Refills: 0 | Status: DISCONTINUED | OUTPATIENT
Start: 2018-05-14 | End: 2018-05-17

## 2018-05-14 RX ORDER — OXYBUTYNIN CHLORIDE 5 MG
5 TABLET ORAL
Qty: 0 | Refills: 0 | Status: DISCONTINUED | OUTPATIENT
Start: 2018-05-14 | End: 2018-05-17

## 2018-05-14 RX ADMIN — Medication 25 MILLIGRAM(S): at 19:14

## 2018-05-14 RX ADMIN — OXYCODONE AND ACETAMINOPHEN 1 TABLET(S): 5; 325 TABLET ORAL at 12:24

## 2018-05-14 RX ADMIN — OXYCODONE AND ACETAMINOPHEN 1 TABLET(S): 5; 325 TABLET ORAL at 19:13

## 2018-05-14 RX ADMIN — Medication 300 MILLIGRAM(S): at 21:19

## 2018-05-14 RX ADMIN — Medication 5 MILLIGRAM(S): at 19:14

## 2018-05-14 RX ADMIN — Medication 162 MILLIGRAM(S): at 10:23

## 2018-05-14 NOTE — ED PROVIDER NOTE - ATTENDING CONTRIBUTION TO CARE
87F pmh afib not on a/c, chf, cad presents with substernal chest pain rad to L side and back, with sob, nausea. Without f/c, abd pain, cough, congestion, vomiting, diarrhea.     PE: NAD, NCAT, MMM, Trachea midline, Normal conjunctiva, lungs CTAB, S1/S2 RRR, Normal perfusion, 2+ radial pulses bilat, Abdomen Soft, NTND, No rebound/guarding, No LE edema, No deformity of extremities, No rashes,  No focal motor or sensory deficits.    87F with extensive hx including cardiac hx presents with chest pain, sob, with chest pain radiating to L shoulder and back. EKG NSR without acute ischemic changes. Pain does radiate to back, consider dissection although lower suspicion, obtain cta chest eval for dissection. Check labs including CBC eval for anemia, cmp eval for metabolic derangement, trop. CXR. Plan to admit. - Michele Raya MD 87F pmh afib not on a/c, chf, cad presents with substernal chest pain rad to L side, with sob, nausea. Without f/c, abd pain, cough, congestion, vomiting, diarrhea.     PE: NAD, NCAT, MMM, Trachea midline, Normal conjunctiva, lungs CTAB, S1/S2 RRR, Normal perfusion, 2+ radial pulses bilat, Abdomen Soft, NTND, No rebound/guarding, No LE edema, No deformity of extremities, No rashes,  No focal motor or sensory deficits.    87F with extensive hx including cardiac hx presents with chest pain, sob, with chest pain radiating to L shoulder. EKG NSR without acute ischemic changes. Pain does not radiate to back, very low suspicion dissection. consider PE, obtain cta chest eval for PE. does have abd ttp, though no complaint of pain otherwise, concern for potential intera-abd pathology, also obtain ct a/p. Check labs including CBC eval for anemia, cmp eval for metabolic derangement, trop. CXR. Plan to admit. - Michele Raya MD

## 2018-05-14 NOTE — ED ADULT NURSE REASSESSMENT NOTE - NS ED NURSE REASSESS COMMENT FT1
Pt rec'd from [facility]IL[facility] Kelsey. AxOx3. No acute distress. Eating lunch at this time. Family at bedside. Cardiac monitoring in place, NSR as per cardiac tech. Safety and comfort maintained.

## 2018-05-14 NOTE — H&P ADULT - HISTORY OF PRESENT ILLNESS
Patient is a 88 y/o/f w/ pmhx of Hx afib (not on AC due to pt's refusal of anticoagulation for stroke prophylaxis sec to hx of hematuria requiring PRBC transfusion 5 yrs ago), chronic systolic CHF, CAD admitted 5/14 with cc of chest pain.  According to the patient she has had a similar chest pains in the past. As per patient, she belives this is due to her RA. States that, chest pain worse with dep breathing, with on and off SOB and (l) sided jaw pain. Denies any associated symptoms of diaphoresis or back pain. Pain worse with movement. Patient's recently admitted with acute on chronic CHF and has been evaluated by Dr. Cervantes.     Now chest pain free  EKG: Sinus rhythm, prolonged QT interval

## 2018-05-14 NOTE — H&P ADULT - PROBLEM SELECTOR PLAN 6
- patient has an RA  - does not know which rheumatologist she is following up  - Rheumatologist hao pending

## 2018-05-14 NOTE — H&P ADULT - NSHPPHYSICALEXAM_GEN_ALL_CORE
Physical Exam:   GENERAL APPEARANCE:  deconditioned, frail, elderly.   T(C): 37.2 (05-14-18 @ 10:16), Max: 37.2 (05-14-18 @ 10:16)  HR: 79 (05-14-18 @ 13:09) (79 - 91)  BP: 134/84 (05-14-18 @ 13:09) (134/84 - 148/92)  RR: 19 (05-14-18 @ 13:09) (16 - 19)  SpO2: 98% (05-14-18 @ 13:09) (98% - 100%)    HEENT:  normocephalic  Skin:  thin, pale  NECK:  Supple without lymphadenopathy.   HEART:  Regular rate and rhythm. normal S1 and S2, No M/R/G  LUNGS:  very faint end expiratory wheeze  ABDOMEN:  Soft, nontender, nondistended with good bowel sounds heard  EXTREMITIES: upper extremity: ulnar deviation. swan deformability. boutonierre contraction.

## 2018-05-14 NOTE — ED ADULT NURSE NOTE - OBJECTIVE STATEMENT
Pt alert and oriented x3. Pt presents with chest pain on inspiration. Pt resides at [facility]. Per pt daughter pt increased SOB and chest pain on inspiration and increased swelling. MD increased lasix dose.

## 2018-05-14 NOTE — H&P ADULT - NSHPLABSRESULTS_GEN_ALL_CORE
CBC Full  -  ( 14 May 2018 09:54 )  WBC Count : 5.75 K/uL  Hemoglobin : 9.4 g/dL  Hematocrit : 28.7 %  Platelet Count - Automated : 235 K/uL      137  |  104  |  24<H>  ----------------------------<  94  4.3   |  24  |  1.14    Ca    8.5      14 May 2018 09:54    TPro  6.4  /  Alb  3.3  /  TBili  0.6  /  DBili  x   /  AST  29  /  ALT  43  /  AlkPhos  49  05-14

## 2018-05-14 NOTE — ED PROVIDER NOTE - OBJECTIVE STATEMENT
86 y/o F w/ Hx afib (not on AC), CHF, CAD pw CP.  Pt developed substernal CP w/ radiation to L side overnight, pain increased w/ movement associated w/ mild SOB and nausea.  Denies AP, vomit, d/c, fever, cough.  s/p recent admit for acute on chronic CHF exacerbation.

## 2018-05-14 NOTE — H&P ADULT - NSHPREVIEWOFSYSTEMS_GEN_ALL_CORE
REVIEW OF SYSTEMS:  General: chronically weak, extensive joint pains mainly on the left side.   HEENT:  no visual changes  SKIN: no new rashes  CARDIOVASCULAR:  + chest pain, (l) sided jaw pains (on and off)  RESPIRATORY:  on and off SOB  GASTROINTESTINAL:  No abdominal pain  MUSCULOSKELETAL:  chronic joint pains with swelling  HEMATOLOGIC:  No anemia, bleeding or bruising.

## 2018-05-14 NOTE — H&P ADULT - PROBLEM SELECTOR PLAN 5
- rhythm control - amiodarone  - patient has QT prolongation  - not on AC due to pt's refusal of anticoagulation for stroke prophylaxis sec to hx of hematuria requiring PRBC transfusion 5 yrs ago

## 2018-05-14 NOTE — ED PROVIDER NOTE - MEDICAL DECISION MAKING DETAILS
pt w/ CP in setting of sign cardiac Hx, abd TTP on exam, will get CT, check trops, if neg admit for ACS evaluation.

## 2018-05-15 LAB
ADD ON TEST-SPECIMEN IN LAB: SIGNIFICANT CHANGE UP
ANION GAP SERPL CALC-SCNC: 8 MMOL/L — SIGNIFICANT CHANGE UP (ref 5–17)
BUN SERPL-MCNC: 22 MG/DL — SIGNIFICANT CHANGE UP (ref 7–23)
CALCIUM SERPL-MCNC: 8.4 MG/DL — LOW (ref 8.5–10.1)
CHLORIDE SERPL-SCNC: 104 MMOL/L — SIGNIFICANT CHANGE UP (ref 96–108)
CO2 SERPL-SCNC: 27 MMOL/L — SIGNIFICANT CHANGE UP (ref 22–31)
CREAT SERPL-MCNC: 1.12 MG/DL — SIGNIFICANT CHANGE UP (ref 0.5–1.3)
GLUCOSE SERPL-MCNC: 114 MG/DL — HIGH (ref 70–99)
MAGNESIUM SERPL-MCNC: 2.2 MG/DL — SIGNIFICANT CHANGE UP (ref 1.6–2.6)
NT-PROBNP SERPL-SCNC: HIGH PG/ML (ref 0–450)
PHOSPHATE SERPL-MCNC: 3.1 MG/DL — SIGNIFICANT CHANGE UP (ref 2.5–4.5)
POTASSIUM SERPL-MCNC: 3.7 MMOL/L — SIGNIFICANT CHANGE UP (ref 3.5–5.3)
POTASSIUM SERPL-SCNC: 3.7 MMOL/L — SIGNIFICANT CHANGE UP (ref 3.5–5.3)
SODIUM SERPL-SCNC: 139 MMOL/L — SIGNIFICANT CHANGE UP (ref 135–145)
TROPONIN I SERPL-MCNC: 0.04 NG/ML — SIGNIFICANT CHANGE UP (ref 0.01–0.04)
TROPONIN I SERPL-MCNC: 0.05 NG/ML — HIGH (ref 0.01–0.04)

## 2018-05-15 PROCEDURE — 93010 ELECTROCARDIOGRAM REPORT: CPT

## 2018-05-15 RX ORDER — ASPIRIN/CALCIUM CARB/MAGNESIUM 324 MG
81 TABLET ORAL ONCE
Qty: 0 | Refills: 0 | Status: COMPLETED | OUTPATIENT
Start: 2018-05-15 | End: 2018-05-15

## 2018-05-15 RX ORDER — METOPROLOL TARTRATE 50 MG
5 TABLET ORAL EVERY 6 HOURS
Qty: 0 | Refills: 0 | Status: DISCONTINUED | OUTPATIENT
Start: 2018-05-15 | End: 2018-05-17

## 2018-05-15 RX ORDER — NITROGLYCERIN 6.5 MG
0.4 CAPSULE, EXTENDED RELEASE ORAL
Qty: 0 | Refills: 0 | Status: COMPLETED | OUTPATIENT
Start: 2018-05-15 | End: 2018-05-15

## 2018-05-15 RX ORDER — FUROSEMIDE 40 MG
40 TABLET ORAL ONCE
Qty: 0 | Refills: 0 | Status: COMPLETED | OUTPATIENT
Start: 2018-05-15 | End: 2018-05-15

## 2018-05-15 RX ORDER — ONDANSETRON 8 MG/1
4 TABLET, FILM COATED ORAL EVERY 6 HOURS
Qty: 0 | Refills: 0 | Status: DISCONTINUED | OUTPATIENT
Start: 2018-05-15 | End: 2018-05-17

## 2018-05-15 RX ORDER — ACETAMINOPHEN 500 MG
650 TABLET ORAL EVERY 6 HOURS
Qty: 0 | Refills: 0 | Status: DISCONTINUED | OUTPATIENT
Start: 2018-05-15 | End: 2018-05-17

## 2018-05-15 RX ORDER — METOPROLOL TARTRATE 50 MG
25 TABLET ORAL DAILY
Qty: 0 | Refills: 0 | Status: DISCONTINUED | OUTPATIENT
Start: 2018-05-15 | End: 2018-05-17

## 2018-05-15 RX ADMIN — Medication 25 MILLIGRAM(S): at 05:32

## 2018-05-15 RX ADMIN — Medication 81 MILLIGRAM(S): at 11:37

## 2018-05-15 RX ADMIN — OXYCODONE AND ACETAMINOPHEN 1 TABLET(S): 5; 325 TABLET ORAL at 17:46

## 2018-05-15 RX ADMIN — Medication 81 MILLIGRAM(S): at 05:32

## 2018-05-15 RX ADMIN — Medication 650 MILLIGRAM(S): at 12:09

## 2018-05-15 RX ADMIN — Medication 0.4 MILLIGRAM(S): at 06:36

## 2018-05-15 RX ADMIN — Medication 0.4 MILLIGRAM(S): at 06:48

## 2018-05-15 RX ADMIN — ONDANSETRON 4 MILLIGRAM(S): 8 TABLET, FILM COATED ORAL at 12:10

## 2018-05-15 RX ADMIN — OXYCODONE AND ACETAMINOPHEN 1 TABLET(S): 5; 325 TABLET ORAL at 05:29

## 2018-05-15 RX ADMIN — Medication 2.5 MILLIGRAM(S): at 05:31

## 2018-05-15 RX ADMIN — POLYETHYLENE GLYCOL 3350 17 GRAM(S): 17 POWDER, FOR SOLUTION ORAL at 13:11

## 2018-05-15 RX ADMIN — Medication 300 MILLIGRAM(S): at 21:06

## 2018-05-15 RX ADMIN — Medication 650 MILLIGRAM(S): at 13:10

## 2018-05-15 RX ADMIN — Medication 30 MILLILITER(S): at 05:29

## 2018-05-15 RX ADMIN — Medication 0.4 MILLIGRAM(S): at 06:22

## 2018-05-15 RX ADMIN — Medication 5 MILLIGRAM(S): at 05:31

## 2018-05-15 RX ADMIN — AMIODARONE HYDROCHLORIDE 200 MILLIGRAM(S): 400 TABLET ORAL at 05:32

## 2018-05-15 RX ADMIN — Medication 40 MILLIGRAM(S): at 11:10

## 2018-05-15 RX ADMIN — Medication 5 MILLIGRAM(S): at 17:46

## 2018-05-15 RX ADMIN — Medication 40 MILLIGRAM(S): at 05:31

## 2018-05-15 NOTE — CONSULT NOTE ADULT - SUBJECTIVE AND OBJECTIVE BOX
Patient is a 87y old  Female who presents with a chief complaint of Chest pain.  HPI:  Patient is a 88 y/o/f w/ pmhx of Hx afib (not on AC due to pt's refusal of anticoagulation for stroke prophylaxis sec to hx of hematuria requiring PRBC transfusion 5 yrs ago), chronic systolic CHF, ischemic cardiomyopathy with known LVEF of 29%, s/p Biv ICD,  CAD admitted 5/14 with cc of chest pain.  According to the patient she has had a similar chest pains in the past. As per patient, she believes this is due to her RA. States that, chest pain worse with dep breathing, with on and off SOB and (l) sided jaw pain. Denies any associated symptoms of diaphoresis or back pain. Pain worse with movement. Patient's recently admitted with acute on chronic CHF and has been evaluated by Dr. Cervantes in last admission.    I called her daughter and per daughter pt since discharge from hospital has swelling of the face UE and LE and distension of the belly.  Her lasix increased to 40mg po daily as outpt.  Pts daughter was unable to bring her to the outpt visit since pt was noted to be in a "bad state".         PAST MEDICAL & SURGICAL HISTORY:  Osteoarthritis of multiple joints, unspecified osteoarthritis type  Gastroesophageal reflux disease without esophagitis  Dyslipidemia  Essential hypertension  Paroxysmal atrial fibrillation  Coronary artery disease involving native coronary artery of native heart without angina pectoris  Chronic systolic congestive heart failure: EF 20-25%  AICD (automatic cardioverter/defibrillator) present      MEDICATIONS  (STANDING):  amiodarone    Tablet 200 milliGRAM(s) Oral daily  aspirin  chewable 81 milliGRAM(s) Oral daily  docusate sodium Oral Tab/Cap - Peds 300 milliGRAM(s) Oral at bedtime  enalapril 2.5 milliGRAM(s) Oral daily  furosemide    Tablet 40 milliGRAM(s) Oral daily  metoprolol tartrate 25 milliGRAM(s) Oral two times a day  oxybutynin 5 milliGRAM(s) Oral two times a day  oxyCODONE    5 mG/acetaminophen 325 mG 1 Tablet(s) Oral two times a day  polyethylene glycol 3350 17 Gram(s) Oral daily    MEDICATIONS  (PRN):      FAMILY HISTORY:  No pertinent family history in first degree relatives      SOCIAL HISTORY:    REVIEW OF SYSTEMS:  CONSTITUTIONAL:    No fatigue, malaise, lethargy.  No fever or chills.  HEENT:  Eyes:  No visual changes.     ENT:  No epistaxis.  No sinus pain.    RESPIRATORY:  No cough.  No wheeze.  No hemoptysis.  No shortness of breath.  CARDIOVASCULAR:  c/o chest pains.  No palpitations. No shortness of breath, No orthopnea or PND.  GASTROINTESTINAL:  No abdominal pain.  c/o nausea or vomiting.    GENITOURINARY:    No hematuria.    MUSCULOSKELETAL:  No musculoskeletal pain.  No joint swelling.  No arthritis.  NEUROLOGICAL:  No tingling or numbness or weakness.  PSYCHIATRIC:  No confusion  SKIN:  No rashes.    ENDOCRINE:  No unexplained weight loss.  No polydipsia.   HEMATOLOGIC:  No anemia.  No prolonged or excessive bleeding.   ALLERGIC AND IMMUNOLOGIC:  No pruritus.          Vital Signs Last 24 Hrs  T(C): 36.6 (15 May 2018 05:04), Max: 37.2 (14 May 2018 10:16)  T(F): 97.9 (15 May 2018 05:04), Max: 98.9 (14 May 2018 10:16)  HR: 79 (15 May 2018 06:27) (79 - 121)  BP: 112/68 (15 May 2018 06:27) (95/70 - 142/78)  BP(mean): --  RR: 17 (15 May 2018 03:53) (16 - 19)  SpO2: 99% (15 May 2018 05:04) (97% - 100%)    PHYSICAL EXAM-    Constitutional: The patient appears to be normal, well developed, well nourished and alert and oriented to time, place and person. The patient does not appear acutely ill.     Head: Head is normocephalic and atraumatic.      Neck: No jugular venous distention. No audible carotid bruits. There are strong carotid pulses bilaterally. positive JVD.     Cardiovascular: Regular rate and rhythm without S3, S4. No murmurs or rubs are appreciated.      Respiratory: rales at bases b/l     Abdomen: Soft, nontender, distended with positive bowel sounds.      Extremity: swelling of her hands.    Neurologic: The patient is alert and oriented.      Skin: No rash, no obvious lesions noted.      Psychiatric: The patient appears to be emotionally stable.      INTERPRETATION OF TELEMETRY: sinus 80-90/min, occasional pacing     ECG: Sinus rythm , L axis , poor R wave progression.     I&O's Detail      LABS:                        8.7    5.09  )-----------( 214      ( 14 May 2018 17:33 )             26.5     05-15    139  |  104  |  22  ----------------------------<  114<H>  3.7   |  27  |  1.12    Ca    8.4<L>      15 May 2018 03:48  Phos  3.1     05-15  Mg     2.2     05-15    TPro  5.8<L>  /  Alb  3.0<L>  /  TBili  0.7  /  DBili  x   /  AST  27  /  ALT  37  /  AlkPhos  45  05-14    CARDIAC MARKERS ( 15 May 2018 07:08 )  0.042 ng/mL / x     / x     / x     / x      CARDIAC MARKERS ( 15 May 2018 03:48 )  0.040 ng/mL / x     / x     / x     / x      CARDIAC MARKERS ( 15 May 2018 01:29 )  0.047 ng/mL / x     / x     / x     / x      CARDIAC MARKERS ( 14 May 2018 19:00 )  0.039 ng/mL / x     / x     / x     / x      CARDIAC MARKERS ( 14 May 2018 09:54 )  0.031 ng/mL / x     / x     / x     / x              I&O's Summary    BNP  RADIOLOGY & ADDITIONAL STUDIES:
CHIEF COMPLAINT: RA     HPI:  Patient is a 88 y/o/f w/ pmhx of Hx afib (not on AC due to pt's refusal of anticoagulation for stroke prophylaxis sec to hx of hematuria requiring PRBC transfusion 5 yrs ago), chronic systolic CHF, CAD admitted 5/14 with cc of chest pain.  According to the patient she has had a similar chest pains in the past. As per patient, she belives this is due to her RA. States that, chest pain worse with dep breathing, with on and off SOB and (l) sided jaw pain. Denies any associated symptoms of diaphoresis or back pain. Pain worse with movement. Patient's recently admitted with acute on chronic CHF and has been evaluated by Dr. Cervantes.     Now chest pain free she states that she has had rheumatoid arthritis for at least 20 years. She is a poor historian and does not remember the name of her rheumatologist, she has not seen them in years. She does not recall any treatment recently. She denies use of methotrexate or Arava or sulfasalazine. She denies use of a TNF inhibitor.    She states that she is use to her joint pain, she rates her joint pain at a 5/10 today. She states that the pain can travel. She has had a left knee replacement in the past. Today she feels well, she denies any headaches visual symptoms or jaw claudication. She denies chest pain or shortness of breath.  EKG: Sinus rhythm, prolonged QT interval    PMH/PSH/Allergies/Social history- reviewed     REVIEW OF SYSTEMS:    CONSTITUTIONAL: No weakness, fevers or chills  EYES/ENT: No visual changes;  No vertigo or throat pain   NECK: No pain or stiffness  RESPIRATORY: No cough, wheezing, hemoptysis; No shortness of breath  CARDIOVASCULAR: No chest pain or palpitations  GASTROINTESTINAL: No abdominal or epigastric pain. No nausea, vomiting, or hematemesis; No diarrhea or constipation. No melena or hematochezia.  GENITOURINARY: No dysuria, frequency or hematuria  NEUROLOGICAL: No numbness or weakness  SKIN: No itching, burning, rashes, or lesions   All other review of systems is negative unless indicated above    Vital Signs Last 24 Hrs  T(C): 37.1 (15 May 2018 17:09), Max: 37.1 (15 May 2018 17:09)  T(F): 98.8 (15 May 2018 17:09), Max: 98.8 (15 May 2018 17:09)  HR: 109 (15 May 2018 17:09) (79 - 121)  BP: 95/59 (15 May 2018 17:09) (95/59 - 142/78)  BP(mean): --  RR: 17 (15 May 2018 10:57) (16 - 17)  SpO2: 96% (15 May 2018 17:09) (96% - 100%)    I&O's Summary      CAPILLARY BLOOD GLUCOSE          PHYSICAL EXAM:    HEENT- no oral lesions noted,   Heart- S1 S2 reg  Lungs- CTA b/l  Abd- Soft NT  Ext- no edema  Skin- no rashes  Musculoskelatal- FROM all joints, no active synovitis noted, chnages in hands c/w RA, ulnar drift, subluxation of left thumb, left TKR, hips, knees with FROM.   Neurological- intact strength upper and lower extremities    MEDICATIONS:  MEDICATIONS  (STANDING):  amiodarone    Tablet 200 milliGRAM(s) Oral daily  aspirin  chewable 81 milliGRAM(s) Oral daily  docusate sodium Oral Tab/Cap - Peds 300 milliGRAM(s) Oral at bedtime  enalapril 2.5 milliGRAM(s) Oral daily  metoprolol succinate ER 25 milliGRAM(s) Oral daily  oxybutynin 5 milliGRAM(s) Oral two times a day  oxyCODONE    5 mG/acetaminophen 325 mG 1 Tablet(s) Oral two times a day  polyethylene glycol 3350 17 Gram(s) Oral daily      LABS: All Labs Reviewed:                        8.7    5.09  )-----------( 214      ( 14 May 2018 17:33 )             26.5     05-15    139  |  104  |  22  ----------------------------<  114<H>  3.7   |  27  |  1.12    Ca    8.4<L>      15 May 2018 03:48  Phos  3.1     05-15  Mg     2.2     05-15    TPro  5.8<L>  /  Alb  3.0<L>  /  TBili  0.7  /  DBili  x   /  AST  27  /  ALT  37  /  AlkPhos  45  05-14      CARDIAC MARKERS ( 15 May 2018 07:08 )  0.042 ng/mL / x     / x     / x     / x      CARDIAC MARKERS ( 15 May 2018 03:48 )  0.040 ng/mL / x     / x     / x     / x      CARDIAC MARKERS ( 15 May 2018 01:29 )  0.047 ng/mL / x     / x     / x     / x      CARDIAC MARKERS ( 14 May 2018 19:00 )  0.039 ng/mL / x     / x     / x     / x      CARDIAC MARKERS ( 14 May 2018 09:54 )  0.031 ng/mL / x     / x     / x     / x          Blood Culture: Organism --  Gram Stain Blood -- Gram Stain --  Specimen Source .Blood None  Culture-Blood --    Organism --  Gram Stain Blood -- Gram Stain --  Specimen Source .Blood Blood-Peripheral  Culture-Blood --        RADIOLOGY/EKG:    A/P

## 2018-05-15 NOTE — PHYSICAL THERAPY INITIAL EVALUATION ADULT - PERTINENT HX OF CURRENT PROBLEM, REHAB EVAL
Pt admitted to  secondary to chest pain and SOB. CT angio: neg for PE.  Pt recently in hospital for acute on chronic CHF. Troponin: 5/15-0.047.

## 2018-05-15 NOTE — PROVIDER CONTACT NOTE (OTHER) - RECOMMENDATIONS
Dr. Lynne made aware. Pain 7/10 dull radiating to left hand. no sob. VSS. labs ordred. maalox and aspirin 81 given.  Nitro SL ordered and given.

## 2018-05-15 NOTE — CHART NOTE - NSCHARTNOTEFT_GEN_A_CORE
RN to MD: Pt c/o of nausea     pt was seen and examined at bedside. Pt is resting comfortably in bed and denies any nausea after drinking gingerale. Pt denies any chest pain, palpitations, sob, wheezing, nausea, vomiting, abdominal pain,  back pain, jaw pain or left arm pain.     Vitals: HR 91, BP: 129/69, 97 room air, RR 17   General: NAD, resting peacefully in bed  Lungs: CTA b/l no wheezing, no crackles, rales or ronchi  CVS: normal s1 and s2  abdomen: Soft NT, +BS, no guarding or rigidity      87F admitted for chest pain c/o of nausea  - no nausea when seen at bedside  - no chest pain palpitations  - pt is stable resting comfortably at bedside  - no intervention at this time

## 2018-05-15 NOTE — PROVIDER CONTACT NOTE (CRITICAL VALUE NOTIFICATION) - ACTION/TREATMENT ORDERED:
Stat EKG. stat BNP, Mag and phos, 2 troponins ordered.  patient asymptomatic. will continue to monitor.

## 2018-05-15 NOTE — CHART NOTE - NSCHARTNOTEFT_GEN_A_CORE
RN to MD: Pt 3rd Troponin Level came back 0.047    Pt was seen at bedside and is resting comfortably, however, reports she is having 5/10 intermittent pressure like feeling in her chest. Pt denies any left arm pain, neck pain, back pain, sob, wheezing, chest congestion, palpitations, n/v diarrhea or constipation. However, pt reports she has indigestion and gas.  Pt feels anxious cause she keeps waking up at night.    Vitals: BP: 123/74, HR 81, RR 18, Temp 98.2 Oxygen 99% on 2L NC  General: NAD resting coMForatbly  CVS: normal s1 and s2 no murmur, gallops or rubs  lungs: CTA b/l no wheezing no crackles no rales  MUSK: + tenderness on palpation of chest    87F c/o of intermittent indigestion and chest pressure  - 3rd troponin I level is 0.047  - stat EKG - no changes from previous EKG on admission  - STAT BMP and mg2+ and phos  - Asprin 81mg   - Maalox for indigestion      case discussed with attending Dr. Grider

## 2018-05-15 NOTE — PHYSICAL THERAPY INITIAL EVALUATION ADULT - GENERAL OBSERVATIONS, REHAB EVAL
Pt found supine in bed with daughter present, tele monitor, and bed alarm activated. Pt with no c/o pain.

## 2018-05-15 NOTE — PROGRESS NOTE ADULT - SUBJECTIVE AND OBJECTIVE BOX
CC: CP /SOB    HPI: Patient is a 86 y/o/f w/ pmhx of Hx afib (not on AC due to pt's refusal of anticoagulation for stroke prophylaxis sec to hx of hematuria requiring PRBC transfusion 5 yrs ago), chronic systolic CHF, CAD admitted 5/14 with cc of chest pain.  According to the patient she has had a similar chest pains in the past. As per patient, she belives this is due to her RA. States that, chest pain worse with dep breathing, with on and off SOB and (l) sided jaw pain. Denies any associated symptoms of diaphoresis or back pain. Pain worse with movement. Patient's recently admitted with acute on chronic CHF and has been evaluated by Dr. Cervantes.     Subj: C/o HA and nausea this AM better w/ Tylenol and     ROS: stated above.     Vital Signs Last 24 Hrs  T(C): 36.7 (15 May 2018 10:57), Max: 37.2 (14 May 2018 15:56)  T(F): 98.1 (15 May 2018 10:57), Max: 98.9 (14 May 2018 15:56)  HR: 84 (15 May 2018 10:57) (79 - 121)  BP: 111/71 (15 May 2018 10:57) (95/70 - 142/78)  BP(mean): --  RR: 17 (15 May 2018 10:57) (16 - 19)  SpO2: 100% (15 May 2018 10:57) (97% - 100%)    PHYSICAL EXAM:  Constitutional: NAD, awake and alert, well-developed  HEENT: PERR, EOMI, Normal Hearing, MMM  Neck: Soft and supple, No LAD, No JVD  Respiratory: rales on bases. No wheezing.   Cardiovascular: S1 and S2, regular rate and rhythm, no Murmurs, gallops or rubs  Gastrointestinal: Bowel Sounds present, soft, nontender, nondistended, no guarding, no rebound  Extremities: No peripheral edema  Vascular: 2+ peripheral pulses  Neurological: A/O x 3, no focal deficits  Musculoskeletal: 5/5 strength b/l upper and lower extremities  Skin: No rashes      MEDICATIONS  (STANDING):  amiodarone    Tablet 200 milliGRAM(s) Oral daily  aspirin  chewable 81 milliGRAM(s) Oral daily  docusate sodium Oral Tab/Cap - Peds 300 milliGRAM(s) Oral at bedtime  enalapril 2.5 milliGRAM(s) Oral daily  metoprolol succinate ER 25 milliGRAM(s) Oral daily  oxybutynin 5 milliGRAM(s) Oral two times a day  oxyCODONE    5 mG/acetaminophen 325 mG 1 Tablet(s) Oral two times a day  polyethylene glycol 3350 17 Gram(s) Oral daily    LABS: All Labs Reviewed:                        8.7    5.09  )-----------( 214      ( 14 May 2018 17:33 )             26.5     05-15    139  |  104  |  22  ----------------------------<  114<H>  3.7   |  27  |  1.12    Ca    8.4<L>      15 May 2018 03:48  Phos  3.1     05-15  Mg     2.2     05-15    TPro  5.8<L>  /  Alb  3.0<L>  /  TBili  0.7  /  DBili  x   /  AST  27  /  ALT  37  /  AlkPhos  45  05-14    CARDIAC MARKERS ( 15 May 2018 07:08 )  0.042 ng/mL / x     / x     / x     / x      CARDIAC MARKERS ( 15 May 2018 03:48 )  0.040 ng/mL / x     / x     / x     / x      CARDIAC MARKERS ( 15 May 2018 01:29 )  0.047 ng/mL / x     / x     / x     / x      CARDIAC MARKERS ( 14 May 2018 19:00 )  Serum Pro-Brain Natriuretic Peptide: 12636 pg/mL (05.15.18 @ 07:08)    CT Angio Chest PE Protocol w/ IV Cont (05.14.18 @ 11:49) >  Evaluation of bibasilar subsegmental pulmonary arteries was limited by   motion. There is however no obvious pulmonary embolus.     Severe cardiomegaly, right-sided heart failure and mild pulmonary edema.    Extensive atherosclerotic disease of the aorta and branch vessels. Mild   small bowel ileus, nonspecific, however correlate clinically for bowel   ischemia. Sigmoid diverticulosis. Incidental comments as above.

## 2018-05-15 NOTE — PROVIDER CONTACT NOTE (OTHER) - SITUATION
patient admitted for chest pain. Over night patient c/o intermittent nausea/epigastric pain.  Patient c/o chest pain around 5 am,
notified service; spoke to Rosalee

## 2018-05-15 NOTE — CONSULT NOTE ADULT - ASSESSMENT
Chest pain - Chest pain is atypical in nature. So far cardiac enzymes and EKG did not reveal any ischemia. Her T wave changes on EKG were similar in her old EKGs.   Outpt ischemic heart disease evaluation recommended.  f/u with cardiologist as outpt.     Acute decompensated biventricular failure- NYHA class III- hypervolemic,known LVEF of 29%-  Will order one dose iv lasix this am.  DC po lasix for now.  She is having RV failure symptoms equally as left heart failure symptoms.  Check BNP.  Continue ACEI.  Will switch metoprolol to toprol.   Diuresis with close monitoring of the renal function and electrolytes.  Goal potassium of 4 and magnesium of 2.   Strict I/O and daily wt checks. Low sodium diet. Nutrition education.     Atrial fibrillation- maintaining sinus rythm.  Continue po amiodarone 200mg daily.  EP team following pt.  Off anticoagulation from the past since her hematuria.  Pt is adamant for a long time about not taking anticoagulation.      CAD - continue aspirin and betablocker.  Off statin secondary to myalgias in past.    Other medical issues- Management per primary team.   Thank you for allowing me to participate in the care of this patient. Please feel free to contact me with any questions.
Patient is a 88 y/o/f w/ pmhx of Hx afib (not on AC due to pt's refusal of anticoagulation for stroke prophylaxis sec to hx of hematuria requiring PRBC transfusion 5 yrs ago), chronic systolic CHF, CAD admitted 5/14 with cc of chest pain.  According to the patient she has had a similar chest pains in the past.   Rheumatology service was asked to see the patient because she has a diagnosis of rheumatoid arthritis. Upon further questioning she has not been on medication in many years but has a diagnosis for the last 20 years. 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    Upon extensive questioning she denies use of Diamox or TNF inhibitors and does not recall the name of the medication that she was using in the past. Currently her rheumatoid arthritis does not appear to be active.    Rheumatoid arthritis-  -Long-standing RA, may be quiescent at this time.  -I did discuss with her the treatment options for rheumatoid arthritis, rheumatoid arthritis is treated as an outpatient.  -At this time there is no indication for additional intervention especially in view of the fact that she was admitted with chest pain.  -She has to discuss with her daughter whether she can come to [place] and see our practice, I did otherwise suggest that she should discuss with her primary care physician and see a rheumatologist in the future.  - she denies oral steroid use in the past.   -She is anemic and will require workup for that as well.  -Please recall as needed.

## 2018-05-15 NOTE — PROVIDER CONTACT NOTE (OTHER) - ACTION/TREATMENT ORDERED:
Patient now complaining of generalized pain. Patient given percocet earlier. will continue to monitor.

## 2018-05-15 NOTE — PROGRESS NOTE ADULT - ASSESSMENT
# Atypical Chest pain 2/2 Acute on Chronic Right Sided Systolic CHF  - no signs of ACS, negative trops X 4. Stable EKG except for mildly prolonged Qtc.   - continue Asprin / Amiodarone for A. fib, Metoprolol 25mg ER.   - may need to uptitrate BB if persistent A. fib HR >130's.   - elevated proBNP, appreciate cardiac eval and recs.   - ECHO in Feb w/ EF 30%  - s/p IV Lasix this AM, repeat BMP in AM.   - low salt diet.   - daily weights /  strict i/o    # Mild Small Bowl Ileus - no abd pain, mild nausea. Had large BM yesterday and small one this AM.       Problem/Plan - 3:  ·  Problem: Dyslipidemia.  Plan: - not on STATIN therapy but should be given high LDL.     Problem/Plan - 4:  ·  Problem: Essential hypertension.  Plan: - continue with BB / lasix / lisinopril.     Problem/Plan - 5:  ·  Problem: Paroxysmal atrial fibrillation.  Plan: - rhythm control - amiodarone  - patient has QT prolongation  - not on AC due to pt's refusal of anticoagulation for stroke prophylaxis sec to hx of hematuria requiring PRBC transfusion 5 yrs ago.     Problem/Plan - 6:  Problem: Osteoarthritis of multiple joints, unspecified osteoarthritis type. Plan: - patient has an RA  - does not know which rheumatologist she is following up  - Rheumatologist hao pending.    Problem/Plan - 7:  ·  Problem: Gastroesophageal reflux disease without esophagitis.  Plan: - continue with home meds.     Problem/Plan - 8:  ·  Problem: Anemia.  Plan: - H/H - 9.6  - iron studies, B12.     Dispo: remain inpatient on telemetry. Discussed w/ Cardiology and RN.   Total time > 45 mins.

## 2018-05-16 LAB
ANION GAP SERPL CALC-SCNC: 7 MMOL/L — SIGNIFICANT CHANGE UP (ref 5–17)
BUN SERPL-MCNC: 23 MG/DL — SIGNIFICANT CHANGE UP (ref 7–23)
CALCIUM SERPL-MCNC: 8.2 MG/DL — LOW (ref 8.5–10.1)
CHLORIDE SERPL-SCNC: 104 MMOL/L — SIGNIFICANT CHANGE UP (ref 96–108)
CO2 SERPL-SCNC: 28 MMOL/L — SIGNIFICANT CHANGE UP (ref 22–31)
CREAT SERPL-MCNC: 1.14 MG/DL — SIGNIFICANT CHANGE UP (ref 0.5–1.3)
FERRITIN SERPL-MCNC: 46 NG/ML — SIGNIFICANT CHANGE UP (ref 15–150)
GLUCOSE SERPL-MCNC: 94 MG/DL — SIGNIFICANT CHANGE UP (ref 70–99)
HCT VFR BLD CALC: 26.5 % — LOW (ref 34.5–45)
HGB BLD-MCNC: 8.7 G/DL — LOW (ref 11.5–15.5)
IRON SATN MFR SERPL: 20 UG/DL — LOW (ref 30–160)
IRON SATN MFR SERPL: 8 % — LOW (ref 14–50)
MCHC RBC-ENTMCNC: 27.2 PG — SIGNIFICANT CHANGE UP (ref 27–34)
MCHC RBC-ENTMCNC: 32.8 GM/DL — SIGNIFICANT CHANGE UP (ref 32–36)
MCV RBC AUTO: 82.8 FL — SIGNIFICANT CHANGE UP (ref 80–100)
NRBC # BLD: 0 /100 WBCS — SIGNIFICANT CHANGE UP (ref 0–0)
PLATELET # BLD AUTO: 190 K/UL — SIGNIFICANT CHANGE UP (ref 150–400)
POTASSIUM SERPL-MCNC: 3.9 MMOL/L — SIGNIFICANT CHANGE UP (ref 3.5–5.3)
POTASSIUM SERPL-SCNC: 3.9 MMOL/L — SIGNIFICANT CHANGE UP (ref 3.5–5.3)
RBC # BLD: 3.2 M/UL — LOW (ref 3.8–5.2)
RBC # FLD: 14.8 % — HIGH (ref 10.3–14.5)
SODIUM SERPL-SCNC: 139 MMOL/L — SIGNIFICANT CHANGE UP (ref 135–145)
TIBC SERPL-MCNC: 266 UG/DL — SIGNIFICANT CHANGE UP (ref 220–430)
TRANSFERRIN SERPL-MCNC: 282 MG/DL — SIGNIFICANT CHANGE UP (ref 200–360)
UIBC SERPL-MCNC: 246 UG/DL — SIGNIFICANT CHANGE UP (ref 110–370)
WBC # BLD: 5.3 K/UL — SIGNIFICANT CHANGE UP (ref 3.8–10.5)
WBC # FLD AUTO: 5.3 K/UL — SIGNIFICANT CHANGE UP (ref 3.8–10.5)

## 2018-05-16 RX ORDER — FUROSEMIDE 40 MG
20 TABLET ORAL DAILY
Qty: 0 | Refills: 0 | Status: DISCONTINUED | OUTPATIENT
Start: 2018-05-16 | End: 2018-05-16

## 2018-05-16 RX ORDER — FUROSEMIDE 40 MG
40 TABLET ORAL DAILY
Qty: 0 | Refills: 0 | Status: DISCONTINUED | OUTPATIENT
Start: 2018-05-16 | End: 2018-05-17

## 2018-05-16 RX ORDER — DIGOXIN 250 MCG
0.12 TABLET ORAL EVERY OTHER DAY
Qty: 0 | Refills: 0 | Status: DISCONTINUED | OUTPATIENT
Start: 2018-05-16 | End: 2018-05-17

## 2018-05-16 RX ORDER — LACTULOSE 10 G/15ML
30 SOLUTION ORAL
Qty: 0 | Refills: 0 | COMMUNITY

## 2018-05-16 RX ADMIN — OXYCODONE AND ACETAMINOPHEN 1 TABLET(S): 5; 325 TABLET ORAL at 17:28

## 2018-05-16 RX ADMIN — Medication 5 MILLIGRAM(S): at 06:44

## 2018-05-16 RX ADMIN — OXYCODONE AND ACETAMINOPHEN 1 TABLET(S): 5; 325 TABLET ORAL at 06:20

## 2018-05-16 RX ADMIN — Medication 25 MILLIGRAM(S): at 05:07

## 2018-05-16 RX ADMIN — POLYETHYLENE GLYCOL 3350 17 GRAM(S): 17 POWDER, FOR SOLUTION ORAL at 12:17

## 2018-05-16 RX ADMIN — Medication 2.5 MILLIGRAM(S): at 05:09

## 2018-05-16 RX ADMIN — OXYCODONE AND ACETAMINOPHEN 1 TABLET(S): 5; 325 TABLET ORAL at 07:54

## 2018-05-16 RX ADMIN — Medication 81 MILLIGRAM(S): at 12:17

## 2018-05-16 RX ADMIN — Medication 650 MILLIGRAM(S): at 22:21

## 2018-05-16 RX ADMIN — Medication 5 MILLIGRAM(S): at 05:07

## 2018-05-16 RX ADMIN — Medication 5 MILLIGRAM(S): at 17:21

## 2018-05-16 RX ADMIN — Medication 40 MILLIGRAM(S): at 17:20

## 2018-05-16 RX ADMIN — Medication 0.12 MILLIGRAM(S): at 17:20

## 2018-05-16 RX ADMIN — Medication 20 MILLIGRAM(S): at 15:06

## 2018-05-16 RX ADMIN — AMIODARONE HYDROCHLORIDE 200 MILLIGRAM(S): 400 TABLET ORAL at 05:07

## 2018-05-16 RX ADMIN — OXYCODONE AND ACETAMINOPHEN 1 TABLET(S): 5; 325 TABLET ORAL at 17:20

## 2018-05-16 NOTE — PROGRESS NOTE ADULT - ASSESSMENT
Chest pain - Chest pain is atypical in nature. So far cardiac enzymes and EKG did not reveal any ischemia. Her T wave changes on EKG were similar in her old EKGs.   Outpt ischemic heart disease evaluation recommended.  f/u with cardiologist as outpt.     Acute decompensated biventricular failure- NYHA class III- hypervolemic,known LVEF of 29%-  Continue lasix 40mg iv daily  Holding ACEI and uptitrating beta blocker     Diuresis with close monitoring of the renal function and electrolytes.  Goal potassium of 4 and magnesium of 2.   Strict I/O and daily wt checks. Low sodium diet. Nutrition education.     Atrial fibrillation- poor rate control.   Increase beta blocker.W  Will start her on digoxin.   Continue po amiodarone 200mg daily.  EP team following pt.  Off anticoagulation from the past since her hematuria.  Pt is adamant for a long time about not taking anticoagulation.      CAD - continue aspirin and betablocker.  Off statin secondary to myalgias in past.    Other medical issues- Management per primary team.   Thank you for allowing me to participate in the care of this patient. Please feel free to contact me with any questions.

## 2018-05-16 NOTE — PROGRESS NOTE ADULT - ASSESSMENT
# Atypical Chest pain 2/2 Acute on Chronic Right Sided Systolic CHF  - no signs of ACS, negative trops X 4. Stable EKG except for mildly prolonged Qtc.   - continue Asprin / Amiodarone for A. fib, Metoprolol 25mg ER.   - STILL WITH INTERMITTENT UNCONTROLLED A. FIB --> POSSIBLY START DIG? VS STOP ACEi AND UPTITRATE BB. AWAITING CARDIAC RECS.  - may need to uptitrate BB if persistent A. fib HR >130's.   - elevated proBNP, appreciate cardiac eval and recs.   - ECHO in Feb w/ EF 30%  - s/p IV Lasix YESTERDAY --> needs PO today.  - low salt diet.   - daily weights /  strict i/o    # Mild Small Bowl Ileus - no abd pain and has had multiple BM's. Miralax works per pt.       Problem/Plan - 3:  ·  Problem: Dyslipidemia.  Plan: - not on STATIN therapy but should be given high LDL. Will start Atorvastatin low dose QHS.    Problem/Plan - 4:  ·  Problem: Essential hypertension.  Plan: - continue with BB / lasix / lisinopril.     Problem/Plan - 5:  ·  Problem: Paroxysmal atrial fibrillation.  Plan: - rhythm control - amiodarone  - patient has QT prolongation  - not on AC due to pt's refusal of anticoagulation for stroke prophylaxis sec to hx of hematuria requiring PRBC transfusion 5 yrs ago.     Problem/Plan - 6:  Problem: Osteoarthritis of multiple joints, unspecified osteoarthritis type. Plan: - patient has an RA  - does not know which rheumatologist she is following up  - Rheumatologist eval pending.    Problem/Plan - 7:  ·  Problem: Gastroesophageal reflux disease without esophagitis.  Plan: - continue with home meds.     Problem/Plan - 8:  ·  Problem: Anemia.  Plan: - H/H -8.7  - iron studies, B12.     Dispo: remain inpatient on telemetry. Discussed w/ Cardiology and RN.   Total time > 45 mins. Tentative dc tomorrow to [facility].

## 2018-05-16 NOTE — PROGRESS NOTE ADULT - SUBJECTIVE AND OBJECTIVE BOX
CC: CP /SOB    HPI: Patient is a 88 y/o/f w/ pmhx of Hx afib (not on AC due to pt's refusal of anticoagulation for stroke prophylaxis sec to hx of hematuria requiring PRBC transfusion 5 yrs ago), chronic systolic CHF, CAD admitted 5/14 with cc of chest pain.  According to the patient she has had a similar chest pains in the past. As per patient, she belives this is due to her RA. States that, chest pain worse with dep breathing, with on and off SOB and (l) sided jaw pain. Denies any associated symptoms of diaphoresis or back pain. Pain worse with movement. Patient's recently admitted with acute on chronic CHF and has been evaluated by Dr. Cervantes.     Hospital course: Admitted for acute on chronic CHF exacerbation with improvement after IV Diuresis w/ lasix.     Subj: Feeling better, less SOB. Still with some rales. Good BM's. States at NH its hard to stay on low sodium diet that they don't adhere to her request.    ROS: stated above.     Vital Signs Last 24 Hrs  T(C): 36.6 (16 May 2018 04:39), Max: 37.1 (15 May 2018 17:09)  T(F): 97.9 (16 May 2018 04:39), Max: 98.8 (15 May 2018 17:09)  HR: 128 (16 May 2018 06:39) (84 - 128)  BP: 111/68 (16 May 2018 06:39) (95/59 - 111/71)  BP(mean): --  RR: 18 (16 May 2018 04:39) (17 - 18)  SpO2: 98% (16 May 2018 04:39) (96% - 100%)    PHYSICAL EXAM:  Constitutional: NAD, awake and alert, well-developed elderly female sitting up in chair.   HEENT: PERR, EOMI, Normal Hearing, MMM  Neck: Soft and supple, No LAD, No JVD  Respiratory: mild rales on bases. No wheezing.   Cardiovascular: S1 and S2, regular rate and rhythm, no Murmurs, gallops or rubs  Gastrointestinal: Bowel Sounds present, soft, nontender, nondistended, no guarding, no rebound  Extremities: No peripheral edema  Vascular: 2+ peripheral pulses  Neurological: A/O x 3, no focal deficits  Musculoskeletal: 5/5 strength b/l upper and lower extremities  Skin: No rashes      MEDICATIONS  (STANDING):  amiodarone    Tablet 200 milliGRAM(s) Oral daily  aspirin  chewable 81 milliGRAM(s) Oral daily  docusate sodium Oral Tab/Cap - Peds 300 milliGRAM(s) Oral at bedtime  enalapril 2.5 milliGRAM(s) Oral daily  metoprolol succinate ER 25 milliGRAM(s) Oral daily  oxybutynin 5 milliGRAM(s) Oral two times a day  oxyCODONE    5 mG/acetaminophen 325 mG 1 Tablet(s) Oral two times a day  polyethylene glycol 3350 17 Gram(s) Oral daily    LABS: All Labs Reviewed:                        8.7    5.30  )-----------( 190      ( 16 May 2018 05:52 )             26.5                           8.7    5.09  )-----------( 214      ( 14 May 2018 17:33 )             26.5   05-16    139  |  104  |  23  ----------------------------<  94  3.9   |  28  |  1.14    Ca    8.2<L>      16 May 2018 05:52  Phos  3.1     05-15  Mg     2.2     05-15    TPro  5.8<L>  /  Alb  3.0<L>  /  TBili  0.7  /  DBili  x   /  AST  27  /  ALT  37  /  AlkPhos  45  05-14  CARDIAC MARKERS ( 15 May 2018 07:08 )  0.042 ng/mL / x     / x     / x     / x      CARDIAC MARKERS ( 15 May 2018 03:48 )  0.040 ng/mL / x     / x     / x     / x      CARDIAC MARKERS ( 15 May 2018 01:29 )  0.047 ng/mL / x     / x     / x     / x      CARDIAC MARKERS ( 14 May 2018 19:00 )  Serum Pro-Brain Natriuretic Peptide: 06025 pg/mL (05.15.18 @ 07:08)    CT Angio Chest PE Protocol w/ IV Cont (05.14.18 @ 11:49) >  Evaluation of bibasilar subsegmental pulmonary arteries was limited by   motion. There is however no obvious pulmonary embolus.     Severe cardiomegaly, right-sided heart failure and mild pulmonary edema.    Extensive atherosclerotic disease of the aorta and branch vessels. Mild   small bowel ileus, nonspecific, however correlate clinically for bowel   ischemia. Sigmoid diverticulosis. Incidental comments as above.

## 2018-05-16 NOTE — PROGRESS NOTE ADULT - SUBJECTIVE AND OBJECTIVE BOX
Patient is a 87y old  Female who presents with a chief complaint of Chest pain.  HPI:  Patient is a 86 y/o/f w/ pmhx of Hx afib (not on AC due to pt's refusal of anticoagulation for stroke prophylaxis sec to hx of hematuria requiring PRBC transfusion 5 yrs ago), chronic systolic CHF, ischemic cardiomyopathy with known LVEF of 29%, s/p Biv ICD,  CAD admitted 5/14 with cc of chest pain.  According to the patient she has had a similar chest pains in the past. As per patient, she believes this is due to her RA. States that, chest pain worse with dep breathing, with on and off SOB and (l) sided jaw pain. Denies any associated symptoms of diaphoresis or back pain. Pain worse with movement. Patient's recently admitted with acute on chronic CHF and has been evaluated by Dr. Cervantes in last admission.    I called her daughter and per daughter pt since discharge from hospital has swelling of the face UE and LE and distension of the belly.  Her lasix increased to 40mg po daily as outpt.  Pts daughter was unable to bring her to the outpt visit since pt was noted to be in a "bad state".     5/16- Pt seen and examined by me today. Notes improvement in her SOB.         PAST MEDICAL & SURGICAL HISTORY:  Osteoarthritis of multiple joints, unspecified osteoarthritis type  Gastroesophageal reflux disease without esophagitis  Dyslipidemia  Essential hypertension  Paroxysmal atrial fibrillation  Coronary artery disease involving native coronary artery of native heart without angina pectoris  Chronic systolic congestive heart failure: EF 20-25%  AICD (automatic cardioverter/defibrillator) present      MEDICATIONS  (STANDING):  amiodarone    Tablet 200 milliGRAM(s) Oral daily  aspirin  chewable 81 milliGRAM(s) Oral daily  docusate sodium Oral Tab/Cap - Peds 300 milliGRAM(s) Oral at bedtime  enalapril 2.5 milliGRAM(s) Oral daily  furosemide    Tablet 40 milliGRAM(s) Oral daily  metoprolol tartrate 25 milliGRAM(s) Oral two times a day  oxybutynin 5 milliGRAM(s) Oral two times a day  oxyCODONE    5 mG/acetaminophen 325 mG 1 Tablet(s) Oral two times a day  polyethylene glycol 3350 17 Gram(s) Oral daily    MEDICATIONS  (PRN):      FAMILY HISTORY:  No pertinent family history in first degree relatives      SOCIAL HISTORY:    REVIEW OF SYSTEMS:  CONSTITUTIONAL:    No fatigue, malaise, lethargy.  No fever or chills.  HEENT:  Eyes:  No visual changes.     ENT:  No epistaxis.  No sinus pain.    RESPIRATORY:  No cough.  No wheeze.  No hemoptysis.  No shortness of breath.  CARDIOVASCULAR:  c/o chest pains.  No palpitations. No shortness of breath, No orthopnea or PND.  GASTROINTESTINAL:  No abdominal pain.  no nausea or vomiting.    GENITOURINARY:    No hematuria.    MUSCULOSKELETAL:  No musculoskeletal pain.  No joint swelling.  No arthritis.  NEUROLOGICAL:  No tingling or numbness or weakness.  PSYCHIATRIC:  No confusion  SKIN:  No rashes.    ENDOCRINE:  No unexplained weight loss.  No polydipsia.   HEMATOLOGIC:  No anemia.  No prolonged or excessive bleeding.   ALLERGIC AND IMMUNOLOGIC:  No pruritus.          Vital Signs Last 24 Hrs  T(C): 36.6 (15 May 2018 05:04), Max: 37.2 (14 May 2018 10:16)  T(F): 97.9 (15 May 2018 05:04), Max: 98.9 (14 May 2018 10:16)  HR: 79 (15 May 2018 06:27) (79 - 121)  BP: 112/68 (15 May 2018 06:27) (95/70 - 142/78)  BP(mean): --  RR: 17 (15 May 2018 03:53) (16 - 19)  SpO2: 99% (15 May 2018 05:04) (97% - 100%)    PHYSICAL EXAM-    Constitutional: The patient appears to be normal, well developed, well nourished and alert and oriented to time, place and person. The patient does not appear acutely ill.     Head: Head is normocephalic and atraumatic.      Neck: No jugular venous distention. No audible carotid bruits. There are strong carotid pulses bilaterally. positive JVD.     Cardiovascular: Regular rate and rhythm without S3, S4. No murmurs or rubs are appreciated.      Respiratory: rales at bases b/l     Abdomen: Soft, nontender, distended with positive bowel sounds.      Extremity: swelling of her hands.    Neurologic: The patient is alert and oriented.      Skin: No rash, no obvious lesions noted.      Psychiatric: The patient appears to be emotionally stable.      INTERPRETATION OF TELEMETRY: sinus 80-90/min, occasional pacing , atrial fibrillation with RVR intermittently.     ECG: Sinus rythm , L axis , poor R wave progression.     I&O's Detail      LABS:                        8.7    5.09  )-----------( 214      ( 14 May 2018 17:33 )             26.5     05-15    139  |  104  |  22  ----------------------------<  114<H>  3.7   |  27  |  1.12    Ca    8.4<L>      15 May 2018 03:48  Phos  3.1     05-15  Mg     2.2     05-15    TPro  5.8<L>  /  Alb  3.0<L>  /  TBili  0.7  /  DBili  x   /  AST  27  /  ALT  37  /  AlkPhos  45  05-14    CARDIAC MARKERS ( 15 May 2018 07:08 )  0.042 ng/mL / x     / x     / x     / x      CARDIAC MARKERS ( 15 May 2018 03:48 )  0.040 ng/mL / x     / x     / x     / x      CARDIAC MARKERS ( 15 May 2018 01:29 )  0.047 ng/mL / x     / x     / x     / x      CARDIAC MARKERS ( 14 May 2018 19:00 )  0.039 ng/mL / x     / x     / x     / x      CARDIAC MARKERS ( 14 May 2018 09:54 )  0.031 ng/mL / x     / x     / x     / x              I&O's Summary    BNP  RADIOLOGY & ADDITIONAL STUDIES:

## 2018-05-17 ENCOUNTER — TRANSCRIPTION ENCOUNTER (OUTPATIENT)
Age: 83
End: 2018-05-17

## 2018-05-17 VITALS — WEIGHT: 134.04 LBS

## 2018-05-17 LAB
ANION GAP SERPL CALC-SCNC: 9 MMOL/L — SIGNIFICANT CHANGE UP (ref 5–17)
BUN SERPL-MCNC: 26 MG/DL — HIGH (ref 7–23)
CALCIUM SERPL-MCNC: 8.1 MG/DL — LOW (ref 8.5–10.1)
CHLORIDE SERPL-SCNC: 104 MMOL/L — SIGNIFICANT CHANGE UP (ref 96–108)
CO2 SERPL-SCNC: 28 MMOL/L — SIGNIFICANT CHANGE UP (ref 22–31)
CREAT SERPL-MCNC: 1.33 MG/DL — HIGH (ref 0.5–1.3)
GLUCOSE SERPL-MCNC: 100 MG/DL — HIGH (ref 70–99)
POTASSIUM SERPL-MCNC: 3.8 MMOL/L — SIGNIFICANT CHANGE UP (ref 3.5–5.3)
POTASSIUM SERPL-SCNC: 3.8 MMOL/L — SIGNIFICANT CHANGE UP (ref 3.5–5.3)
SODIUM SERPL-SCNC: 141 MMOL/L — SIGNIFICANT CHANGE UP (ref 135–145)

## 2018-05-17 RX ORDER — ONDANSETRON 8 MG/1
1 TABLET, FILM COATED ORAL
Qty: 30 | Refills: 0 | OUTPATIENT
Start: 2018-05-17

## 2018-05-17 RX ORDER — DIGOXIN 250 MCG
1 TABLET ORAL
Qty: 15 | Refills: 0 | OUTPATIENT
Start: 2018-05-17 | End: 2018-06-15

## 2018-05-17 RX ORDER — ATORVASTATIN CALCIUM 80 MG/1
1 TABLET, FILM COATED ORAL
Qty: 30 | Refills: 0 | OUTPATIENT
Start: 2018-05-17 | End: 2018-06-15

## 2018-05-17 RX ADMIN — OXYCODONE AND ACETAMINOPHEN 1 TABLET(S): 5; 325 TABLET ORAL at 05:12

## 2018-05-17 RX ADMIN — Medication 81 MILLIGRAM(S): at 11:19

## 2018-05-17 RX ADMIN — Medication 40 MILLIGRAM(S): at 05:12

## 2018-05-17 RX ADMIN — Medication 5 MILLIGRAM(S): at 05:12

## 2018-05-17 RX ADMIN — AMIODARONE HYDROCHLORIDE 200 MILLIGRAM(S): 400 TABLET ORAL at 05:12

## 2018-05-17 RX ADMIN — Medication 25 MILLIGRAM(S): at 05:12

## 2018-05-17 RX ADMIN — Medication 2.5 MILLIGRAM(S): at 05:13

## 2018-05-17 NOTE — DISCHARGE NOTE ADULT - PLAN OF CARE
Improvement in breathing NO salt in diet, take Lasix daily as prescribed and measure daily weights. Rate control Start new Digoxin every other day

## 2018-05-17 NOTE — PROGRESS NOTE ADULT - ASSESSMENT
Chest pain - Chest pain is atypical in nature. So far cardiac enzymes and EKG did not reveal any ischemia. Her T wave changes on EKG were similar in her old EKGs.   Outpt ischemic heart disease evaluation recommended.  f/u with cardiologist as outpt.     Acute decompensated biventricular failure- NYHA class III- euvolemic.  will dc iv lasix and will resume po from tomorrow.  f/u in one week in the heart failure clinic with me as outpt.    Diuresis with close monitoring of the renal function and electrolytes.  Goal potassium of 4 and magnesium of 2.   Strict I/O and daily wt checks. Low sodium diet. Nutrition education.     Atrial fibrillation- rate controlled .   cotinue beta blocker and digoxin.   Off anticoagulation from the past since her hematuria.  Pt is adamant for a long time about not taking anticoagulation.      CAD - continue aspirin and betablocker.  Off statin secondary to myalgias in past.    Other medical issues- Management per primary team.   Thank you for allowing me to participate in the care of this patient. Please feel free to contact me with any questions.

## 2018-05-17 NOTE — DISCHARGE NOTE ADULT - MEDICATION SUMMARY - MEDICATIONS TO CHANGE
I will SWITCH the dose or number of times a day I take the medications listed below when I get home from the hospital:    oxyCODONE-acetaminophen 5 mg-325 mg oral tablet  -- 1 tab(s) by mouth 2 times a day

## 2018-05-17 NOTE — DISCHARGE NOTE ADULT - ADDITIONAL INSTRUCTIONS
Dr. Denny Cardiologist. appointment with Dr. Ballard on Thursday,  May 24th at 1:15pm at [address] [place] [address]

## 2018-05-17 NOTE — DISCHARGE NOTE ADULT - CARE PROVIDER_API CALL
Radha Ballard (SANCHEZ), Cardiovascular Disease; Internal Medicine  [address] [place] [address]  Phone: [phone]  Fax: [phone]

## 2018-05-17 NOTE — DISCHARGE NOTE ADULT - OTHER SIGNIFICANT FINDINGS
Serum Pro-Brain Natriuretic Peptide: 16937 pg/mL (05.15.18 @ 07:08)    CT Angio Chest PE Protocol w/ IV Cont (05.14.18 @ 11:49) >  Evaluation of bibasilar subsegmental pulmonary arteries was limited by   motion. There is however no obvious pulmonary embolus.   05-17    141  |  104  |  26<H>  ----------------------------<  100<H>  3.8   |  28  |  1.33<H>    Ca    8.1<L>      17 May 2018 05:47

## 2018-05-17 NOTE — DISCHARGE NOTE ADULT - CARE PLAN
Principal Discharge DX:	CHF (congestive heart failure)  Goal:	Improvement in breathing  Assessment and plan of treatment:	NO salt in diet, take Lasix daily as prescribed and measure daily weights.  Secondary Diagnosis:	Paroxysmal atrial fibrillation  Goal:	Rate control  Assessment and plan of treatment:	Start new Digoxin every other day

## 2018-05-17 NOTE — PROGRESS NOTE ADULT - SUBJECTIVE AND OBJECTIVE BOX
Patient is a 87y old  Female who presents with a chief complaint of Chest pain.  HPI:  Patient is a 86 y/o/f w/ pmhx of Hx afib (not on AC due to pt's refusal of anticoagulation for stroke prophylaxis sec to hx of hematuria requiring PRBC transfusion 5 yrs ago), chronic systolic CHF, ischemic cardiomyopathy with known LVEF of 29%, s/p Biv ICD,  CAD admitted 5/14 with cc of chest pain.  According to the patient she has had a similar chest pains in the past. As per patient, she believes this is due to her RA. States that, chest pain worse with dep breathing, with on and off SOB and (l) sided jaw pain. Denies any associated symptoms of diaphoresis or back pain. Pain worse with movement. Patient's recently admitted with acute on chronic CHF and has been evaluated by Dr. Crevantes in last admission.    I called her daughter and per daughter pt since discharge from hospital has swelling of the face UE and LE and distension of the belly.  Her lasix increased to 40mg po daily as outpt.  Pts daughter was unable to bring her to the outpt visit since pt was noted to be in a "bad state".     5/16- Pt seen and examined by me today. Notes improvement in her SOB.     5/17- pt seen and examined by me today.  She denies any AYERS.  Pt states that in her assited living facility when they delay administration of meds for arthritis she is having mild chest pressure with that stress.     PAST MEDICAL & SURGICAL HISTORY:  Osteoarthritis of multiple joints, unspecified osteoarthritis type  Gastroesophageal reflux disease without esophagitis  Dyslipidemia  Essential hypertension  Paroxysmal atrial fibrillation  Coronary artery disease involving native coronary artery of native heart without angina pectoris  Chronic systolic congestive heart failure: EF 20-25%  AICD (automatic cardioverter/defibrillator) present      MEDICATIONS  (STANDING):  amiodarone    Tablet 200 milliGRAM(s) Oral daily  aspirin  chewable 81 milliGRAM(s) Oral daily  docusate sodium Oral Tab/Cap - Peds 300 milliGRAM(s) Oral at bedtime  enalapril 2.5 milliGRAM(s) Oral daily  furosemide    Tablet 40 milliGRAM(s) Oral daily  metoprolol tartrate 25 milliGRAM(s) Oral two times a day  oxybutynin 5 milliGRAM(s) Oral two times a day  oxyCODONE    5 mG/acetaminophen 325 mG 1 Tablet(s) Oral two times a day  polyethylene glycol 3350 17 Gram(s) Oral daily    MEDICATIONS  (PRN):      FAMILY HISTORY:  No pertinent family history in first degree relatives      SOCIAL HISTORY:    REVIEW OF SYSTEMS:  CONSTITUTIONAL:    No fatigue, malaise, lethargy.  No fever or chills.  HEENT:  Eyes:  No visual changes.     ENT:  No epistaxis.  No sinus pain.    RESPIRATORY:  No cough.  No wheeze.  No hemoptysis.  No shortness of breath.  CARDIOVASCULAR:  c/o chest pains.  No palpitations. No shortness of breath, No orthopnea or PND.  GASTROINTESTINAL:  No abdominal pain.  no nausea or vomiting.    GENITOURINARY:    No hematuria.    MUSCULOSKELETAL:  No musculoskeletal pain.  No joint swelling.  No arthritis.  NEUROLOGICAL:  No tingling or numbness or weakness.  PSYCHIATRIC:  No confusion  SKIN:  No rashes.    ENDOCRINE:  No unexplained weight loss.  No polydipsia.   HEMATOLOGIC:  No anemia.  No prolonged or excessive bleeding.   ALLERGIC AND IMMUNOLOGIC:  No pruritus.          Vital Signs Last 24 Hrs  T(C): 36.6 (15 May 2018 05:04), Max: 37.2 (14 May 2018 10:16)  T(F): 97.9 (15 May 2018 05:04), Max: 98.9 (14 May 2018 10:16)  HR: 79 (15 May 2018 06:27) (79 - 121)  BP: 112/68 (15 May 2018 06:27) (95/70 - 142/78)  BP(mean): --  RR: 17 (15 May 2018 03:53) (16 - 19)  SpO2: 99% (15 May 2018 05:04) (97% - 100%)    PHYSICAL EXAM-    Constitutional: The patient appears to be normal, well developed, well nourished and alert and oriented to time, place and person. The patient does not appear acutely ill.     Head: Head is normocephalic and atraumatic.      Neck: No jugular venous distention. No audible carotid bruits. There are strong carotid pulses bilaterally. No JVD.     Cardiovascular: Regular rate and rhythm without S3, S4. No murmurs or rubs are appreciated.      Respiratory: CTA b/l     Abdomen: Soft, nontender, distended with positive bowel sounds.      Extremity: swelling of her hands.    Neurologic: The patient is alert and oriented.      Skin: No rash, no obvious lesions noted.      Psychiatric: The patient appears to be emotionally stable.      INTERPRETATION OF TELEMETRY: sinus 80-90/min, occasional pacing , atrial fibrillation with RVR intermittently.     ECG: Sinus rythm , L axis , poor R wave progression.     I&O's Detail      LABS:                        8.7    5.09  )-----------( 214      ( 14 May 2018 17:33 )             26.5     05-15    139  |  104  |  22  ----------------------------<  114<H>  3.7   |  27  |  1.12    Ca    8.4<L>      15 May 2018 03:48  Phos  3.1     05-15  Mg     2.2     05-15    TPro  5.8<L>  /  Alb  3.0<L>  /  TBili  0.7  /  DBili  x   /  AST  27  /  ALT  37  /  AlkPhos  45  05-14    CARDIAC MARKERS ( 15 May 2018 07:08 )  0.042 ng/mL / x     / x     / x     / x      CARDIAC MARKERS ( 15 May 2018 03:48 )  0.040 ng/mL / x     / x     / x     / x      CARDIAC MARKERS ( 15 May 2018 01:29 )  0.047 ng/mL / x     / x     / x     / x      CARDIAC MARKERS ( 14 May 2018 19:00 )  0.039 ng/mL / x     / x     / x     / x      CARDIAC MARKERS ( 14 May 2018 09:54 )  0.031 ng/mL / x     / x     / x     / x              I&O's Summary    BNP  RADIOLOGY & ADDITIONAL STUDIES:

## 2018-05-17 NOTE — DISCHARGE NOTE ADULT - MEDICATION SUMMARY - MEDICATIONS TO TAKE
I will START or STAY ON the medications listed below when I get home from the hospital:    aspirin 81 mg oral tablet, chewable  -- 1 tab(s) by mouth once a day  -- Indication: For A. fib    oxyCODONE-acetaminophen 5 mg-325 mg oral tablet  -- 1 tab(s) by mouth 3 times a day, As Needed for severe arthritis  -- Indication: For Severe pain    enalapril 2.5 mg oral tablet  -- 1 tab(s) by mouth once a day  -- Indication: For HTN    amiodarone 200 mg oral tablet  -- 1 tab(s) by mouth once a day  -- Indication: For A. fib    digoxin 125 mcg (0.125 mg) oral tablet  -- 1 tab(s) by mouth every other day  -- Indication: For A. fib    metoprolol tartrate 25 mg oral tablet  -- 1 tab(s) by mouth 2 times a day  -- Indication: For A. fib    Lasix 20 mg oral tablet  -- 2 tab(s) by mouth once a day  -- Indication: For CHF    docusate sodium 100 mg oral capsule  -- 3 cap(s) by mouth once a day (at bedtime)  -- Indication: For Constipation    polyethylene glycol 3350 oral powder for reconstitution  -- 17 gram(s) by mouth once a day  -- Indication: For Constipation    oxybutynin 5 mg oral tablet  -- 1 tab(s) by mouth 2 times a day  -- Indication: For urinary incontinence I will START or STAY ON the medications listed below when I get home from the hospital:    aspirin 81 mg oral tablet, chewable  -- 1 tab(s) by mouth once a day  -- Indication: For A. fib    oxyCODONE-acetaminophen 5 mg-325 mg oral tablet  -- 1 tab(s) by mouth 3 times a day, As Needed for severe arthritis  -- Indication: For Severe pain    enalapril 2.5 mg oral tablet  -- 1 tab(s) by mouth once a day  -- Indication: For HTN    amiodarone 200 mg oral tablet  -- 1 tab(s) by mouth once a day  -- Indication: For A. fib    digoxin 125 mcg (0.125 mg) oral tablet  -- 1 tab(s) by mouth every other day  -- Indication: For A. fib    atorvastatin 10 mg oral tablet  -- 1 tab(s) by mouth once a day (at bedtime)   -- Avoid grapefruit and grapefruit juice while taking this medication.  Do not take this drug if you are pregnant.  It is very important that you take or use this exactly as directed.  Do not skip doses or discontinue unless directed by your doctor.  Obtain medical advice before taking any non-prescription drugs as some may affect the action of this medication.  Take with food or milk.    -- Indication: For high cholesterol    metoprolol tartrate 25 mg oral tablet  -- 1 tab(s) by mouth 2 times a day  -- Indication: For A. fib    Lasix 20 mg oral tablet  -- 2 tab(s) by mouth once a day  -- Indication: For CHF    docusate sodium 100 mg oral capsule  -- 3 cap(s) by mouth once a day (at bedtime)  -- Indication: For Constipation    polyethylene glycol 3350 oral powder for reconstitution  -- 17 gram(s) by mouth once a day  -- Indication: For Constipation    oxybutynin 5 mg oral tablet  -- 1 tab(s) by mouth 2 times a day  -- Indication: For urinary incontinence I will START or STAY ON the medications listed below when I get home from the hospital:    aspirin 81 mg oral tablet, chewable  -- 1 tab(s) by mouth once a day  -- Indication: For A. fib    oxyCODONE-acetaminophen 5 mg-325 mg oral tablet  -- 1 tab(s) by mouth 3 times a day, As Needed for severe arthritis  -- Indication: For Severe pain    enalapril 2.5 mg oral tablet  -- 1 tab(s) by mouth once a day  -- Indication: For HTN    amiodarone 200 mg oral tablet  -- 1 tab(s) by mouth once a day  -- Indication: For A. fib    digoxin 125 mcg (0.125 mg) oral tablet  -- 1 tab(s) by mouth every other day  -- Indication: For A. fib    ondansetron 4 mg oral tablet  -- 1 tab(s) by mouth every 4 hours, As Needed for nausea  -- Indication: For nausea    atorvastatin 10 mg oral tablet  -- 1 tab(s) by mouth once a day (at bedtime)   -- Avoid grapefruit and grapefruit juice while taking this medication.  Do not take this drug if you are pregnant.  It is very important that you take or use this exactly as directed.  Do not skip doses or discontinue unless directed by your doctor.  Obtain medical advice before taking any non-prescription drugs as some may affect the action of this medication.  Take with food or milk.    -- Indication: For high cholesterol    metoprolol tartrate 25 mg oral tablet  -- 1 tab(s) by mouth 2 times a day  -- Indication: For A. fib    Lasix 20 mg oral tablet  -- 2 tab(s) by mouth once a day  -- Indication: For CHF    docusate sodium 100 mg oral capsule  -- 3 cap(s) by mouth once a day (at bedtime)  -- Indication: For Constipation    polyethylene glycol 3350 oral powder for reconstitution  -- 17 gram(s) by mouth once a day  -- Indication: For Constipation    oxybutynin 5 mg oral tablet  -- 1 tab(s) by mouth 2 times a day  -- Indication: For urinary incontinence

## 2018-05-17 NOTE — DISCHARGE NOTE ADULT - PATIENT PORTAL LINK FT
You can access the [facility] Patient Portal, offered by [facility], by registering with the following website: [email or web address]

## 2018-05-17 NOTE — DISCHARGE NOTE ADULT - HOSPITAL COURSE
CC: CP /SOB    HPI: Patient is a 86 y/o/f w/ pmhx of Hx afib (not on AC due to pt's refusal of anticoagulation for stroke prophylaxis sec to hx of hematuria requiring PRBC transfusion 5 yrs ago), chronic systolic CHF, CAD admitted 5/14 with cc of chest pain.  According to the patient she has had a similar chest pains in the past. As per patient, she belives this is due to her RA. States that, chest pain worse with dep breathing, with on and off SOB and (l) sided jaw pain. Denies any associated symptoms of diaphoresis or back pain. Pain worse with movement. Patient's recently admitted with acute on chronic CHF and has been evaluated by Dr. Cervantes.     Hospital course: Admitted for acute on chronic CHF exacerbation with improvement after IV Diuresis w/ lasix.     Subj: Seen ambulating in the hallway with PT using walker, no complaints. SOB resolved.     ROS: stated above.     Vital Signs Last 24 Hrs  T(C): 36.9 (17 May 2018 12:25), Max: 36.9 (17 May 2018 12:25)  T(F): 98.4 (17 May 2018 12:25), Max: 98.4 (17 May 2018 12:25)  HR: 79 (17 May 2018 12:25) (76 - 80)  BP: 105/50 (17 May 2018 12:25) (101/65 - 117/64)  BP(mean): --  RR: 18 (17 May 2018 12:25) (17 - 18)  SpO2: 100% (17 May 2018 12:25) (96% - 100%)    PHYSICAL EXAM:  Constitutional: NAD, awake and alert, well-developed elderly female appears comfortable.   HEENT: PERR, EOMI, Normal Hearing, MMM  Neck: Soft and supple, No LAD, No JVD  Respiratory: rales decreased, no active wheezing.   Cardiovascular: S1 and S2, regular rate and rhythm, no Murmurs, gallops or rubs  Gastrointestinal: Bowel Sounds present, soft, nontender, nondistended, no guarding, no rebound  Extremities: No peripheral edema  Vascular: 2+ peripheral pulses  Neurological: A/O x 3, no focal deficits  Musculoskeletal: 5/5 strength b/l upper and lower extremities  Skin: No rashes    All meds/ labs reviewed.     · Assessment		  # Atypical Chest pain 2/2 Acute on Chronic Right Sided Systolic CHF  - no signs of ACS, negative trops X 4. Stable EKG except for mildly prolonged Qtc.   - continue Asprin / Amiodarone for A. fib, Metoprolol 25mg ER.   - better rate control of A. fib w/ Digoxin --> new med for dc.     - elevated proBNP, appreciate cardiac eval and recs.   - ECHO in Feb w/ EF 30% --> cont daily Lasix PO 40mg.   - low salt diet.   - daily weights /  strict i/o    # Mild Small Bowl Ileus - no abd pain and has had multiple BM's. Miralax works per pt.       Problem/Plan - 3:  ·  Problem: Dyslipidemia.  Plan: - not on STATIN therapy but should be given high LDL. Will start Atorvastatin low dose QHS.    Problem/Plan - 4:  ·  Problem: Essential hypertension.  Plan: - continue with BB / lasix / lisinopril.     Problem/Plan - 5:  ·  Problem: Paroxysmal atrial fibrillation.  Plan: - rhythm control - amiodarone  - patient has QT prolongation  - not on AC due to pt's refusal of anticoagulation for stroke prophylaxis sec to hx of hematuria requiring PRBC transfusion 5 yrs ago.     Problem/Plan - 6:  Problem: Osteoarthritis of multiple joints, unspecified osteoarthritis type. Plan: - patient has an RA  - seen by Rheum here, percocet increased to 3x / day prn.     Problem/Plan - 7:  ·  Problem: Gastroesophageal reflux disease without esophagitis.  Plan: - continue with home meds.     Problem/Plan - 8:  ·  Problem: Anemia.  Plan: - H/H -8.7  - iron studies, B12.     Discussed w/ patient and staff.   Total time > 45 mins.  Dc to [facility].

## 2018-05-21 DIAGNOSIS — I11.0 HYPERTENSIVE HEART DISEASE WITH HEART FAILURE: ICD-10-CM

## 2018-05-21 DIAGNOSIS — D64.9 ANEMIA, UNSPECIFIED: ICD-10-CM

## 2018-05-21 DIAGNOSIS — I48.0 PAROXYSMAL ATRIAL FIBRILLATION: ICD-10-CM

## 2018-05-21 DIAGNOSIS — I25.10 ATHEROSCLEROTIC HEART DISEASE OF NATIVE CORONARY ARTERY WITHOUT ANGINA PECTORIS: ICD-10-CM

## 2018-05-21 DIAGNOSIS — M19.90 UNSPECIFIED OSTEOARTHRITIS, UNSPECIFIED SITE: ICD-10-CM

## 2018-05-21 DIAGNOSIS — K21.9 GASTRO-ESOPHAGEAL REFLUX DISEASE WITHOUT ESOPHAGITIS: ICD-10-CM

## 2018-05-21 DIAGNOSIS — R07.9 CHEST PAIN, UNSPECIFIED: ICD-10-CM

## 2018-05-21 DIAGNOSIS — M06.9 RHEUMATOID ARTHRITIS, UNSPECIFIED: ICD-10-CM

## 2018-05-21 DIAGNOSIS — Z79.82 LONG TERM (CURRENT) USE OF ASPIRIN: ICD-10-CM

## 2018-05-21 DIAGNOSIS — Z79.899 OTHER LONG TERM (CURRENT) DRUG THERAPY: ICD-10-CM

## 2018-05-21 DIAGNOSIS — Z91.018 ALLERGY TO OTHER FOODS: ICD-10-CM

## 2018-05-21 DIAGNOSIS — Z88.8 ALLERGY STATUS TO OTHER DRUGS, MEDICAMENTS AND BIOLOGICAL SUBSTANCES: ICD-10-CM

## 2018-05-21 DIAGNOSIS — E78.5 HYPERLIPIDEMIA, UNSPECIFIED: ICD-10-CM

## 2018-05-21 DIAGNOSIS — K56.7 ILEUS, UNSPECIFIED: ICD-10-CM

## 2018-05-21 DIAGNOSIS — I50.23 ACUTE ON CHRONIC SYSTOLIC (CONGESTIVE) HEART FAILURE: ICD-10-CM

## 2018-10-25 NOTE — H&P ADULT - NSHPREVIEWOFSYSTEMS_GEN_ALL_CORE
General: Pt denies recent weight loss/fever/chills    Neurological: denies numbness or  sensation loss    Cardiovascular: denies chest pain/palpitations/leg edema    Respiratory and Thorax: denies SOB/cough/wheezing    Gastrointestinal: denies abdominal pain/diarrhea/constipation/bloody stool    Genitourinary: denies urinary frequency/urgency/ dysuria    Musculoskeletal: denies joint pain or swelling, denies restricted motion    Hematologic: denies abnormal bleeding General: Pt denies recent weight loss/fever/chills    Neurological: denies numbness or  sensation loss    Cardiovascular: denies chest pain/palpitations/leg edema    Respiratory and Thorax: denies cough/wheezing (+)AYERS    Gastrointestinal: denies abdominal pain/diarrhea/constipation/bloody stool    Genitourinary: denies urinary frequency/urgency/ dysuria    Musculoskeletal: denies joint pain or swelling, denies restricted motion    Hematologic: denies abnormal bleeding

## 2018-10-25 NOTE — H&P ADULT - HISTORY OF PRESENT ILLNESS
This is a 87 y.o. female with PMHx of HTN, HLD, 3V CAD, ischemic cardiomyopathy (EF;30%), s/p ICD implantation, HFrEF (on Bumex), Afib (not on AC due to severe hematuria in the past), s/p Afib ablation presented to cardiologist office with c/o SOB, weight gain, and pedal edema. Pt was referred for CardioMems to prevent further decompensated episode and to manage CHF.

## 2018-10-25 NOTE — H&P ADULT - ASSESSMENT
This is a 87 y.o. female with PMHx of HTN, HLD, 3V CAD, ischemic cardiomyopathy (EF;30%), s/p ICD implantation, HFrEF (on Bumex), Afib (not on AC due to severe hematuria in the past), s/p Afib ablation presented to cardiologist office with c/o SOB, weight gain, and pedal edema. Pt was referred for CardioMems to prevent further decompensated episode and to manage CHF.  - plan for CardioMEMs   - risks and benefits of procedure reviewed, all questions answered   - informed consent obtained, pt verbalized understanding.

## 2018-10-25 NOTE — H&P ADULT - NSHPPHYSICALEXAM_GEN_ALL_CORE
Vital Signs : BP        HR       RR        BT             Constitutional: well developed, well nourished, no deformities and no acute distress    Neurological: Alert & Oriented x 3, GUTIERRES, no focal deficits    HEENT: NC/AT, PERRLA, EOMI,  Neck supple.    Respiratory: CTA B/L, No wheezing/crackles/rhonchi    Cardiovascular: (+) S1 & S2, RRR, No m/r/g    Gastrointestinal: soft, NT, nondistended, (+) BS    Genitourinary: non distended bladder, voiding freely    Extremities: No pedal edema, No clubbing, No cyanosis    Skin:  normal skin color and pigmentation, no skin lesions Vital Signs : /90       HR   75    RR 16                 Constitutional: well developed, well nourished, no deformities and no acute distress    Neurological: Alert & Oriented x 3, GUTIERRES, no focal deficits    HEENT: NC/AT, PERRLA, EOMI,  Neck supple.    Respiratory: (+)Rt basilar crackles    Cardiovascular: (+) S1 & S2, RRR, No m/r/g    Gastrointestinal: soft, NT, nondistended, (+) BS    Genitourinary: non distended bladder, voiding freely    Extremities: 1+ B/L pedal edema, No clubbing, No cyanosis    Skin:  normal skin color and pigmentation, no skin lesions

## 2018-10-25 NOTE — ASU PATIENT PROFILE, ADULT - NS PRO INFO GIVEN TO
family/Spoke to patient's daughter, Gabrielle.  Also spoke to nurse at [facility]/patient

## 2018-10-26 ENCOUNTER — OUTPATIENT (OUTPATIENT)
Dept: OUTPATIENT SERVICES | Facility: HOSPITAL | Age: 83
LOS: 1 days | Discharge: HOME | End: 2018-10-26

## 2018-10-26 VITALS
HEIGHT: 60 IN | RESPIRATION RATE: 16 BRPM | SYSTOLIC BLOOD PRESSURE: 149 MMHG | HEART RATE: 78 BPM | DIASTOLIC BLOOD PRESSURE: 91 MMHG | WEIGHT: 134.04 LBS | OXYGEN SATURATION: 97 %

## 2018-10-26 VITALS
OXYGEN SATURATION: 100 % | HEART RATE: 86 BPM | DIASTOLIC BLOOD PRESSURE: 61 MMHG | RESPIRATION RATE: 20 BRPM | SYSTOLIC BLOOD PRESSURE: 109 MMHG

## 2018-10-26 DIAGNOSIS — Z95.810 PRESENCE OF AUTOMATIC (IMPLANTABLE) CARDIAC DEFIBRILLATOR: Chronic | ICD-10-CM

## 2018-10-26 DIAGNOSIS — Z98.890 OTHER SPECIFIED POSTPROCEDURAL STATES: Chronic | ICD-10-CM

## 2018-10-26 LAB
ANION GAP SERPL CALC-SCNC: 8 MMOL/L — SIGNIFICANT CHANGE UP (ref 5–17)
BUN SERPL-MCNC: 23 MG/DL — SIGNIFICANT CHANGE UP (ref 7–23)
CALCIUM SERPL-MCNC: 9.1 MG/DL — SIGNIFICANT CHANGE UP (ref 8.5–10.1)
CHLORIDE SERPL-SCNC: 106 MMOL/L — SIGNIFICANT CHANGE UP (ref 96–108)
CO2 SERPL-SCNC: 26 MMOL/L — SIGNIFICANT CHANGE UP (ref 22–31)
CREAT SERPL-MCNC: 1.16 MG/DL — SIGNIFICANT CHANGE UP (ref 0.5–1.3)
GLUCOSE SERPL-MCNC: 91 MG/DL — SIGNIFICANT CHANGE UP (ref 70–99)
HCT VFR BLD CALC: 32 % — LOW (ref 34.5–45)
HGB BLD-MCNC: 10.4 G/DL — LOW (ref 11.5–15.5)
MCHC RBC-ENTMCNC: 26.7 PG — LOW (ref 27–34)
MCHC RBC-ENTMCNC: 32.5 GM/DL — SIGNIFICANT CHANGE UP (ref 32–36)
MCV RBC AUTO: 82.1 FL — SIGNIFICANT CHANGE UP (ref 80–100)
NRBC # BLD: 0 /100 WBCS — SIGNIFICANT CHANGE UP (ref 0–0)
PLATELET # BLD AUTO: 211 K/UL — SIGNIFICANT CHANGE UP (ref 150–400)
POTASSIUM SERPL-MCNC: 3.8 MMOL/L — SIGNIFICANT CHANGE UP (ref 3.5–5.3)
POTASSIUM SERPL-SCNC: 3.8 MMOL/L — SIGNIFICANT CHANGE UP (ref 3.5–5.3)
RBC # BLD: 3.9 M/UL — SIGNIFICANT CHANGE UP (ref 3.8–5.2)
RBC # FLD: 16.4 % — HIGH (ref 10.3–14.5)
SODIUM SERPL-SCNC: 140 MMOL/L — SIGNIFICANT CHANGE UP (ref 135–145)
WBC # BLD: 6.5 K/UL — SIGNIFICANT CHANGE UP (ref 3.8–10.5)
WBC # FLD AUTO: 6.5 K/UL — SIGNIFICANT CHANGE UP (ref 3.8–10.5)

## 2018-10-26 RX ORDER — OXYCODONE AND ACETAMINOPHEN 5; 325 MG/1; MG/1
1 TABLET ORAL ONCE
Qty: 0 | Refills: 0 | Status: DISCONTINUED | OUTPATIENT
Start: 2018-10-26 | End: 2018-10-26

## 2018-10-26 RX ORDER — CLOPIDOGREL BISULFATE 75 MG/1
1 TABLET, FILM COATED ORAL
Qty: 30 | Refills: 0 | OUTPATIENT
Start: 2018-10-26 | End: 2018-11-24

## 2018-10-26 RX ORDER — CLOPIDOGREL BISULFATE 75 MG/1
1 TABLET, FILM COATED ORAL
Qty: 0 | Refills: 0 | COMMUNITY
Start: 2018-10-26

## 2018-10-26 RX ORDER — FENTANYL CITRATE 50 UG/ML
25 INJECTION INTRAVENOUS ONCE
Qty: 0 | Refills: 0 | Status: DISCONTINUED | OUTPATIENT
Start: 2018-10-26 | End: 2018-10-26

## 2018-10-26 RX ORDER — CLOPIDOGREL BISULFATE 75 MG/1
75 TABLET, FILM COATED ORAL DAILY
Qty: 0 | Refills: 0 | Status: DISCONTINUED | OUTPATIENT
Start: 2018-10-26 | End: 2018-11-10

## 2018-10-26 RX ADMIN — CLOPIDOGREL BISULFATE 75 MILLIGRAM(S): 75 TABLET, FILM COATED ORAL at 14:06

## 2018-10-26 RX ADMIN — FENTANYL CITRATE 25 MICROGRAM(S): 50 INJECTION INTRAVENOUS at 11:53

## 2018-10-26 RX ADMIN — OXYCODONE AND ACETAMINOPHEN 1 TABLET(S): 5; 325 TABLET ORAL at 14:07

## 2018-10-26 NOTE — PACU DISCHARGE NOTE - COMMENTS
patient discharged to home. Incision clean/dry/intact. IVL D/C'd site benign. Patient without any complaints. Vital signs stable. Discharge instructions discussed understanding noted through teach back.  accompanied by daughter and granddaughter via personal wheelchair to lobby for discharge

## 2018-10-26 NOTE — ASU PREOP CHECKLIST - LOOSE TEETH
Size Of Margin In Cm: 0.4 Pre-Excision Curettage Text (Leave Blank If You Do Not Want): Prior to drawing the surgical margin the visible lesion was removed with electrodesiccation and curettage to clearly define the lesion size. X Size Of Lesion In Cm (Optional): 1.4 Show Primary And Secondary Defect Sizes Variable (Do Not Hide If You Perform Flaps Or Graft Closures): Yes Helical Rim Advancement Flap Text: The defect edges were debeveled with a #15 blade scalpel.  Given the location of the defect and the proximity to free margins (helical rim) a double helical rim advancement flap was deemed most appropriate.  Using a sterile surgical marker, the appropriate advancement flaps were drawn incorporating the defect and placing the expected incisions between the helical rim and antihelix where possible.  The area thus outlined was incised through and through with a #15 scalpel blade.  With a skin hook and iris scissors, the flaps were gently and sharply undermined and freed up. Complex Repair And Double M Plasty Text: The defect edges were debeveled with a #15 scalpel blade.  The primary defect was closed partially with a complex linear closure.  Given the location of the remaining defect, shape of the defect and the proximity to free margins a double M plasty was deemed most appropriate for complete closure of the defect.  Using a sterile surgical marker, an appropriate advancement flap was drawn incorporating the defect and placing the expected incisions within the relaxed skin tension lines where possible.    The area thus outlined was incised deep to adipose tissue with a #15 scalpel blade.  The skin margins were undermined to an appropriate distance in all directions utilizing iris scissors. Partial Purse String (Intermediate) Text: Given the location of the defect and the characteristics of the surrounding skin an intermediate purse string closure was deemed most appropriate.  Undermining was performed circumferentially around the surgical defect.  A purse string suture was then placed and tightened. Wound tension of the circular defect prevented complete closure of the wound. Complex Repair And Modified Advancement Flap Text: The defect edges were debeveled with a #15 scalpel blade.  The primary defect was closed partially with a complex linear closure.  Given the location of the remaining defect, shape of the defect and the proximity to free margins a modified advancement flap was deemed most appropriate for complete closure of the defect.  Using a sterile surgical marker, an appropriate advancement flap was drawn incorporating the defect and placing the expected incisions within the relaxed skin tension lines where possible.    The area thus outlined was incised deep to adipose tissue with a #15 scalpel blade.  The skin margins were undermined to an appropriate distance in all directions utilizing iris scissors. Xenograft Text: The defect edges were debeveled with a #15 scalpel blade.  Given the location of the defect, shape of the defect and the proximity to free margins a xenograft was deemed most appropriate.  The graft was then trimmed to fit the size of the defect.  The graft was then placed in the primary defect and oriented appropriately. Melolabial Transposition Flap Text: The defect edges were debeveled with a #15 scalpel blade.  Given the location of the defect and the proximity to free margins a melolabial flap was deemed most appropriate.  Using a sterile surgical marker, an appropriate melolabial transposition flap was drawn incorporating the defect.    The area thus outlined was incised deep to adipose tissue with a #15 scalpel blade.  The skin margins were undermined to an appropriate distance in all directions utilizing iris scissors. Mucosal Advancement Flap Text: Given the location of the defect, shape of the defect and the proximity to free margins a mucosal advancement flap was deemed most appropriate. Incisions were made with a 15 blade scalpel in the appropriate fashion along the cutaneous vermilion border and the mucosal lip. The remaining actinically damaged mucosal tissue was excised.  The mucosal advancement flap was then elevated to the gingival sulcus with care taken to preserve the neurovascular structures and advanced into the primary defect. Care was taken to ensure that precise realignment of the vermilion border was achieved. Wound Care: Petrolatum O-T Plasty Text: The defect edges were debeveled with a #15 scalpel blade.  Given the location of the defect, shape of the defect and the proximity to free margins an O-T plasty was deemed most appropriate.  Using a sterile surgical marker, an appropriate O-T plasty was drawn incorporating the defect and placing the expected incisions within the relaxed skin tension lines where possible.    The area thus outlined was incised deep to adipose tissue with a #15 scalpel blade.  The skin margins were undermined to an appropriate distance in all directions utilizing iris scissors. Interpolation Flap Text: A decision was made to reconstruct the defect utilizing an interpolation axial flap and a staged reconstruction.  A telfa template was made of the defect.  This telfa template was then used to outline the interpolation flap.  The donor area for the pedicle flap was then injected with anesthesia.  The flap was excised through the skin and subcutaneous tissue down to the layer of the underlying musculature.  The interpolation flap was carefully excised within this deep plane to maintain its blood supply.  The edges of the donor site were undermined.   The donor site was closed in a primary fashion.  The pedicle was then rotated into position and sutured.  Once the tube was sutured into place, adequate blood supply was confirmed with blanching and refill.  The pedicle was then wrapped with xeroform gauze and dressed appropriately with a telfa and gauze bandage to ensure continued blood supply and protect the attached pedicle. no Epidermal Sutures: 4-0 Nylon Advancement-Rotation Flap Text: The defect edges were debeveled with a #15 scalpel blade.  Given the location of the defect, shape of the defect and the proximity to free margins an advancement-rotation flap was deemed most appropriate.  Using a sterile surgical marker, an appropriate flap was drawn incorporating the defect and placing the expected incisions within the relaxed skin tension lines where possible. The area thus outlined was incised deep to adipose tissue with a #15 scalpel blade.  The skin margins were undermined to an appropriate distance in all directions utilizing iris scissors. V-Y Flap Text: The defect edges were debeveled with a #15 scalpel blade.  Given the location of the defect, shape of the defect and the proximity to free margins a V-Y flap was deemed most appropriate.  Using a sterile surgical marker, an appropriate advancement flap was drawn incorporating the defect and placing the expected incisions within the relaxed skin tension lines where possible.    The area thus outlined was incised deep to adipose tissue with a #15 scalpel blade.  The skin margins were undermined to an appropriate distance in all directions utilizing iris scissors. Complex Repair And Dorsal Nasal Flap Text: The defect edges were debeveled with a #15 scalpel blade.  The primary defect was closed partially with a complex linear closure.  Given the location of the remaining defect, shape of the defect and the proximity to free margins a dorsal nasal flap was deemed most appropriate for complete closure of the defect.  Using a sterile surgical marker, an appropriate flap was drawn incorporating the defect and placing the expected incisions within the relaxed skin tension lines where possible.    The area thus outlined was incised deep to adipose tissue with a #15 scalpel blade.  The skin margins were undermined to an appropriate distance in all directions utilizing iris scissors. Elliptical Excision Additional Text (Leave Blank If You Do Not Want): The margin was drawn around the clinically apparent lesion.  An elliptical shape was then drawn on the skin incorporating the lesion and margins.  Incisions were then made along these lines to the appropriate tissue plane and the lesion was extirpated. Suture Removal: 14 days Repair Type: Intermediate Primary Defect Length (In Cm): 0 Complex Repair And V-Y Plasty Text: The defect edges were debeveled with a #15 scalpel blade.  The primary defect was closed partially with a complex linear closure.  Given the location of the remaining defect, shape of the defect and the proximity to free margins a V-Y plasty was deemed most appropriate for complete closure of the defect.  Using a sterile surgical marker, an appropriate advancement flap was drawn incorporating the defect and placing the expected incisions within the relaxed skin tension lines where possible.    The area thus outlined was incised deep to adipose tissue with a #15 scalpel blade.  The skin margins were undermined to an appropriate distance in all directions utilizing iris scissors. Melolabial Interpolation Flap Text: A decision was made to reconstruct the defect utilizing an interpolation axial flap and a staged reconstruction.  A telfa template was made of the defect.  This telfa template was then used to outline the melolabial interpolation flap.  The donor area for the pedicle flap was then injected with anesthesia.  The flap was excised through the skin and subcutaneous tissue down to the layer of the underlying musculature.  The pedicle flap was carefully excised within this deep plane to maintain its blood supply.  The edges of the donor site were undermined.   The donor site was closed in a primary fashion.  The pedicle was then rotated into position and sutured.  Once the tube was sutured into place, adequate blood supply was confirmed with blanching and refill.  The pedicle was then wrapped with xeroform gauze and dressed appropriately with a telfa and gauze bandage to ensure continued blood supply and protect the attached pedicle. Hemostasis: Electrocautery Render The Repair Note As A Separate Paragraph: No Anesthesia Volume In Cc: 12 S Plasty Text: Given the location and shape of the defect, and the orientation of relaxed skin tension lines, an S-plasty was deemed most appropriate for repair.  Using a sterile surgical marker, the appropriate outline of the S-plasty was drawn, incorporating the defect and placing the expected incisions within the relaxed skin tension lines where possible.  The area thus outlined was incised deep to adipose tissue with a #15 scalpel blade.  The skin margins were undermined to an appropriate distance in all directions utilizing iris scissors. The skin flaps were advanced over the defect.  The opposing margins were then approximated with interrupted buried subcutaneous sutures. Perilesional Excision Additional Text (Leave Blank If You Do Not Want): The margin was drawn around the clinically apparent lesion. Incisions were then made along these lines to the appropriate tissue plane and the lesion was extirpated. Complex Repair And A-T Advancement Flap Text: The defect edges were debeveled with a #15 scalpel blade.  The primary defect was closed partially with a complex linear closure.  Given the location of the remaining defect, shape of the defect and the proximity to free margins an A-T advancement flap was deemed most appropriate for complete closure of the defect.  Using a sterile surgical marker, an appropriate advancement flap was drawn incorporating the defect and placing the expected incisions within the relaxed skin tension lines where possible.    The area thus outlined was incised deep to adipose tissue with a #15 scalpel blade.  The skin margins were undermined to an appropriate distance in all directions utilizing iris scissors. Detail Level: Detailed Lip Wedge Excision Repair Text: Given the location of the defect and the proximity to free margins a full thickness wedge repair was deemed most appropriate.  Using a sterile surgical marker, the appropriate repair was drawn incorporating the defect and placing the expected incisions perpendicular to the vermilion border.  The vermilion border was also meticulously outlined to ensure appropriate reapproximation during the repair.  The area thus outlined was incised through and through with a #15 scalpel blade.  The muscularis and dermis were reaproximated with deep sutures following hemostasis. Care was taken to realign the vermilion border before proceeding with the superficial closure.  Once the vermilion was realigned the superfical and mucosal closure was finished. Complex Repair And Rotation Flap Text: The defect edges were debeveled with a #15 scalpel blade.  The primary defect was closed partially with a complex linear closure.  Given the location of the remaining defect, shape of the defect and the proximity to free margins a rotation flap was deemed most appropriate for complete closure of the defect.  Using a sterile surgical marker, an appropriate advancement flap was drawn incorporating the defect and placing the expected incisions within the relaxed skin tension lines where possible.    The area thus outlined was incised deep to adipose tissue with a #15 scalpel blade.  The skin margins were undermined to an appropriate distance in all directions utilizing iris scissors. Dressing: dry sterile dressing Saucerization Excision Additional Text (Leave Blank If You Do Not Want): The margin was drawn around the clinically apparent lesion.  Incisions were then made along these lines, in a tangential fashion, to the appropriate tissue plane and the lesion was extirpated. O-L Flap Text: The defect edges were debeveled with a #15 scalpel blade.  Given the location of the defect, shape of the defect and the proximity to free margins an O-L flap was deemed most appropriate.  Using a sterile surgical marker, an appropriate advancement flap was drawn incorporating the defect and placing the expected incisions within the relaxed skin tension lines where possible.    The area thus outlined was incised deep to adipose tissue with a #15 scalpel blade.  The skin margins were undermined to an appropriate distance in all directions utilizing iris scissors. Complex Repair And Dermal Autograft Text: The defect edges were debeveled with a #15 scalpel blade.  The primary defect was closed partially with a complex linear closure.  Given the location of the defect, shape of the defect and the proximity to free margins an dermal autograft was deemed most appropriate to repair the remaining defect.  The graft was trimmed to fit the size of the remaining defect.  The graft was then placed in the primary defect, oriented appropriately, and sutured into place. Fusiform Excision Additional Text (Leave Blank If You Do Not Want): The margin was drawn around the clinically apparent lesion.  A fusiform shape was then drawn on the skin incorporating the lesion and margins.  Incisions were then made along these lines to the appropriate tissue plane and the lesion was extirpated. Hatchet Flap Text: The defect edges were debeveled with a #15 scalpel blade.  Given the location of the defect, shape of the defect and the proximity to free margins a hatchet flap was deemed most appropriate.  Using a sterile surgical marker, an appropriate hatchet flap was drawn incorporating the defect and placing the expected incisions within the relaxed skin tension lines where possible.    The area thus outlined was incised deep to adipose tissue with a #15 scalpel blade.  The skin margins were undermined to an appropriate distance in all directions utilizing iris scissors. Trilobed Flap Text: The defect edges were debeveled with a #15 scalpel blade.  Given the location of the defect and the proximity to free margins a trilobed flap was deemed most appropriate.  Using a sterile surgical marker, an appropriate trilobed flap drawn around the defect.    The area thus outlined was incised deep to adipose tissue with a #15 scalpel blade.  The skin margins were undermined to an appropriate distance in all directions utilizing iris scissors. Island Pedicle Flap With Canthal Suspension Text: The defect edges were debeveled with a #15 scalpel blade.  Given the location of the defect, shape of the defect and the proximity to free margins an island pedicle advancement flap was deemed most appropriate.  Using a sterile surgical marker, an appropriate advancement flap was drawn incorporating the defect, outlining the appropriate donor tissue and placing the expected incisions within the relaxed skin tension lines where possible. The area thus outlined was incised deep to adipose tissue with a #15 scalpel blade.  The skin margins were undermined to an appropriate distance in all directions around the primary defect and laterally outward around the island pedicle utilizing iris scissors.  There was minimal undermining beneath the pedicle flap. A suspension suture was placed in the canthal tendon to prevent tension and prevent ectropion. Complex Repair And Split-Thickness Skin Graft Text: The defect edges were debeveled with a #15 scalpel blade.  The primary defect was closed partially with a complex linear closure.  Given the location of the defect, shape of the defect and the proximity to free margins a split thickness skin graft was deemed most appropriate to repair the remaining defect.  The graft was trimmed to fit the size of the remaining defect.  The graft was then placed in the primary defect, oriented appropriately, and sutured into place. Complex Repair And Skin Substitute Graft Text: The defect edges were debeveled with a #15 scalpel blade.  The primary defect was closed partially with a complex linear closure.  Given the location of the remaining defect, shape of the defect and the proximity to free margins a skin substitute graft was deemed most appropriate to repair the remaining defect.  The graft was trimmed to fit the size of the remaining defect.  The graft was then placed in the primary defect, oriented appropriately, and sutured into place. V-Y Plasty Text: The defect edges were debeveled with a #15 scalpel blade.  Given the location of the defect, shape of the defect and the proximity to free margins an V-Y advancement flap was deemed most appropriate.  Using a sterile surgical marker, an appropriate advancement flap was drawn incorporating the defect and placing the expected incisions within the relaxed skin tension lines where possible.    The area thus outlined was incised deep to adipose tissue with a #15 scalpel blade.  The skin margins were undermined to an appropriate distance in all directions utilizing iris scissors. Complex Repair And O-T Advancement Flap Text: The defect edges were debeveled with a #15 scalpel blade.  The primary defect was closed partially with a complex linear closure.  Given the location of the remaining defect, shape of the defect and the proximity to free margins an O-T advancement flap was deemed most appropriate for complete closure of the defect.  Using a sterile surgical marker, an appropriate advancement flap was drawn incorporating the defect and placing the expected incisions within the relaxed skin tension lines where possible.    The area thus outlined was incised deep to adipose tissue with a #15 scalpel blade.  The skin margins were undermined to an appropriate distance in all directions utilizing iris scissors. Complex Repair And Z Plasty Text: The defect edges were debeveled with a #15 scalpel blade.  The primary defect was closed partially with a complex linear closure.  Given the location of the remaining defect, shape of the defect and the proximity to free margins a Z plasty was deemed most appropriate for complete closure of the defect.  Using a sterile surgical marker, an appropriate advancement flap was drawn incorporating the defect and placing the expected incisions within the relaxed skin tension lines where possible.    The area thus outlined was incised deep to adipose tissue with a #15 scalpel blade.  The skin margins were undermined to an appropriate distance in all directions utilizing iris scissors. Excisional Biopsy Additional Text (Leave Blank If You Do Not Want): The margin was drawn around the clinically apparent lesion. An elliptical shape was then drawn on the skin incorporating the lesion and margins.  Incisions were then made along these lines to the appropriate tissue plane and the lesion was extirpated. Path Notes (To The Dermatopathologist): Please check margins.\\nMarking suture 12 o’clock proximal edge Posterior Auricular Interpolation Flap Text: A decision was made to reconstruct the defect utilizing an interpolation axial flap and a staged reconstruction.  A telfa template was made of the defect.  This telfa template was then used to outline the posterior auricular interpolation flap.  The donor area for the pedicle flap was then injected with anesthesia.  The flap was excised through the skin and subcutaneous tissue down to the layer of the underlying musculature.  The pedicle flap was carefully excised within this deep plane to maintain its blood supply.  The edges of the donor site were undermined.   The donor site was closed in a primary fashion.  The pedicle was then rotated into position and sutured.  Once the tube was sutured into place, adequate blood supply was confirmed with blanching and refill.  The pedicle was then wrapped with xeroform gauze and dressed appropriately with a telfa and gauze bandage to ensure continued blood supply and protect the attached pedicle. Rhombic Flap Text: The defect edges were debeveled with a #15 scalpel blade.  Given the location of the defect and the proximity to free margins a rhombic flap was deemed most appropriate.  Using a sterile surgical marker, an appropriate rhombic flap was drawn incorporating the defect.    The area thus outlined was incised deep to adipose tissue with a #15 scalpel blade.  The skin margins were undermined to an appropriate distance in all directions utilizing iris scissors. Alar Island Pedicle Flap Text: The defect edges were debeveled with a #15 scalpel blade.  Given the location of the defect, shape of the defect and the proximity to the alar rim an island pedicle advancement flap was deemed most appropriate.  Using a sterile surgical marker, an appropriate advancement flap was drawn incorporating the defect, outlining the appropriate donor tissue and placing the expected incisions within the nasal ala running parallel to the alar rim. The area thus outlined was incised with a #15 scalpel blade.  The skin margins were undermined minimally to an appropriate distance in all directions around the primary defect and laterally outward around the island pedicle utilizing iris scissors.  There was minimal undermining beneath the pedicle flap. Complex Repair And Melolabial Flap Text: The defect edges were debeveled with a #15 scalpel blade.  The primary defect was closed partially with a complex linear closure.  Given the location of the remaining defect, shape of the defect and the proximity to free margins a melolabial flap was deemed most appropriate for complete closure of the defect.  Using a sterile surgical marker, an appropriate advancement flap was drawn incorporating the defect and placing the expected incisions within the relaxed skin tension lines where possible.    The area thus outlined was incised deep to adipose tissue with a #15 scalpel blade.  The skin margins were undermined to an appropriate distance in all directions utilizing iris scissors. Anesthesia Volume In Cc: 6 Island Pedicle Flap-Requiring Vessel Identification Text: The defect edges were debeveled with a #15 scalpel blade.  Given the location of the defect, shape of the defect and the proximity to free margins an island pedicle advancement flap was deemed most appropriate.  Using a sterile surgical marker, an appropriate advancement flap was drawn, based on the axial vessel mentioned above, incorporating the defect, outlining the appropriate donor tissue and placing the expected incisions within the relaxed skin tension lines where possible.    The area thus outlined was incised deep to adipose tissue with a #15 scalpel blade.  The skin margins were undermined to an appropriate distance in all directions around the primary defect and laterally outward around the island pedicle utilizing iris scissors.  There was minimal undermining beneath the pedicle flap. Graft Donor Site Bandage (Optional-Leave Blank If You Don't Want In Note): Steri-strips and a pressure bandage were applied to the donor site. O-Z Plasty Text: The defect edges were debeveled with a #15 scalpel blade.  Given the location of the defect, shape of the defect and the proximity to free margins an O-Z plasty (double transposition flap) was deemed most appropriate.  Using a sterile surgical marker, the appropriate transposition flaps were drawn incorporating the defect and placing the expected incisions within the relaxed skin tension lines where possible.    The area thus outlined was incised deep to adipose tissue with a #15 scalpel blade.  The skin margins were undermined to an appropriate distance in all directions utilizing iris scissors.  Hemostasis was achieved with electrocautery.  The flaps were then transposed into place, one clockwise and the other counterclockwise, and anchored with interrupted buried subcutaneous sutures. No Repair - Repaired With Adjacent Surgical Defect Text (Leave Blank If You Do Not Want): After the excision the defect was repaired concurrently with another surgical defect which was in close approximation. Double Island Pedicle Flap Text: The defect edges were debeveled with a #15 scalpel blade.  Given the location of the defect, shape of the defect and the proximity to free margins a double island pedicle advancement flap was deemed most appropriate.  Using a sterile surgical marker, an appropriate advancement flap was drawn incorporating the defect, outlining the appropriate donor tissue and placing the expected incisions within the relaxed skin tension lines where possible.    The area thus outlined was incised deep to adipose tissue with a #15 scalpel blade.  The skin margins were undermined to an appropriate distance in all directions around the primary defect and laterally outward around the island pedicle utilizing iris scissors.  There was minimal undermining beneath the pedicle flap. Paramedian Forehead Flap Text: A decision was made to reconstruct the defect utilizing an interpolation axial flap and a staged reconstruction.  A telfa template was made of the defect.  This telfa template was then used to outline the paramedian forehead pedicle flap.  The donor area for the pedicle flap was then injected with anesthesia.  The flap was excised through the skin and subcutaneous tissue down to the layer of the underlying musculature.  The pedicle flap was carefully excised within this deep plane to maintain its blood supply.  The edges of the donor site were undermined.   The donor site was closed in a primary fashion.  The pedicle was then rotated into position and sutured.  Once the tube was sutured into place, adequate blood supply was confirmed with blanching and refill.  The pedicle was then wrapped with xeroform gauze and dressed appropriately with a telfa and gauze bandage to ensure continued blood supply and protect the attached pedicle. Complex Repair And Bilobe Flap Text: The defect edges were debeveled with a #15 scalpel blade.  The primary defect was closed partially with a complex linear closure.  Given the location of the remaining defect, shape of the defect and the proximity to free margins a bilobe flap was deemed most appropriate for complete closure of the defect.  Using a sterile surgical marker, an appropriate advancement flap was drawn incorporating the defect and placing the expected incisions within the relaxed skin tension lines where possible.    The area thus outlined was incised deep to adipose tissue with a #15 scalpel blade.  The skin margins were undermined to an appropriate distance in all directions utilizing iris scissors. Complex Repair And O-L Flap Text: The defect edges were debeveled with a #15 scalpel blade.  The primary defect was closed partially with a complex linear closure.  Given the location of the remaining defect, shape of the defect and the proximity to free margins an O-L flap was deemed most appropriate for complete closure of the defect.  Using a sterile surgical marker, an appropriate flap was drawn incorporating the defect and placing the expected incisions within the relaxed skin tension lines where possible.    The area thus outlined was incised deep to adipose tissue with a #15 scalpel blade.  The skin margins were undermined to an appropriate distance in all directions utilizing iris scissors. Post-Care Instructions: I reviewed with the patient in detail post-care instructions. Patient is not to engage in any heavy lifting, exercise, or swimming for the next 14 days. Should the patient develop any fevers, chills, bleeding, severe pain patient will contact the office immediately. Deep Sutures: 4-0 Vicryl Complex Repair And Xenograft Text: The defect edges were debeveled with a #15 scalpel blade.  The primary defect was closed partially with a complex linear closure.  Given the location of the defect, shape of the defect and the proximity to free margins a xenograft was deemed most appropriate to repair the remaining defect.  The graft was trimmed to fit the size of the remaining defect.  The graft was then placed in the primary defect, oriented appropriately, and sutured into place. Slit Excision Additional Text (Leave Blank If You Do Not Want): A linear line was drawn on the skin overlying the lesion. An incision was made slowly until the lesion was visualized.  Once visualized, the lesion was removed with blunt dissection. Z Plasty Text: The lesion was extirpated to the level of the fat with a #15 scalpel blade.  Given the location of the defect, shape of the defect and the proximity to free margins a Z-plasty was deemed most appropriate for repair.  Using a sterile surgical marker, the appropriate transposition arms of the Z-plasty were drawn incorporating the defect and placing the expected incisions within the relaxed skin tension lines where possible.    The area thus outlined was incised deep to adipose tissue with a #15 scalpel blade.  The skin margins were undermined to an appropriate distance in all directions utilizing iris scissors.  The opposing transposition arms were then transposed into place in opposite direction and anchored with interrupted buried subcutaneous sutures. Modified Advancement Flap Text: The defect edges were debeveled with a #15 scalpel blade.  Given the location of the defect, shape of the defect and the proximity to free margins a modified advancement flap was deemed most appropriate.  Using a sterile surgical marker, an appropriate advancement flap was drawn incorporating the defect and placing the expected incisions within the relaxed skin tension lines where possible.    The area thus outlined was incised deep to adipose tissue with a #15 scalpel blade.  The skin margins were undermined to an appropriate distance in all directions utilizing iris scissors. Complex Repair And Double Advancement Flap Text: The defect edges were debeveled with a #15 scalpel blade.  The primary defect was closed partially with a complex linear closure.  Given the location of the remaining defect, shape of the defect and the proximity to free margins a double advancement flap was deemed most appropriate for complete closure of the defect.  Using a sterile surgical marker, an appropriate advancement flap was drawn incorporating the defect and placing the expected incisions within the relaxed skin tension lines where possible.    The area thus outlined was incised deep to adipose tissue with a #15 scalpel blade.  The skin margins were undermined to an appropriate distance in all directions utilizing iris scissors. Cheek-To-Nose Interpolation Flap Text: A decision was made to reconstruct the defect utilizing an interpolation axial flap and a staged reconstruction.  A telfa template was made of the defect.  This telfa template was then used to outline the Cheek-To-Nose Interpolation flap.  The donor area for the pedicle flap was then injected with anesthesia.  The flap was excised through the skin and subcutaneous tissue down to the layer of the underlying musculature.  The interpolation flap was carefully excised within this deep plane to maintain its blood supply.  The edges of the donor site were undermined.   The donor site was closed in a primary fashion.  The pedicle was then rotated into position and sutured.  Once the tube was sutured into place, adequate blood supply was confirmed with blanching and refill.  The pedicle was then wrapped with xeroform gauze and dressed appropriately with a telfa and gauze bandage to ensure continued blood supply and protect the attached pedicle. Complex Repair And Single Advancement Flap Text: The defect edges were debeveled with a #15 scalpel blade.  The primary defect was closed partially with a complex linear closure.  Given the location of the remaining defect, shape of the defect and the proximity to free margins a single advancement flap was deemed most appropriate for complete closure of the defect.  Using a sterile surgical marker, an appropriate advancement flap was drawn incorporating the defect and placing the expected incisions within the relaxed skin tension lines where possible.    The area thus outlined was incised deep to adipose tissue with a #15 scalpel blade.  The skin margins were undermined to an appropriate distance in all directions utilizing iris scissors. Scalpel Size: 15 blade Transposition Flap Text: The defect edges were debeveled with a #15 scalpel blade.  Given the location of the defect and the proximity to free margins a transposition flap was deemed most appropriate.  Using a sterile surgical marker, an appropriate transposition flap was drawn incorporating the defect.    The area thus outlined was incised deep to adipose tissue with a #15 scalpel blade.  The skin margins were undermined to an appropriate distance in all directions utilizing iris scissors. Repair Performed By Another Provider Text (Leave Blank If You Do Not Want): After the tissue was excised the defect was repaired by another provider. O-T Advancement Flap Text: The defect edges were debeveled with a #15 scalpel blade.  Given the location of the defect, shape of the defect and the proximity to free margins an O-T advancement flap was deemed most appropriate.  Using a sterile surgical marker, an appropriate advancement flap was drawn incorporating the defect and placing the expected incisions within the relaxed skin tension lines where possible.    The area thus outlined was incised deep to adipose tissue with a #15 scalpel blade.  The skin margins were undermined to an appropriate distance in all directions utilizing iris scissors. Composite Graft Text: The defect edges were debeveled with a #15 scalpel blade.  Given the location of the defect, shape of the defect, the proximity to free margins and the fact the defect was full thickness a composite graft was deemed most appropriate.  The defect was outline and then transferred to the donor site.  A full thickness graft was then excised from the donor site. The graft was then placed in the primary defect, oriented appropriately and then sutured into place.  The secondary defect was then repaired using a primary closure. Estimated Blood Loss (Cc): minimal Epidermal Closure: simple interrupted Positioning (Leave Blank If You Do Not Want): The patient was placed in a comfortable position exposing the surgical site. Anesthesia Type: 1% lidocaine with epinephrine A-T Advancement Flap Text: The defect edges were debeveled with a #15 scalpel blade.  Given the location of the defect, shape of the defect and the proximity to free margins an A-T advancement flap was deemed most appropriate.  Using a sterile surgical marker, an appropriate advancement flap was drawn incorporating the defect and placing the expected incisions within the relaxed skin tension lines where possible.    The area thus outlined was incised deep to adipose tissue with a #15 scalpel blade.  The skin margins were undermined to an appropriate distance in all directions utilizing iris scissors. Partial Purse String (Simple) Text: Given the location of the defect and the characteristics of the surrounding skin a simple purse string closure was deemed most appropriate.  Undermining was performed circumferentially around the surgical defect.  A purse string suture was then placed and tightened. Wound tension of the circular defect prevented complete closure of the wound. Mastoid Interpolation Flap Text: A decision was made to reconstruct the defect utilizing an interpolation axial flap and a staged reconstruction.  A telfa template was made of the defect.  This telfa template was then used to outline the mastoid interpolation flap.  The donor area for the pedicle flap was then injected with anesthesia.  The flap was excised through the skin and subcutaneous tissue down to the layer of the underlying musculature.  The pedicle flap was carefully excised within this deep plane to maintain its blood supply.  The edges of the donor site were undermined.   The donor site was closed in a primary fashion.  The pedicle was then rotated into position and sutured.  Once the tube was sutured into place, adequate blood supply was confirmed with blanching and refill.  The pedicle was then wrapped with xeroform gauze and dressed appropriately with a telfa and gauze bandage to ensure continued blood supply and protect the attached pedicle. Excision Depth: adipose tissue Island Pedicle Flap Text: The defect edges were debeveled with a #15 scalpel blade.  Given the location of the defect, shape of the defect and the proximity to free margins an island pedicle advancement flap was deemed most appropriate.  Using a sterile surgical marker, an appropriate advancement flap was drawn incorporating the defect, outlining the appropriate donor tissue and placing the expected incisions within the relaxed skin tension lines where possible.    The area thus outlined was incised deep to adipose tissue with a #15 scalpel blade.  The skin margins were undermined to an appropriate distance in all directions around the primary defect and laterally outward around the island pedicle utilizing iris scissors.  There was minimal undermining beneath the pedicle flap. Purse String (Intermediate) Text: Given the location of the defect and the characteristics of the surrounding skin a purse string intermediate closure was deemed most appropriate.  Undermining was performed circumfirentially around the surgical defect.  A purse string suture was then placed and tightened. Star Wedge Flap Text: The defect edges were debeveled with a #15 scalpel blade.  Given the location of the defect, shape of the defect and the proximity to free margins a star wedge flap was deemed most appropriate.  Using a sterile surgical marker, an appropriate rotation flap was drawn incorporating the defect and placing the expected incisions within the relaxed skin tension lines where possible. The area thus outlined was incised deep to adipose tissue with a #15 scalpel blade.  The skin margins were undermined to an appropriate distance in all directions utilizing iris scissors. Crescentic Advancement Flap Text: The defect edges were debeveled with a #15 scalpel blade.  Given the location of the defect and the proximity to free margins a crescentic advancement flap was deemed most appropriate.  Using a sterile surgical marker, the appropriate advancement flap was drawn incorporating the defect and placing the expected incisions within the relaxed skin tension lines where possible.    The area thus outlined was incised deep to adipose tissue with a #15 scalpel blade.  The skin margins were undermined to an appropriate distance in all directions utilizing iris scissors. Intermediate / Complex Repair - Final Wound Length In Cm: 4 Complex Repair And Tissue Cultured Epidermal Autograft Text: The defect edges were debeveled with a #15 scalpel blade.  The primary defect was closed partially with a complex linear closure.  Given the location of the defect, shape of the defect and the proximity to free margins an tissue cultured epidermal autograft was deemed most appropriate to repair the remaining defect.  The graft was trimmed to fit the size of the remaining defect.  The graft was then placed in the primary defect, oriented appropriately, and sutured into place. Complex Repair And Epidermal Autograft Text: The defect edges were debeveled with a #15 scalpel blade.  The primary defect was closed partially with a complex linear closure.  Given the location of the defect, shape of the defect and the proximity to free margins an epidermal autograft was deemed most appropriate to repair the remaining defect.  The graft was trimmed to fit the size of the remaining defect.  The graft was then placed in the primary defect, oriented appropriately, and sutured into place. Complex Repair And Ftsg Text: The defect edges were debeveled with a #15 scalpel blade.  The primary defect was closed partially with a complex linear closure.  Given the location of the defect, shape of the defect and the proximity to free margins a full thickness skin graft was deemed most appropriate to repair the remaining defect.  The graft was trimmed to fit the size of the remaining defect.  The graft was then placed in the primary defect, oriented appropriately, and sutured into place. Advancement Flap (Single) Text: The defect edges were debeveled with a #15 scalpel blade.  Given the location of the defect and the proximity to free margins a single advancement flap was deemed most appropriate.  Using a sterile surgical marker, an appropriate advancement flap was drawn incorporating the defect and placing the expected incisions within the relaxed skin tension lines where possible.    The area thus outlined was incised deep to adipose tissue with a #15 scalpel blade.  The skin margins were undermined to an appropriate distance in all directions utilizing iris scissors. Purse String (Simple) Text: Given the location of the defect and the characteristics of the surrounding skin a purse string simple closure was deemed most appropriate.  Undermining was performed circumferentially around the surgical defect.  A purse string suture was then placed and tightened. Dorsal Nasal Flap Text: The defect edges were debeveled with a #15 scalpel blade.  Given the location of the defect and the proximity to free margins a dorsal nasal flap was deemed most appropriate.  Using a sterile surgical marker, an appropriate dorsal nasal flap was drawn around the defect.    The area thus outlined was incised deep to adipose tissue with a #15 scalpel blade.  The skin margins were undermined to an appropriate distance in all directions utilizing iris scissors. Curvilinear Excision Additional Text (Leave Blank If You Do Not Want): The margin was drawn around the clinically apparent lesion.  A curvilinear shape was then drawn on the skin incorporating the lesion and margins.  Incisions were then made along these lines to the appropriate tissue plane and the lesion was extirpated. Dermal Autograft Text: The defect edges were debeveled with a #15 scalpel blade.  Given the location of the defect, shape of the defect and the proximity to free margins a dermal autograft was deemed most appropriate.  Using a sterile surgical marker, the primary defect shape was transferred to the donor site. The area thus outlined was incised deep to adipose tissue with a #15 scalpel blade.  The harvested graft was then trimmed of adipose and epidermal tissue until only dermis was left.  The skin graft was then placed in the primary defect and oriented appropriately. Ear Star Wedge Flap Text: The defect edges were debeveled with a #15 blade scalpel.  Given the location of the defect and the proximity to free margins (helical rim) an ear star wedge flap was deemed most appropriate.  Using a sterile surgical marker, the appropriate flap was drawn incorporating the defect and placing the expected incisions between the helical rim and antihelix where possible.  The area thus outlined was incised through and through with a #15 scalpel blade. Cheek Interpolation Flap Text: A decision was made to reconstruct the defect utilizing an interpolation axial flap and a staged reconstruction.  A telfa template was made of the defect.  This telfa template was then used to outline the Cheek Interpolation flap.  The donor area for the pedicle flap was then injected with anesthesia.  The flap was excised through the skin and subcutaneous tissue down to the layer of the underlying musculature.  The interpolation flap was carefully excised within this deep plane to maintain its blood supply.  The edges of the donor site were undermined.   The donor site was closed in a primary fashion.  The pedicle was then rotated into position and sutured.  Once the tube was sutured into place, adequate blood supply was confirmed with blanching and refill.  The pedicle was then wrapped with xeroform gauze and dressed appropriately with a telfa and gauze bandage to ensure continued blood supply and protect the attached pedicle. Intermediate Repair Preamble Text (Leave Blank If You Do Not Want): Undermining was performed with blunt dissection. W Plasty Text: The lesion was extirpated to the level of the fat with a #15 scalpel blade.  Given the location of the defect, shape of the defect and the proximity to free margins a W-plasty was deemed most appropriate for repair.  Using a sterile surgical marker, the appropriate transposition arms of the W-plasty were drawn incorporating the defect and placing the expected incisions within the relaxed skin tension lines where possible.    The area thus outlined was incised deep to adipose tissue with a #15 scalpel blade.  The skin margins were undermined to an appropriate distance in all directions utilizing iris scissors.  The opposing transposition arms were then transposed into place in opposite direction and anchored with interrupted buried subcutaneous sutures. Bilobed Transposition Flap Text: The defect edges were debeveled with a #15 scalpel blade.  Given the location of the defect and the proximity to free margins a bilobed transposition flap was deemed most appropriate.  Using a sterile surgical marker, an appropriate bilobe flap drawn around the defect.    The area thus outlined was incised deep to adipose tissue with a #15 scalpel blade.  The skin margins were undermined to an appropriate distance in all directions utilizing iris scissors. Ftsg Text: The defect edges were debeveled with a #15 scalpel blade.  Given the location of the defect, shape of the defect and the proximity to free margins a full thickness skin graft was deemed most appropriate.  Using a sterile surgical marker, the primary defect shape was transferred to the donor site. The area thus outlined was incised deep to adipose tissue with a #15 scalpel blade.  The harvested graft was then trimmed of adipose tissue until only dermis and epidermis was left.  The skin margins of the secondary defect were undermined to an appropriate distance in all directions utilizing iris scissors.  The secondary defect was closed with interrupted buried subcutaneous sutures.  The skin edges were then re-apposed with running  sutures.  The skin graft was then placed in the primary defect and oriented appropriately. Burow's Advancement Flap Text: The defect edges were debeveled with a #15 scalpel blade.  Given the location of the defect and the proximity to free margins a Burow's advancement flap was deemed most appropriate.  Using a sterile surgical marker, the appropriate advancement flap was drawn incorporating the defect and placing the expected incisions within the relaxed skin tension lines where possible.    The area thus outlined was incised deep to adipose tissue with a #15 scalpel blade.  The skin margins were undermined to an appropriate distance in all directions utilizing iris scissors. Complex Repair And Transposition Flap Text: The defect edges were debeveled with a #15 scalpel blade.  The primary defect was closed partially with a complex linear closure.  Given the location of the remaining defect, shape of the defect and the proximity to free margins a transposition flap was deemed most appropriate for complete closure of the defect.  Using a sterile surgical marker, an appropriate advancement flap was drawn incorporating the defect and placing the expected incisions within the relaxed skin tension lines where possible.    The area thus outlined was incised deep to adipose tissue with a #15 scalpel blade.  The skin margins were undermined to an appropriate distance in all directions utilizing iris scissors. Excision Method: Elliptical H Plasty Text: Given the location of the defect, shape of the defect and the proximity to free margins a H-plasty was deemed most appropriate for repair.  Using a sterile surgical marker, the appropriate advancement arms of the H-plasty were drawn incorporating the defect and placing the expected incisions within the relaxed skin tension lines where possible. The area thus outlined was incised deep to adipose tissue with a #15 scalpel blade. The skin margins were undermined to an appropriate distance in all directions utilizing iris scissors.  The opposing advancement arms were then advanced into place in opposite direction and anchored with interrupted buried subcutaneous sutures. Complex Repair Preamble Text (Leave Blank If You Do Not Want): Extensive wide undermining was performed. Keystone Flap Text: The defect edges were debeveled with a #15 scalpel blade.  Given the location of the defect, shape of the defect a keystone flap was deemed most appropriate.  Using a sterile surgical marker, an appropriate keystone flap was drawn incorporating the defect, outlining the appropriate donor tissue and placing the expected incisions within the relaxed skin tension lines where possible. The area thus outlined was incised deep to adipose tissue with a #15 scalpel blade.  The skin margins were undermined to an appropriate distance in all directions around the primary defect and laterally outward around the flap utilizing iris scissors. Billing Type: Third-Party Bill Bilateral Helical Rim Advancement Flap Text: The defect edges were debeveled with a #15 blade scalpel.  Given the location of the defect and the proximity to free margins (helical rim) a bilateral helical rim advancement flap was deemed most appropriate.  Using a sterile surgical marker, the appropriate advancement flaps were drawn incorporating the defect and placing the expected incisions between the helical rim and antihelix where possible.  The area thus outlined was incised through and through with a #15 scalpel blade.  With a skin hook and iris scissors, the flaps were gently and sharply undermined and freed up. Skin Substitute Text: The defect edges were debeveled with a #15 scalpel blade.  Given the location of the defect, shape of the defect and the proximity to free margins a skin substitute graft was deemed most appropriate.  The graft material was trimmed to fit the size of the defect. The graft was then placed in the primary defect and oriented appropriately. Muscle Hinge Flap Text: The defect edges were debeveled with a #15 scalpel blade.  Given the size, depth and location of the defect and the proximity to free margins a muscle hinge flap was deemed most appropriate.  Using a sterile surgical marker, an appropriate hinge flap was drawn incorporating the defect. The area thus outlined was incised with a #15 scalpel blade.  The skin margins were undermined to an appropriate distance in all directions utilizing iris scissors. Tissue Cultured Epidermal Autograft Text: The defect edges were debeveled with a #15 scalpel blade.  Given the location of the defect, shape of the defect and the proximity to free margins a tissue cultured epidermal autograft was deemed most appropriate.  The graft was then trimmed to fit the size of the defect.  The graft was then placed in the primary defect and oriented appropriately. Split-Thickness Skin Graft Text: The defect edges were debeveled with a #15 scalpel blade.  Given the location of the defect, shape of the defect and the proximity to free margins a split thickness skin graft was deemed most appropriate.  Using a sterile surgical marker, the primary defect shape was transferred to the donor site. The split thickness graft was then harvested.  The skin graft was then placed in the primary defect and oriented appropriately. Bi-Rhombic Flap Text: The defect edges were debeveled with a #15 scalpel blade.  Given the location of the defect and the proximity to free margins a bi-rhombic flap was deemed most appropriate.  Using a sterile surgical marker, an appropriate rhombic flap was drawn incorporating the defect. The area thus outlined was incised deep to adipose tissue with a #15 scalpel blade.  The skin margins were undermined to an appropriate distance in all directions utilizing iris scissors. Home Suture Removal Text: Patient was provided a home suture removal kit and will remove their sutures at home.  If they have any questions or difficulties they will call the office. Advancement Flap (Double) Text: The defect edges were debeveled with a #15 scalpel blade.  Given the location of the defect and the proximity to free margins a double advancement flap was deemed most appropriate.  Using a sterile surgical marker, the appropriate advancement flaps were drawn incorporating the defect and placing the expected incisions within the relaxed skin tension lines where possible.    The area thus outlined was incised deep to adipose tissue with a #15 scalpel blade.  The skin margins were undermined to an appropriate distance in all directions utilizing iris scissors. Cartilage Graft Text: The defect edges were debeveled with a #15 scalpel blade.  Given the location of the defect, shape of the defect, the fact the defect involved a full thickness cartilage defect a cartilage graft was deemed most appropriate.  An appropriate donor site was identified, cleansed, and anesthetized. The cartilage graft was then harvested and transferred to the recipient site, oriented appropriately and then sutured into place.  The secondary defect was then repaired using a primary closure. Spiral Flap Text: The defect edges were debeveled with a #15 scalpel blade.  Given the location of the defect, shape of the defect and the proximity to free margins a spiral flap was deemed most appropriate.  Using a sterile surgical marker, an appropriate rotation flap was drawn incorporating the defect and placing the expected incisions within the relaxed skin tension lines where possible. The area thus outlined was incised deep to adipose tissue with a #15 scalpel blade.  The skin margins were undermined to an appropriate distance in all directions utilizing iris scissors. Consent was obtained from the patient. The risks and benefits to therapy were discussed in detail. Specifically, the risks of infection, scarring, bleeding, prolonged wound healing, incomplete removal, allergy to anesthesia, nerve injury and recurrence were addressed. Prior to the procedure, the treatment site was clearly identified and confirmed by the patient. All components of Universal Protocol/PAUSE Rule completed. Bilobed Flap Text: The defect edges were debeveled with a #15 scalpel blade.  Given the location of the defect and the proximity to free margins a bilobe flap was deemed most appropriate.  Using a sterile surgical marker, an appropriate bilobe flap drawn around the defect.    The area thus outlined was incised deep to adipose tissue with a #15 scalpel blade.  The skin margins were undermined to an appropriate distance in all directions utilizing iris scissors. Rotation Flap Text: The defect edges were debeveled with a #15 scalpel blade.  Given the location of the defect, shape of the defect and the proximity to free margins a rotation flap was deemed most appropriate.  Using a sterile surgical marker, an appropriate rotation flap was drawn incorporating the defect and placing the expected incisions within the relaxed skin tension lines where possible.    The area thus outlined was incised deep to adipose tissue with a #15 scalpel blade.  The skin margins were undermined to an appropriate distance in all directions utilizing iris scissors. Complex Repair And M Plasty Text: The defect edges were debeveled with a #15 scalpel blade.  The primary defect was closed partially with a complex linear closure.  Given the location of the remaining defect, shape of the defect and the proximity to free margins an M plasty was deemed most appropriate for complete closure of the defect.  Using a sterile surgical marker, an appropriate advancement flap was drawn incorporating the defect and placing the expected incisions within the relaxed skin tension lines where possible.    The area thus outlined was incised deep to adipose tissue with a #15 scalpel blade.  The skin margins were undermined to an appropriate distance in all directions utilizing iris scissors. Complex Repair And W Plasty Text: The defect edges were debeveled with a #15 scalpel blade.  The primary defect was closed partially with a complex linear closure.  Given the location of the remaining defect, shape of the defect and the proximity to free margins a W plasty was deemed most appropriate for complete closure of the defect.  Using a sterile surgical marker, an appropriate advancement flap was drawn incorporating the defect and placing the expected incisions within the relaxed skin tension lines where possible.    The area thus outlined was incised deep to adipose tissue with a #15 scalpel blade.  The skin margins were undermined to an appropriate distance in all directions utilizing iris scissors. Epidermal Autograft Text: The defect edges were debeveled with a #15 scalpel blade.  Given the location of the defect, shape of the defect and the proximity to free margins an epidermal autograft was deemed most appropriate.  Using a sterile surgical marker, the primary defect shape was transferred to the donor site. The epidermal graft was then harvested.  The skin graft was then placed in the primary defect and oriented appropriately. Complex Repair And Rhombic Flap Text: The defect edges were debeveled with a #15 scalpel blade.  The primary defect was closed partially with a complex linear closure.  Given the location of the remaining defect, shape of the defect and the proximity to free margins a rhombic flap was deemed most appropriate for complete closure of the defect.  Using a sterile surgical marker, an appropriate advancement flap was drawn incorporating the defect and placing the expected incisions within the relaxed skin tension lines where possible.    The area thus outlined was incised deep to adipose tissue with a #15 scalpel blade.  The skin margins were undermined to an appropriate distance in all directions utilizing iris scissors.

## 2018-10-26 NOTE — PROGRESS NOTE ADULT - SUBJECTIVE AND OBJECTIVE BOX
HPI:  This is a 87 y.o. female with PMHx of HTN, HLD, 3V CAD, ischemic cardiomyopathy (EF;30%), s/p ICD implantation, HFrEF (on Bumex), Afib (not on AC due to severe hematuria in the past), s/p Afib ablation presented to cardiologist office with c/o SOB, weight gain, and pedal edema. Pt was referred for CardioMems to prevent further decompensated episode and to manage CHF.     s/p RHC & cardiomems implant: PCWP 38  Pt denies chest pain/SOB/palpitations post cath but c/o back pain d/t her arthritis  Physical exam:  Vital Signs:  HR: 70 (26 Oct 2018 11:45) (70 - 78)  BP: 143/86 (26 Oct 2018 11:30) (143/86 - 149/91)  BP(mean): 99 (26 Oct 2018 11:30) (99 - 104)  RR: 16 (26 Oct 2018 11:45) (16 - 16)  SpO2: 97% (26 Oct 2018 11:45) (91% - 97%)  Cardiac : (+)S1S2, RRR  Lungs: (+) Rt basilar crackles  Abd: soft, NT, (+)BS  Ext: Rt groin sheath removed, no hematoma, 2+ Rt DP    Labs:                        10.4   6.50  )-----------( 211      ( 26 Oct 2018 09:10 )             32.0     10-26    140  |  106  |  23  ----------------------------<  91  3.8   |  26  |  1.16    Ca    9.1      26 Oct 2018 09:10        MEDICATIONS  (STANDING):  clopidogrel Tablet 75 milliGRAM(s) Oral daily      A/P : s/p RHC & cardiomems implant  -BR for 3 hrs  -ASA/plavix for 4 weeks  -Cardiomems instruction provided by [employer] to pt & family.  -d/c home today  -f/u with Dr. Ballard in 5-7 days.  -Plan discussed with pt/Dr. Morris.

## 2018-11-01 DIAGNOSIS — K21.9 GASTRO-ESOPHAGEAL REFLUX DISEASE WITHOUT ESOPHAGITIS: ICD-10-CM

## 2018-11-01 DIAGNOSIS — M19.90 UNSPECIFIED OSTEOARTHRITIS, UNSPECIFIED SITE: ICD-10-CM

## 2018-11-01 DIAGNOSIS — I50.22 CHRONIC SYSTOLIC (CONGESTIVE) HEART FAILURE: ICD-10-CM

## 2018-11-01 DIAGNOSIS — Z95.810 PRESENCE OF AUTOMATIC (IMPLANTABLE) CARDIAC DEFIBRILLATOR: ICD-10-CM

## 2018-11-01 DIAGNOSIS — E78.5 HYPERLIPIDEMIA, UNSPECIFIED: ICD-10-CM

## 2018-11-01 DIAGNOSIS — I48.0 PAROXYSMAL ATRIAL FIBRILLATION: ICD-10-CM

## 2018-11-01 DIAGNOSIS — I10 ESSENTIAL (PRIMARY) HYPERTENSION: ICD-10-CM

## 2018-11-01 DIAGNOSIS — I25.10 ATHEROSCLEROTIC HEART DISEASE OF NATIVE CORONARY ARTERY WITHOUT ANGINA PECTORIS: ICD-10-CM

## 2019-01-10 ENCOUNTER — INPATIENT (INPATIENT)
Facility: HOSPITAL | Age: 84
LOS: 11 days | Discharge: NURSING, REHAB OR LONG-TERM CARE | End: 2019-01-22
Attending: FAMILY MEDICINE | Admitting: FAMILY MEDICINE
Payer: MEDICARE

## 2019-01-10 VITALS
DIASTOLIC BLOOD PRESSURE: 87 MMHG | RESPIRATION RATE: 18 BRPM | HEART RATE: 74 BPM | SYSTOLIC BLOOD PRESSURE: 143 MMHG | OXYGEN SATURATION: 100 % | WEIGHT: 149.91 LBS

## 2019-01-10 DIAGNOSIS — Z95.810 PRESENCE OF AUTOMATIC (IMPLANTABLE) CARDIAC DEFIBRILLATOR: Chronic | ICD-10-CM

## 2019-01-10 DIAGNOSIS — E78.5 HYPERLIPIDEMIA, UNSPECIFIED: ICD-10-CM

## 2019-01-10 DIAGNOSIS — Z96.652 PRESENCE OF LEFT ARTIFICIAL KNEE JOINT: Chronic | ICD-10-CM

## 2019-01-10 DIAGNOSIS — I25.10 ATHEROSCLEROTIC HEART DISEASE OF NATIVE CORONARY ARTERY WITHOUT ANGINA PECTORIS: ICD-10-CM

## 2019-01-10 DIAGNOSIS — I10 ESSENTIAL (PRIMARY) HYPERTENSION: ICD-10-CM

## 2019-01-10 DIAGNOSIS — J18.9 PNEUMONIA, UNSPECIFIED ORGANISM: ICD-10-CM

## 2019-01-10 DIAGNOSIS — K21.9 GASTRO-ESOPHAGEAL REFLUX DISEASE WITHOUT ESOPHAGITIS: ICD-10-CM

## 2019-01-10 DIAGNOSIS — Z29.9 ENCOUNTER FOR PROPHYLACTIC MEASURES, UNSPECIFIED: ICD-10-CM

## 2019-01-10 DIAGNOSIS — M15.9 POLYOSTEOARTHRITIS, UNSPECIFIED: ICD-10-CM

## 2019-01-10 DIAGNOSIS — I50.22 CHRONIC SYSTOLIC (CONGESTIVE) HEART FAILURE: ICD-10-CM

## 2019-01-10 DIAGNOSIS — N18.3 CHRONIC KIDNEY DISEASE, STAGE 3 (MODERATE): ICD-10-CM

## 2019-01-10 DIAGNOSIS — Z98.890 OTHER SPECIFIED POSTPROCEDURAL STATES: Chronic | ICD-10-CM

## 2019-01-10 DIAGNOSIS — Z98.891 HISTORY OF UTERINE SCAR FROM PREVIOUS SURGERY: Chronic | ICD-10-CM

## 2019-01-10 DIAGNOSIS — I48.0 PAROXYSMAL ATRIAL FIBRILLATION: ICD-10-CM

## 2019-01-10 LAB
ALBUMIN SERPL ELPH-MCNC: 3.1 G/DL — LOW (ref 3.3–5)
ALBUMIN SERPL ELPH-MCNC: 3.8 G/DL — SIGNIFICANT CHANGE UP (ref 3.3–5)
ALP SERPL-CCNC: 46 U/L — SIGNIFICANT CHANGE UP (ref 40–120)
ALP SERPL-CCNC: 61 U/L — SIGNIFICANT CHANGE UP (ref 40–120)
ALT FLD-CCNC: 20 U/L — SIGNIFICANT CHANGE UP (ref 12–78)
ALT FLD-CCNC: 21 U/L — SIGNIFICANT CHANGE UP (ref 12–78)
ANION GAP SERPL CALC-SCNC: 11 MMOL/L — SIGNIFICANT CHANGE UP (ref 5–17)
ANION GAP SERPL CALC-SCNC: 9 MMOL/L — SIGNIFICANT CHANGE UP (ref 5–17)
APPEARANCE UR: CLEAR — SIGNIFICANT CHANGE UP
APTT BLD: 29.3 SEC — SIGNIFICANT CHANGE UP (ref 27.5–36.3)
AST SERPL-CCNC: 26 U/L — SIGNIFICANT CHANGE UP (ref 15–37)
AST SERPL-CCNC: 32 U/L — SIGNIFICANT CHANGE UP (ref 15–37)
BASE EXCESS BLDA CALC-SCNC: 2.5 MMOL/L — HIGH (ref -2–2)
BASOPHILS # BLD AUTO: 0.02 K/UL — SIGNIFICANT CHANGE UP (ref 0–0.2)
BASOPHILS NFR BLD AUTO: 0.2 % — SIGNIFICANT CHANGE UP (ref 0–2)
BILIRUB SERPL-MCNC: 0.6 MG/DL — SIGNIFICANT CHANGE UP (ref 0.2–1.2)
BILIRUB SERPL-MCNC: 0.6 MG/DL — SIGNIFICANT CHANGE UP (ref 0.2–1.2)
BILIRUB UR-MCNC: NEGATIVE — SIGNIFICANT CHANGE UP
BLOOD GAS COMMENTS ARTERIAL: SIGNIFICANT CHANGE UP
BUN SERPL-MCNC: 23 MG/DL — SIGNIFICANT CHANGE UP (ref 7–23)
BUN SERPL-MCNC: 23 MG/DL — SIGNIFICANT CHANGE UP (ref 7–23)
CALCIUM SERPL-MCNC: 8.4 MG/DL — LOW (ref 8.5–10.1)
CALCIUM SERPL-MCNC: 8.7 MG/DL — SIGNIFICANT CHANGE UP (ref 8.5–10.1)
CHLORIDE SERPL-SCNC: 102 MMOL/L — SIGNIFICANT CHANGE UP (ref 96–108)
CHLORIDE SERPL-SCNC: 102 MMOL/L — SIGNIFICANT CHANGE UP (ref 96–108)
CK SERPL-CCNC: 65 U/L — SIGNIFICANT CHANGE UP (ref 26–192)
CK SERPL-CCNC: 68 U/L — SIGNIFICANT CHANGE UP (ref 26–192)
CO2 SERPL-SCNC: 24 MMOL/L — SIGNIFICANT CHANGE UP (ref 22–31)
CO2 SERPL-SCNC: 25 MMOL/L — SIGNIFICANT CHANGE UP (ref 22–31)
COLOR SPEC: YELLOW — SIGNIFICANT CHANGE UP
CREAT SERPL-MCNC: 1.25 MG/DL — SIGNIFICANT CHANGE UP (ref 0.5–1.3)
CREAT SERPL-MCNC: 1.29 MG/DL — SIGNIFICANT CHANGE UP (ref 0.5–1.3)
DIFF PNL FLD: ABNORMAL
EOSINOPHIL # BLD AUTO: 0 K/UL — SIGNIFICANT CHANGE UP (ref 0–0.5)
EOSINOPHIL NFR BLD AUTO: 0 % — SIGNIFICANT CHANGE UP (ref 0–6)
GAS PNL BLDA: SIGNIFICANT CHANGE UP
GLUCOSE BLDC GLUCOMTR-MCNC: 98 MG/DL — SIGNIFICANT CHANGE UP (ref 70–99)
GLUCOSE SERPL-MCNC: 108 MG/DL — HIGH (ref 70–99)
GLUCOSE SERPL-MCNC: 110 MG/DL — HIGH (ref 70–99)
GLUCOSE UR QL: NEGATIVE MG/DL — SIGNIFICANT CHANGE UP
HCO3 BLDA-SCNC: 25 MMOL/L — SIGNIFICANT CHANGE UP (ref 21–29)
HCT VFR BLD CALC: 31.1 % — LOW (ref 34.5–45)
HCT VFR BLD CALC: 36.3 % — SIGNIFICANT CHANGE UP (ref 34.5–45)
HGB BLD-MCNC: 11.4 G/DL — LOW (ref 11.5–15.5)
HGB BLD-MCNC: 9.9 G/DL — LOW (ref 11.5–15.5)
HMPV RNA SPEC QL NAA+PROBE: DETECTED
IMM GRANULOCYTES NFR BLD AUTO: 0.6 % — SIGNIFICANT CHANGE UP (ref 0–1.5)
INR BLD: 1.15 RATIO — SIGNIFICANT CHANGE UP (ref 0.88–1.16)
KETONES UR-MCNC: ABNORMAL
LACTATE SERPL-SCNC: 2.2 MMOL/L — HIGH (ref 0.7–2)
LACTATE SERPL-SCNC: 2.3 MMOL/L — HIGH (ref 0.7–2)
LEUKOCYTE ESTERASE UR-ACNC: ABNORMAL
LYMPHOCYTES # BLD AUTO: 0.52 K/UL — LOW (ref 1–3.3)
LYMPHOCYTES # BLD AUTO: 6.3 % — LOW (ref 13–44)
MAGNESIUM SERPL-MCNC: 1.8 MG/DL — SIGNIFICANT CHANGE UP (ref 1.6–2.6)
MCHC RBC-ENTMCNC: 26 PG — LOW (ref 27–34)
MCHC RBC-ENTMCNC: 26.8 PG — LOW (ref 27–34)
MCHC RBC-ENTMCNC: 31.4 GM/DL — LOW (ref 32–36)
MCHC RBC-ENTMCNC: 31.8 GM/DL — LOW (ref 32–36)
MCV RBC AUTO: 82.9 FL — SIGNIFICANT CHANGE UP (ref 80–100)
MCV RBC AUTO: 84.3 FL — SIGNIFICANT CHANGE UP (ref 80–100)
MONOCYTES # BLD AUTO: 0.3 K/UL — SIGNIFICANT CHANGE UP (ref 0–0.9)
MONOCYTES NFR BLD AUTO: 3.6 % — SIGNIFICANT CHANGE UP (ref 2–14)
NEUTROPHILS # BLD AUTO: 7.33 K/UL — SIGNIFICANT CHANGE UP (ref 1.8–7.4)
NEUTROPHILS NFR BLD AUTO: 89.3 % — HIGH (ref 43–77)
NITRITE UR-MCNC: NEGATIVE — SIGNIFICANT CHANGE UP
NRBC # BLD: 0 /100 WBCS — SIGNIFICANT CHANGE UP (ref 0–0)
PCO2 BLDA: 34 MMHG — SIGNIFICANT CHANGE UP (ref 32–46)
PH BLDA: 7.49 — HIGH (ref 7.35–7.45)
PH UR: 6 — SIGNIFICANT CHANGE UP (ref 5–8)
PHOSPHATE SERPL-MCNC: 3.3 MG/DL — SIGNIFICANT CHANGE UP (ref 2.5–4.5)
PLATELET # BLD AUTO: 164 K/UL — SIGNIFICANT CHANGE UP (ref 150–400)
PLATELET # BLD AUTO: 195 K/UL — SIGNIFICANT CHANGE UP (ref 150–400)
PO2 BLDA: 108 MMHG — SIGNIFICANT CHANGE UP (ref 74–108)
POTASSIUM SERPL-MCNC: 3.3 MMOL/L — LOW (ref 3.5–5.3)
POTASSIUM SERPL-MCNC: 4 MMOL/L — SIGNIFICANT CHANGE UP (ref 3.5–5.3)
POTASSIUM SERPL-SCNC: 3.3 MMOL/L — LOW (ref 3.5–5.3)
POTASSIUM SERPL-SCNC: 4 MMOL/L — SIGNIFICANT CHANGE UP (ref 3.5–5.3)
PROT SERPL-MCNC: 6.7 GM/DL — SIGNIFICANT CHANGE UP (ref 6–8.3)
PROT SERPL-MCNC: 7.8 GM/DL — SIGNIFICANT CHANGE UP (ref 6–8.3)
PROT UR-MCNC: 30 MG/DL
PROTHROM AB SERPL-ACNC: 12.8 SEC — SIGNIFICANT CHANGE UP (ref 10–12.9)
RAPID RVP RESULT: DETECTED
RBC # BLD: 3.69 M/UL — LOW (ref 3.8–5.2)
RBC # BLD: 4.38 M/UL — SIGNIFICANT CHANGE UP (ref 3.8–5.2)
RBC # FLD: 16.4 % — HIGH (ref 10.3–14.5)
RBC # FLD: 16.8 % — HIGH (ref 10.3–14.5)
SAO2 % BLDA: 98 % — HIGH (ref 92–96)
SODIUM SERPL-SCNC: 136 MMOL/L — SIGNIFICANT CHANGE UP (ref 135–145)
SODIUM SERPL-SCNC: 137 MMOL/L — SIGNIFICANT CHANGE UP (ref 135–145)
SP GR SPEC: 1.02 — SIGNIFICANT CHANGE UP (ref 1.01–1.02)
TROPONIN I SERPL-MCNC: 0.04 NG/ML — SIGNIFICANT CHANGE UP (ref 0.01–0.04)
TROPONIN I SERPL-MCNC: 0.07 NG/ML — HIGH (ref 0.01–0.04)
TROPONIN I SERPL-MCNC: 0.08 NG/ML — HIGH (ref 0.01–0.04)
TROPONIN I SERPL-MCNC: 0.09 NG/ML — HIGH (ref 0.01–0.04)
UROBILINOGEN FLD QL: NEGATIVE MG/DL — SIGNIFICANT CHANGE UP
WBC # BLD: 6.84 K/UL — SIGNIFICANT CHANGE UP (ref 3.8–10.5)
WBC # BLD: 8.22 K/UL — SIGNIFICANT CHANGE UP (ref 3.8–10.5)
WBC # FLD AUTO: 6.84 K/UL — SIGNIFICANT CHANGE UP (ref 3.8–10.5)
WBC # FLD AUTO: 8.22 K/UL — SIGNIFICANT CHANGE UP (ref 3.8–10.5)

## 2019-01-10 PROCEDURE — 71045 X-RAY EXAM CHEST 1 VIEW: CPT | Mod: 26,77

## 2019-01-10 PROCEDURE — 74176 CT ABD & PELVIS W/O CONTRAST: CPT | Mod: 26

## 2019-01-10 PROCEDURE — 71250 CT THORAX DX C-: CPT | Mod: 26

## 2019-01-10 PROCEDURE — 71045 X-RAY EXAM CHEST 1 VIEW: CPT | Mod: 26

## 2019-01-10 PROCEDURE — 93010 ELECTROCARDIOGRAM REPORT: CPT | Mod: 76

## 2019-01-10 PROCEDURE — 99285 EMERGENCY DEPT VISIT HI MDM: CPT

## 2019-01-10 RX ORDER — ACETAMINOPHEN 500 MG
650 TABLET ORAL EVERY 6 HOURS
Qty: 0 | Refills: 0 | Status: DISCONTINUED | OUTPATIENT
Start: 2019-01-10 | End: 2019-01-13

## 2019-01-10 RX ORDER — ASPIRIN/CALCIUM CARB/MAGNESIUM 324 MG
81 TABLET ORAL DAILY
Qty: 0 | Refills: 0 | Status: DISCONTINUED | OUTPATIENT
Start: 2019-01-10 | End: 2019-01-22

## 2019-01-10 RX ORDER — HEPARIN SODIUM 5000 [USP'U]/ML
5000 INJECTION INTRAVENOUS; SUBCUTANEOUS EVERY 12 HOURS
Qty: 0 | Refills: 0 | Status: DISCONTINUED | OUTPATIENT
Start: 2019-01-10 | End: 2019-01-22

## 2019-01-10 RX ORDER — VANCOMYCIN HCL 1 G
1000 VIAL (EA) INTRAVENOUS ONCE
Qty: 0 | Refills: 0 | Status: COMPLETED | OUTPATIENT
Start: 2019-01-10 | End: 2019-01-10

## 2019-01-10 RX ORDER — CEFTRIAXONE 500 MG/1
1000 INJECTION, POWDER, FOR SOLUTION INTRAMUSCULAR; INTRAVENOUS ONCE
Qty: 0 | Refills: 0 | Status: COMPLETED | OUTPATIENT
Start: 2019-01-10 | End: 2019-01-10

## 2019-01-10 RX ORDER — METOPROLOL TARTRATE 50 MG
50 TABLET ORAL DAILY
Qty: 0 | Refills: 0 | Status: DISCONTINUED | OUTPATIENT
Start: 2019-01-10 | End: 2019-01-10

## 2019-01-10 RX ORDER — ACETAMINOPHEN 500 MG
1000 TABLET ORAL ONCE
Qty: 0 | Refills: 0 | Status: COMPLETED | OUTPATIENT
Start: 2019-01-10 | End: 2019-01-10

## 2019-01-10 RX ORDER — AZITHROMYCIN 500 MG/1
500 TABLET, FILM COATED ORAL ONCE
Qty: 0 | Refills: 0 | Status: COMPLETED | OUTPATIENT
Start: 2019-01-10 | End: 2019-01-10

## 2019-01-10 RX ORDER — ATORVASTATIN CALCIUM 80 MG/1
10 TABLET, FILM COATED ORAL AT BEDTIME
Qty: 0 | Refills: 0 | Status: DISCONTINUED | OUTPATIENT
Start: 2019-01-10 | End: 2019-01-22

## 2019-01-10 RX ORDER — CLOPIDOGREL BISULFATE 75 MG/1
75 TABLET, FILM COATED ORAL DAILY
Qty: 0 | Refills: 0 | Status: DISCONTINUED | OUTPATIENT
Start: 2019-01-10 | End: 2019-01-22

## 2019-01-10 RX ORDER — MAGNESIUM OXIDE 400 MG ORAL TABLET 241.3 MG
200 TABLET ORAL DAILY
Qty: 0 | Refills: 0 | Status: DISCONTINUED | OUTPATIENT
Start: 2019-01-10 | End: 2019-01-22

## 2019-01-10 RX ORDER — PANTOPRAZOLE SODIUM 20 MG/1
40 TABLET, DELAYED RELEASE ORAL ONCE
Qty: 0 | Refills: 0 | Status: COMPLETED | OUTPATIENT
Start: 2019-01-10 | End: 2019-01-10

## 2019-01-10 RX ORDER — CEFEPIME 1 G/1
1000 INJECTION, POWDER, FOR SOLUTION INTRAMUSCULAR; INTRAVENOUS EVERY 12 HOURS
Qty: 0 | Refills: 0 | Status: DISCONTINUED | OUTPATIENT
Start: 2019-01-10 | End: 2019-01-17

## 2019-01-10 RX ORDER — POTASSIUM CHLORIDE 20 MEQ
10 PACKET (EA) ORAL
Qty: 0 | Refills: 0 | Status: COMPLETED | OUTPATIENT
Start: 2019-01-10 | End: 2019-01-11

## 2019-01-10 RX ORDER — AMIODARONE HYDROCHLORIDE 400 MG/1
200 TABLET ORAL DAILY
Qty: 0 | Refills: 0 | Status: DISCONTINUED | OUTPATIENT
Start: 2019-01-10 | End: 2019-01-22

## 2019-01-10 RX ORDER — SODIUM CHLORIDE 9 MG/ML
1000 INJECTION INTRAMUSCULAR; INTRAVENOUS; SUBCUTANEOUS ONCE
Qty: 0 | Refills: 0 | Status: DISCONTINUED | OUTPATIENT
Start: 2019-01-10 | End: 2019-01-11

## 2019-01-10 RX ORDER — ASPIRIN/CALCIUM CARB/MAGNESIUM 324 MG
324 TABLET ORAL ONCE
Qty: 0 | Refills: 0 | Status: COMPLETED | OUTPATIENT
Start: 2019-01-10 | End: 2019-01-10

## 2019-01-10 RX ORDER — OXYBUTYNIN CHLORIDE 5 MG
5 TABLET ORAL
Qty: 0 | Refills: 0 | Status: DISCONTINUED | OUTPATIENT
Start: 2019-01-10 | End: 2019-01-22

## 2019-01-10 RX ORDER — OXYCODONE AND ACETAMINOPHEN 5; 325 MG/1; MG/1
1 TABLET ORAL EVERY 6 HOURS
Qty: 0 | Refills: 0 | Status: DISCONTINUED | OUTPATIENT
Start: 2019-01-10 | End: 2019-01-16

## 2019-01-10 RX ORDER — IPRATROPIUM/ALBUTEROL SULFATE 18-103MCG
3 AEROSOL WITH ADAPTER (GRAM) INHALATION ONCE
Qty: 0 | Refills: 0 | Status: COMPLETED | OUTPATIENT
Start: 2019-01-10 | End: 2019-01-10

## 2019-01-10 RX ORDER — VANCOMYCIN HCL 1 G
VIAL (EA) INTRAVENOUS
Qty: 0 | Refills: 0 | Status: DISCONTINUED | OUTPATIENT
Start: 2019-01-10 | End: 2019-01-10

## 2019-01-10 RX ORDER — LOPERAMIDE HCL 2 MG
2 TABLET ORAL DAILY
Qty: 0 | Refills: 0 | Status: DISCONTINUED | OUTPATIENT
Start: 2019-01-10 | End: 2019-01-22

## 2019-01-10 RX ORDER — FUROSEMIDE 40 MG
40 TABLET ORAL DAILY
Qty: 0 | Refills: 0 | Status: DISCONTINUED | OUTPATIENT
Start: 2019-01-10 | End: 2019-01-11

## 2019-01-10 RX ORDER — ONDANSETRON 8 MG/1
4 TABLET, FILM COATED ORAL EVERY 6 HOURS
Qty: 0 | Refills: 0 | Status: DISCONTINUED | OUTPATIENT
Start: 2019-01-10 | End: 2019-01-12

## 2019-01-10 RX ORDER — FUROSEMIDE 40 MG
40 TABLET ORAL ONCE
Qty: 0 | Refills: 0 | Status: DISCONTINUED | OUTPATIENT
Start: 2019-01-10 | End: 2019-01-10

## 2019-01-10 RX ORDER — LACTULOSE 10 G/15ML
20 SOLUTION ORAL AT BEDTIME
Qty: 0 | Refills: 0 | Status: DISCONTINUED | OUTPATIENT
Start: 2019-01-10 | End: 2019-01-22

## 2019-01-10 RX ORDER — BUMETANIDE 0.25 MG/ML
1 INJECTION INTRAMUSCULAR; INTRAVENOUS
Qty: 0 | Refills: 0 | COMMUNITY

## 2019-01-10 RX ORDER — SODIUM CHLORIDE 9 MG/ML
500 INJECTION INTRAMUSCULAR; INTRAVENOUS; SUBCUTANEOUS
Qty: 0 | Refills: 0 | Status: COMPLETED | OUTPATIENT
Start: 2019-01-10 | End: 2019-01-11

## 2019-01-10 RX ORDER — CEFTRIAXONE 500 MG/1
1 INJECTION, POWDER, FOR SOLUTION INTRAMUSCULAR; INTRAVENOUS ONCE
Qty: 0 | Refills: 0 | Status: DISCONTINUED | OUTPATIENT
Start: 2019-01-10 | End: 2019-01-10

## 2019-01-10 RX ORDER — BUMETANIDE 0.25 MG/ML
3 INJECTION INTRAMUSCULAR; INTRAVENOUS DAILY
Qty: 0 | Refills: 0 | Status: DISCONTINUED | OUTPATIENT
Start: 2019-01-10 | End: 2019-01-10

## 2019-01-10 RX ORDER — PIPERACILLIN AND TAZOBACTAM 4; .5 G/20ML; G/20ML
3.38 INJECTION, POWDER, LYOPHILIZED, FOR SOLUTION INTRAVENOUS EVERY 8 HOURS
Qty: 0 | Refills: 0 | Status: DISCONTINUED | OUTPATIENT
Start: 2019-01-10 | End: 2019-01-10

## 2019-01-10 RX ADMIN — Medication 100 MILLIEQUIVALENT(S): at 23:41

## 2019-01-10 RX ADMIN — AMIODARONE HYDROCHLORIDE 200 MILLIGRAM(S): 400 TABLET ORAL at 18:21

## 2019-01-10 RX ADMIN — PANTOPRAZOLE SODIUM 40 MILLIGRAM(S): 20 TABLET, DELAYED RELEASE ORAL at 22:29

## 2019-01-10 RX ADMIN — CLOPIDOGREL BISULFATE 75 MILLIGRAM(S): 75 TABLET, FILM COATED ORAL at 18:21

## 2019-01-10 RX ADMIN — HEPARIN SODIUM 5000 UNIT(S): 5000 INJECTION INTRAVENOUS; SUBCUTANEOUS at 18:20

## 2019-01-10 RX ADMIN — Medication 40 MILLIGRAM(S): at 18:20

## 2019-01-10 RX ADMIN — Medication 3 MILLILITER(S): at 20:12

## 2019-01-10 RX ADMIN — Medication 250 MILLIGRAM(S): at 14:18

## 2019-01-10 RX ADMIN — Medication 5 MILLIGRAM(S): at 18:21

## 2019-01-10 RX ADMIN — CEFEPIME 1000 MILLIGRAM(S): 1 INJECTION, POWDER, FOR SOLUTION INTRAMUSCULAR; INTRAVENOUS at 22:29

## 2019-01-10 RX ADMIN — Medication 1000 MILLIGRAM(S): at 11:18

## 2019-01-10 RX ADMIN — Medication 400 MILLIGRAM(S): at 10:45

## 2019-01-10 RX ADMIN — OXYCODONE AND ACETAMINOPHEN 1 TABLET(S): 5; 325 TABLET ORAL at 15:12

## 2019-01-10 RX ADMIN — Medication 324 MILLIGRAM(S): at 12:06

## 2019-01-10 RX ADMIN — Medication 400 MILLIGRAM(S): at 23:41

## 2019-01-10 RX ADMIN — AZITHROMYCIN 255 MILLIGRAM(S): 500 TABLET, FILM COATED ORAL at 12:06

## 2019-01-10 RX ADMIN — CEFTRIAXONE 1000 MILLIGRAM(S): 500 INJECTION, POWDER, FOR SOLUTION INTRAMUSCULAR; INTRAVENOUS at 11:30

## 2019-01-10 NOTE — ED PROVIDER NOTE - OBJECTIVE STATEMENT
88 y/o F with PMHx CAD, HTN, HLD, A-Fib of presenting to the ED BIBA c/o CP and SOB starting 2 days ago. Pt states that today she began having severe SOB that was worse lying flat. Pt called EMS and was given 1 sublingual of Nitro and placed on BiPAP en route. NKDA. 86 y/o F with PMHx CAD, HTN, HLD, A-Fib of presenting to the ED BIBA c/o CP and SOB starting 2 days ago. Pt states that today she began having severe SOB that was worse when lying flat. Pt called EMS and was given 1 sublingual of Nitro and placed on BiPAP en route. NKDA. 86 y/o F with PMHx CAD, HTN, HLD, A-Fib presenting to the ED BIBA c/o CP and SOB starting 2 days ago. Pt states that today she began having severe SOB that was worse when lying flat. pt also with cough Pt called EMS and was given 1 sublingual of Nitro and placed on BiPAP en route. NKDA.

## 2019-01-10 NOTE — CHART NOTE - NSCHARTNOTEFT_GEN_A_CORE
RRT called for SOB and desaturation to 78% on 50% venti mask.      Plan:   Stat EKG  Stat CMP, CBC, Mg, Phos, Troponin  CXR  Start Bipap  Michelle  Maalox, Protonix RRT called for SOB and desaturation to 78% on 50% venti mask. Patient also complaining of new onset severe abdominal pain.           Plan:   Stat EKG  Stat CMP, CBC, Mg, Phos, Troponin, Lactate  CXR  Start Bipap  Michelle  Maalox, Protonix  Stat CT abdomen 87F w/ PMH of afib not on a/c for hx of hematuria & s/p ablation (?), CAD, HTN, HLD, sCHF (EF 20-25%) s/p cardiomems and AICD, GERD, OA admitted for PNA.    RRT called for SOB and desaturation to 78% on 50% venti mask. Patient also complaining of new onset severe abdominal pain.     Vitals:   P 106  /74  O2 sat 89% on non-rebreather    PE:   Gen: elderly female in mild to moderate distress, alert and oriented  CV:  physiologic s1 and s2  Resp: decreased breath sounds in b/l lower lobes; rales in b/l upper lobes  abd: soft, diffusely tender to palpation, + bowel sounds      Plan:   Stat EKG  Stat CMP, CBC, Mg, Phos, Troponin, Lactate  CXR  Start Bipap  Michelle  Maalox, Protonix  Stat CT abdomen/pelvis non contrast 87F w/ PMH of afib (not on a/c ) & s/p ablation (?), CAD, HTN, HLD, HFrEF (EF 20-25%) s/p cardiomems and AICD, GERD, OA admitted for PNA.     New admit to floor, in ED patient on Bipap for to hypoxia. On floor patient on venti mask, but was switched to NC for meal. After meal, patient was saturating 70's and placed on venti mask. RRT called for SOB and desaturation to 78% on 50% venti mask when she began complaining of new onset severe abdominal pain.     Vitals:   P 106  /74  O2 sat 89% on non-rebreather  T 98.2F    PE:   Gen: elderly female in mild to moderate distress, curled into fetal position, alert and oriented  CV:  physiologic s1 and s2, tacycardic  Resp: decreased breath sounds in b/l lower lobes; rales in b/l upper lobes  abd: soft, diffusely tender to palpation, + bowel sounds      Plan: 86yo F admitted for PNA now in respiratory distress and with severe abdominal pain.   Stat EKG  Stat CMP, CBC, Mg, Phos, Troponin, Lactate  Stat CXR  Start Bipap  Michelle placed  Maalox, Protonix  Stat CT abdomen/pelvis non contrast    Plan discussed with Dr. Alamo, PGY-3. Dr. Lara, intensivist present. 87F w/ PMH of afib (not on a/c ) & s/p ablation (?), CAD, HTN, HLD, HFrEF (EF 20-25%) s/p cardiomems and AICD, GERD, OA admitted for PNA.     New admit to floor, in ED patient on Bipap for to hypoxia. On floor patient on venti mask, but was switched to NC for meal. After meal, patient was saturating 70's and placed on venti mask. RRT called for SOB and desaturation to 78% on 50% venti mask when she began complaining of new onset severe abdominal pain.     Vitals:   P 106  /74  O2 sat 89% on non-rebreather  T 98.2F    PE:   Gen: elderly female in mild to moderate distress, curled into fetal position, alert and oriented  CV:  physiologic s1 and s2, tacycardic  Resp: decreased breath sounds in b/l lower lobes; rales in b/l upper lobes  abd: soft, diffusely tender to palpation, + bowel sounds      Plan: 88yo F admitted for PNA now in respiratory distress and with severe abdominal pain.   Stat EKG  Stat CMP, CBC, Mg, Phos, Troponin, Lactate  Stat CXR  Start Bipap  Michelle placed  Maalox, Protonix  Stat CT abdomen/pelvis non contrast    Plan discussed with Dr. Alamo, PGY-3. Dr. Lara, intensivist present.    Addendum:   On re-evaluation, patient appears more comfortable with decreased abdominal pain. On abdominal exam 87F w/ PMH of afib (not on a/c ) & s/p ablation (?), CAD, HTN, HLD, HFrEF (EF 20-25%) s/p cardiomems and AICD, GERD, OA admitted for PNA.     New admit to floor, in ED patient on Bipap for to hypoxia. On floor patient on venti mask, but was switched to NC for meal. After meal, patient was saturating 70's and placed on venti mask. RRT called for SOB and desaturation to 78% on 50% venti mask when she began complaining of new onset severe abdominal pain.     Vitals:   P 106  /74  O2 sat 89% on non-rebreather  T 98.2F    PE:   Gen: elderly female in mild to moderate distress, curled into fetal position, alert and oriented  CV:  physiologic s1 and s2, tacycardic  Resp: decreased breath sounds in b/l lower lobes; rales in b/l upper lobes  abd: soft, diffusely tender to palpation, + bowel sounds      Plan: 86yo F admitted for PNA now in respiratory distress and with severe abdominal pain.   Stat EKG  Stat CMP, CBC, Mg, Phos, Troponin, Lactate  Stat CXR  Start Bipap  Michelle placed  Maalox, Protonix  Stat CT abdomen/pelvis non contrast    Plan discussed with Dr. Alamo, PGY-3. Dr. Lara, intensivist present.    Addendum:   On re-evaluation, patient appears more comfortable with decreased abdominal pain. On repeat abdominal exam, soft, non-distended, nontender. 87F w/ PMH of afib (not on a/c ) & s/p ablation (?), CAD, HTN, HLD, HFrEF (EF 20-25%) s/p cardiomems and AICD, GERD, OA admitted for PNA.     New admit to floor, in ED patient on Bipap for to hypoxia. On floor patient on venti mask, but was switched to NC for meal. After meal, patient was saturating 70's and placed on venti mask. RRT called for SOB and desaturation to 78% on 50% venti mask when she began complaining of new onset severe abdominal pain.     Vitals:   P 106  /74  O2 sat 89% on non-rebreather  T 98.2F    PE:   Gen: elderly female in mild to moderate distress, curled into fetal position, alert and oriented  CV:  physiologic s1 and s2, tachycardic  Resp: decreased breath sounds in b/l lower lobes; rales in b/l upper lobes  abd: soft, diffusely tender to palpation, + bowel sounds      Plan: 86yo F admitted for PNA now in respiratory distress and with severe abdominal pain.   Stat EKG  Stat CMP, CBC, Mg, Phos, Troponin, Lactate  Stat CXR  Start Bipap  Michelle placed  Maalox, Protonix  Stat CT abdomen/pelvis non contrast    Plan discussed with Dr. Alamo, PGY-3. Dr. Lara, intensivist present.    Addendum:   On re-evaluation, patient appears more comfortable with decreased abdominal pain. On repeat abdominal exam, soft, non-distended, nontender.  CT abdomen shows gallbladder inflammation.   Abdominal sono- routine ordered 87F w/ PMH of afib (not on a/c ) & s/p ablation (?), CAD, HTN, HLD, HFrEF (EF 20-25%) s/p cardiomems and AICD, GERD, OA admitted for PNA.     New admit to floor, in ED patient on Bipap for to hypoxia. On floor patient on venti mask, but was switched to NC for meal. After meal, patient was saturating 70's and placed on venti mask. RRT called for SOB and desaturation to 78% on 50% venti mask when she began complaining of new onset severe abdominal pain.     Vitals:   P 106  /74  O2 sat 89% on non-rebreather  T 98.2F    PE:   Gen: elderly female in mild to moderate distress, curled into fetal position, alert and oriented  CV:  physiologic s1 and s2, tachycardic  Resp: decreased breath sounds in b/l lower lobes; rales in b/l upper lobes  abd: soft, diffusely tender to palpation, + bowel sounds      Plan: 86yo F admitted for PNA now in respiratory distress and with severe abdominal pain.   Stat EKG  Stat CMP, CBC, Mg, Phos, Troponin, Lactate  Stat CXR  Start Bipap  Michelle placed  Maalox, Protonix  Stat CT abdomen/pelvis non contrast    Plan discussed with Dr. Alamo, PGY-3. Dr. Lara, intensivist present.    Addendum:   On re-evaluation, patient appears more comfortable with decreased abdominal pain. On repeat abdominal exam, soft, non-distended, nontender.  CT abdomen shows gallbladder distention  Abdominal sono- routine ordered 87F w/ PMH of afib (not on a/c ) & s/p ablation (?), CAD, HTN, HLD, HFrEF (EF 20-25%) s/p cardiomems and AICD, GERD, OA admitted for PNA.     New admit to floor, in ED patient on Bipap for to hypoxia. On floor patient on venti mask, but was switched to NC for meal. After meal, patient was saturating 70's and placed on venti mask. RRT called for SOB and desaturation to 78% on 50% venti mask when she began complaining of new onset severe abdominal pain.     Vitals:   P 106  /74  O2 sat 89% on non-rebreather  T 98.2F    PE:   Gen: elderly female in mild to moderate distress, curled into fetal position, alert and oriented  CV:  physiologic s1 and s2, tachycardic  Resp: decreased breath sounds in b/l lower lobes; rales in b/l upper lobes  abd: soft, diffusely tender to palpation, + bowel sounds      Plan: 88yo F admitted for PNA now in respiratory distress and with severe abdominal pain.   Stat EKG  Stat CMP, CBC, Mg, Phos, Troponin, Lactate  Stat CXR  Start Bipap  Michelle placed  Maalox, Protonix  Stat CT abdomen/pelvis non contrast    Plan discussed with Dr. Alamo, PGY-3. Dr. Lara, intensivist present.    Addendum:   On re-evaluation, patient appears more comfortable with decreased abdominal pain. On repeat abdominal exam, soft, non-distended, nontender.  CT abdomen shows gallbladder distention  Abdominal ultrasound- routine ordered

## 2019-01-10 NOTE — CONSULT NOTE ADULT - SUBJECTIVE AND OBJECTIVE BOX
Patient is a 87y old  Female who presents with a chief complaint of SOB, cough.   HPI:  87F w/ PMH of afib not on a/c for hx of hematuria & s/p ablation (?), CAD, HTN, HLD, sCHF (EF 20-25%) s/p cardiomems and AICD, GERD, OA, s/p L knee replacement, 4x c-sections comes in with worsening SOB/cough for past 3 days. She lives in assisted living and I have seen her in office yesterday.  She was told to increase diuretics in the past and she refused to take more than what she was already taking.   Family urged to change bumex to lasix secondary to cost.  With cough yesterday she denied any fever and I warned her about viral infections that are wide spread now.    On arrival to ED pt had fever 102.2 F and was in respiratory distress with increased work of breathing so was put on bipap.   Now on face mask.  No hypoxia.    No CP or pressure.         PAST MEDICAL & SURGICAL HISTORY:  Osteoarthritis of multiple joints, unspecified osteoarthritis type  Gastroesophageal reflux disease without esophagitis  Dyslipidemia  Essential hypertension  Paroxysmal atrial fibrillation  Coronary artery disease involving native coronary artery of native heart without angina pectoris  Chronic systolic congestive heart failure: EF 20-25%  History of : 4 c-sections  History of total left knee replacement (TKR)  S/P ablation of atrial fibrillation  AICD (automatic cardioverter/defibrillator) present      MEDICATIONS  (STANDING):  amiodarone    Tablet 200 milliGRAM(s) Oral daily  aspirin  chewable 81 milliGRAM(s) Oral daily  atorvastatin 10 milliGRAM(s) Oral at bedtime  cefepime  Injectable. 1000 milliGRAM(s) IV Push every 12 hours  clopidogrel Tablet 75 milliGRAM(s) Oral daily  enalapril 2.5 milliGRAM(s) Oral daily  heparin  Injectable 5000 Unit(s) SubCutaneous every 12 hours  magnesium oxide 200 milliGRAM(s) Oral daily  metoprolol succinate ER 50 milliGRAM(s) Oral daily  oxybutynin 5 milliGRAM(s) Oral two times a day  sodium chloride 0.9% Bolus 1000 milliLiter(s) IV Bolus once    MEDICATIONS  (PRN):  acetaminophen   Tablet .. 650 milliGRAM(s) Oral every 6 hours PRN Temp greater or equal to 38C (100.4F), Mild Pain (1 - 3)  dicyclomine 20 milliGRAM(s) Oral three times a day before meals PRN Indigestion  lactulose Syrup 20 Gram(s) Oral at bedtime PRN Constipation  loperamide 2 milliGRAM(s) Oral daily PRN Diarrhea  ondansetron    Tablet 4 milliGRAM(s) Oral every 6 hours PRN Nausea and/or Vomiting  oxyCODONE    5 mG/acetaminophen 325 mG 1 Tablet(s) Oral every 6 hours PRN Moderate Pain (4 - 6)      FAMILY HISTORY:  Family history of malignant neoplasm (Child)      SOCIAL HISTORY:  no recent smoking     REVIEW OF SYSTEMS:  CONSTITUTIONAL:    No fatigue, malaise, lethargy.  No fever or chills.  HEENT:  Eyes:  No visual changes.     ENT:  No epistaxis.  No sinus pain.    RESPIRATORY:  c/o cough.  No wheeze.  No hemoptysis.  c/o  shortness of breath.  CARDIOVASCULAR:  No chest pains.  No palpitations. c/o shortness of breath, No orthopnea or PND.  GASTROINTESTINAL:  No abdominal pain.  No nausea or vomiting.    GENITOURINARY:    No hematuria.    MUSCULOSKELETAL:  No musculoskeletal pain.  No joint swelling.  No arthritis.  NEUROLOGICAL:  No tingling or numbness or weakness.  PSYCHIATRIC:  No confusion  SKIN:  No rashes.    ENDOCRINE:  No unexplained weight loss.  No polydipsia.   HEMATOLOGIC:  No anemia.  No prolonged or excessive bleeding.   ALLERGIC AND IMMUNOLOGIC:  No pruritus.          Vital Signs Last 24 Hrs  T(C): 36.7 (10 Hemant 2019 15:17), Max: 39 (10 Hemant 2019 10:51)  T(F): 98.1 (10 Hemant 2019 15:17), Max: 102.2 (10 Hemant 2019 10:51)  HR: 86 (10 Hemant 2019 15:17) (74 - 90)  BP: 113/58 (10 Hemant 2019 15:17) (100/55 - 143/87)  BP(mean): --  RR: 18 (10 Hemant 2019 15:17) (18 - 19)  SpO2: 99% (10 Hemant 2019 15:17) (99% - 100%)    PHYSICAL EXAM-    Constitutional: ill looking, mild respiratory distress, elderly frail    Head: Head is normocephalic and atraumatic.      Neck: No jugular venous distention. No audible carotid bruits. There are strong carotid pulses bilaterally. No JVD.     Cardiovascular: Regular rate and rhythm without S3, S4. No murmurs or rubs are appreciated.      Respiratory: crackles b/l    Abdomen: Soft, nontender, nondistended with positive bowel sounds.      Extremity: No tenderness. trace  pitting edema b/l    Neurologic: The patient is alert and oriented.      Skin: No rash, no obvious lesions noted.      Psychiatric: The patient appears to be distress      INTERPRETATION OF TELEMETRY: sinus rythm    ECG: Sinus rythm. LVH.     I&O's Detail      LABS:                        11.4   8.22  )-----------( 195      ( 10 Hemant 2019 10:28 )             36.3     01-10    136  |  102  |  23  ----------------------------<  110<H>  4.0   |  25  |  1.29    Ca    8.7      10 Hemant 2019 10:28    TPro  7.8  /  Alb  3.8  /  TBili  0.6  /  DBili  x   /  AST  32  /  ALT  21  /  AlkPhos  61  0110    CARDIAC MARKERS ( 10 Hemant 2019 13:24 )  0.081 ng/mL / x     / x     / x     / x      CARDIAC MARKERS ( 10 Hemant 2019 10:28 )  0.043 ng/mL / x     / x     / x     / x          PT/INR - ( 10 Hemant 2019 10:28 )   PT: 12.8 sec;   INR: 1.15 ratio         PTT - ( 10 Hemant 2019 10:28 )  PTT:29.3 sec  Urinalysis Basic - ( 10 Hemant 2019 10:28 )    Color: Yellow / Appearance: Clear / S.020 / pH: x  Gluc: x / Ketone: Moderate  / Bili: Negative / Urobili: Negative mg/dL   Blood: x / Protein: 30 mg/dL / Nitrite: Negative   Leuk Esterase: Trace / RBC: 11-25 /HPF / WBC 3-5   Sq Epi: x / Non Sq Epi: x / Bacteria: Moderate      I&O's Summary    BNPSerum Pro-Brain Natriuretic Peptide: 21897 pg/mL (01-10 @ 10:28)    RADIOLOGY & ADDITIONAL STUDIES:  < from: CT Chest No Cont (01.10.19 @ 13:37) >  IMPRESSION:     Bilateral multifocal pneumonia.    Cardiomegaly and enlarged pulmonary artery suggestive of pulmonary   arterial hypertension. Left lower pulmonary artery cardioMEMS device in   place.                FRANCISCO GAING   This document has been electronically signed. Hemant 10 2019  2:26PM        < end of copied text >

## 2019-01-10 NOTE — ED PROCEDURE NOTE - US CPT CODES
19086 US Chest (PTX, Pleural Effussion/CHF vs COPD)
14153 Echocardiography Transthoracic with Image 2D (Echo/FAST)

## 2019-01-10 NOTE — H&P ADULT - PSH
AICD (automatic cardioverter/defibrillator) present    History of   4 c-sections  History of total left knee replacement (TKR)    S/P ablation of atrial fibrillation

## 2019-01-10 NOTE — H&P ADULT - NSHPPHYSICALEXAM_GEN_ALL_CORE
Vital Signs Last 24 Hrs  T(C): 39 (01-10-19 @ 10:51)  T(F): 102.2 (01-10-19 @ 10:51), Max: 102.2 (01-10-19 @ 10:51)  HR: 90 (01-10-19 @ 10:51) (74 - 90)  BP: 113/93 (01-10-19 @ 10:51)  BP(mean): --  RR: 19 (01-10-19 @ 10:51) (18 - 19)  SpO2: 100% (01-10-19 @ 10:51) (100% - 100%)  Wt(kg): --

## 2019-01-10 NOTE — H&P ADULT - PROBLEM SELECTOR PLAN 3
Cont ASA, plavix, toprol Cont ASA, plavix  Hold toprol for now Cr 1.29, baseline ~1.1 previously  Will monitor  Serial BMP, especially while getting diuretics

## 2019-01-10 NOTE — H&P ADULT - PROBLEM SELECTOR PROBLEM 8
Osteoarthritis of multiple joints, unspecified osteoarthritis type Gastroesophageal reflux disease without esophagitis

## 2019-01-10 NOTE — H&P ADULT - HISTORY OF PRESENT ILLNESS
Anesthetic History PONV Review of Systems / Medical History Patient summary reviewed, nursing notes reviewed and pertinent labs reviewed Pulmonary Within defined limits Neuro/Psych Within defined limits Cardiovascular Exercise tolerance: >4 METS 
  
GI/Hepatic/Renal 
Within defined limits Endo/Other Within defined limits Other Findings Physical Exam 
 
Airway Mallampati: II 
TM Distance: 4 - 6 cm Neck ROM: normal range of motion Mouth opening: Normal 
 
 Cardiovascular Rhythm: regular Rate: normal 
 
 
 
 Dental 
No notable dental hx 
 
Comments: overbite Pulmonary Breath sounds clear to auscultation Abdominal 
GI exam deferred Other Findings Anesthetic Plan ASA: 2 Anesthesia type: general 
 
 
 
 
Induction: Intravenous Anesthetic plan and risks discussed with: Patient
87F w/ PMH of afib not on a/c for hx of hematuria & s/p ablation (?), CAD, HTN, HLD, sCHF (EF 20-25%) s/p cardiomems and AICD, GERD, OA, s/p L knee replacement, 4x c-sections comes in with worsening SOB/cough for past 3 days. Her daughter notes that she wasn't herself since Monday when she left company early (unlike her), and then complaining of SOB/cough. She went to see Dr. Denny in the office yesterday, where she was supposedly ok and Dr. Denny did not make any changes to her medications at that time. Then today her SOB was so bad that she wanted to come to the hospital. She denies fever, chills, productive cough, N/V, abd pain, chest pain/pressure. She can lay flat when she sleeps and uses 2 pillows. Has not noted any increase in leg swelling recently. Denies eating very salty food recently or drinking excessive water. Pt comes from [facility]. According to her daughter her baseline is AOx3, walking w/ a walker, and needs help with some ADLs.     On arrival to ED pt had fever 102.2 F and was in respiratory distress with increased work of breathing so was put on bipap. Has since been taken off bipap and is currently on non-rebreather. Did not have episodes of desaturation, but was tachypneic. Patient has MOLST form which shows she is DNR/DNI.

## 2019-01-10 NOTE — H&P ADULT - PROBLEM SELECTOR PLAN 7
Not on home meds - will add pepcid PRN Cont enalapril  Hold toprol for now Cont enalapril with holding parameters  Hold toprol for now

## 2019-01-10 NOTE — CONSULT NOTE ADULT - SUBJECTIVE AND OBJECTIVE BOX
Patient is a 87y old  Female who presents with a chief complaint of SOB, cough (10 Hemant 2019 12:03)    HPI:  86 y/o Female with h/o A.fib not on a/c due to hematuria & s/p ablation (?), CAD, HTN, HLD, CHF (EF 20-25%) s/p cardiomems and AICD, GERD, OA,  left TKR, 4x C-sections was admitted on 1/10 for worsening SOB/cough for past 3 days PTA. Her daughter notes that she wasn't herself and then complaining of SOB/cough. She went to see Dr. Denny in the office yesterday, where she was supposedly ok and Dr. Denny did not make any changes to her medications at that time. Then her SOB worsened and came to the hospital. She denies fever, chills at home. She has dry cough. On arrival to ED pt had fever 102.2 F and was in respiratory distress with increased work of breathing so was put on BiPAP. SHe received zosyn.    PMH: as above  PSH: as above  Meds: per reconciliation sheet, noted below  MEDICATIONS  (STANDING):  amiodarone    Tablet 200 milliGRAM(s) Oral daily  aspirin  chewable 81 milliGRAM(s) Oral daily  atorvastatin 10 milliGRAM(s) Oral at bedtime  cefepime  Injectable. 1000 milliGRAM(s) IV Push every 12 hours  clopidogrel Tablet 75 milliGRAM(s) Oral daily  enalapril 2.5 milliGRAM(s) Oral daily  heparin  Injectable 5000 Unit(s) SubCutaneous every 12 hours  magnesium oxide 200 milliGRAM(s) Oral daily  metoprolol succinate ER 50 milliGRAM(s) Oral daily  oxybutynin 5 milliGRAM(s) Oral two times a day  sodium chloride 0.9% Bolus 1000 milliLiter(s) IV Bolus once    MEDICATIONS  (PRN):  acetaminophen   Tablet .. 650 milliGRAM(s) Oral every 6 hours PRN Temp greater or equal to 38C (100.4F), Mild Pain (1 - 3)  dicyclomine 20 milliGRAM(s) Oral three times a day before meals PRN Indigestion  lactulose Syrup 20 Gram(s) Oral at bedtime PRN Constipation  loperamide 2 milliGRAM(s) Oral daily PRN Diarrhea  ondansetron    Tablet 4 milliGRAM(s) Oral every 6 hours PRN Nausea and/or Vomiting  oxyCODONE    5 mG/acetaminophen 325 mG 1 Tablet(s) Oral every 6 hours PRN Moderate Pain (4 - 6)    Allergies    No Known Allergies    Intolerances      Social: no smoking, no alcohol, no illegal drugs; no recent travel, no exposure to TB  FAMILY HISTORY:  Family history of malignant neoplasm (Child)    no history of premature cardiovascular disease in first degree relatives    ROS: the patient denies HA, no seizures, no dizziness, no sore throat, no nasal congestion, no blurry vision, no CP, no palpitations, has SOB, has cough, no abdominal pain, no diarrhea, no N/V, no dysuria, no leg pain, no claudication, no rash, no joint aches, no rectal pain or bleeding, no night sweats; has increased weakness  All other systems reviewed and are negative    Vital Signs Last 24 Hrs  T(C): 36.7 (10 Hemant 2019 15:17), Max: 39 (10 Hemant 2019 10:51)  T(F): 98.1 (10 Hemant 2019 15:17), Max: 102.2 (10 Hemant 2019 10:51)  HR: 86 (10 Hemant 2019 15:17) (74 - 90)  BP: 113/58 (10 Hemant 2019 15:17) (100/55 - 143/87)  BP(mean): --  RR: 18 (10 Hemant 2019 15:17) (18 - 19)  SpO2: 99% (10 Hemant 2019 15:17) (99% - 100%)  Daily     Daily Weight in k.4 (10 Hemant 2019 15:17)    PE:    Constitutional: frail looking  HEENT: NC/AT, EOMI, PERRLA, conjunctivae clear; ears and nose atraumatic; pharynx benign  Neck: supple; thyroid not palpable  Back: no tenderness  Respiratory: respiratory effort normal; crackles b/l  Cardiovascular: S1S2 regular, no murmurs  Abdomen: soft, not tender, not distended, positive BS; no liver or spleen organomegaly  Genitourinary: no suprapubic tenderness  Lymphatic: no LN palpable  Musculoskeletal: no muscle tenderness, no joint swelling or tenderness  Extremities: no pedal edema  Neurological/ Psychiatric: AxOx3, judgement and insight normal; moving all extremities  Skin: no rashes; no palpable lesions    Labs: all available labs reviewed                        11.4   8.22  )-----------( 195      ( 10 Hemant 2019 10:28 )             36.3     01-10    136  |  102  |  23  ----------------------------<  110<H>  4.0   |  25  |  1.29    Ca    8.7      10 Hemant 2019 10:28    TPro  7.8  /  Alb  3.8  /  TBili  0.6  /  DBili  x   /  AST  32  /  ALT  21  /  AlkPhos  61  01-10     LIVER FUNCTIONS - ( 10 Hemant 2019 10:28 )  Alb: 3.8 g/dL / Pro: 7.8 gm/dL / ALK PHOS: 61 U/L / ALT: 21 U/L / AST: 32 U/L / GGT: x           Urinalysis Basic - ( 10 Hemant 2019 10:28 )    Color: Yellow / Appearance: Clear / S.020 / pH: x  Gluc: x / Ketone: Moderate  / Bili: Negative / Urobili: Negative mg/dL   Blood: x / Protein: 30 mg/dL / Nitrite: Negative   Leuk Esterase: Trace / RBC: 11-25 /HPF / WBC 3-5   Sq Epi: x / Non Sq Epi: x / Bacteria: Moderate          Radiology: all available radiological tests reviewed    < from: CT Chest No Cont (01.10.19 @ 13:37) >  Bilateral multifocal pneumonia.  Cardiomegaly and enlarged pulmonary artery suggestive of pulmonary   arterial hypertension. Left lower pulmonary artery cardioMEMS device in   place.    < end of copied text >      Advanced directives addressed: full resuscitation Patient is a 87y old  Female who presents with a chief complaint of SOB, cough (10 Hemant 2019 12:03)    HPI:  88 y/o Female with h/o A.fib not on a/c due to hematuria & s/p ablation (?), CAD, HTN, HLD, CHF (EF 20-25%) s/p cardiomems and AICD, GERD, OA,  left TKR, 4x C-sections was admitted on 1/10 for worsening SOB/cough for past 3 days PTA. Her daughter notes that she wasn't herself and then complaining of SOB/cough. She went to see Dr. Denny in the office yesterday, where she was supposedly ok and Dr. Denny did not make any changes to her medications at that time. Then her SOB worsened and came to the hospital. She denies fever, chills at home. She has dry cough. On arrival to ED pt had fever 102.2 F and was in respiratory distress with increased work of breathing so was put on BiPAP. SHe received zosyn.    PMH: as above  PSH: as above  Meds: per reconciliation sheet, noted below  MEDICATIONS  (STANDING):  amiodarone    Tablet 200 milliGRAM(s) Oral daily  aspirin  chewable 81 milliGRAM(s) Oral daily  atorvastatin 10 milliGRAM(s) Oral at bedtime  cefepime  Injectable. 1000 milliGRAM(s) IV Push every 12 hours  clopidogrel Tablet 75 milliGRAM(s) Oral daily  enalapril 2.5 milliGRAM(s) Oral daily  heparin  Injectable 5000 Unit(s) SubCutaneous every 12 hours  magnesium oxide 200 milliGRAM(s) Oral daily  metoprolol succinate ER 50 milliGRAM(s) Oral daily  oxybutynin 5 milliGRAM(s) Oral two times a day  sodium chloride 0.9% Bolus 1000 milliLiter(s) IV Bolus once    MEDICATIONS  (PRN):  acetaminophen   Tablet .. 650 milliGRAM(s) Oral every 6 hours PRN Temp greater or equal to 38C (100.4F), Mild Pain (1 - 3)  dicyclomine 20 milliGRAM(s) Oral three times a day before meals PRN Indigestion  lactulose Syrup 20 Gram(s) Oral at bedtime PRN Constipation  loperamide 2 milliGRAM(s) Oral daily PRN Diarrhea  ondansetron    Tablet 4 milliGRAM(s) Oral every 6 hours PRN Nausea and/or Vomiting  oxyCODONE    5 mG/acetaminophen 325 mG 1 Tablet(s) Oral every 6 hours PRN Moderate Pain (4 - 6)    Allergies    No Known Allergies    Intolerances      Social: no smoking, no alcohol, no illegal drugs; no recent travel, no exposure to TB  FAMILY HISTORY:  Family history of malignant neoplasm (Child)    no history of premature cardiovascular disease in first degree relatives    ROS: the patient denies HA, no seizures, no dizziness, no sore throat, no nasal congestion, no blurry vision, no CP, no palpitations, has SOB, has cough, no abdominal pain, no diarrhea, no N/V, no dysuria, no leg pain, no claudication, no rash, no joint aches, no rectal pain or bleeding, no night sweats; has increased weakness  All other systems reviewed and are negative    Vital Signs Last 24 Hrs  T(C): 36.7 (10 Hemant 2019 15:17), Max: 39 (10 Hemant 2019 10:51)  T(F): 98.1 (10 Hemant 2019 15:17), Max: 102.2 (10 Hemant 2019 10:51)  HR: 86 (10 Hemant 2019 15:17) (74 - 90)  BP: 113/58 (10 Hemant 2019 15:17) (100/55 - 143/87)  BP(mean): --  RR: 18 (10 Hemant 2019 15:17) (18 - 19)  SpO2: 99% (10 Hemant 2019 15:17) (99% - 100%)  Daily     Daily Weight in k.4 (10 Hemant 2019 15:17)    PE:    Constitutional: frail looking  HEENT: NC/AT, EOMI, PERRLA, conjunctivae clear; ears and nose atraumatic; pharynx benign  Neck: supple; thyroid not palpable  Back: no tenderness  Respiratory: respiratory effort normal; crackles b/l  Cardiovascular: S1S2 regular, no murmurs  Abdomen: soft, not tender, not distended, positive BS; no liver or spleen organomegaly  Genitourinary: no suprapubic tenderness  Lymphatic: no LN palpable  Musculoskeletal: no muscle tenderness, no joint swelling or tenderness  Extremities: no pedal edema  Neurological/ Psychiatric: AxOx3, judgement and insight normal; moving all extremities  Skin: no rashes; no palpable lesions    Labs: all available labs reviewed                        11.4   8.22  )-----------( 195      ( 10 Hemant 2019 10:28 )             36.3     01-10    136  |  102  |  23  ----------------------------<  110<H>  4.0   |  25  |  1.29    Ca    8.7      10 Hemant 2019 10:28    TPro  7.8  /  Alb  3.8  /  TBili  0.6  /  DBili  x   /  AST  32  /  ALT  21  /  AlkPhos  61  01-10     LIVER FUNCTIONS - ( 10 Hemant 2019 10:28 )  Alb: 3.8 g/dL / Pro: 7.8 gm/dL / ALK PHOS: 61 U/L / ALT: 21 U/L / AST: 32 U/L / GGT: x           Urinalysis Basic - ( 10 Hemant 2019 10:28 )    Color: Yellow / Appearance: Clear / S.020 / pH: x  Gluc: x / Ketone: Moderate  / Bili: Negative / Urobili: Negative mg/dL   Blood: x / Protein: 30 mg/dL / Nitrite: Negative   Leuk Esterase: Trace / RBC: 11-25 /HPF / WBC 3-5   Sq Epi: x / Non Sq Epi: x / Bacteria: Moderate    Rapid Respiratory Viral Panel (01.10.19 @ 10:28)    Rapid RVP Result: Detected:     hMPV (RapRVP): Detected      Radiology: all available radiological tests reviewed    < from: CT Chest No Cont (01.10.19 @ 13:37) >  Bilateral multifocal pneumonia.  Cardiomegaly and enlarged pulmonary artery suggestive of pulmonary   arterial hypertension. Left lower pulmonary artery cardioMEMS device in   place.    < end of copied text >      Advanced directives addressed: DNR

## 2019-01-10 NOTE — H&P ADULT - PROBLEM SELECTOR PLAN 4
Cont toprol 50, amiodarone Cont amiodarone  Hold toprol for now Cont ASA, plavix  Hold toprol for now

## 2019-01-10 NOTE — PROVIDER CONTACT NOTE (CHANGE IN STATUS NOTIFICATION) - ASSESSMENT
Patient on 50% venti mask with O2 sat 78%, severe SOB and abdominal pain. /74  Temp 98.2 BGM 98, RR 32.  RRT called

## 2019-01-10 NOTE — ED ADULT NURSE NOTE - CHPI ED NUR SYMPTOMS NEG
no edema/no body aches/no cough/no wheezing/no chills/no headache/no chest pain/no diaphoresis/no hemoptysis

## 2019-01-10 NOTE — H&P ADULT - PROBLEM SELECTOR PLAN 2
EF 20-25% (last TTE in records done 2/2018 with EF 30%)  f/u cardiology consult w/ Dr. Denny  On bumex 3 mg daily at home - will continue at decreased dose 1 mg daily here while w/ PNA  Cont enalapril EF 20-25% (last TTE in records done 2/2018 with EF 30%)  f/u cardiology consult w/ Dr. Denny  On bumex 3 mg daily at home - will DC diuretics until CT chest done  Cont enalapril EF 20-25% (last TTE in records done 2/2018 with EF 30%)  f/u cardiology consult w/ Dr. Denny  On bumex 3 mg daily at home - will DC diuretics until CT chest done  Cont enalapril with holding parameters  f/u PT eval

## 2019-01-10 NOTE — ED PROVIDER NOTE - PHYSICAL EXAMINATION
Constitutional: mild distress AAOx3  Eyes: PERRLA EOMI  Head: Normocephalic atraumatic  Mouth: MMM  Cardiac: regular rate   Resp: Diffuse rales bilaterally  GI: Abd s/nt/nd  Neuro: CN2-12 intact  Skin: No rashes Constitutional: moderate distress  Eyes: PERRLA EOMI  Head: Normocephalic atraumatic  Mouth: MMM  Cardiac: regular rate normal peripheral pulses  Resp: Diffuse rales bilaterally  GI: Abd s/nt/nd  Neuro: CN2-12 intact  Skin: No rashes

## 2019-01-10 NOTE — CHART NOTE - NSCHARTNOTEFT_GEN_A_CORE
Called to a RR for decreased sat after patient removed from NIV.  Patient was on NIV for B/L infiltrates from RSV PNA.  Patient also with new onset abdominal pain    VS /74 P 106sat 80 on 50% VM'    lungs B/L rails  ABG diffuse abdominal pain    A/P: Type 1 resp failure from RSV Pna and new onset abdominal pain    Placed back on NIV and sat improved  CT A/P besides a distended GB no acute pathology  Daniella distally  Patient returned from CT had a BM and Pain resolved  Suggest an abdominal US.      D/W House staff and they will order the Abdominal US    30 min

## 2019-01-10 NOTE — CHART NOTE - NSCHARTNOTEFT_GEN_A_CORE
A/P  #Acute respiratory failure /B/L pneumonia suspected GNR/MRSA  #suspected sepsi from PNA  #hx systolic CHF EF 20%.currently compensated   #Acute toxic metabolic encephalopathy    -admit to tele  -continue IV zosyn and s/p IV vanco x1, s/p zithro x1 Blood cx, legionella urine  -BIPAP prn, no ABG as patient and HCP daughter at bed side declining intubation or resuscitation if need arises  -no further IVf due to CHF hx  -CT chest  -acute chf ruled out on CT chest then would resume BB, and lisinopril today with BB parameters and resume lasix in 1  day in lieu of sepsis  -cardio, pulm and ID consult    #Paroxysmal afib not on AC due to patient's refusal in the past and now  continue amio and BB    # CAD  continue statin, asa, plavix    #CKD stage 3  monitor BMP    #DNR/DNI  poor prognosis  palliative care consult    #DVT prophylaxis  heparin sc    # Advanced care planning(ACP)- spent 20mins for ACP conversation   I spent 20 minutes discussing with patient and daugter at bedside about current management plans and diagnosis and spoke about advanced care planning. daughter  patient's HCP and patient declined intubation or ventilator or pressors or resuscitation if the need arises.if her condition worsens despite conservative management then they would prefer comfort care as at baseline line she has very poor quality of life and alsways in chronic pain as per daughter     Patient seen and examined with the resident Dr Holloway. Agree with above plan of care which was discussed with the patient, family, team and NP. and necessary amendments made herein my A/P note    # Patient reevaluated at 3pm ,lactate level improved ,hemodynamically stable, denies any CP or SOB at rest, appears comfortable ,but drowsy  continue current management

## 2019-01-10 NOTE — CONSULT NOTE ADULT - ASSESSMENT
SOB and cough- MPV infection.  Management per primary team.     Chronic HFrEF , s/p ICD- hypervolemic-   Avoid IVF.  Will give lasix 40mg iv daily.  Implantable pulmonary artery pressure monitor readings from yesterday showed high PADP but pt refuses as outpt to take more diuretics.  Diuresis with close monitoring of the renal function and electrolytes.  Goal potassium of 4 and magnesium of 2.   Strict I/O and daily wt checks. Low sodium diet. Nutrition education.     Afib s/p ablation-  not on full dose anticoagulation as pt had major hematuria in past and she adamantly refused it from then on after knowing the risks of CVA off anticoagulation.   Now in sinus rythm.    HTN- hold all antihypertensives other than lasix for now.    Other medical issues- Management per primary team.   Thank you for allowing me to participate in the care of this patient. Please feel free to contact me with any questions.

## 2019-01-10 NOTE — ED PROVIDER NOTE - MEDICAL DECISION MAKING DETAILS
86 y/o f hx of CAD, HTN, HLD, A-fib, presents to ED for CP and SOB. Pt states that CP started 2 days ago. Today started having severe SOB worse laying flat. Called EMS. Was placed on BiPAP and given 1 sublingual Nitro. Here pt with diffuse rales bilaterally, and bedside US with B-lines throughout. Concern for CHF, will obtain labs cardiac enzymes, BiPAP, and admit. 88 y/o f hx of CAD, HTN, HLD, A-fib, presents to ED for CP and SOB. Pt states that CP started 2 days ago. Today started having severe SOB worse laying flat. Called EMS. Was placed on BiPAP and given 1 sublingual Nitro. Here pt with diffuse rales bilaterally, and bedside US with B-lines throughout. Concern for CHF, 2/2 to infectious etiology will obtain labs cardiac enzymes, BiPAP, and admit.

## 2019-01-10 NOTE — ED PROVIDER NOTE - PROGRESS NOTE DETAILS
pt improved on bipap - hr/bp wnl. pt with chf 2/2 pneumonia. gentle fluids (not sepsis fluids 2/2 to chf and pt does not want). pt endorsed to Dr. Mckeon who accepts. Flo Barber M.D., Attending Physician

## 2019-01-10 NOTE — H&P ADULT - PROBLEM SELECTOR PLAN 1
With sepsis - elevated lactic acid, tachycardic in low 100s, tachypneic in 30s.    Admit to med-surg  DNR/DNI  Judicious IVF administration given history of severe sCHF  Given ceftriaxone and azithromycin in ED, will continue  If patient saturating >95% may DC supplemental O2  Serial LA until trending down With sepsis - elevated lactic acid, tachycardic in low 100s, tachypneic in 30s.    Admit to med-surg  DNR/DNI  Judicious IVF administration given history of severe sCHF  Given ceftriaxone and azithromycin in ED, will change to vanc, zosyn  If patient saturating >95% may DC supplemental O2  Serial LA until trending down  f/u CT chest  f/u RVP, f/u Bcx, Ucx With sepsis - elevated lactic acid, tachycardic in low 100s, tachypneic in 30s.    Admit to med-surg  DNR/DNI  Judicious IVF administration given history of severe sCHF  Given ceftriaxone and azithromycin in ED, will change to vanc, zosyn  f/u ID consult  If patient saturating >95% may DC supplemental O2  Serial LA until trending down  f/u CT chest  f/u RVP, f/u Bcx, Ucx

## 2019-01-10 NOTE — H&P ADULT - ATTENDING COMMENTS
A/P  #Acute respiratory failure /B/L pneumonia suspected GNR/MRSA  #suspected sepsi from PNA  #hx systolic CHF EF 20% r/o acute decompensation  #Acute toxic metabolic encephalopathy    -admit to tele  -continue IV zosyn and s/p IV vanco x1, s/p zithro x1 Blood cx, legionella urine  -BIPAP prn, no ABG as patient and HCP daughter at bed side declining intubation or resuscitation if need arises  -no further IVf due to CHF hx  -CT chest  -if acute chf ruled out then would resume BB, and lisinopril today with BB parameters and resume lasix in 1  day in lieu of sepsis  -cardio, pulm and ID consult    #Paroxysmal afib not on AC due to patient's refusal in the past and now  continue amio and BB    # CAD  continue statin, asa, plavix    #CKD stage 3  monitor BMP    #DNR/DNI  poor prognosis  palliative care consult    #DVT prophylaxis  heparin sc  Patient seen and examined with the resident Dr Holloway. Agree with above plan of care which was discussed with the patient, family, team and NP. and necessary amendments made herin my A/P note A/P  #Acute respiratory failure /B/L pneumonia suspected GNR/MRSA  #suspected sepsi from PNA  #hx systolic CHF EF 20% r/o acute decompensation  #Acute toxic metabolic encephalopathy    -admit to tele  -continue IV zosyn and s/p IV vanco x1, s/p zithro x1 Blood cx, legionella urine  -BIPAP prn, no ABG as patient and HCP daughter at bed side declining intubation or resuscitation if need arises  -no further IVf due to CHF hx  -CT chest  -if acute chf ruled out then would resume BB, and lisinopril today with BB parameters and resume lasix in 1  day in lieu of sepsis  -cardio, pulm and ID consult    #Paroxysmal afib not on AC due to patient's refusal in the past and now  continue amio and BB    # CAD  continue statin, asa, plavix    #CKD stage 3  monitor BMP    #DNR/DNI  poor prognosis  palliative care consult    #DVT prophylaxis  heparin sc    # Advanced care planning(ACP)- spent 20mins for ACP conversation   I spent 20 minutes discussing with patient and daugter at bedside about current management plans and diagnosis and spoke about advanced care planning. daughter  patient's HCP and patient declined intubation or ventilator or pressors or resuscitation if the need arises.if her condition worsens despite conservative management then they would prefer comfort care as at baseline line she has very poor quality of life and alsways in chronic pain as per daughter     Patient seen and examined with the resident Dr Holloway. Agree with above plan of care which was discussed with the patient, family, team and NP. and necessary amendments made herein my A/P note A/P  #Acute respiratory failure /B/L pneumonia suspected GNR/MRSA  #suspected sepsi from PNA  #hx systolic CHF EF 20%.currently compensated   #Acute toxic metabolic encephalopathy    -admit to tele  -continue IV zosyn and s/p IV vanco x1, s/p zithro x1 Blood cx, legionella urine  -BIPAP prn, no ABG as patient and HCP daughter at bed side declining intubation or resuscitation if need arises  -no further IVf due to CHF hx  -CT chest  -acute chf ruled out on CT chest then would resume BB, and lisinopril today with BB parameters and resume lasix in 1  day in lieu of sepsis  -cardio, pulm and ID consult    #Paroxysmal afib not on AC due to patient's refusal in the past and now  continue amio and BB    # CAD  continue statin, asa, plavix    #CKD stage 3  monitor BMP    #DNR/DNI  poor prognosis  palliative care consult    #DVT prophylaxis  heparin sc    # Advanced care planning(ACP)- spent 20mins for ACP conversation   I spent 20 minutes discussing with patient and daugter at bedside about current management plans and diagnosis and spoke about advanced care planning. daughter  patient's HCP and patient declined intubation or ventilator or pressors or resuscitation if the need arises.if her condition worsens despite conservative management then they would prefer comfort care as at baseline line she has very poor quality of life and alsways in chronic pain as per daughter     Patient seen and examined with the resident Dr Holloway. Agree with above plan of care which was discussed with the patient, family, team and NP. and necessary amendments made herein my A/P note

## 2019-01-10 NOTE — ED ADULT TRIAGE NOTE - CHIEF COMPLAINT QUOTE
pt presents to ED from [facility] due to respiratory distress and Chest pain pt arrived on Cpap by EMS

## 2019-01-10 NOTE — ED ADULT NURSE NOTE - CHIEF COMPLAINT QUOTE
pt presents to ED from [facility] due to respiratory distress and Chest pain pt arrived on Cpap by EMS

## 2019-01-10 NOTE — ED PROVIDER NOTE - NS_ ATTENDINGSCRIBEDETAILS _ED_A_ED_FT
I, Flo Barber MD,  performed the initial face to face bedside interview with this patient regarding history of present illness, review of symptoms and relevant past medical, social and family history.  I completed an independent physical examination.  I was the initial provider who evaluated this patient.  The history, relevant review of systems, past medical and surgical history, medical decision making, and physical examination was documented by the scribe in my presence and I attest to the accuracy of the documentation.

## 2019-01-10 NOTE — H&P ADULT - PROBLEM SELECTOR PROBLEM 3
Coronary artery disease involving native coronary artery of native heart without angina pectoris CKD (chronic kidney disease) stage 3, GFR 30-59 ml/min

## 2019-01-10 NOTE — ED PROVIDER NOTE - CRITICAL CARE PROVIDED
documentation/consultation with other physicians/direct patient care (not related to procedure)/conducted a detailed discussion of DNR status/additional history taking/interpretation of diagnostic studies

## 2019-01-10 NOTE — H&P ADULT - PROBLEM SELECTOR PROBLEM 4
Paroxysmal atrial fibrillation Coronary artery disease involving native coronary artery of native heart without angina pectoris

## 2019-01-10 NOTE — ED ADULT NURSE NOTE - NSIMPLEMENTINTERV_GEN_ALL_ED
Implemented All Fall with Harm Risk Interventions:  [place] to call system. Call bell, personal items and telephone within reach. Instruct patient to call for assistance. Room bathroom lighting operational. Non-slip footwear when patient is off stretcher. Physically safe environment: no spills, clutter or unnecessary equipment. Stretcher in lowest position, wheels locked, appropriate side rails in place. Provide visual cue, wrist band, yellow gown, etc. Monitor gait and stability. Monitor for mental status changes and reorient to person, place, and time. Review medications for side effects contributing to fall risk. Reinforce activity limits and safety measures with patient and family. Provide visual clues: red socks.

## 2019-01-10 NOTE — CHART NOTE - NSCHARTNOTEFT_GEN_A_CORE
Rapid response called, placed pt on BiPAP 10/5 50% F 12, SRR 48.  Pt c/o pain in abdomen, taken to CT. , returned to room and placed back on BiPAP, stat Duo neb tx given in line.    ABG done.

## 2019-01-10 NOTE — H&P ADULT - NEGATIVE CARDIOVASCULAR SYMPTOMS
no paroxysmal nocturnal dyspnea/no peripheral edema/no dyspnea on exertion/no chest pain/no orthopnea/no palpitations

## 2019-01-10 NOTE — ED PROVIDER NOTE - NS ED ROS FT
Constitutional: No fever or chills  Eyes: No visual changes  HEENT: No throat pain  CV: No chest pain  Resp: CP and SOB  GI: No abd pain, nausea or vomiting  : No dysuria  MSK: No musculoskeletal pain  Skin: No rash  Neuro: No headache Constitutional: No fever or chills  Eyes: No visual changes  HEENT: No throat pain  CV: + chest pain  Resp: SOB  and cough  GI: No abd pain, nausea or vomiting  : No dysuria  MSK: No musculoskeletal pain  Skin: No rash  Neuro: No headache

## 2019-01-10 NOTE — PROVIDER CONTACT NOTE (CRITICAL VALUE NOTIFICATION) - NAME OF MD/NP/PA/DO NOTIFIED:
Additional Notes: Recommended counseling for anxiety and mental health along with psychiatric visits. Sunil

## 2019-01-10 NOTE — CONSULT NOTE ADULT - ASSESSMENT
86 y/o Female with h/o A.fib not on a/c due to hematuria & s/p ablation (?), CAD, HTN, HLD, CHF (EF 20-25%) s/p cardiomems and AICD, GERD, OA,  left TKR, 4x C-sections was admitted on 1/10 for worsening SOB/cough for past 3 days PTA. Her daughter notes that she wasn't herself and then complaining of SOB/cough. She went to see Dr. Denny in the office yesterday, where she was supposedly ok and Dr. Denny did not make any changes to her medications at that time. Then her SOB worsened and came to the hospital. She denies fever, chills at home. She has dry cough. On arrival to ED pt had fever 102.2 F and was in respiratory distress with increased work of breathing so was put on BiPAP. SHe received zosyn.    1. Febrile syndrome. Possible sepsis. Dyspnea. Multifocal pneumonia with hMPv. ?superimposed bacterial pneumonia. CHF exacerbation.  -obtain BC x 2  -start cefepime 1 gm IV q12h   -reason for abx use and side effects reviewed with patient; monitor BMP   -respiratory care  -old chart reviewed to assess prior cultures  -monitor temps  -f/u CBC  -supportive care  2. Other issues:   -care per medicine 88 y/o Female with h/o A.fib not on a/c due to hematuria & s/p ablation (?), CAD, HTN, HLD, CHF (EF 20-25%) s/p cardiomems and AICD, GERD, OA,  left TKR, 4x C-sections was admitted on 1/10 for worsening SOB/cough for past 3 days PTA. Her daughter notes that she wasn't herself and then complaining of SOB/cough. She went to see Dr. Denny in the office yesterday, where she was supposedly ok and Dr. Denny did not make any changes to her medications at that time. Then her SOB worsened and came to the hospital. She denies fever, chills at home. She has dry cough. On arrival to ED pt had fever 102.2 F and was in respiratory distress with increased work of breathing so was put on BiPAP. SHe received zosyn.    1. Febrile syndrome. Possible sepsis. Acute respiratory failure. Multifocal pneumonia with hMPv. ?superimposed bacterial pneumonia. CHF exacerbation.  -obtain BC x 2  -start cefepime 1 gm IV q12h   -reason for abx use and side effects reviewed with patient; monitor BMP   -respiratory care  -BiPAP as needed  -droplet isolation  -old chart reviewed to assess prior cultures  -monitor temps  -f/u CBC  -supportive care  2. Other issues:   -care per medicine

## 2019-01-11 LAB
ALBUMIN SERPL ELPH-MCNC: 3.1 G/DL — LOW (ref 3.3–5)
ALP SERPL-CCNC: 42 U/L — SIGNIFICANT CHANGE UP (ref 40–120)
ALT FLD-CCNC: 24 U/L — SIGNIFICANT CHANGE UP (ref 12–78)
ANION GAP SERPL CALC-SCNC: 9 MMOL/L — SIGNIFICANT CHANGE UP (ref 5–17)
ANISOCYTOSIS BLD QL: SLIGHT — SIGNIFICANT CHANGE UP
AST SERPL-CCNC: 42 U/L — HIGH (ref 15–37)
BASOPHILS # BLD AUTO: 0 K/UL — SIGNIFICANT CHANGE UP (ref 0–0.2)
BASOPHILS NFR BLD AUTO: 0 % — SIGNIFICANT CHANGE UP (ref 0–2)
BILIRUB SERPL-MCNC: 1 MG/DL — SIGNIFICANT CHANGE UP (ref 0.2–1.2)
BUN SERPL-MCNC: 23 MG/DL — SIGNIFICANT CHANGE UP (ref 7–23)
CALCIUM SERPL-MCNC: 8.5 MG/DL — SIGNIFICANT CHANGE UP (ref 8.5–10.1)
CHLORIDE SERPL-SCNC: 102 MMOL/L — SIGNIFICANT CHANGE UP (ref 96–108)
CO2 SERPL-SCNC: 27 MMOL/L — SIGNIFICANT CHANGE UP (ref 22–31)
CREAT SERPL-MCNC: 1.19 MG/DL — SIGNIFICANT CHANGE UP (ref 0.5–1.3)
ELLIPTOCYTES BLD QL SMEAR: SLIGHT — SIGNIFICANT CHANGE UP
EOSINOPHIL # BLD AUTO: 0 K/UL — SIGNIFICANT CHANGE UP (ref 0–0.5)
EOSINOPHIL NFR BLD AUTO: 0 % — SIGNIFICANT CHANGE UP (ref 0–6)
GLUCOSE SERPL-MCNC: 106 MG/DL — HIGH (ref 70–99)
HCT VFR BLD CALC: 29.9 % — LOW (ref 34.5–45)
HGB BLD-MCNC: 9.7 G/DL — LOW (ref 11.5–15.5)
HYPOCHROMIA BLD QL: SLIGHT — SIGNIFICANT CHANGE UP
LYMPHOCYTES # BLD AUTO: 0.27 K/UL — LOW (ref 1–3.3)
LYMPHOCYTES # BLD AUTO: 2 % — LOW (ref 13–44)
MANUAL SMEAR VERIFICATION: SIGNIFICANT CHANGE UP
MCHC RBC-ENTMCNC: 26.6 PG — LOW (ref 27–34)
MCHC RBC-ENTMCNC: 32.4 GM/DL — SIGNIFICANT CHANGE UP (ref 32–36)
MCV RBC AUTO: 82.1 FL — SIGNIFICANT CHANGE UP (ref 80–100)
MICROCYTES BLD QL: SLIGHT — SIGNIFICANT CHANGE UP
MONOCYTES # BLD AUTO: 0.54 K/UL — SIGNIFICANT CHANGE UP (ref 0–0.9)
MONOCYTES NFR BLD AUTO: 4 % — SIGNIFICANT CHANGE UP (ref 2–14)
NEUTROPHILS # BLD AUTO: 12.72 K/UL — HIGH (ref 1.8–7.4)
NEUTROPHILS NFR BLD AUTO: 89 % — HIGH (ref 43–77)
NEUTS BAND # BLD: 5 % — SIGNIFICANT CHANGE UP (ref 0–8)
NRBC # BLD: 0 /100 — SIGNIFICANT CHANGE UP (ref 0–0)
NRBC # BLD: SIGNIFICANT CHANGE UP /100 WBCS (ref 0–0)
OVALOCYTES BLD QL SMEAR: SLIGHT — SIGNIFICANT CHANGE UP
PLAT MORPH BLD: NORMAL — SIGNIFICANT CHANGE UP
PLATELET # BLD AUTO: 124 K/UL — LOW (ref 150–400)
POIKILOCYTOSIS BLD QL AUTO: SLIGHT — SIGNIFICANT CHANGE UP
POTASSIUM SERPL-MCNC: 3.9 MMOL/L — SIGNIFICANT CHANGE UP (ref 3.5–5.3)
POTASSIUM SERPL-SCNC: 3.9 MMOL/L — SIGNIFICANT CHANGE UP (ref 3.5–5.3)
PROT SERPL-MCNC: 6.8 GM/DL — SIGNIFICANT CHANGE UP (ref 6–8.3)
RBC # BLD: 3.64 M/UL — LOW (ref 3.8–5.2)
RBC # FLD: 16.7 % — HIGH (ref 10.3–14.5)
RBC BLD AUTO: ABNORMAL
SCHISTOCYTES BLD QL AUTO: SLIGHT — SIGNIFICANT CHANGE UP
SODIUM SERPL-SCNC: 138 MMOL/L — SIGNIFICANT CHANGE UP (ref 135–145)
WBC # BLD: 13.53 K/UL — HIGH (ref 3.8–10.5)
WBC # FLD AUTO: 13.53 K/UL — HIGH (ref 3.8–10.5)

## 2019-01-11 PROCEDURE — 93010 ELECTROCARDIOGRAM REPORT: CPT

## 2019-01-11 PROCEDURE — 76700 US EXAM ABDOM COMPLETE: CPT | Mod: 26

## 2019-01-11 RX ORDER — FUROSEMIDE 40 MG
40 TABLET ORAL EVERY 8 HOURS
Qty: 0 | Refills: 0 | Status: DISCONTINUED | OUTPATIENT
Start: 2019-01-11 | End: 2019-01-13

## 2019-01-11 RX ORDER — LACTULOSE 10 G/15ML
0 SOLUTION ORAL
Qty: 0 | Refills: 0 | COMMUNITY

## 2019-01-11 RX ORDER — IBUPROFEN 200 MG
400 TABLET ORAL ONCE
Qty: 0 | Refills: 0 | Status: COMPLETED | OUTPATIENT
Start: 2019-01-11 | End: 2019-01-11

## 2019-01-11 RX ORDER — POTASSIUM CHLORIDE 20 MEQ
2 PACKET (EA) ORAL
Qty: 0 | Refills: 0 | COMMUNITY

## 2019-01-11 RX ORDER — LOPERAMIDE HCL 2 MG
1 TABLET ORAL
Qty: 0 | Refills: 0 | COMMUNITY

## 2019-01-11 RX ORDER — METOPROLOL TARTRATE 50 MG
1 TABLET ORAL
Qty: 0 | Refills: 0 | COMMUNITY

## 2019-01-11 RX ORDER — MAGNESIUM OXIDE 400 MG ORAL TABLET 241.3 MG
1 TABLET ORAL
Qty: 0 | Refills: 0 | COMMUNITY

## 2019-01-11 RX ORDER — BUMETANIDE 0.25 MG/ML
3 INJECTION INTRAMUSCULAR; INTRAVENOUS
Qty: 0 | Refills: 0 | COMMUNITY

## 2019-01-11 RX ORDER — LACTULOSE 10 G/15ML
15 SOLUTION ORAL
Qty: 0 | Refills: 0 | COMMUNITY

## 2019-01-11 RX ORDER — MORPHINE SULFATE 50 MG/1
2 CAPSULE, EXTENDED RELEASE ORAL EVERY 4 HOURS
Qty: 0 | Refills: 0 | Status: DISCONTINUED | OUTPATIENT
Start: 2019-01-11 | End: 2019-01-18

## 2019-01-11 RX ORDER — ACETAMINOPHEN 500 MG
0 TABLET ORAL
Qty: 0 | Refills: 0 | COMMUNITY

## 2019-01-11 RX ORDER — MAGNESIUM OXIDE 400 MG ORAL TABLET 241.3 MG
0 TABLET ORAL
Qty: 0 | Refills: 0 | COMMUNITY

## 2019-01-11 RX ORDER — LOPERAMIDE HCL 2 MG
0 TABLET ORAL
Qty: 0 | Refills: 0 | COMMUNITY

## 2019-01-11 RX ADMIN — Medication 40 MILLIGRAM(S): at 14:50

## 2019-01-11 RX ADMIN — Medication 30 MILLILITER(S): at 22:24

## 2019-01-11 RX ADMIN — CEFEPIME 1000 MILLIGRAM(S): 1 INJECTION, POWDER, FOR SOLUTION INTRAMUSCULAR; INTRAVENOUS at 04:28

## 2019-01-11 RX ADMIN — Medication 650 MILLIGRAM(S): at 12:43

## 2019-01-11 RX ADMIN — OXYCODONE AND ACETAMINOPHEN 1 TABLET(S): 5; 325 TABLET ORAL at 22:25

## 2019-01-11 RX ADMIN — Medication 5 MILLIGRAM(S): at 04:28

## 2019-01-11 RX ADMIN — AMIODARONE HYDROCHLORIDE 200 MILLIGRAM(S): 400 TABLET ORAL at 04:28

## 2019-01-11 RX ADMIN — SODIUM CHLORIDE 500 MILLILITER(S): 9 INJECTION INTRAMUSCULAR; INTRAVENOUS; SUBCUTANEOUS at 00:22

## 2019-01-11 RX ADMIN — Medication 40 MILLIGRAM(S): at 22:24

## 2019-01-11 RX ADMIN — ATORVASTATIN CALCIUM 10 MILLIGRAM(S): 80 TABLET, FILM COATED ORAL at 22:24

## 2019-01-11 RX ADMIN — Medication 110 MILLIGRAM(S): at 12:23

## 2019-01-11 RX ADMIN — CEFEPIME 1000 MILLIGRAM(S): 1 INJECTION, POWDER, FOR SOLUTION INTRAMUSCULAR; INTRAVENOUS at 18:18

## 2019-01-11 RX ADMIN — OXYCODONE AND ACETAMINOPHEN 1 TABLET(S): 5; 325 TABLET ORAL at 21:00

## 2019-01-11 RX ADMIN — OXYCODONE AND ACETAMINOPHEN 1 TABLET(S): 5; 325 TABLET ORAL at 12:22

## 2019-01-11 RX ADMIN — Medication 5 MILLIGRAM(S): at 18:18

## 2019-01-11 RX ADMIN — CLOPIDOGREL BISULFATE 75 MILLIGRAM(S): 75 TABLET, FILM COATED ORAL at 12:24

## 2019-01-11 RX ADMIN — Medication 110 MILLIGRAM(S): at 23:49

## 2019-01-11 RX ADMIN — Medication 100 MILLIEQUIVALENT(S): at 09:45

## 2019-01-11 RX ADMIN — MAGNESIUM OXIDE 400 MG ORAL TABLET 200 MILLIGRAM(S): 241.3 TABLET ORAL at 12:24

## 2019-01-11 RX ADMIN — HEPARIN SODIUM 5000 UNIT(S): 5000 INJECTION INTRAVENOUS; SUBCUTANEOUS at 04:28

## 2019-01-11 RX ADMIN — Medication 100 MILLIEQUIVALENT(S): at 00:05

## 2019-01-11 RX ADMIN — Medication 40 MILLIGRAM(S): at 09:44

## 2019-01-11 RX ADMIN — HEPARIN SODIUM 5000 UNIT(S): 5000 INJECTION INTRAVENOUS; SUBCUTANEOUS at 18:18

## 2019-01-11 RX ADMIN — MORPHINE SULFATE 2 MILLIGRAM(S): 50 CAPSULE, EXTENDED RELEASE ORAL at 21:02

## 2019-01-11 NOTE — SWALLOW BEDSIDE ASSESSMENT ADULT - ORAL PHASE
Bolus formation/transfer with foods that require mastication were mildly to moderately prolonged in setting of edentulous status but mechanically functional. Bolus formation/transfer with pureed foods-liquids were achieved via functional age acceptable AP lingual actions. Piecemeal deglutition was evident. Mild tongue debris was noted with coarse solids only.

## 2019-01-11 NOTE — PHARMACOTHERAPY INTERVENTION NOTE - COMMENTS
Obtained medication history from assisted living paperwork
patient with pna and CHF (EF ~20%). recommend change zosyn to cefepime 1g q12h

## 2019-01-11 NOTE — PHYSICAL THERAPY INITIAL EVALUATION ADULT - PRECAUTIONS/LIMITATIONS, REHAB EVAL
cardiac precautions/oxygen therapy device and L/min/Tele cardiac precautions/oxygen therapy device and L/min/fall precautions/Tele

## 2019-01-11 NOTE — PROVIDER CONTACT NOTE (OTHER) - SITUATION
pt troponin remains elevated from last draw at 0.087. pt H&H decreased from 11.4 to 9.9. lactate improved from 2.3 to 2.2 however remain elevated. pt continues to be in distress. K was 3.3

## 2019-01-11 NOTE — SWALLOW BEDSIDE ASSESSMENT ADULT - PHARYNGEAL PHASE
Swallow was triggered in an acceptable time frame for age and laryngeal lift on palpation during swallowing trials was felt to be within functional parameters for age. NO behavioral aspiration signs exhibited on exam.

## 2019-01-11 NOTE — DIETITIAN INITIAL EVALUATION ADULT. - PERTINENT MEDS FT
MEDICATIONS  (STANDING):  amiodarone    Tablet 200 milliGRAM(s) Oral daily  aspirin  chewable 81 milliGRAM(s) Oral daily  atorvastatin 10 milliGRAM(s) Oral at bedtime  cefepime  Injectable. 1000 milliGRAM(s) IV Push every 12 hours  clopidogrel Tablet 75 milliGRAM(s) Oral daily  furosemide   Injectable 40 milliGRAM(s) IV Push every 8 hours  heparin  Injectable 5000 Unit(s) SubCutaneous every 12 hours  magnesium oxide 200 milliGRAM(s) Oral daily  oxybutynin 5 milliGRAM(s) Oral two times a day    MEDICATIONS  (PRN):  acetaminophen   Tablet .. 650 milliGRAM(s) Oral every 6 hours PRN Temp greater or equal to 38C (100.4F), Mild Pain (1 - 3)  aluminum hydroxide/magnesium hydroxide/simethicone Suspension 30 milliLiter(s) Oral every 6 hours PRN Dyspepsia  dicyclomine 20 milliGRAM(s) Oral three times a day before meals PRN Indigestion  lactulose Syrup 20 Gram(s) Oral at bedtime PRN Constipation  loperamide 2 milliGRAM(s) Oral daily PRN Diarrhea  ondansetron    Tablet 4 milliGRAM(s) Oral every 6 hours PRN Nausea and/or Vomiting  oxyCODONE    5 mG/acetaminophen 325 mG 1 Tablet(s) Oral every 6 hours PRN Moderate Pain (4 - 6)

## 2019-01-11 NOTE — SWALLOW BEDSIDE ASSESSMENT ADULT - SWALLOW EVAL: RECOMMENDED DIET
SUGGEST A SOFT TEXTURE DIET WITH THIN LIQUID CONSISTENCIES  AS SHE TOLERATES THESE FOOD TEXTURES FROM AN OROPHARYNGEAL SWALLOWING STANCE ON CLINICAL EXAM AND FOOD CONSISTENCIES ON THIS DIET ACCOMMODATES HER EDENTULOUS STATUS.

## 2019-01-11 NOTE — PROGRESS NOTE ADULT - ASSESSMENT
PROBLEMS:    AC hypoxaemic respiratory failur  Multifocal pneumonia  positive HMPV  Chronic systolic heart failure   CKD  Paroxysmal atrial fibrillation  Essential hypertension    PLAN:    IV CEFEPIME  ADD IV DOXYCYLIN  FIO2  BIPAP AS TOLERATED  POOR PROGNOSIS  DVT PROPHYLASIX DISPLAY PLAN FREE TEXT

## 2019-01-11 NOTE — DIETITIAN INITIAL EVALUATION ADULT. - PROBLEM SELECTOR PLAN 1
With sepsis - elevated lactic acid, tachycardic in low 100s, tachypneic in 30s.    Admit to med-surg  DNR/DNI  Judicious IVF administration given history of severe sCHF  Given ceftriaxone and azithromycin in ED, will change to vanc, zosyn  f/u ID consult  If patient saturating >95% may DC supplemental O2  Serial LA until trending down  f/u CT chest  f/u RVP, f/u Bcx, Ucx

## 2019-01-11 NOTE — SWALLOW BEDSIDE ASSESSMENT ADULT - ADDITIONAL RECOMMENDATIONS
1) Nutrition f/u    2) Nursing f/u regarding daughter complaint that she thinks patient's dentures were lost in the hospital

## 2019-01-11 NOTE — DIETITIAN INITIAL EVALUATION ADULT. - OTHER INFO
Consult for CHF.  [facility] list.  Pt is 87F w/ PMH of afib hx of hematuria & s/p ablation (?), CAD, HTN, HLD, sCHF, AICD, GERD, OA, s/pcomes in with worsening SOB/cough for past 3 days. Her SOB was so bad that she wanted to come to the hospital. She can lay flat when she sleeps and uses 2 pillows. Has not noted any increase in leg swelling recently. Denies eating very salty food recently or drinking excessive water. Pt comes from [facility]. According to her daughter her baseline is AOx3, walking w/ a walker, and needs help with some ADLs. On arrival to ED pt had fever 102.2 F and was in respiratory distress with increased work of breathing so was put on bipap. Pt  was tachypneic. Patient has MOLST form which shows she is DNR/DNI. Dx of HCap w sepsis.  hx of severe CHF, current exacerbation. CKD. CAD. Afib.  Essential hypertension.  GERD without esophagitis. Pall care consulted.  Edema 2+ left and right ankle and legs.  [place] 14.  Pressure ulcers stage 2 : sacrum, stage 1: bilateral heels Consult for CHF.  [facility] list.  Pt is 87F w/ PMH of afib hx of hematuria & s/p ablation (?), CAD, HTN, HLD, sCHF, AICD, GERD, OA, s/pcomes in with worsening SOB/cough for past 3 days. Her SOB was so bad that she wanted to come to the hospital. She can lay flat when she sleeps and uses 2 pillows. Has not noted any increase in leg swelling recently. Denies eating very salty food recently or drinking excessive water. Pt comes from [facility]. According to her daughter her baseline is AOx3, walking w/ a walker, and needs help with some ADLs. On arrival to ED pt had fever 102.2 F and was in respiratory distress with increased work of breathing so was put on bipap. Pt  was tachypneic. Patient has MOLST form which shows she is DNR/DNI. Dx of HCap w sepsis.  hx of severe CHF, current exacerbation. CKD. CAD. Afib.  Essential hypertension.  GERD without esophagitis. Pall care consulted.  Pt from [facility], is confused, not candidate for education. Will monitor labs, meds, po intake, tolerance.  , add Ensure pudding BID, gelatein BID.  Add Zn sulfate 220 mg x 10 days.  Vit C 500 mg bid. Consult for CHF.  [facility] list.  Pt is 87F w/ PMH of afib hx of hematuria & s/p ablation (?), CAD, HTN, HLD, sCHF, AICD, GERD, OA, s/pcomes in with worsening SOB/cough for past 3 days. Her SOB was so bad that she wanted to come to the hospital. She can lay flat when she sleeps and uses 2 pillows. Has not noted any increase in leg swelling recently. Denies eating very salty food recently or drinking excessive water. Pt comes from [facility]. According to her daughter her baseline is AOx3, walking w/ a walker, and needs help with some ADLs. On arrival to ED pt had fever 102.2 F and was in respiratory distress with increased work of breathing so was put on bipap. Pt  was tachypneic. Patient has MOLST form which shows she is DNR/DNI. Dx of HCap w sepsis.  hx of severe CHF, current exacerbation. CKD. CAD. Afib.  Essential hypertension.  GERD without esophagitis. Pall care consulted.  Pt from [facility], is confused, not candidate for education. Pt meets criteria for severe protein-calorie malnutrition in context of chronic disease. nutrition focused physical exam reveals moderate/severe muscle wasting (temples, interosseus, clavicles, scapulas, shoulders, thighs, calves.)  Edema 2+ may be masking further muscle wasting.  Pt has st 1 and st 2 PUs.  Swallow eval ordered.  Suggest maintain DASH diet, texture as per SLP.  Add Ensure pudding bid, Gelatein BID. Daily weights.Add Zn sulfate 220 mg x 10 days.  Vit C 500 mg bid. Will monitor PO intake, tolerance, labs and weight. Consult for CHF.  [facility] list.  Pt is 87F w/ PMH of afib hx of hematuria & s/p ablation (?), CAD, HTN, HLD, sCHF, AICD, GERD, OA, s/pcomes in with worsening SOB/cough for past 3 days. Her SOB was so bad that she wanted to come to the hospital. She can lay flat when she sleeps and uses 2 pillows. Has not noted any increase in leg swelling recently. Denies eating very salty food recently or drinking excessive water. Pt comes from [facility]. According to her daughter her baseline is AOx3, walking w/ a walker, and needs help with some ADLs. On arrival to ED pt had fever 102.2 F and was in respiratory distress with increased work of breathing so was put on bipap. Pt  was tachypneic. Patient has MOLST form which shows she is DNR/DNI. Dx of HCap w sepsis.  hx of severe CHF, current exacerbation. CKD. CAD. Afib.  Essential hypertension.  GERD without esophagitis. Pall care consulted.  Pt from [facility], is confused, not candidate for education. Pt meets criteria for severe protein-calorie malnutrition in context of chronic disease. nutrition focused physical exam reveals moderate/severe muscle wasting (temples, interosseus, clavicles, scapulas, shoulders, thighs, calves.)  Edema 2+ may be masking further muscle wasting. Moderate/severe fat wasting.  Pt has st 1 and st 2 PUs.  Swallow eval ordered.  Suggest maintain DASH diet, texture as per SLP.  Add Ensure pudding bid, Gelatein BID. Daily weights.Add Zn sulfate 220 mg x 10 days.  Vit C 500 mg bid. Will monitor PO intake, tolerance, labs and weight.

## 2019-01-11 NOTE — SWALLOW BEDSIDE ASSESSMENT ADULT - ASR SWALLOW DENTITION
edentulous, does not have dentures/Daughter claims that she thinks dentures were lost in hospital. Nsg was advised.

## 2019-01-11 NOTE — PROGRESS NOTE ADULT - SUBJECTIVE AND OBJECTIVE BOX
Patient is a 87y old  Female who presents with a chief complaint of SOB, cough.   HPI:  87F w/ PMH of afib not on a/c for hx of hematuria & s/p ablation (?), CAD, HTN, HLD, sCHF (EF 20-25%) s/p cardiomems and AICD, GERD, OA, s/p L knee replacement, 4x c-sections comes in with worsening SOB/cough for past 3 days. She lives in assisted living and I have seen her in office yesterday.  She was told to increase diuretics in the past and she refused to take more than what she was already taking.   Family urged to change bumex to lasix secondary to cost.  With cough yesterday she denied any fever and I warned her about viral infections that are wide spread now.    On arrival to ED pt had fever 102.2 F and was in respiratory distress with increased work of breathing so was put on bipap.   Now on face mask.  No hypoxia.    No CP or pressure.     - pt seen and examined by me today. In respiratory distress to a mild degress, hypoxia overnight    PAST MEDICAL & SURGICAL HISTORY:  Osteoarthritis of multiple joints, unspecified osteoarthritis type  Gastroesophageal reflux disease without esophagitis  Dyslipidemia  Essential hypertension  Paroxysmal atrial fibrillation  Coronary artery disease involving native coronary artery of native heart without angina pectoris  Chronic systolic congestive heart failure: EF 20-25%  History of : 4 c-sections  History of total left knee replacement (TKR)  S/P ablation of atrial fibrillation  AICD (automatic cardioverter/defibrillator) present      MEDICATIONS  (STANDING):  amiodarone    Tablet 200 milliGRAM(s) Oral daily  aspirin  chewable 81 milliGRAM(s) Oral daily  atorvastatin 10 milliGRAM(s) Oral at bedtime  cefepime  Injectable. 1000 milliGRAM(s) IV Push every 12 hours  clopidogrel Tablet 75 milliGRAM(s) Oral daily  enalapril 2.5 milliGRAM(s) Oral daily  heparin  Injectable 5000 Unit(s) SubCutaneous every 12 hours  magnesium oxide 200 milliGRAM(s) Oral daily  metoprolol succinate ER 50 milliGRAM(s) Oral daily  oxybutynin 5 milliGRAM(s) Oral two times a day  sodium chloride 0.9% Bolus 1000 milliLiter(s) IV Bolus once    MEDICATIONS  (PRN):  acetaminophen   Tablet .. 650 milliGRAM(s) Oral every 6 hours PRN Temp greater or equal to 38C (100.4F), Mild Pain (1 - 3)  dicyclomine 20 milliGRAM(s) Oral three times a day before meals PRN Indigestion  lactulose Syrup 20 Gram(s) Oral at bedtime PRN Constipation  loperamide 2 milliGRAM(s) Oral daily PRN Diarrhea  ondansetron    Tablet 4 milliGRAM(s) Oral every 6 hours PRN Nausea and/or Vomiting  oxyCODONE    5 mG/acetaminophen 325 mG 1 Tablet(s) Oral every 6 hours PRN Moderate Pain (4 - 6)      FAMILY HISTORY:  Family history of malignant neoplasm (Child)      SOCIAL HISTORY:  no recent smoking     REVIEW OF SYSTEMS:  CONSTITUTIONAL:    No fatigue, malaise, lethargy.  No fever or chills.  HEENT:  Eyes:  No visual changes.     ENT:  No epistaxis.  No sinus pain.    RESPIRATORY:  c/o cough.  No wheeze.  No hemoptysis.  c/o  shortness of breath.  CARDIOVASCULAR:  No chest pains.  No palpitations. c/o shortness of breath, No orthopnea or PND.  GASTROINTESTINAL:  No abdominal pain.  No nausea or vomiting.    GENITOURINARY:    No hematuria.    MUSCULOSKELETAL:  No musculoskeletal pain.  No joint swelling.  No arthritis.  NEUROLOGICAL:  No tingling or numbness or weakness.  PSYCHIATRIC:  No confusion  SKIN:  No rashes.    ENDOCRINE:  No unexplained weight loss.  No polydipsia.   HEMATOLOGIC:  No anemia.  No prolonged or excessive bleeding.   ALLERGIC AND IMMUNOLOGIC:  No pruritus.          Vital Signs Last 24 Hrs  T(C): 36.7 (10 Hemant 2019 15:17), Max: 39 (10 Hemant 2019 10:51)  T(F): 98.1 (10 Hemant 2019 15:17), Max: 102.2 (10 Hemant 2019 10:51)  HR: 86 (10 Hemant 2019 15:17) (74 - 90)  BP: 113/58 (10 Hemant 2019 15:17) (100/55 - 143/87)  BP(mean): --  RR: 18 (10 Hemant 2019 15:17) (18 - 19)  SpO2: 99% (10 Hemant 2019 15:17) (99% - 100%)    PHYSICAL EXAM-    Constitutional: ill looking, mild respiratory distress, elderly frail    Head: Head is normocephalic and atraumatic.      Neck: No jugular venous distention. No audible carotid bruits. There are strong carotid pulses bilaterally. No JVD.     Cardiovascular: Regular rate and rhythm without S3, S4. No murmurs or rubs are appreciated.      Respiratory: crackles b/l    Abdomen: Soft, nontender, nondistended with positive bowel sounds.      Extremity: No tenderness. trace  pitting edema b/l    Neurologic: The patient is alert and oriented.      Skin: No rash, no obvious lesions noted.      Psychiatric: The patient appears to be distress      INTERPRETATION OF TELEMETRY: sinus rythm    ECG: Sinus rythm. LVH.     I&O's Detail      LABS:                        11.4   8.22  )-----------( 195      ( 10 Hemant 2019 10:28 )             36.3     01-10    136  |  102  |  23  ----------------------------<  110<H>  4.0   |  25  |  1.29    Ca    8.7      10 Hemant 2019 10:28    TPro  7.8  /  Alb  3.8  /  TBili  0.6  /  DBili  x   /  AST  32  /  ALT  21  /  AlkPhos  61  0110    CARDIAC MARKERS ( 10 Hemant 2019 13:24 )  0.081 ng/mL / x     / x     / x     / x      CARDIAC MARKERS ( 10 Hemant 2019 10:28 )  0.043 ng/mL / x     / x     / x     / x          PT/INR - ( 10 Hemant 2019 10:28 )   PT: 12.8 sec;   INR: 1.15 ratio         PTT - ( 10 Hemant 2019 10:28 )  PTT:29.3 sec  Urinalysis Basic - ( 10 Hemant 2019 10:28 )    Color: Yellow / Appearance: Clear / S.020 / pH: x  Gluc: x / Ketone: Moderate  / Bili: Negative / Urobili: Negative mg/dL   Blood: x / Protein: 30 mg/dL / Nitrite: Negative   Leuk Esterase: Trace / RBC: 11-25 /HPF / WBC 3-5   Sq Epi: x / Non Sq Epi: x / Bacteria: Moderate      I&O's Summary    BNPSerum Pro-Brain Natriuretic Peptide: 30370 pg/mL (01-10 @ 10:28)    RADIOLOGY & ADDITIONAL STUDIES:  < from: CT Chest No Cont (01.10.19 @ 13:37) >  IMPRESSION:     Bilateral multifocal pneumonia.    Cardiomegaly and enlarged pulmonary artery suggestive of pulmonary   arterial hypertension. Left lower pulmonary artery cardioMEMS device in   place.                [email or web address]   This document has been electronically signed. Hemant 10 2019  2:26PM        < end of copied text >

## 2019-01-11 NOTE — SWALLOW BEDSIDE ASSESSMENT ADULT - ORAL PREPARATORY PHASE
Pt was oriented to feeding situ but her willingness to accept PO was psychogenically decreased at times. Labial grading on utensils was functional when she was willing to eat. No dribbling was noted.

## 2019-01-11 NOTE — PROGRESS NOTE ADULT - ASSESSMENT
SOB and cough- MPV infection.  Management per primary team.     Acute on Chronic HFrEF , s/p ICD- hypervolemic-   Avoid IVF.  Increase lasix to 3 times a day.  IV potassium given yesterday and check level this am. If K less than 4 please give iv potassium.  Please follow AM labs- pending now  Diuresis with close monitoring of the renal function and electrolytes.  Goal potassium of 4 and magnesium of 2.   Strict I/O and daily wt checks. Low sodium diet. Nutrition education.     Afib s/p ablation-  not on full dose anticoagulation as pt had major hematuria in past and she adamantly refused it from then on after knowing the risks of CVA off anticoagulation.   Now in sinus rythm.    HTN- hold all antihypertensives other than lasix for now.    MPV infection- Other medical issues- Management per primary team.     Other medical issues- Management per primary team.   Thank you for allowing me to participate in the care of this patient. Please feel free to contact me with any questions.

## 2019-01-11 NOTE — PROGRESS NOTE ADULT - SUBJECTIVE AND OBJECTIVE BOX
Subjective:    87F w/ PMH of afib not on a/c for hx of hematuria & s/p ablation (?), CAD, HTN, HLD, sCHF (EF 20-25%) s/p cardiomems and AICD, GERD, OA, s/p L knee replacement, 4x c-sections admitted with worsening SOB/cough for past 3 days. On arrival to ED pt had fever 102.2 F and was in respiratory distress with increased work of breathing so was put on bipap. Patient has MOLST form which shows she is DNR/DNI. pat seen for pulmonary eval, multifocal pneumonia & positive HMPV.    Home Medications:  amiodarone 200 mg oral tablet: 1 tab(s) orally once a day (10 Hemant 2019 12:14)  aspirin 81 mg oral tablet, chewable: 1 tab(s) orally once a day (10 Hemant 2019 12:14)  Bentyl:  (10 Hemant 2019 12:14)  bumetanide: 3 milligram(s) orally once a day (10 Hemant 2019 12:14)  enalapril 2.5 mg oral tablet: 1 tab(s) orally once a day (10 Hemant 2019 12:14)  Imodium:  (10 Hemant 2019 12:14)  lactulose:  (10 Hemant 2019 12:14)  magnesium oxide 200 mg oral tablet: orally once a day (10 Hemant 2019 12:14)  Metoprolol Succinate ER 50 mg oral tablet, extended release: 1 tab(s) orally once a day (10 Hemant 2019 12:14)  oxyCODONE-acetaminophen 7.5 mg-325 mg oral tablet: 1 tab(s) orally every 6 hours, As Needed (10 Hemant 2019 12:14)  potassium chloride 20 mEq oral tablet, extended release: 2 tab(s) orally once a day (10 Hemant 2019 12:14)  Tylenol:  (10 Hemant 2019 12:14)    MEDICATIONS  (STANDING):  amiodarone    Tablet 200 milliGRAM(s) Oral daily  aspirin  chewable 81 milliGRAM(s) Oral daily  atorvastatin 10 milliGRAM(s) Oral at bedtime  cefepime  Injectable. 1000 milliGRAM(s) IV Push every 12 hours  clopidogrel Tablet 75 milliGRAM(s) Oral daily  furosemide   Injectable 40 milliGRAM(s) IV Push every 8 hours  heparin  Injectable 5000 Unit(s) SubCutaneous every 12 hours  magnesium oxide 200 milliGRAM(s) Oral daily  oxybutynin 5 milliGRAM(s) Oral two times a day    MEDICATIONS  (PRN):  acetaminophen   Tablet .. 650 milliGRAM(s) Oral every 6 hours PRN Temp greater or equal to 38C (100.4F), Mild Pain (1 - 3)  aluminum hydroxide/magnesium hydroxide/simethicone Suspension 30 milliLiter(s) Oral every 6 hours PRN Dyspepsia  dicyclomine 20 milliGRAM(s) Oral three times a day before meals PRN Indigestion  lactulose Syrup 20 Gram(s) Oral at bedtime PRN Constipation  loperamide 2 milliGRAM(s) Oral daily PRN Diarrhea  ondansetron    Tablet 4 milliGRAM(s) Oral every 6 hours PRN Nausea and/or Vomiting  oxyCODONE    5 mG/acetaminophen 325 mG 1 Tablet(s) Oral every 6 hours PRN Moderate Pain (4 - 6)      Allergies    No Known Allergies    Intolerances        Vital Signs Last 24 Hrs  T(C): 38 (2019 05:06), Max: 39 (10 Hemant 2019 10:51)  T(F): 100.4 (2019 05:06), Max: 102.2 (10 Hemant 2019 10:51)  HR: 87 (2019 05:06) (84 - 106)  BP: 115/62 (2019 09:53) (100/55 - 115/62)  BP(mean): --  RR: 26 (2019 05:06) (18 - 32)  SpO2: 99% (2019 05:06) (78% - 100%)      PHYSICAL EXAMINATION:    NECK:  Supple. No lymphadenopathy. Jugular venous pressure not elevated. Carotids equal.   HEART:   The cardiac impulse has a normal quality. Reg., Nl S1 and S2.  There are no murmurs, rubs or gallops noted  CHEST:  Chest is clear to auscultation. Normal respiratory effort.  ABDOMEN:  Soft and nontender.   EXTREMITIES:  There is no edema.       LABS:                        9.9    6.84  )-----------( 164      ( 10 Hemant 2019 16:50 )             31.1     01-10    137  |  102  |  23  ----------------------------<  108<H>  3.3<L>   |  24  |  1.25    Ca    8.4<L>      10 Hemant 2019 19:17  Phos  3.3     -10  Mg     1.8     -10    TPro  6.7  /  Alb  3.1<L>  /  TBili  0.6  /  DBili  x   /  AST  26  /  ALT  20  /  AlkPhos  46  01-10    PT/INR - ( 10 Hemant 2019 10:28 )   PT: 12.8 sec;   INR: 1.15 ratio         PTT - ( 10 Hemant 2019 10:28 )  PTT:29.3 sec  Urinalysis Basic - ( 10 Hemant 2019 10:28 )    Color: Yellow / Appearance: Clear / S.020 / pH: x  Gluc: x / Ketone: Moderate  / Bili: Negative / Urobili: Negative mg/dL   Blood: x / Protein: 30 mg/dL / Nitrite: Negative   Leuk Esterase: Trace / RBC: 11-25 /HPF / WBC 3-5   Sq Epi: x / Non Sq Epi: x / Bacteria: Moderate    CT Chest No Cont (01.10.19 @ 13:37) >  IMPRESSION:     Bilateral multifocal pneumonia.    Cardiomegaly and enlarged pulmonary artery suggestive of pulmonary   arterial hypertension. Left lower pulmonary artery cardioMEMS device in   place.    Rapid Respiratory Viral Panel (01.10.19 @ 10:28)    Rapid RVP Result: Detected: This Respiratory Panel uses polymerase chain reaction (PCR) to detect for  adenovirus; coronavirus (HKU1, NL63, 229E, OC43); human metapneumovirus  (hMPV); human enterovirus/rhinovirus (Entero/RV); influenza A; influenza  A/H1; influenza A/H3; influenza A/H1-2009; influenza B; parainfluenza  viruses 1, 2, 3, 4; respiratory syncytial virus; Mycoplasma pneumoniae;  and Chlamydophila pneumoniae.    hMPV (RapRVP): Detected: Called to Nurse/Doctor.[email or web address] najera 01/10/19 13:19    ABG - ( 10 Hemant 2019 21:00 )  pH, Arterial: 7.49  pH, Blood: x     /  pCO2: 34    /  pO2: 108   / HCO3: 25    / Base Excess: 2.5   /  SaO2: 98

## 2019-01-11 NOTE — PROGRESS NOTE ADULT - ASSESSMENT
87F w/ PMH as above comes in w/ fever and respiratory distress due to CAP.    A/P  #Acute respiratory failure /B/L pneumonia suspected GNR/MRSA  #suspected sepsi from PNA  #hx systolic CHF EF 20% acutely decomepnsated   #Acute toxic metabolic encephalopathy    -continuetele  -continue cefpime and doxy , abx per ID   Blood cx, legionella urine  -BIPAP prn, no repeat ABG as patient and HCP daughter at bed side declining intubation or resuscitation if need arises  -no further IVf due to CHF hx  -continue IV lasix dosing per cardio  -hold other antihypertensive meds for now    # s/p abdominal pain resolved after bowel movement/doubt acute cholecystitis   Abd US shows GB sludge   CT abd/pelvis no acute pathology  Right renal pelvis fullness but no gabriella hydrnephrosis, patient voiding on IV lasix  continue to monitor    # initial drop h/H likely dilutional   monitor     #Paroxysmal afib not on AC due to patient's refusal in the past and now  continue amio    # CAD  continue statin, asa, plavix    #CKD stage 3  monitor BMP    #DNR/DNI  poor prognosis  palliative care consult    #DVT prophylaxis  heparin sc    # Advanced care planning(ACP)- spent 20mins for ACP conversation   I spent 20 minutes discussing with patient and daugter at bedside about current management plans and diagnosis and spoke about advanced care planning. daughter  patient's HCP and patient declined intubation or ventilator or pressors or resuscitation if the need arises.if her condition worsens despite conservative management then they would prefer comfort care as at baseline line she has very poor quality of life and alsways in chronic pain as per daughter 87F w/ PMH as above comes in w/ fever and respiratory distress due to CAP.    A/P  #Acute respiratory failure /B/L pneumonia suspected GNR/MRSA-no significant improvement  #suspected sepsi from PNA  #hx systolic CHF EF 20% acutely decomepnsated   #Acute toxic metabolic encephalopathy    -continuetele  -continue cefpime and doxy , abx per ID   Blood cx, legionella urine  -BIPAP prn, no repeat ABG as patient and HCP daughter at bed side declining intubation or resuscitation if need arises  -no further IVf due to CHF hx  -continue IV lasix dosing per cardio  -hold other antihypertensive meds for now    # s/p abdominal pain resolved after bowel movement/doubt acute cholecystitis   Abd US shows GB sludge   CT abd/pelvis no acute pathology  Right renal pelvis fullness but no gabriella hydrnephrosis, patient voiding on IV lasix  continue to monitor    # initial drop h/H likely dilutional   monitor     #Paroxysmal afib not on AC due to patient's refusal in the past and now  continue amio    # CAD  continue statin, asa, plavix    #CKD stage 3  monitor BMP    #DNR/DNI  poor prognosis  palliative care consult    #DVT prophylaxis  heparin sc    # Advanced care planning(ACP)- spent 20mins for ACP conversation   I spent 20 minutes discussing with patient and daugter at bedside about current management plans and diagnosis and spoke about advanced care planning. daughter  patient's HCP and patient declined intubation or ventilator or pressors or resuscitation if the need arises.if her condition worsens despite conservative management then they would prefer comfort care as at baseline line she has very poor quality of life and alsways in chronic pain as per daughter 87F w/ PMH as above comes in w/ fever and respiratory distress due to CAP.    A/P  #Acute respiratory failure /B/L pneumonia suspected GNR/MRSA-no significant improvement  #suspected sepsi from PNA  #hx systolic CHF EF 20% acutely decomepnsated   #Acute toxic metabolic encephalopathy    -continuetele  -continue cefpime and doxy , abx per ID   Blood cx, legionella urine  -BIPAP prn, no repeat ABG as patient and HCP daughter at bed side declining intubation or resuscitation if need arises  -no further IVf due to CHF hx  -continue IV lasix dosing per cardio  -hold other antihypertensive meds for now    # s/p abdominal pain resolved after bowel movement/doubt acute cholecystitis   Abd US shows GB sludge   CT abd/pelvis no acute pathology  Right renal pelvis fullness but no gabriella hydrnephrosis, patient voiding on IV lasix  continue to monitor    # initial drop h/H likely dilutional   monitor     #Paroxysmal afib not on AC due to patient's refusal in the past and now  continue amio    # CAD  continue statin, asa, plavix    #CKD stage 3  monitor BMP    #DNR/DNI  poor prognosis  palliative care consult    #DVT prophylaxis  heparin sc    # Advanced care planning(ACP)- spent 20mins for ACP conversation   I spent 20 minutes discussing with patient and daugter at bedside about current management plans and diagnosis and spoke about advanced care planning. daughter  patient's HCP and patient declined intubation or ventilator or pressors or resuscitation if the need arises.if her condition worsens despite conservative management then they would prefer comfort care as at baseline line she has very poor quality of life and alsways in chronic pain as per daughter     addendum  1/11 7.14 pm  steadily declining today, more SOB   will start BIPAP , and earlier dose of lasix  discussed with daughter again if she continues to decline daughter wants only comfort care,  daughter HCP agreed to morphine even though it could depress her respirations further  very poor prognosi s rapidly declining- daughter aware  DNR/DNI confirmed

## 2019-01-11 NOTE — DIETITIAN INITIAL EVALUATION ADULT. - PERTINENT LABORATORY DATA
01-10 Na137 mmol/L Glu 108 mg/dL<H> K+ 3.3 mmol/L<L> Cr  1.25 mg/dL BUN 23 mg/dL Phos 3.3 mg/dL Alb 3.1 g/dL<L> PAB n/a

## 2019-01-11 NOTE — PHYSICAL THERAPY INITIAL EVALUATION ADULT - MANUAL MUSCLE TESTING RESULTS, REHAB EVAL
Bilateral UE strength 4/5 throughout grossly. Bilateral LE strength 4/5 throughout grossly. All MMT performed within available ROM.

## 2019-01-11 NOTE — CHART NOTE - NSCHARTNOTEFT_GEN_A_CORE
Upon Nutritional Assessment by the Registered Dietitian your patient was determined to meet criteria / has evidence of the following diagnosis/diagnoses:          [ ]  Mild Protein Calorie Malnutrition        [ ]  Moderate Protein Calorie Malnutrition        [ x] Severe Protein Calorie Malnutrition        [ ] Unspecified Protein Calorie Malnutrition        [ ] Underweight / BMI <19        [ ] Morbid Obesity / BMI > 40      Findings as based on:  •  Comprehensive nutrition assessment and consultation  •  Calorie counts (nutrient intake analysis)  •  Food acceptance and intake status from observations by staff  •  Follow up  •  Patient education  •  Intervention secondary to interdisciplinary rounds  •   concerns    Pt meets criteria for severe protein-calorie malnutrition in context of chronic disease.  Nutrition focused physical exam reveals   moderate/severe muscle wasting (temples, interosseus, clavicles, scapulas, shoulders, thighs, calves.)    Edema 2+ may be masking further muscle wasting.  Moderate/severe fat depletion    Pt has st 1 and st 2 PUs.   PO intake likely < 75% nutritional needs    Treatment:    The following diet has been recommended:  Swallow eval ordered    Suggest maintain DASH diet, texture as per SLP.    Add Ensure pudding bid,   Gelatein BID. Daily weights  .Add Zn sulfate 220 mg x 10 days.    Vit C 500 mg bid.   Monitor PO intake, tolerance, labs and weight.     PROVIDER Section:     By signing this assessment you are acknowledging and agree with the diagnosis/diagnoses assigned by the Registered Dietitian    Comments:

## 2019-01-11 NOTE — SWALLOW BEDSIDE ASSESSMENT ADULT - COMMENTS
The patient was admitted to  from [facility]. Hospital course is notable for SOB, cough, RSV, pneumonia, CHF exacerbation and hypovolemia. This profile is superimposed upon a history of CAD, systolic CHF, HTN, HLD, A-Fib s/p ablation, AICD placement, GERD, and OA s/p left TKR. See below for additional prior medical information.

## 2019-01-11 NOTE — PROGRESS NOTE ADULT - SUBJECTIVE AND OBJECTIVE BOX
Date of service: 19 @ 12:31    Lying in bed in NAD  Weak looking  Restless and SOB    ROS: no fever or chills; poorly verbal; limited    MEDICATIONS  (STANDING):  amiodarone    Tablet 200 milliGRAM(s) Oral daily  aspirin  chewable 81 milliGRAM(s) Oral daily  atorvastatin 10 milliGRAM(s) Oral at bedtime  cefepime  Injectable. 1000 milliGRAM(s) IV Push every 12 hours  clopidogrel Tablet 75 milliGRAM(s) Oral daily  doxycycline IVPB      doxycycline IVPB 100 milliGRAM(s) IV Intermittent every 12 hours  furosemide   Injectable 40 milliGRAM(s) IV Push every 8 hours  heparin  Injectable 5000 Unit(s) SubCutaneous every 12 hours  magnesium oxide 200 milliGRAM(s) Oral daily  oxybutynin 5 milliGRAM(s) Oral two times a day      Vital Signs Last 24 Hrs  T(C): 38.1 (2019 11:05), Max: 38.6 (10 Hemant 2019 21:41)  T(F): 100.5 (2019 11:05), Max: 101.4 (10 Hemant 2019 21:41)  HR: 91 (2019 11:05) (84 - 106)  BP: 128/72 (2019 11:05) (100/55 - 128/72)  BP(mean): --  RR: 22 (2019 11:05) (18 - 32)  SpO2: 98% (2019 11:05) (78% - 100%)    Physical Exam:      Constitutional: frail looking  HEENT: NC/AT, EOMI, PERRLA, conjunctivae clear  Neck: supple; thyroid not palpable  Back: no tenderness  Respiratory: respiratory effort normal; crackles b/l; wheeszing  Cardiovascular: S1S2 regular, no murmurs  Abdomen: soft, not tender, not distended, positive BS; no liver or spleen organomegaly  Genitourinary: no suprapubic tenderness  Lymphatic: no LN palpable  Musculoskeletal: no muscle tenderness, no joint swelling or tenderness  Extremities: no pedal edema  Neurological/ Psychiatric: AxOx3, judgement and insight normal; moving all extremities  Skin: no rashes; no palpable lesions    Labs: reviewed                        9.7    13.53 )-----------( 124      ( 2019 10:04 )             29.9     01-11    138  |  102  |  23  ----------------------------<  106<H>  3.9   |  27  |  1.19    Ca    8.5      2019 10:04  Phos  3.3     01-10  Mg     1.8     01-10    TPro  6.8  /  Alb  3.1<L>  /  TBili  1.0  /  DBili  x   /  AST  42<H>  /  ALT  24  /  AlkPhos  42                          11.4   8.22  )-----------( 195      ( 10 Hemant 2019 10:28 )             36.3     01-10    136  |  102  |  23  ----------------------------<  110<H>  4.0   |  25  |  1.29    Ca    8.7      10 Hemant 2019 10:28    TPro  7.8  /  Alb  3.8  /  TBili  0.6  /  DBili  x   /  AST  32  /  ALT  21  /  AlkPhos  61  0110     LIVER FUNCTIONS - ( 10 Hemant 2019 10:28 )  Alb: 3.8 g/dL / Pro: 7.8 gm/dL / ALK PHOS: 61 U/L / ALT: 21 U/L / AST: 32 U/L / GGT: x           Urinalysis Basic - ( 10 Hemant 2019 10:28 )    Color: Yellow / Appearance: Clear / S.020 / pH: x  Gluc: x / Ketone: Moderate  / Bili: Negative / Urobili: Negative mg/dL   Blood: x / Protein: 30 mg/dL / Nitrite: Negative   Leuk Esterase: Trace / RBC: 11-25 /HPF / WBC 3-5   Sq Epi: x / Non Sq Epi: x / Bacteria: Moderate    Rapid Respiratory Viral Panel (01.10.19 @ 10:28)    Rapid RVP Result: Detected:     hMPV (RapRVP): Detected      Culture - Urine (collected 10 Hemant 2019 10:28)  Source: .Urine None  Preliminary Report (2019 09:26):    10,000 - 49,000 CFU/mL Escherichia coli    Radiology: all available radiological tests reviewed    < from: CT Chest No Cont (01.10.19 @ 13:37) >  Bilateral multifocal pneumonia.  Cardiomegaly and enlarged pulmonary artery suggestive of pulmonary   arterial hypertension. Left lower pulmonary artery cardioMEMS device in   place.    < end of copied text >      Advanced directives addressed: DNR

## 2019-01-11 NOTE — DIETITIAN INITIAL EVALUATION ADULT. - ENERGY NEEDS
Ht.   61   "        Wt.  52      kg               BMI       28           IBW  47     kg               Pt is at  144  %  IBW

## 2019-01-11 NOTE — SWALLOW BEDSIDE ASSESSMENT ADULT - SWALLOW EVAL: CRITERIA FOR SKILLED INTERVENTION MET
I DO NOT FEEL THAT ACUTE SPEECH PATHOLOGY INTERVENTION WOULD /BE OF CLINICAL BENEFIT. NO SPEECH-LANGUAGE PATHOLOGY WAS EVIDENT, NO ORAL MOTOR DYSFUNCTION IS EVIDENT AND NO SHERITA PHARYNGEAL DYSFUNCTION IS EVIDENT. GIVEN ABOVE, WILL NOT ACTIVELY FOLLOW. RECONSULT PRN. I COULD ALSO BE  RECONSULTED IF PT FINDS HER DENTURES AND WISHES FOR A DIET CONSISTENCY UPGRADE. I DO NOT FEEL THAT ACUTE SPEECH PATHOLOGY INTERVENTION WOULD /BE OF CLINICAL BENEFIT. NO SPEECH-LANGUAGE PATHOLOGY WAS EVIDENT, NO ORAL MOTOR DYSFUNCTION IS EVIDENT AND NO SHERITA PHARYNGEAL DYSFUNCTION IS EVIDENT. GIVEN ABOVE, WILL NOT ACTIVELY FOLLOW. RECONSULT PRN. I COULD ALSO BE  RECONSULTED IF PT FINDS HER DENTURES AND WISHES TO BE EVALUATED TO ASSESS CANDIDACY FOR A DIET CONSISTENCY UPGRADE AT THAT TIME.

## 2019-01-11 NOTE — DIETITIAN INITIAL EVALUATION ADULT. - PROBLEM SELECTOR PLAN 2
EF 20-25% (last TTE in records done 2/2018 with EF 30%)  f/u cardiology consult w/ Dr. Denny  On bumex 3 mg daily at home - will DC diuretics until CT chest done  Cont enalapril with holding parameters  f/u PT eval

## 2019-01-11 NOTE — PROVIDER CONTACT NOTE (OTHER) - ASSESSMENT
pt does not show signs of bleeding. for the drop in H&H pt is not stating cheat pain headache or numbness. ECG was done just prior and did not show any changes.

## 2019-01-11 NOTE — PHYSICAL THERAPY INITIAL EVALUATION ADULT - GENERAL OBSERVATIONS, REHAB EVAL
Pt found supine in bed with tele monitor, O2 NC, and bed alarm activated. Pt with no c/o pain. Pt on 1:1 observation.

## 2019-01-11 NOTE — SWALLOW BEDSIDE ASSESSMENT ADULT - SLP GENERAL OBSERVATIONS
On encounter, an age acceptable loss of bulk was evident in strap muscle region.  The pt was mildly dyspneic, anxious and irritable. She often moaned without clear intent. With that being stated, she was able to verbalize during communicative probes/in conversation without evidence of a primary motor speech or primary linguistic pathology. Pt was able to effectively verbalize her needs and is at reported communicative baseline. Note that pt denied aspiration signs with meals. Further note that the patient owns dentures and her daughter stated that she is "95% sure that her dentures were brought to the hospital" but they currently cannot be located. I advised the charge nurse.

## 2019-01-11 NOTE — PROGRESS NOTE ADULT - SUBJECTIVE AND OBJECTIVE BOX
87F w/ PMH of afib not on a/c for hx of hematuria & s/p ablation (?), CAD, HTN, HLD, sCHF (EF 20-25%) s/p cardiomems and AICD, GERD, OA, s/p L knee replacement, 4x c-sections comes in with worsening SOB/cough for past 3 days. Her daughter notes that she wasn't herself since Monday when she left company early (unlike her), and then complaining of SOB/cough. She went to see Dr. Denny in the office yesterday, where she was supposedly ok and Dr. Denny did not make any changes to her medications at that time. Then today her SOB was so bad that she wanted to come to the hospital. She denies fever, chills, productive cough, N/V, abd pain, chest pain/pressure. She can lay flat when she sleeps and uses 2 pillows. Has not noted any increase in leg swelling recently. Denies eating very salty food recently or drinking excessive water. Pt comes from [facility]. According to her daughter her baseline is AOx3, walking w/ a walker, and needs help with some ADLs.     On arrival to ED pt had fever 102.2 F and was in respiratory distress with increased work of breathing so was put on bipap. Has since been taken off bipap and is currently on non-rebreather. Did not have episodes of desaturation, but was tachypneic. Patient has MOLST form which shows she is DNR/DNI.    -c/o chest pain for 1 week  and chronic generalised bodyaches, rapid response on 1/10 for desaturation and abdominal pain, which resolved after patient had bowel movement, Abd US GB sludge      PHYSICAL EXAM:    Daily     Daily Weight in k (2019 09:47)    ICU Vital Signs Last 24 Hrs  T(C): 38.1 (2019 11:05), Max: 38.6 (10 Hemant 2019 21:41)  T(F): 100.5 (2019 11:05), Max: 101.4 (10 Hemant 2019 21:41)  HR: 91 (2019 11:05) (87 - 106)  BP: 128/72 (2019 11:05) (108/72 - 128/72)  BP(mean): --  ABP: --  ABP(mean): --  RR: 22 (2019 11:05) (22 - 32)  SpO2: 98% (2019 11:05) (78% - 100%)      Constitutional:mild distress due to chronic pain   HEENT: Atraumatic, WENCESLAO, Normal, No congestion  Respiratory: diffused rhonchi and ralesB/l  Cardiovascular: S1S2;  Gastrointestinal: Abdomen soft, non tender, Bowel Ssounds present  Extremities: 1_ edema  Neurological: awake alert follows commands  Breasts: Deferred  Genitourinary: deferred  Rectal: Deferred                          9.7    13.53 )-----------( 124      ( 2019 10:04 )             29.9       CBC Full  -  ( 2019 10:04 )  WBC Count : 13.53 K/uL  Hemoglobin : 9.7 g/dL  Hematocrit : 29.9 %  Platelet Count - Automated : 124 K/uL  Mean Cell Volume : 82.1 fl  Mean Cell Hemoglobin : 26.6 pg  Mean Cell Hemoglobin Concentration : 32.4 gm/dL  Auto Neutrophil # : 12.72 K/uL  Auto Lymphocyte # : 0.27 K/uL  Auto Monocyte # : 0.54 K/uL  Auto Eosinophil # : 0.00 K/uL  Auto Basophil # : 0.00 K/uL  Auto Neutrophil % : 89.0 %  Auto Lymphocyte % : 2.0 %  Auto Monocyte % : 4.0 %  Auto Eosinophil % : 0.0 %  Auto Basophil % : 0.0 %      11    138  |  102  |  23  ----------------------------<  106<H>  3.9   |  27  |  1.19    Ca    8.5      2019 10:04  Phos  3.3     01-10  Mg     1.8     01-10    TPro  6.8  /  Alb  3.1<L>  /  TBili  1.0  /  DBili  x   /  AST  42<H>  /  ALT  24  /  AlkPhos  42  01-11      LIVER FUNCTIONS - ( 2019 10:04 )  Alb: 3.1 g/dL / Pro: 6.8 gm/dL / ALK PHOS: 42 U/L / ALT: 24 U/L / AST: 42 U/L / GGT: x             PT/INR - ( 10 Hemant 2019 10:28 )   PT: 12.8 sec;   INR: 1.15 ratio         PTT - ( 10 Hemant 2019 10:28 )  PTT:29.3 sec    CARDIAC MARKERS ( 10 Hemant 2019 19:17 )  0.087 ng/mL / x     / 68 U/L / x     / x      CARDIAC MARKERS ( 10 Hemant 2019 16:50 )  0.074 ng/mL / x     / 65 U/L / x     / x      CARDIAC MARKERS ( 10 Hemant 2019 13:24 )  0.081 ng/mL / x     / x     / x     / x      CARDIAC MARKERS ( 10 Hemant 2019 10:28 )  0.043 ng/mL / x     / x     / x     / x            Urinalysis Basic - ( 10 Hemant 2019 10:28 )    Color: Yellow / Appearance: Clear / S.020 / pH: x  Gluc: x / Ketone: Moderate  / Bili: Negative / Urobili: Negative mg/dL   Blood: x / Protein: 30 mg/dL / Nitrite: Negative   Leuk Esterase: Trace / RBC: 11-25 /HPF / WBC 3-5   Sq Epi: x / Non Sq Epi: x / Bacteria: Moderate        ABG - ( 10 Hemant 2019 21:00 )  pH, Arterial: 7.49  pH, Blood: x     /  pCO2: 34    /  pO2: 108   / HCO3: 25    / Base Excess: 2.5   /  SaO2: 98                  MEDICATIONS  (STANDING):  amiodarone    Tablet 200 milliGRAM(s) Oral daily  aspirin  chewable 81 milliGRAM(s) Oral daily  atorvastatin 10 milliGRAM(s) Oral at bedtime  cefepime  Injectable. 1000 milliGRAM(s) IV Push every 12 hours  clopidogrel Tablet 75 milliGRAM(s) Oral daily  doxycycline IVPB      doxycycline IVPB 100 milliGRAM(s) IV Intermittent every 12 hours  furosemide   Injectable 40 milliGRAM(s) IV Push every 8 hours  heparin  Injectable 5000 Unit(s) SubCutaneous every 12 hours  magnesium oxide 200 milliGRAM(s) Oral daily  oxybutynin 5 milliGRAM(s) Oral two times a day

## 2019-01-11 NOTE — PROGRESS NOTE ADULT - ASSESSMENT
86 y/o Female with h/o A.fib not on a/c due to hematuria & s/p ablation (?), CAD, HTN, HLD, CHF (EF 20-25%) s/p cardiomems and AICD, GERD, OA,  left TKR, 4x C-sections was admitted on 1/10 for worsening SOB/cough for past 3 days PTA. Her daughter notes that she wasn't herself and then complaining of SOB/cough. She went to see Dr. Denny in the office yesterday, where she was supposedly ok and Dr. Denny did not make any changes to her medications at that time. Then her SOB worsened and came to the hospital. She denies fever, chills at home. She has dry cough. On arrival to ED pt had fever 102.2 F and was in respiratory distress with increased work of breathing so was put on BiPAP. SHe received zosyn.    1. Febrile syndrome. Possible sepsis. Acute respiratory failure. Multifocal pneumonia with hMPv. ?superimposed bacterial pneumonia. CHF exacerbation.  -leukocytosis  -respiratory frail  -f/u BC x 2  -on cefepime 1 gm IV q12h and doxycycline IV # 2  -tolerating abx well so far; no side effects noted  -respiratory care  -BiPAP as needed  -droplet isolation  -continue IV abx coverage  -monitor temps  -f/u CBC  -supportive care  2. Other issues:   -care per medicine

## 2019-01-11 NOTE — DIETITIAN INITIAL EVALUATION ADULT. - NS AS NUTRI DX NUTRIENT
Increased nutrient needs (specify)/Pt meets criteria for severe protein-calorie malnutrition in context of chronic disease  .   Pressure ulcers kay st 2/Malnutrition

## 2019-01-12 LAB
-  AMIKACIN: SIGNIFICANT CHANGE UP
-  AMOXICILLIN/CLAVULANIC ACID: SIGNIFICANT CHANGE UP
-  AMPICILLIN/SULBACTAM: SIGNIFICANT CHANGE UP
-  AMPICILLIN: SIGNIFICANT CHANGE UP
-  AZTREONAM: SIGNIFICANT CHANGE UP
-  CEFAZOLIN: SIGNIFICANT CHANGE UP
-  CEFEPIME: SIGNIFICANT CHANGE UP
-  CEFOXITIN: SIGNIFICANT CHANGE UP
-  CEFTRIAXONE: SIGNIFICANT CHANGE UP
-  CIPROFLOXACIN: SIGNIFICANT CHANGE UP
-  ERTAPENEM: SIGNIFICANT CHANGE UP
-  GENTAMICIN: SIGNIFICANT CHANGE UP
-  IMIPENEM: SIGNIFICANT CHANGE UP
-  LEVOFLOXACIN: SIGNIFICANT CHANGE UP
-  MEROPENEM: SIGNIFICANT CHANGE UP
-  NITROFURANTOIN: SIGNIFICANT CHANGE UP
-  PIPERACILLIN/TAZOBACTAM: SIGNIFICANT CHANGE UP
-  TIGECYCLINE: SIGNIFICANT CHANGE UP
-  TOBRAMYCIN: SIGNIFICANT CHANGE UP
-  TRIMETHOPRIM/SULFAMETHOXAZOLE: SIGNIFICANT CHANGE UP
ALBUMIN SERPL ELPH-MCNC: 2.7 G/DL — LOW (ref 3.3–5)
ALP SERPL-CCNC: 38 U/L — LOW (ref 40–120)
ALT FLD-CCNC: 25 U/L — SIGNIFICANT CHANGE UP (ref 12–78)
ANION GAP SERPL CALC-SCNC: 6 MMOL/L — SIGNIFICANT CHANGE UP (ref 5–17)
AST SERPL-CCNC: 41 U/L — HIGH (ref 15–37)
BASOPHILS # BLD AUTO: 0 K/UL — SIGNIFICANT CHANGE UP (ref 0–0.2)
BASOPHILS NFR BLD AUTO: 0 % — SIGNIFICANT CHANGE UP (ref 0–2)
BILIRUB SERPL-MCNC: 0.9 MG/DL — SIGNIFICANT CHANGE UP (ref 0.2–1.2)
BUN SERPL-MCNC: 32 MG/DL — HIGH (ref 7–23)
CALCIUM SERPL-MCNC: 8.4 MG/DL — LOW (ref 8.5–10.1)
CHLORIDE SERPL-SCNC: 102 MMOL/L — SIGNIFICANT CHANGE UP (ref 96–108)
CO2 SERPL-SCNC: 30 MMOL/L — SIGNIFICANT CHANGE UP (ref 22–31)
CREAT SERPL-MCNC: 1.51 MG/DL — HIGH (ref 0.5–1.3)
CULTURE RESULTS: SIGNIFICANT CHANGE UP
EOSINOPHIL # BLD AUTO: 0 K/UL — SIGNIFICANT CHANGE UP (ref 0–0.5)
EOSINOPHIL NFR BLD AUTO: 0 % — SIGNIFICANT CHANGE UP (ref 0–6)
GLUCOSE SERPL-MCNC: 109 MG/DL — HIGH (ref 70–99)
HCT VFR BLD CALC: 27.7 % — LOW (ref 34.5–45)
HGB BLD-MCNC: 8.9 G/DL — LOW (ref 11.5–15.5)
LYMPHOCYTES # BLD AUTO: 0.34 K/UL — LOW (ref 1–3.3)
LYMPHOCYTES # BLD AUTO: 3 % — LOW (ref 13–44)
MANUAL SMEAR VERIFICATION: SIGNIFICANT CHANGE UP
MCHC RBC-ENTMCNC: 26.5 PG — LOW (ref 27–34)
MCHC RBC-ENTMCNC: 32.1 GM/DL — SIGNIFICANT CHANGE UP (ref 32–36)
MCV RBC AUTO: 82.4 FL — SIGNIFICANT CHANGE UP (ref 80–100)
METHOD TYPE: SIGNIFICANT CHANGE UP
MONOCYTES # BLD AUTO: 0.11 K/UL — SIGNIFICANT CHANGE UP (ref 0–0.9)
MONOCYTES NFR BLD AUTO: 1 % — LOW (ref 2–14)
NEUTROPHILS # BLD AUTO: 10.85 K/UL — HIGH (ref 1.8–7.4)
NEUTROPHILS NFR BLD AUTO: 94 % — HIGH (ref 43–77)
NEUTS BAND # BLD: 2 % — SIGNIFICANT CHANGE UP (ref 0–8)
NRBC # BLD: 0 /100 — SIGNIFICANT CHANGE UP (ref 0–0)
NRBC # BLD: SIGNIFICANT CHANGE UP /100 WBCS (ref 0–0)
ORGANISM # SPEC MICROSCOPIC CNT: SIGNIFICANT CHANGE UP
ORGANISM # SPEC MICROSCOPIC CNT: SIGNIFICANT CHANGE UP
PLAT MORPH BLD: NORMAL — SIGNIFICANT CHANGE UP
PLATELET # BLD AUTO: 129 K/UL — LOW (ref 150–400)
POTASSIUM SERPL-MCNC: 3.1 MMOL/L — LOW (ref 3.5–5.3)
POTASSIUM SERPL-SCNC: 3.1 MMOL/L — LOW (ref 3.5–5.3)
PROT SERPL-MCNC: 6.2 GM/DL — SIGNIFICANT CHANGE UP (ref 6–8.3)
RBC # BLD: 3.36 M/UL — LOW (ref 3.8–5.2)
RBC # FLD: 16.3 % — HIGH (ref 10.3–14.5)
RBC BLD AUTO: SIGNIFICANT CHANGE UP
SODIUM SERPL-SCNC: 138 MMOL/L — SIGNIFICANT CHANGE UP (ref 135–145)
SPECIMEN SOURCE: SIGNIFICANT CHANGE UP
WBC # BLD: 11.3 K/UL — HIGH (ref 3.8–10.5)
WBC # FLD AUTO: 11.3 K/UL — HIGH (ref 3.8–10.5)

## 2019-01-12 RX ORDER — ACETAMINOPHEN 500 MG
650 TABLET ORAL ONCE
Qty: 0 | Refills: 0 | Status: COMPLETED | OUTPATIENT
Start: 2019-01-12 | End: 2019-01-12

## 2019-01-12 RX ORDER — POTASSIUM CHLORIDE 20 MEQ
10 PACKET (EA) ORAL ONCE
Qty: 0 | Refills: 0 | Status: COMPLETED | OUTPATIENT
Start: 2019-01-12 | End: 2019-01-12

## 2019-01-12 RX ORDER — POTASSIUM CHLORIDE 20 MEQ
10 PACKET (EA) ORAL ONCE
Qty: 0 | Refills: 0 | Status: COMPLETED | OUTPATIENT
Start: 2019-01-12 | End: 2019-01-13

## 2019-01-12 RX ORDER — MORPHINE SULFATE 50 MG/1
2.5 CAPSULE, EXTENDED RELEASE ORAL
Qty: 0 | Refills: 0 | Status: DISCONTINUED | OUTPATIENT
Start: 2019-01-12 | End: 2019-01-12

## 2019-01-12 RX ORDER — POTASSIUM CHLORIDE 20 MEQ
40 PACKET (EA) ORAL ONCE
Qty: 0 | Refills: 0 | Status: DISCONTINUED | OUTPATIENT
Start: 2019-01-12 | End: 2019-01-12

## 2019-01-12 RX ORDER — MORPHINE SULFATE 50 MG/1
2.5 CAPSULE, EXTENDED RELEASE ORAL
Qty: 0 | Refills: 0 | Status: DISCONTINUED | OUTPATIENT
Start: 2019-01-12 | End: 2019-01-13

## 2019-01-12 RX ORDER — ONDANSETRON 8 MG/1
4 TABLET, FILM COATED ORAL EVERY 6 HOURS
Qty: 0 | Refills: 0 | Status: DISCONTINUED | OUTPATIENT
Start: 2019-01-12 | End: 2019-01-22

## 2019-01-12 RX ADMIN — Medication 40 MILLIGRAM(S): at 13:57

## 2019-01-12 RX ADMIN — Medication 400 MILLIGRAM(S): at 00:48

## 2019-01-12 RX ADMIN — MORPHINE SULFATE 2 MILLIGRAM(S): 50 CAPSULE, EXTENDED RELEASE ORAL at 18:00

## 2019-01-12 RX ADMIN — CEFEPIME 1000 MILLIGRAM(S): 1 INJECTION, POWDER, FOR SOLUTION INTRAMUSCULAR; INTRAVENOUS at 18:33

## 2019-01-12 RX ADMIN — HEPARIN SODIUM 5000 UNIT(S): 5000 INJECTION INTRAVENOUS; SUBCUTANEOUS at 06:15

## 2019-01-12 RX ADMIN — CEFEPIME 1000 MILLIGRAM(S): 1 INJECTION, POWDER, FOR SOLUTION INTRAMUSCULAR; INTRAVENOUS at 06:15

## 2019-01-12 RX ADMIN — HEPARIN SODIUM 5000 UNIT(S): 5000 INJECTION INTRAVENOUS; SUBCUTANEOUS at 17:14

## 2019-01-12 RX ADMIN — MORPHINE SULFATE 2 MILLIGRAM(S): 50 CAPSULE, EXTENDED RELEASE ORAL at 17:13

## 2019-01-12 RX ADMIN — Medication 650 MILLIGRAM(S): at 18:35

## 2019-01-12 RX ADMIN — OXYCODONE AND ACETAMINOPHEN 1 TABLET(S): 5; 325 TABLET ORAL at 00:01

## 2019-01-12 RX ADMIN — Medication 110 MILLIGRAM(S): at 17:15

## 2019-01-12 RX ADMIN — MORPHINE SULFATE 2 MILLIGRAM(S): 50 CAPSULE, EXTENDED RELEASE ORAL at 00:01

## 2019-01-12 RX ADMIN — AMIODARONE HYDROCHLORIDE 200 MILLIGRAM(S): 400 TABLET ORAL at 06:14

## 2019-01-12 RX ADMIN — Medication 5 MILLIGRAM(S): at 06:14

## 2019-01-12 RX ADMIN — Medication 40 MILLIGRAM(S): at 22:07

## 2019-01-12 RX ADMIN — MORPHINE SULFATE 2 MILLIGRAM(S): 50 CAPSULE, EXTENDED RELEASE ORAL at 01:03

## 2019-01-12 RX ADMIN — Medication 40 MILLIGRAM(S): at 06:14

## 2019-01-12 RX ADMIN — Medication 100 MILLIEQUIVALENT(S): at 22:06

## 2019-01-12 RX ADMIN — Medication 110 MILLIGRAM(S): at 06:13

## 2019-01-12 NOTE — CONSULT NOTE ADULT - ASSESSMENT
Pt is a 87y old Female with hx of A.fib not on a/c due to hematuria (s/p ablation), CAD, HTN, HLD, CHF (EF 20-25%) s/p AICD, GERD, OA,  left TKR, 4x C-sections  - was admitted on 1/10 for worsening SOB/cough for past 3 days PTA.  She saw PMD 1 d PTA,  but came to ED as her SOB worsened.. She denies fever, chills at home. She has dry cough. On arrival to ED pt had fever 102.2 F and was in respiratory distress, was placed on BiPAP. Work-up= bilat infiltrates, RVP + for human metapneumovirus she is being empirically treated for bacterial process as well. She is awake and "very hungry" - has been NPO since admission due to BiPAP mask.     1) Dyspnea  -Multifactorial : severe systolic CHF and pneumonia  -BiPAP mask use intermittently since admission (Today=#3)  -Destuarates quickly when off BiPAP  -Discussed use of this mask with HCP #1 and #2 - they would like to continue with this BUT they also want her to eat -  so they consent to removing it for meals (RT states cannot put back on until 2 hrs have elapsed since last po intake)   -O2 by N/C during meals, then NRBM x 2 hrs after meals before replacing mask  -Pt signed her own MOLST form in 3.2018, requesting DNR/DNI - nothing was marked re:BiPAP mask  -Roxanol ordered for dyspnea  -She also has Oxycodone and IV Morphine ans Oxybutynin ordered - all of these meds will address dyspnea as well.     2)Pneumonia  -RVP + for Human metapneumovirus   -Appreciate ID input and case discussed with ID MD as well - on empiric antibacterial therapy, no change since admission      3)CHF  -Severe systolic CHF  -EF 20%  -Appreciate cardiology input    4) Malnutrition  -Albumin = 2.7  Pt hungry now - will feed her, D/C all dietary restrictions as per family's wishes -   -She is edentulous so will give mechanical soft diet    5) Weakness  -Secondary to acute illness  -Was in [facility] PTA - cannot return there unless she is more independent  -Family is looking into [facility] facilities  -PT evaluation when able to participate    6)Pain  -Has chronic pain secondary to arthritis  -Takes Oxybutynin bid   -Do not stop opioids - continue her Oxybutynin bid  -Morphine IVP for severe pain as ordered   -Oxycodone for moderate pain as ordered     7)Advance directives  -Has HCP on chart - names daughter Ernie Burgos as #1 and granddaughter Renay #2  -Has MOLST from 3/2018 on chart, signed by pt - states DNR/DNI - nothing else was addressed  -Had a [facility] meeting with Renay in person and Gabrielle by phone - see separate note  -Family respects DNI/DNR order, want to continue with BiPAP mask for now but also want her to eat.... and do not want any diertary restrictions other than mechanical soft diet as she is edentulous  -Will follow up tomorrow

## 2019-01-12 NOTE — PROGRESS NOTE ADULT - ASSESSMENT
PROBLEMS:    AC hypoxaemic respiratory failur  Multifocal pneumonia  positive HMPV  Chronic systolic heart failure   CKD  Paroxysmal atrial fibrillation  Essential hypertension    PLAN:    IV CEFEPIME/ IV DOXYCYLIN  FIO2  BIPAP AS TOLERATED  POOR PROGNOSIS  DVT PROPHYLASIX

## 2019-01-12 NOTE — PROGRESS NOTE ADULT - SUBJECTIVE AND OBJECTIVE BOX
Patient is a 87y old  Female who presents with a chief complaint of SOB, cough.   HPI:  87F w/ PMH of afib not on a/c for hx of hematuria & s/p ablation (?), CAD, HTN, HLD, sCHF (EF 20-25%) s/p cardiomems and AICD, GERD, OA, s/p L knee replacement, 4x c-sections comes in with worsening SOB/cough for past 3 days. She lives in assisted living and I have seen her in office yesterday.  She was told to increase diuretics in the past and she refused to take more than what she was already taking.   Family urged to change bumex to lasix secondary to cost.  With cough yesterday she denied any fever and I warned her about viral infections that are wide spread now.    On arrival to ED pt had fever 102.2 F and was in respiratory distress with increased work of breathing so was put on bipap.   Now on face mask.  No hypoxia.    No CP or pressure.     - pt seen and examined by me today. In respiratory distress to a mild degress, hypoxia overnight    - - pt seen and examined by me this am.     PAST MEDICAL & SURGICAL HISTORY:  Osteoarthritis of multiple joints, unspecified osteoarthritis type  Gastroesophageal reflux disease without esophagitis  Dyslipidemia  Essential hypertension  Paroxysmal atrial fibrillation  Coronary artery disease involving native coronary artery of native heart without angina pectoris  Chronic systolic congestive heart failure: EF 20-25%  History of : 4 c-sections  History of total left knee replacement (TKR)  S/P ablation of atrial fibrillation  AICD (automatic cardioverter/defibrillator) present      MEDICATIONS  (STANDING):  amiodarone    Tablet 200 milliGRAM(s) Oral daily  aspirin  chewable 81 milliGRAM(s) Oral daily  atorvastatin 10 milliGRAM(s) Oral at bedtime  cefepime  Injectable. 1000 milliGRAM(s) IV Push every 12 hours  clopidogrel Tablet 75 milliGRAM(s) Oral daily  enalapril 2.5 milliGRAM(s) Oral daily  heparin  Injectable 5000 Unit(s) SubCutaneous every 12 hours  magnesium oxide 200 milliGRAM(s) Oral daily  metoprolol succinate ER 50 milliGRAM(s) Oral daily  oxybutynin 5 milliGRAM(s) Oral two times a day  sodium chloride 0.9% Bolus 1000 milliLiter(s) IV Bolus once    MEDICATIONS  (PRN):  acetaminophen   Tablet .. 650 milliGRAM(s) Oral every 6 hours PRN Temp greater or equal to 38C (100.4F), Mild Pain (1 - 3)  dicyclomine 20 milliGRAM(s) Oral three times a day before meals PRN Indigestion  lactulose Syrup 20 Gram(s) Oral at bedtime PRN Constipation  loperamide 2 milliGRAM(s) Oral daily PRN Diarrhea  ondansetron    Tablet 4 milliGRAM(s) Oral every 6 hours PRN Nausea and/or Vomiting  oxyCODONE    5 mG/acetaminophen 325 mG 1 Tablet(s) Oral every 6 hours PRN Moderate Pain (4 - 6)      FAMILY HISTORY:  Family history of malignant neoplasm (Child)      SOCIAL HISTORY:  no recent smoking     REVIEW OF SYSTEMS:  CONSTITUTIONAL:    No fatigue, malaise, lethargy.  No fever or chills.  HEENT:  Eyes:  No visual changes.     ENT:  No epistaxis.  No sinus pain.    RESPIRATORY:  c/o cough.  No wheeze.  No hemoptysis.  c/o  shortness of breath.  CARDIOVASCULAR:  No chest pains.  No palpitations. c/o shortness of breath, No orthopnea or PND.  GASTROINTESTINAL:  No abdominal pain.  No nausea or vomiting.    GENITOURINARY:    No hematuria.    MUSCULOSKELETAL:  No musculoskeletal pain.  No joint swelling.  No arthritis.  NEUROLOGICAL:  No tingling or numbness or weakness.  PSYCHIATRIC:  No confusion  SKIN:  No rashes.    ENDOCRINE:  No unexplained weight loss.  No polydipsia.   HEMATOLOGIC:  No anemia.  No prolonged or excessive bleeding.   ALLERGIC AND IMMUNOLOGIC:  No pruritus.          Vital Signs Last 24 Hrs  T(C): 36.7 (10 Hemant 2019 15:17), Max: 39 (10 Hemant 2019 10:51)  T(F): 98.1 (10 Hemant 2019 15:17), Max: 102.2 (10 Hemant 2019 10:51)  HR: 86 (10 Hemant 2019 15:17) (74 - 90)  BP: 113/58 (10 Hemant 2019 15:17) (100/55 - 143/87)  BP(mean): --  RR: 18 (10 Hemant 2019 15:17) (18 - 19)  SpO2: 99% (10 Hemant 2019 15:17) (99% - 100%)    PHYSICAL EXAM-    Constitutional: ill looking, mild respiratory distress, elderly frail    Head: Head is normocephalic and atraumatic.      Neck: No jugular venous distention. No audible carotid bruits. There are strong carotid pulses bilaterally. No JVD.     Cardiovascular: Regular rate and rhythm without S3, S4. No murmurs or rubs are appreciated.      Respiratory: crackles b/l    Abdomen: Soft, nontender, nondistended with positive bowel sounds.      Extremity: No tenderness. trace  pitting edema b/l    Neurologic: The patient is alert and oriented.      Skin: No rash, no obvious lesions noted.      Psychiatric: The patient appears to be distress      INTERPRETATION OF TELEMETRY: not on    ECG: Sinus rythm. LVH.     I&O's Detail      LABS:                        11.4   8.22  )-----------( 195      ( 10 Hemant 2019 10:28 )             36.3     01-10    136  |  102  |  23  ----------------------------<  110<H>  4.0   |  25  |  1.29    Ca    8.7      10 Hemant 2019 10:28    TPro  7.8  /  Alb  3.8  /  TBili  0.6  /  DBili  x   /  AST  32  /  ALT  21  /  AlkPhos  61  0110    CARDIAC MARKERS ( 10 Hemant 2019 13:24 )  0.081 ng/mL / x     / x     / x     / x      CARDIAC MARKERS ( 10 Hemant 2019 10:28 )  0.043 ng/mL / x     / x     / x     / x          PT/INR - ( 10 Hemant 2019 10:28 )   PT: 12.8 sec;   INR: 1.15 ratio         PTT - ( 10 Hemant 2019 10:28 )  PTT:29.3 sec  Urinalysis Basic - ( 10 Hemant 2019 10:28 )    Color: Yellow / Appearance: Clear / S.020 / pH: x  Gluc: x / Ketone: Moderate  / Bili: Negative / Urobili: Negative mg/dL   Blood: x / Protein: 30 mg/dL / Nitrite: Negative   Leuk Esterase: Trace / RBC: 11-25 /HPF / WBC 3-5   Sq Epi: x / Non Sq Epi: x / Bacteria: Moderate      I&O's Summary    BNPSerum Pro-Brain Natriuretic Peptide: 75019 pg/mL (01-10 @ 10:28)    RADIOLOGY & ADDITIONAL STUDIES:  < from: CT Chest No Cont (01.10.19 @ 13:37) >  IMPRESSION:     Bilateral multifocal pneumonia.    Cardiomegaly and enlarged pulmonary artery suggestive of pulmonary   arterial hypertension. Left lower pulmonary artery cardioMEMS device in   place.                FRANCISCOSHANNAN OQUENDO   This document has been electronically signed. Hemant 10 2019  2:26PM        < end of copied text > Patient is a 87y old  Female who presents with a chief complaint of SOB, cough.   HPI:  87F w/ PMH of afib not on a/c for hx of hematuria & s/p ablation (?), CAD, HTN, HLD, sCHF (EF 20-25%) s/p cardiomems and AICD, GERD, OA, s/p L knee replacement, 4x c-sections comes in with worsening SOB/cough for past 3 days. She lives in assisted living and I have seen her in office yesterday.  She was told to increase diuretics in the past and she refused to take more than what she was already taking.   Family urged to change bumex to lasix secondary to cost.  With cough yesterday she denied any fever and I warned her about viral infections that are wide spread now.    On arrival to ED pt had fever 102.2 F and was in respiratory distress with increased work of breathing so was put on bipap.   Now on face mask.  No hypoxia.    No CP or pressure.     - pt seen and examined by me today. In respiratory distress to a mild degress, hypoxia overnight    - - pt seen and examined by me this am.         PAST MEDICAL & SURGICAL HISTORY:  Osteoarthritis of multiple joints, unspecified osteoarthritis type  Gastroesophageal reflux disease without esophagitis  Dyslipidemia  Essential hypertension  Paroxysmal atrial fibrillation  Coronary artery disease involving native coronary artery of native heart without angina pectoris  Chronic systolic congestive heart failure: EF 20-25%  History of : 4 c-sections  History of total left knee replacement (TKR)  S/P ablation of atrial fibrillation  AICD (automatic cardioverter/defibrillator) present      MEDICATIONS  (STANDING):  amiodarone    Tablet 200 milliGRAM(s) Oral daily  aspirin  chewable 81 milliGRAM(s) Oral daily  atorvastatin 10 milliGRAM(s) Oral at bedtime  cefepime  Injectable. 1000 milliGRAM(s) IV Push every 12 hours  clopidogrel Tablet 75 milliGRAM(s) Oral daily  enalapril 2.5 milliGRAM(s) Oral daily  heparin  Injectable 5000 Unit(s) SubCutaneous every 12 hours  magnesium oxide 200 milliGRAM(s) Oral daily  metoprolol succinate ER 50 milliGRAM(s) Oral daily  oxybutynin 5 milliGRAM(s) Oral two times a day  sodium chloride 0.9% Bolus 1000 milliLiter(s) IV Bolus once    MEDICATIONS  (PRN):  acetaminophen   Tablet .. 650 milliGRAM(s) Oral every 6 hours PRN Temp greater or equal to 38C (100.4F), Mild Pain (1 - 3)  dicyclomine 20 milliGRAM(s) Oral three times a day before meals PRN Indigestion  lactulose Syrup 20 Gram(s) Oral at bedtime PRN Constipation  loperamide 2 milliGRAM(s) Oral daily PRN Diarrhea  ondansetron    Tablet 4 milliGRAM(s) Oral every 6 hours PRN Nausea and/or Vomiting  oxyCODONE    5 mG/acetaminophen 325 mG 1 Tablet(s) Oral every 6 hours PRN Moderate Pain (4 - 6)      FAMILY HISTORY:  Family history of malignant neoplasm (Child)      SOCIAL HISTORY:  no recent smoking     REVIEW OF SYSTEMS:  CONSTITUTIONAL:    No fatigue, malaise, lethargy.  No fever or chills.  HEENT:  Eyes:  No visual changes.     ENT:  No epistaxis.  No sinus pain.    RESPIRATORY:  c/o cough.  No wheeze.  No hemoptysis.  c/o  shortness of breath.  CARDIOVASCULAR:  No chest pains.  No palpitations. c/o shortness of breath, No orthopnea or PND.  GASTROINTESTINAL:  No abdominal pain.  No nausea or vomiting.    GENITOURINARY:    No hematuria.    MUSCULOSKELETAL:  No musculoskeletal pain.  No joint swelling.  No arthritis.  NEUROLOGICAL:  No tingling or numbness or weakness.  PSYCHIATRIC:  No confusion  SKIN:  No rashes.    ENDOCRINE:  No unexplained weight loss.  No polydipsia.   HEMATOLOGIC:  No anemia.  No prolonged or excessive bleeding.   ALLERGIC AND IMMUNOLOGIC:  No pruritus.          Vital Signs Last 24 Hrs  T(C): 36.7 (10 Hemant 2019 15:17), Max: 39 (10 Hemant 2019 10:51)  T(F): 98.1 (10 Hemant 2019 15:17), Max: 102.2 (10 Hemant 2019 10:51)  HR: 86 (10 Hemant 2019 15:17) (74 - 90)  BP: 113/58 (10 Hemant 2019 15:17) (100/55 - 143/87)  BP(mean): --  RR: 18 (10 Hemant 2019 15:17) (18 - 19)  SpO2: 99% (10 Hemant 2019 15:17) (99% - 100%)    PHYSICAL EXAM-    Constitutional: ill looking, lethargic on bipap    Head: Head is normocephalic and atraumatic.      Neck: There are strong carotid pulses bilaterally. No JVD.     Cardiovascular: Regular rate and rhythm without S3, S4. No murmurs or rubs are appreciated.      Respiratory: crackles b/l    Abdomen: Soft, nontender, nondistended with positive bowel sounds.      Extremity: No tenderness. trace  pitting edema b/l    Neurologic: The patient is lethargic    Skin: No rash, no obvious lesions noted.      Psychiatric: The patient appears to be lethargic      INTERPRETATION OF TELEMETRY: not on    ECG: Sinus rythm. LVH.     I&O's Detail      LABS:                        11.4   8.22  )-----------( 195      ( 10 Hemant 2019 10:28 )             36.3     01-10    136  |  102  |  23  ----------------------------<  110<H>  4.0   |  25  |  1.29    Ca    8.7      10 Hemant 2019 10:28    TPro  7.8  /  Alb  3.8  /  TBili  0.6  /  DBili  x   /  AST  32  /  ALT  21  /  AlkPhos  61  0110    CARDIAC MARKERS ( 10 Hemant 2019 13:24 )  0.081 ng/mL / x     / x     / x     / x      CARDIAC MARKERS ( 10 Hemant 2019 10:28 )  0.043 ng/mL / x     / x     / x     / x          PT/INR - ( 10 Hemant 2019 10:28 )   PT: 12.8 sec;   INR: 1.15 ratio         PTT - ( 10 Hemant 2019 10:28 )  PTT:29.3 sec  Urinalysis Basic - ( 10 Hemant 2019 10:28 )    Color: Yellow / Appearance: Clear / S.020 / pH: x  Gluc: x / Ketone: Moderate  / Bili: Negative / Urobili: Negative mg/dL   Blood: x / Protein: 30 mg/dL / Nitrite: Negative   Leuk Esterase: Trace / RBC: 11-25 /HPF / WBC 3-5   Sq Epi: x / Non Sq Epi: x / Bacteria: Moderate      I&O's Summary    BNPSerum Pro-Brain Natriuretic Peptide: 68341 pg/mL (01-10 @ 10:28)    RADIOLOGY & ADDITIONAL STUDIES:  < from: CT Chest No Cont (01.10.19 @ 13:37) >  IMPRESSION:     Bilateral multifocal pneumonia.    Cardiomegaly and enlarged pulmonary artery suggestive of pulmonary   arterial hypertension. Left lower pulmonary artery cardioMEMS device in   place.                FRANCISCO Handmark   This document has been electronically signed. Hemant 10 2019  2:26PM        < end of copied text >

## 2019-01-12 NOTE — PROGRESS NOTE ADULT - SUBJECTIVE AND OBJECTIVE BOX
Subjective:    pat on bipap 10/5 100%, lathergic, 1:1.    Home Medications:  amiodarone 200 mg oral tablet: 1 tab(s) orally once a day (11 Jan 2019 14:24)  aspirin 81 mg oral tablet, chewable: 1 tab(s) orally once a day (11 Jan 2019 14:24)  Bentyl 20 mg oral tablet: 1 tab(s) orally 3 times a day, As Needed (11 Jan 2019 14:24)  bumetanide: 3 milligram(s) orally once a day (11 Jan 2019 14:24)  enalapril 2.5 mg oral tablet: 1 tab(s) orally once a day (11 Jan 2019 14:24)  Imodium A-D 2 mg oral tablet: 1 tab(s) orally once a day, As Needed (11 Jan 2019 14:24)  lactulose 10 g/15 mL oral syrup: 15 milliliter(s) orally once a day, As Needed (11 Jan 2019 14:24)  magnesium oxide 200 mg oral tablet: 1 tab(s) orally once a day (11 Jan 2019 14:24)  Metoprolol Succinate ER 50 mg oral tablet, extended release: 1 tab(s) orally once a day (11 Jan 2019 14:24)  oxyCODONE-acetaminophen 7.5 mg-325 mg oral tablet: 1 tab(s) orally every 6 hours, As Needed (11 Jan 2019 14:24)  potassium chloride 20 mEq oral tablet, extended release: 2 tab(s) orally 2 times a day (11 Jan 2019 14:24)    MEDICATIONS  (STANDING):  amiodarone    Tablet 200 milliGRAM(s) Oral daily  aspirin  chewable 81 milliGRAM(s) Oral daily  atorvastatin 10 milliGRAM(s) Oral at bedtime  cefepime  Injectable. 1000 milliGRAM(s) IV Push every 12 hours  clopidogrel Tablet 75 milliGRAM(s) Oral daily  doxycycline IVPB      doxycycline IVPB 100 milliGRAM(s) IV Intermittent every 12 hours  furosemide   Injectable 40 milliGRAM(s) IV Push every 8 hours  heparin  Injectable 5000 Unit(s) SubCutaneous every 12 hours  magnesium oxide 200 milliGRAM(s) Oral daily  oxybutynin 5 milliGRAM(s) Oral two times a day    MEDICATIONS  (PRN):  acetaminophen   Tablet .. 650 milliGRAM(s) Oral every 6 hours PRN Temp greater or equal to 38C (100.4F), Mild Pain (1 - 3)  aluminum hydroxide/magnesium hydroxide/simethicone Suspension 30 milliLiter(s) Oral every 6 hours PRN Dyspepsia  dicyclomine 20 milliGRAM(s) Oral three times a day before meals PRN Indigestion  lactulose Syrup 20 Gram(s) Oral at bedtime PRN Constipation  loperamide 2 milliGRAM(s) Oral daily PRN Diarrhea  morphine  - Injectable 2 milliGRAM(s) IV Push every 4 hours PRN Moderate Pain (4 - 6)  ondansetron   Disintegrating Tablet 4 milliGRAM(s) Oral every 6 hours PRN Nausea and/or Vomiting  oxyCODONE    5 mG/acetaminophen 325 mG 1 Tablet(s) Oral every 6 hours PRN Moderate Pain (4 - 6)      Allergies    No Known Allergies    Intolerances        Vital Signs Last 24 Hrs  T(C): 36.7 (12 Jan 2019 05:11), Max: 39.6 (11 Jan 2019 23:49)  T(F): 98.1 (12 Jan 2019 05:11), Max: 103.2 (11 Jan 2019 23:49)  HR: 68 (12 Jan 2019 06:35) (68 - 104)  BP: 119/58 (12 Jan 2019 06:35) (91/34 - 131/64)  BP(mean): --  RR: 20 (12 Jan 2019 05:11) (20 - 22)  SpO2: 94% (12 Jan 2019 05:11) (92% - 98%)      PHYSICAL EXAMINATION:    NECK:  Supple. No lymphadenopathy. Jugular venous pressure not elevated. Carotids equal.   HEART:   The cardiac impulse has a normal quality. Reg., Nl S1 and S2.  There are no murmurs, rubs or gallops noted  CHEST:  Chest crackles to auscultation. Normal respiratory effort.  ABDOMEN:  Soft and nontender.   EXTREMITIES:  There is no edema.       LABS:                        8.9    11.30 )-----------( 129      ( 12 Jan 2019 06:49 )             27.7     01-12    138  |  102  |  32<H>  ----------------------------<  109<H>  3.1<L>   |  30  |  1.51<H>    Ca    8.4<L>      12 Jan 2019 06:49  Phos  3.3     01-10  Mg     1.8     01-10    TPro  6.2  /  Alb  2.7<L>  /  TBili  0.9  /  DBili  x   /  AST  41<H>  /  ALT  25  /  AlkPhos  38<L>  01-12

## 2019-01-12 NOTE — PROGRESS NOTE ADULT - ASSESSMENT
SOB and cough- MPV infection.  Management per primary team.     Acute on Chronic HFrEF , s/p ICD- hypervolemic-   Avoid IVF.  Increase lasix to 3 times a day.  IV potassium given yesterday and check level this am. If K less than 4 please give iv potassium.  Please follow AM labs- pending now  Diuresis with close monitoring of the renal function and electrolytes.  Goal potassium of 4 and magnesium of 2.   Strict I/O and daily wt checks. Low sodium diet. Nutrition education.     Afib s/p ablation-  not on full dose anticoagulation as pt had major hematuria in past and she adamantly refused it from then on after knowing the risks of CVA off anticoagulation.   Now in sinus rythm.    HTN- hold all antihypertensives other than lasix for now.    MPV infection- Other medical issues- Management per primary team.     Other medical issues- Management per primary team.   Thank you for allowing me to participate in the care of this patient. Please feel free to contact me with any questions. Lehtargic- she will need close monitoring of respiratory status with diuresis and narcotics.   She had hypoxia to 70% yesterday.  Transfer to tele florr.     SOB and cough- MPV infection.  Management per primary team.     Acute on Chronic HFrEF , s/p ICD- hypervolemic-   Hold other antihypertensives and GDMT till iv diuresis is majorly done.   Iv lasix TID to continue.  She will need O2 montioring.  Please transfer the patient to tele floor.  Diuresis with close monitoring of the renal function and electrolytes.  Goal potassium of 4 and magnesium of 2.   Strict I/O and daily wt checks. Low sodium diet. Nutrition education.     Afib s/p ablation-  not on full dose anticoagulation as pt had major hematuria in past and she adamantly refused it from then on after knowing the risks of CVA off anticoagulation.   Now in sinus rythm.    HTN- hold all antihypertensives other than lasix for now.    MPV infection- Other medical issues- Management per primary team.     Other medical issues- Management per primary team.   Thank you for allowing me to participate in the care of this patient. Please feel free to contact me with any questions.

## 2019-01-12 NOTE — PROGRESS NOTE ADULT - ASSESSMENT
· Assessment		  87F w/ PMH as above comes in w/ fever and respiratory distress due to CAP.    A/P  #Acute respiratory failure /B/L pneumonia suspected GNR/MRSA-no significant improvement  #suspected sepsi from PNA  #hx systolic CHF EF 20% acutely decomepnsated   #Acute toxic metabolic encephalopathy    -continuetele  -continue cefpime and doxy , abx per ID   Blood cx, legionella urine  -BIPAP prn, no repeat ABG as patient and HCP daughter at bed side declining intubation or resuscitation if need arises  -no further IVf due to CHF hx  -continue IV lasix dosing per cardio  -hold other antihypertensive meds for now    # s/p abdominal pain resolved after bowel movement/doubt acute cholecystitis   Abd US shows GB sludge   CT abd/pelvis no acute pathology  Right renal pelvis fullness but no gabriella hydrnephrosis, patient voiding on IV lasix  continue to monitor    # initial drop h/H likely dilutional   monitor     #Paroxysmal afib not on AC due to patient's refusal in the past and now  continue amio    # CAD  continue statin, asa, plavix    #CKD stage 3  monitor BMP    #DNR/DNI  poor prognosis  palliative care consult    #DVT prophylaxis  heparin sc    # Advanced care planning(ACP)- spent 20mins for ACP conversation   I spent 20 minutes discussing with patient and daugter at bedside about current management plans and diagnosis and spoke about advanced care planning. daughter  patient's HCP and patient declined intubation or ventilator or pressors or resuscitation if the need arises.if her condition worsens despite conservative management then they would prefer comfort care as at baseline line she has very poor quality of life and alsways in chronic pain as per daughter

## 2019-01-12 NOTE — PROGRESS NOTE ADULT - ASSESSMENT
88 y/o Female with h/o A.fib not on a/c due to hematuria & s/p ablation (?), CAD, HTN, HLD, CHF (EF 20-25%) s/p cardiomems and AICD, GERD, OA,  left TKR, 4x C-sections was admitted on 1/10 for worsening SOB/cough for past 3 days PTA. Her daughter notes that she wasn't herself and then complaining of SOB/cough. She went to see Dr. Denny in the office yesterday, where she was supposedly ok and Dr. Denny did not make any changes to her medications at that time. Then her SOB worsened and came to the hospital. She denies fever, chills at home. She has dry cough. On arrival to ED pt had fever 102.2 F and was in respiratory distress with increased work of breathing so was put on BiPAP. SHe received zosyn.    1. Febrile syndrome. Possible sepsis. Acute respiratory failure. Multifocal pneumonia with hMPv. ?superimposed bacterial pneumonia. CHF exacerbation.  -still febrile  -mild leukocytosis  -respiratory frail  -f/u BC x 2  -on cefepime 1 gm IV q12h and doxycycline IV # 3  -tolerating abx well so far; no side effects noted  -respiratory care  -BiPAP as needed  -droplet isolation  -continue IV abx coverage for now  -prognosis is poor  -monitor temps  -f/u CBC  -supportive care  2. Other issues:   -care per medicine

## 2019-01-12 NOTE — PROGRESS NOTE ADULT - SUBJECTIVE AND OBJECTIVE BOX
Date of service: 19 @ 13:06    Lying in bed on BiPAP  Restless  Mild SOB  Dose not seem in pain  Febrile to 103F    ROS: unobtainable    MEDICATIONS  (STANDING):  amiodarone    Tablet 200 milliGRAM(s) Oral daily  aspirin  chewable 81 milliGRAM(s) Oral daily  atorvastatin 10 milliGRAM(s) Oral at bedtime  cefepime  Injectable. 1000 milliGRAM(s) IV Push every 12 hours  clopidogrel Tablet 75 milliGRAM(s) Oral daily  doxycycline IVPB      doxycycline IVPB 100 milliGRAM(s) IV Intermittent every 12 hours  furosemide   Injectable 40 milliGRAM(s) IV Push every 8 hours  heparin  Injectable 5000 Unit(s) SubCutaneous every 12 hours  magnesium oxide 200 milliGRAM(s) Oral daily  oxybutynin 5 milliGRAM(s) Oral two times a day      Vital Signs Last 24 Hrs  T(C): 37.6 (2019 12:03), Max: 39.6 (2019 23:49)  T(F): 99.6 (2019 12:03), Max: 103.2 (2019 23:49)  HR: 78 (2019 12:03) (68 - 104)  BP: 107/63 (2019 12:03) (91/34 - 131/64)  BP(mean): --  RR: 20 (2019 05:11) (20 - 22)  SpO2: 97% (2019 12:03) (92% - 98%)    Physical Exam:      Constitutional: frail looking  HEENT: NC/AT, EOMI, PERRLA, conjunctivae clear  Neck: supple; thyroid not palpable  Back: no tenderness  Respiratory: respiratory effort normal; crackles b/l; wheezing  Cardiovascular: S1S2 regular, no murmurs  Abdomen: soft, not tender, not distended, positive BS; no liver or spleen organomegaly  Genitourinary: no suprapubic tenderness  Lymphatic: no LN palpable  Musculoskeletal: no muscle tenderness, no joint swelling or tenderness  Extremities: no pedal edema  Neurological/ Psychiatric: AxOx3, judgement and insight normal; moving all extremities  Skin: no rashes; no palpable lesions    Labs: reviewed                        8.9    11.30 )-----------( 129      ( 2019 06:49 )             27.7     12    138  |  102  |  32<H>  ----------------------------<  109<H>  3.1<L>   |  30  |  1.51<H>    Ca    8.4<L>      2019 06:49  Phos  3.3     01-10  Mg     1.8     01-10    TPro  6.2  /  Alb  2.7<L>  /  TBili  0.9  /  DBili  x   /  AST  41<H>  /  ALT  25  /  AlkPhos  38<L>                                    11.4   8.22  )-----------( 195      ( 10 Hemant 2019 10:28 )             36.3     01-10    136  |  102  |  23  ----------------------------<  110<H>  4.0   |  25  |  1.29    Ca    8.7      10 Hemant 2019 10:28    TPro  7.8  /  Alb  3.8  /  TBili  0.6  /  DBili  x   /  AST  32  /  ALT  21  /  AlkPhos  61  0110     LIVER FUNCTIONS - ( 10 Hemant 2019 10:28 )  Alb: 3.8 g/dL / Pro: 7.8 gm/dL / ALK PHOS: 61 U/L / ALT: 21 U/L / AST: 32 U/L / GGT: x           Urinalysis Basic - ( 10 Hemant 2019 10:28 )    Color: Yellow / Appearance: Clear / S.020 / pH: x  Gluc: x / Ketone: Moderate  / Bili: Negative / Urobili: Negative mg/dL   Blood: x / Protein: 30 mg/dL / Nitrite: Negative   Leuk Esterase: Trace / RBC: 11-25 /HPF / WBC 3-5   Sq Epi: x / Non Sq Epi: x / Bacteria: Moderate    Rapid Respiratory Viral Panel (01.10.19 @ 10:28)    Rapid RVP Result: Detected:     hMPV (RapRVP): Detected      Culture - Urine (collected 10 Hemant 2019 10:28)  Source: .Urine None  Preliminary Report (2019 09:26):    10,000 - 49,000 CFU/mL Escherichia coli    Culture - Blood (01.10.19 @ 10:28)    Specimen Source: .Blood None    Culture Results:   No growth to date.    Radiology: all available radiological tests reviewed    < from: CT Chest No Cont (01.10.19 @ 13:37) >  Bilateral multifocal pneumonia.  Cardiomegaly and enlarged pulmonary artery suggestive of pulmonary   arterial hypertension. Left lower pulmonary artery cardioMEMS device in   place.    < end of copied text >      Advanced directives addressed: DNR

## 2019-01-12 NOTE — CONSULT NOTE ADULT - SUBJECTIVE AND OBJECTIVE BOX
HPI: Pt is a 87y old Female with hx of A.fib not on a/c due to hematuria (s/p ablation), CAD, HTN, HLD, CHF (EF 20-25%) s/p AICD, GERD, OA,  left TKR, 4x C-sections  - was admitted on 1/10 for worsening SOB/cough for past 3 days PTA.  She saw PMD 1 d PTA,  but came to ED as her SOB worsened.. She denies fever, chills at home. She has dry cough. On arrival to ED pt had fever 102.2 F and was in respiratory distress, was placed on BiPAP. Work-up= bilat infiltrates, RVP + for human metapneumovirus she is being empirically treated for bacterial process as well. She is awake and "very hungry" - has been NPO since admission due to BiPAP mask.         PAIN: ( )Yes   (x )No  Level:  Location:  Intensity:    10  Quality:  Aggravating Factors:  Alleviating Factors:  Radiation:  Duration/Timing:  Impact on ADLs:    DYSPNEA: (x ) Yes  ( ) No  Level:moderate-severe    PAST MEDICAL & SURGICAL HISTORY:  Osteoarthritis of multiple joints, unspecified osteoarthritis type- chronic pain   Gastroesophageal reflux disease without esophagitis  Dyslipidemia  Essential hypertension  Paroxysmal atrial fibrillation  Coronary artery disease involving native coronary artery of native heart without angina pectoris  Chronic systolic congestive heart failure: EF 20-25%  History of : 4 c-sections  History of total left knee replacement (TKR)  S/P ablation of atrial fibrillation  AICD (automatic cardioverter/defibrillator) present      SOCIAL HX: Reseident of [facility]    Hx opiate tolerance (x )YES  ( )NO    Baseline ADLs  (Prior to Admission)  (x ) Independent   ( )Dependent    FAMILY HISTORY:  Family history of malignant neoplasm (Child)      Review of Systems:    Anxiety-moderate  Depression-  Physical Discomfort-SOB and hunger  Dyspnea-moderate to severe  Constipation-no  Diarrhea-no  Nausea-no  Vomiting-no  Anorexia-no -is HUNGRY!!  Weight Loss- no  Cough-yes  Secretions-no  Fatigue-moderate  Weakness-moderate  Delirium-no      Limited due to: SOB      PHYSICAL EXAM:    Vital Signs Last 24 Hrs  T(C): 37.6 (2019 12:03), Max: 39.6 (2019 23:49)  T(F): 99.6 (2019 12:03), Max: 103.2 (2019 23:49)  HR: 78 (2019 12:03) (68 - 104)  BP: 107/63 (2019 12:03) (91/34 - 131/64)  BP(mean): --  RR: 20 (2019 05:11) (20 - 22)  SpO2: 97% (2019 12:03) (92% - 98%)  Daily     Daily     PPSV2:   30%  FAST:    General:  Dyspneic off BiPAP  Mental Status: Awake, alert  HEENT: mucosa moist  Lungs: Decreased BS throughout with some rhonchi  Cardiac: Irreg  GI: +BS Soft, no masses or tenderness  : Diaper  Ext: No edema  Neuro: No gross focal deficit      LABS:                        8.9    11.30 )-----------( 129      ( 2019 06:49 )             27.7     01-12    138  |  102  |  32<H>  ----------------------------<  109<H>  3.1<L>   |  30  |  1.51<H>    Ca    8.4<L>      2019 06:49  Phos  3.3     01-10  Mg     1.8     -10    TPro  6.2  /  Alb  2.7<L>  /  TBili  0.9  /  DBili  x   /  AST  41<H>  /  ALT  25  /  AlkPhos  38<L>  12      Albumin: Albumin, Serum: 2.7 g/dL ( @ 06:49)      Allergies    No Known Allergies    Intolerances      MEDICATIONS  (STANDING):  amiodarone    Tablet 200 milliGRAM(s) Oral daily  aspirin  chewable 81 milliGRAM(s) Oral daily  atorvastatin 10 milliGRAM(s) Oral at bedtime  cefepime  Injectable. 1000 milliGRAM(s) IV Push every 12 hours  clopidogrel Tablet 75 milliGRAM(s) Oral daily  doxycycline IVPB      doxycycline IVPB 100 milliGRAM(s) IV Intermittent every 12 hours  furosemide   Injectable 40 milliGRAM(s) IV Push every 8 hours  heparin  Injectable 5000 Unit(s) SubCutaneous every 12 hours  magnesium oxide 200 milliGRAM(s) Oral daily  oxybutynin 5 milliGRAM(s) Oral two times a day  potassium chloride    Tablet ER 40 milliEquivalent(s) Oral once    MEDICATIONS  (PRN):  acetaminophen   Tablet .. 650 milliGRAM(s) Oral every 6 hours PRN Temp greater or equal to 38C (100.4F), Mild Pain (1 - 3)  aluminum hydroxide/magnesium hydroxide/simethicone Suspension 30 milliLiter(s) Oral every 6 hours PRN Dyspepsia  dicyclomine 20 milliGRAM(s) Oral three times a day before meals PRN Indigestion  lactulose Syrup 20 Gram(s) Oral at bedtime PRN Constipation  loperamide 2 milliGRAM(s) Oral daily PRN Diarrhea  morphine  - Injectable 2 milliGRAM(s) IV Push every 4 hours PRN Moderate Pain (4 - 6)  morphine Concentrate 2.5 milliGRAM(s) SubLingual every 2 hours PRN dyspnea or pain  ondansetron   Disintegrating Tablet 4 milliGRAM(s) Oral every 6 hours PRN Nausea and/or Vomiting  oxyCODONE    5 mG/acetaminophen 325 mG 1 Tablet(s) Oral every 6 hours PRN Moderate Pain (4 - 6)      RADIOLOGY/ADDITIONAL STUDIES: < from: CT Chest No Cont (01.10.19 @ 13:37) >  MPRESSION:   Bilateral multifocal pneumonia.  Cardiomegaly and enlarged pulmonary artery suggestive of pulmonary   arterial hypertension. Left lower pulmonary artery cardioMEMS device in   place.  < end of copied text >

## 2019-01-12 NOTE — PROGRESS NOTE ADULT - SUBJECTIVE AND OBJECTIVE BOX
· Subjective and Objective: 	  87F w/ PMH of afib not on a/c for hx of hematuria & s/p ablation (?), CAD, HTN, HLD, sCHF (EF 20-25%) s/p cardiomems and AICD, GERD, OA, s/p L knee replacement, 4x c-sections comes in with worsening SOB/cough for past 3 days. Her daughter notes that she wasn't herself since Monday when she left company early (unlike her), and then complaining of SOB/cough. She went to see Dr. Denny in the office yesterday, where she was supposedly ok and Dr. Denny did not make any changes to her medications at that time. Then today her SOB was so bad that she wanted to come to the hospital. She denies fever, chills, productive cough, N/V, abd pain, chest pain/pressure. She can lay flat when she sleeps and uses 2 pillows. Has not noted any increase in leg swelling recently. Denies eating very salty food recently or drinking excessive water. Pt comes from [facility]. According to her daughter her baseline is AOx3, walking w/ a walker, and needs help with some ADLs.     On arrival to ED pt had fever 102.2 F and was in respiratory distress with increased work of breathing so was put on bipap. Has since been taken off bipap and is currently on non-rebreather. Did not have episodes of desaturation, but was tachypneic. Patient has MOLST form which shows she is DNR/DNI.    1/11-c/o chest pain for 1 week  and chronic generalised bodyaches, rapid response on 1/10 for desaturation and abdominal pain, which resolved after patient had bowel movement, Abd US GB sludge  1/12-on BIPAP,awake , says she is hungry     Appears very ill on BIPAP, but no respiratory distress on BIPAP now  Respiratory: diffused rhonchi and ralesB/l  Cardiovascular: S1S2;  Gastrointestinal: Abdomen soft, non tender, Bowel Ssounds present  Extremities: 1_ edema  Neurological: awake on BIPAP, follows commands  Breasts: Deferred  Genitourinary: deferred  Rectal: Deferred      PHYSICAL EXAM:    Daily     Daily     ICU Vital Signs Last 24 Hrs  T(C): 37.6 (12 Jan 2019 12:03), Max: 39.6 (11 Jan 2019 23:49)  T(F): 99.6 (12 Jan 2019 12:03), Max: 103.2 (11 Jan 2019 23:49)  HR: 78 (12 Jan 2019 12:03) (68 - 104)  BP: 107/63 (12 Jan 2019 12:03) (91/34 - 131/64)  BP(mean): --  ABP: --  ABP(mean): --  RR: 20 (12 Jan 2019 05:11) (20 - 22)  SpO2: 97% (12 Jan 2019 12:03) (92% - 98%)                                8.9    11.30 )-----------( 129      ( 12 Jan 2019 06:49 )             27.7       CBC Full  -  ( 12 Jan 2019 06:49 )  WBC Count : 11.30 K/uL  Hemoglobin : 8.9 g/dL  Hematocrit : 27.7 %  Platelet Count - Automated : 129 K/uL  Mean Cell Volume : 82.4 fl  Mean Cell Hemoglobin : 26.5 pg  Mean Cell Hemoglobin Concentration : 32.1 gm/dL  Auto Neutrophil # : 10.85 K/uL  Auto Lymphocyte # : 0.34 K/uL  Auto Monocyte # : 0.11 K/uL  Auto Eosinophil # : 0.00 K/uL  Auto Basophil # : 0.00 K/uL  Auto Neutrophil % : 94.0 %  Auto Lymphocyte % : 3.0 %  Auto Monocyte % : 1.0 %  Auto Eosinophil % : 0.0 %  Auto Basophil % : 0.0 %      01-12    138  |  102  |  32<H>  ----------------------------<  109<H>  3.1<L>   |  30  |  1.51<H>    Ca    8.4<L>      12 Jan 2019 06:49  Phos  3.3     01-10  Mg     1.8     01-10    TPro  6.2  /  Alb  2.7<L>  /  TBili  0.9  /  DBili  x   /  AST  41<H>  /  ALT  25  /  AlkPhos  38<L>  01-12      LIVER FUNCTIONS - ( 12 Jan 2019 06:49 )  Alb: 2.7 g/dL / Pro: 6.2 gm/dL / ALK PHOS: 38 U/L / ALT: 25 U/L / AST: 41 U/L / GGT: x                 CARDIAC MARKERS ( 10 Hemant 2019 19:17 )  0.087 ng/mL / x     / 68 U/L / x     / x      CARDIAC MARKERS ( 10 Hemant 2019 16:50 )  0.074 ng/mL / x     / 65 U/L / x     / x                ABG - ( 10 Hemant 2019 21:00 )  pH, Arterial: 7.49  pH, Blood: x     /  pCO2: 34    /  pO2: 108   / HCO3: 25    / Base Excess: 2.5   /  SaO2: 98                  MEDICATIONS  (STANDING):  amiodarone    Tablet 200 milliGRAM(s) Oral daily  aspirin  chewable 81 milliGRAM(s) Oral daily  atorvastatin 10 milliGRAM(s) Oral at bedtime  cefepime  Injectable. 1000 milliGRAM(s) IV Push every 12 hours  clopidogrel Tablet 75 milliGRAM(s) Oral daily  doxycycline IVPB      doxycycline IVPB 100 milliGRAM(s) IV Intermittent every 12 hours  furosemide   Injectable 40 milliGRAM(s) IV Push every 8 hours  heparin  Injectable 5000 Unit(s) SubCutaneous every 12 hours  magnesium oxide 200 milliGRAM(s) Oral daily  oxybutynin 5 milliGRAM(s) Oral two times a day

## 2019-01-13 LAB
ANION GAP SERPL CALC-SCNC: 11 MMOL/L — SIGNIFICANT CHANGE UP (ref 5–17)
ANISOCYTOSIS BLD QL: SLIGHT — SIGNIFICANT CHANGE UP
BASOPHILS # BLD AUTO: 0 K/UL — SIGNIFICANT CHANGE UP (ref 0–0.2)
BASOPHILS NFR BLD AUTO: 0 % — SIGNIFICANT CHANGE UP (ref 0–2)
BUN SERPL-MCNC: 32 MG/DL — HIGH (ref 7–23)
CALCIUM SERPL-MCNC: 9 MG/DL — SIGNIFICANT CHANGE UP (ref 8.5–10.1)
CHLORIDE SERPL-SCNC: 99 MMOL/L — SIGNIFICANT CHANGE UP (ref 96–108)
CO2 SERPL-SCNC: 32 MMOL/L — HIGH (ref 22–31)
CREAT SERPL-MCNC: 1.27 MG/DL — SIGNIFICANT CHANGE UP (ref 0.5–1.3)
ELLIPTOCYTES BLD QL SMEAR: SLIGHT — SIGNIFICANT CHANGE UP
EOSINOPHIL # BLD AUTO: 0 K/UL — SIGNIFICANT CHANGE UP (ref 0–0.5)
EOSINOPHIL NFR BLD AUTO: 0 % — SIGNIFICANT CHANGE UP (ref 0–6)
GLUCOSE SERPL-MCNC: 172 MG/DL — HIGH (ref 70–99)
HCT VFR BLD CALC: 30.8 % — LOW (ref 34.5–45)
HGB BLD-MCNC: 9.8 G/DL — LOW (ref 11.5–15.5)
HYPOCHROMIA BLD QL: SLIGHT — SIGNIFICANT CHANGE UP
LYMPHOCYTES # BLD AUTO: 0.1 K/UL — LOW (ref 1–3.3)
LYMPHOCYTES # BLD AUTO: 1 % — LOW (ref 13–44)
MACROCYTES BLD QL: SLIGHT — SIGNIFICANT CHANGE UP
MAGNESIUM SERPL-MCNC: 2.2 MG/DL — SIGNIFICANT CHANGE UP (ref 1.6–2.6)
MANUAL SMEAR VERIFICATION: SIGNIFICANT CHANGE UP
MCHC RBC-ENTMCNC: 26.4 PG — LOW (ref 27–34)
MCHC RBC-ENTMCNC: 31.8 GM/DL — LOW (ref 32–36)
MCV RBC AUTO: 83 FL — SIGNIFICANT CHANGE UP (ref 80–100)
MONOCYTES # BLD AUTO: 0 K/UL — SIGNIFICANT CHANGE UP (ref 0–0.9)
MONOCYTES NFR BLD AUTO: 0 % — LOW (ref 2–14)
NEUTROPHILS # BLD AUTO: 10.32 K/UL — HIGH (ref 1.8–7.4)
NEUTROPHILS NFR BLD AUTO: 94 % — HIGH (ref 43–77)
NEUTS BAND # BLD: 5 % — SIGNIFICANT CHANGE UP (ref 0–8)
NRBC # BLD: 0 /100 — SIGNIFICANT CHANGE UP (ref 0–0)
NRBC # BLD: SIGNIFICANT CHANGE UP /100 WBCS (ref 0–0)
PLAT MORPH BLD: NORMAL — SIGNIFICANT CHANGE UP
PLATELET # BLD AUTO: 156 K/UL — SIGNIFICANT CHANGE UP (ref 150–400)
PLATELET COUNT - ESTIMATE: NORMAL — SIGNIFICANT CHANGE UP
POIKILOCYTOSIS BLD QL AUTO: SLIGHT — SIGNIFICANT CHANGE UP
POTASSIUM SERPL-MCNC: 2.5 MMOL/L — CRITICAL LOW (ref 3.5–5.3)
POTASSIUM SERPL-MCNC: 4.1 MMOL/L — SIGNIFICANT CHANGE UP (ref 3.5–5.3)
POTASSIUM SERPL-SCNC: 2.5 MMOL/L — CRITICAL LOW (ref 3.5–5.3)
POTASSIUM SERPL-SCNC: 4.1 MMOL/L — SIGNIFICANT CHANGE UP (ref 3.5–5.3)
RBC # BLD: 3.71 M/UL — LOW (ref 3.8–5.2)
RBC # FLD: 16.3 % — HIGH (ref 10.3–14.5)
RBC BLD AUTO: ABNORMAL
SCHISTOCYTES BLD QL AUTO: SLIGHT — SIGNIFICANT CHANGE UP
SODIUM SERPL-SCNC: 142 MMOL/L — SIGNIFICANT CHANGE UP (ref 135–145)
WBC # BLD: 10.42 K/UL — SIGNIFICANT CHANGE UP (ref 3.8–10.5)
WBC # FLD AUTO: 10.42 K/UL — SIGNIFICANT CHANGE UP (ref 3.8–10.5)

## 2019-01-13 RX ORDER — POTASSIUM CHLORIDE 20 MEQ
40 PACKET (EA) ORAL ONCE
Qty: 0 | Refills: 0 | Status: COMPLETED | OUTPATIENT
Start: 2019-01-13 | End: 2019-01-13

## 2019-01-13 RX ORDER — ACETAMINOPHEN 500 MG
650 TABLET ORAL EVERY 6 HOURS
Qty: 0 | Refills: 0 | Status: DISCONTINUED | OUTPATIENT
Start: 2019-01-13 | End: 2019-01-17

## 2019-01-13 RX ORDER — POTASSIUM CHLORIDE 20 MEQ
10 PACKET (EA) ORAL
Qty: 0 | Refills: 0 | Status: COMPLETED | OUTPATIENT
Start: 2019-01-13 | End: 2019-01-13

## 2019-01-13 RX ADMIN — MORPHINE SULFATE 2 MILLIGRAM(S): 50 CAPSULE, EXTENDED RELEASE ORAL at 00:02

## 2019-01-13 RX ADMIN — Medication 5 MILLIGRAM(S): at 18:39

## 2019-01-13 RX ADMIN — MORPHINE SULFATE 2 MILLIGRAM(S): 50 CAPSULE, EXTENDED RELEASE ORAL at 11:18

## 2019-01-13 RX ADMIN — Medication 110 MILLIGRAM(S): at 06:05

## 2019-01-13 RX ADMIN — AMIODARONE HYDROCHLORIDE 200 MILLIGRAM(S): 400 TABLET ORAL at 06:06

## 2019-01-13 RX ADMIN — MAGNESIUM OXIDE 400 MG ORAL TABLET 200 MILLIGRAM(S): 241.3 TABLET ORAL at 09:25

## 2019-01-13 RX ADMIN — MORPHINE SULFATE 2.5 MILLIGRAM(S): 50 CAPSULE, EXTENDED RELEASE ORAL at 06:07

## 2019-01-13 RX ADMIN — HEPARIN SODIUM 5000 UNIT(S): 5000 INJECTION INTRAVENOUS; SUBCUTANEOUS at 06:06

## 2019-01-13 RX ADMIN — Medication 100 MILLIEQUIVALENT(S): at 16:46

## 2019-01-13 RX ADMIN — MORPHINE SULFATE 2.5 MILLIGRAM(S): 50 CAPSULE, EXTENDED RELEASE ORAL at 09:18

## 2019-01-13 RX ADMIN — Medication 100 MILLIEQUIVALENT(S): at 10:08

## 2019-01-13 RX ADMIN — Medication 100 MILLIEQUIVALENT(S): at 12:17

## 2019-01-13 RX ADMIN — MORPHINE SULFATE 2 MILLIGRAM(S): 50 CAPSULE, EXTENDED RELEASE ORAL at 08:15

## 2019-01-13 RX ADMIN — ATORVASTATIN CALCIUM 10 MILLIGRAM(S): 80 TABLET, FILM COATED ORAL at 20:17

## 2019-01-13 RX ADMIN — Medication 100 MILLIEQUIVALENT(S): at 00:18

## 2019-01-13 RX ADMIN — Medication 650 MILLIGRAM(S): at 15:48

## 2019-01-13 RX ADMIN — HEPARIN SODIUM 5000 UNIT(S): 5000 INJECTION INTRAVENOUS; SUBCUTANEOUS at 17:45

## 2019-01-13 RX ADMIN — MORPHINE SULFATE 2.5 MILLIGRAM(S): 50 CAPSULE, EXTENDED RELEASE ORAL at 12:53

## 2019-01-13 RX ADMIN — Medication 110 MILLIGRAM(S): at 17:06

## 2019-01-13 RX ADMIN — CEFEPIME 1000 MILLIGRAM(S): 1 INJECTION, POWDER, FOR SOLUTION INTRAMUSCULAR; INTRAVENOUS at 06:05

## 2019-01-13 RX ADMIN — Medication 81 MILLIGRAM(S): at 09:24

## 2019-01-13 RX ADMIN — MORPHINE SULFATE 2.5 MILLIGRAM(S): 50 CAPSULE, EXTENDED RELEASE ORAL at 11:18

## 2019-01-13 RX ADMIN — Medication 40 MILLIEQUIVALENT(S): at 18:39

## 2019-01-13 RX ADMIN — MORPHINE SULFATE 2 MILLIGRAM(S): 50 CAPSULE, EXTENDED RELEASE ORAL at 15:24

## 2019-01-13 RX ADMIN — Medication 40 MILLIGRAM(S): at 06:07

## 2019-01-13 RX ADMIN — Medication 5 MILLIGRAM(S): at 06:06

## 2019-01-13 RX ADMIN — CLOPIDOGREL BISULFATE 75 MILLIGRAM(S): 75 TABLET, FILM COATED ORAL at 09:25

## 2019-01-13 RX ADMIN — MORPHINE SULFATE 2.5 MILLIGRAM(S): 50 CAPSULE, EXTENDED RELEASE ORAL at 15:24

## 2019-01-13 RX ADMIN — MORPHINE SULFATE 2 MILLIGRAM(S): 50 CAPSULE, EXTENDED RELEASE ORAL at 11:59

## 2019-01-13 RX ADMIN — CEFEPIME 1000 MILLIGRAM(S): 1 INJECTION, POWDER, FOR SOLUTION INTRAMUSCULAR; INTRAVENOUS at 17:06

## 2019-01-13 NOTE — PROGRESS NOTE ADULT - ASSESSMENT
86 y/o Female with h/o A.fib not on a/c due to hematuria & s/p ablation (?), CAD, HTN, HLD, CHF (EF 20-25%) s/p cardiomems and AICD, GERD, OA,  left TKR, 4x C-sections was admitted on 1/10 for worsening SOB/cough for past 3 days PTA. Her daughter notes that she wasn't herself and then complaining of SOB/cough. She went to see Dr. Denny in the office yesterday, where she was supposedly ok and Dr. Denny did not make any changes to her medications at that time. Then her SOB worsened and came to the hospital. She denies fever, chills at home. She has dry cough. On arrival to ED pt had fever 102.2 F and was in respiratory distress with increased work of breathing so was put on BiPAP. SHe received zosyn.    1. Febrile syndrome. Acute respiratory failure. Multifocal pneumonia with hMPv. ?superimposed bacterial pneumonia. CHF exacerbation.  -febrile  -mild leukocytosis  -respiratory function remains frail  -f/u BC x 2  -on cefepime 1 gm IV q12h and doxycycline IV # 4  -tolerating abx well so far; no side effects noted  -respiratory care  -BiPAP as needed  -droplet isolation  -continue IV abx coverage   -prognosis is poor  -monitor temps  -f/u CBC  -supportive care  2. Other issues:   -care per medicine

## 2019-01-13 NOTE — PROGRESS NOTE ADULT - SUBJECTIVE AND OBJECTIVE BOX
HPI: Pt is a 87y old Female with hx of A.fib not on a/c due to hematuria (s/p ablation), CAD, HTN, HLD, CHF (EF 20-25%) s/p AICD, GERD, OA,  left TKR, 4x C-sections  - was admitted on 1/10 for worsening SOB/cough for past 3 days PTA.  She saw PMD 1 d PTA,  but came to ED as her SOB worsened.. She denies fever, chills at home. She has dry cough. On arrival to ED pt had fever 102.2 F and was in respiratory distress, was placed on BiPAP. Work-up= bilat infiltrates, RVP + for human metapneumovirus she is being empirically treated for bacterial process as well.   When I met her yesterday, she had been NPO since admission due to BiPAP mask and was very hungry. After having a meeting with her 2 HCP's , it was decided that she should be able to eat with N/C and then go back on NRBM x 2 hours and then go back on BiPAP mask. Today, I found her 2 hours after her meal, wearing nasal cannula, with a RR of ~40. Respiratory therapy came, put her back on BiPAP and I clarified my orders with nursing and respiratory and her hospitalist.  Was medicated with IVP Morphine 2 mg x3 /past 24 hrs for SOB with good effect as per RN as well as Roxanol 2.5 mg SL x 3 .          PAIN: ( )Yes   ( )No  Level:  Location:  Intensity:    /10  Quality:  Aggravating Factors:  Alleviating Factors:  Radiation:  Duration/Timing:  Impact on ADLs:    DYSPNEA: (x ) Yes  ( ) No  Level: moderate-severe        Review of Systems:    Anxiety-yes, due to SOB  Depression-  Physical Discomfort-SOB  Dyspnea-moderate-severe  Constipation-  Diarrhea-  Nausea-  Vomiting-no  Anorexia-no  Weight Loss-   Cough-  Secretions-  Fatigue-  Weakness-  Delirium-no      Limited due to: SOB        PHYSICAL EXAM:    Vital Signs Last 24 Hrs  T(C): 37 (13 Jan 2019 12:06), Max: 39.2 (12 Jan 2019 18:30)  T(F): 98.6 (13 Jan 2019 12:06), Max: 102.5 (12 Jan 2019 18:30)  HR: 89 (13 Jan 2019 12:06) (83 - 105)  BP: 129/55 (13 Jan 2019 12:06) (119/87 - 129/73)  BP(mean): --  RR: 32 (13 Jan 2019 09:28) (18 - 32)  SpO2: 95% (13 Jan 2019 11:08) (95% - 99%)  Daily     Daily     PPSV2:  20-30 %  FAST:    General: Awake, SOB, no cyanosis. RR ~40 BPM  HEENT: Oral mucosa moist  Lungs: Crackling rales throughout  Cardiac: RRR  GI: +BS , soft, no masses or tenderness  :  Ext:No edema  Neuro: No gross focal deficit      LABS:                        9.8    10.42 )-----------( 156      ( 13 Jan 2019 07:27 )             30.8     01-13    142  |  99  |  32<H>  ----------------------------<  172<H>  2.5<LL>   |  32<H>  |  1.27    Ca    9.0      13 Jan 2019 07:27    TPro  6.2  /  Alb  2.7<L>  /  TBili  0.9  /  DBili  x   /  AST  41<H>  /  ALT  25  /  AlkPhos  38<L>  01-12      Albumin: Albumin, Serum: 2.7 g/dL (01-12 @ 06:49)      Allergies    No Known Allergies    Intolerances      MEDICATIONS  (STANDING):  amiodarone    Tablet 200 milliGRAM(s) Oral daily  aspirin  chewable 81 milliGRAM(s) Oral daily  atorvastatin 10 milliGRAM(s) Oral at bedtime  cefepime  Injectable. 1000 milliGRAM(s) IV Push every 12 hours  clopidogrel Tablet 75 milliGRAM(s) Oral daily  doxycycline IVPB      doxycycline IVPB 100 milliGRAM(s) IV Intermittent every 12 hours  heparin  Injectable 5000 Unit(s) SubCutaneous every 12 hours  magnesium oxide 200 milliGRAM(s) Oral daily  oxybutynin 5 milliGRAM(s) Oral two times a day  potassium chloride   Powder 40 milliEquivalent(s) Oral once  potassium chloride  10 mEq/100 mL IVPB 10 milliEquivalent(s) IV Intermittent every 1 hour    MEDICATIONS  (PRN):  acetaminophen   Tablet .. 650 milliGRAM(s) Oral every 6 hours PRN Temp greater or equal to 38C (100.4F), Mild Pain (1 - 3)  aluminum hydroxide/magnesium hydroxide/simethicone Suspension 30 milliLiter(s) Oral every 6 hours PRN Dyspepsia  dicyclomine 20 milliGRAM(s) Oral three times a day before meals PRN Indigestion  lactulose Syrup 20 Gram(s) Oral at bedtime PRN Constipation  loperamide 2 milliGRAM(s) Oral daily PRN Diarrhea  morphine  - Injectable 2 milliGRAM(s) IV Push every 4 hours PRN Moderate Pain (4 - 6)  morphine Concentrate 2.5 milliGRAM(s) SubLingual every 2 hours PRN dyspnea  ondansetron   Disintegrating Tablet 4 milliGRAM(s) Oral every 6 hours PRN Nausea and/or Vomiting  oxyCODONE    5 mG/acetaminophen 325 mG 1 Tablet(s) Oral every 6 hours PRN Moderate Pain (4 - 6)      RADIOLOGY:< from: CT Chest No Cont (01.10.19 @ 13:37) >  IMPRESSION:   Bilateral multifocal pneumonia.  Cardiomegaly and enlarged pulmonary artery suggestive of pulmonary   arterial hypertension. Left lower pulmonary artery cardioMEMS device in   place.  < end of copied text >

## 2019-01-13 NOTE — PROGRESS NOTE ADULT - SUBJECTIVE AND OBJECTIVE BOX
Patient is a 87y old  Female who presents with a chief complaint of SOB, cough.   HPI:  87F w/ PMH of afib not on a/c for hx of hematuria & s/p ablation (?), CAD, HTN, HLD, sCHF (EF 20-25%) s/p cardiomems and AICD, GERD, OA, s/p L knee replacement, 4x c-sections comes in with worsening SOB/cough for past 3 days. She lives in assisted living and I have seen her in office yesterday.  She was told to increase diuretics in the past and she refused to take more than what she was already taking.   Family urged to change bumex to lasix secondary to cost.  With cough yesterday she denied any fever and I warned her about viral infections that are wide spread now.    On arrival to ED pt had fever 102.2 F and was in respiratory distress with increased work of breathing so was put on bipap.   Now on face mask.  No hypoxia.    No CP or pressure.     - pt seen and examined by me today. In respiratory distress to a mild degrees, hypoxia overnight     - pt seen and examined by me this am.     - Pt seen and examined by me today. Pt states that her SOB is improving.         PAST MEDICAL & SURGICAL HISTORY:  Osteoarthritis of multiple joints, unspecified osteoarthritis type  Gastroesophageal reflux disease without esophagitis  Dyslipidemia  Essential hypertension  Paroxysmal atrial fibrillation  Coronary artery disease involving native coronary artery of native heart without angina pectoris  Chronic systolic congestive heart failure: EF 20-25%  History of : 4 c-sections  History of total left knee replacement (TKR)  S/P ablation of atrial fibrillation  AICD (automatic cardioverter/defibrillator) present      MEDICATIONS  (STANDING):  amiodarone    Tablet 200 milliGRAM(s) Oral daily  aspirin  chewable 81 milliGRAM(s) Oral daily  atorvastatin 10 milliGRAM(s) Oral at bedtime  cefepime  Injectable. 1000 milliGRAM(s) IV Push every 12 hours  clopidogrel Tablet 75 milliGRAM(s) Oral daily  enalapril 2.5 milliGRAM(s) Oral daily  heparin  Injectable 5000 Unit(s) SubCutaneous every 12 hours  magnesium oxide 200 milliGRAM(s) Oral daily  metoprolol succinate ER 50 milliGRAM(s) Oral daily  oxybutynin 5 milliGRAM(s) Oral two times a day  sodium chloride 0.9% Bolus 1000 milliLiter(s) IV Bolus once    MEDICATIONS  (PRN):  acetaminophen   Tablet .. 650 milliGRAM(s) Oral every 6 hours PRN Temp greater or equal to 38C (100.4F), Mild Pain (1 - 3)  dicyclomine 20 milliGRAM(s) Oral three times a day before meals PRN Indigestion  lactulose Syrup 20 Gram(s) Oral at bedtime PRN Constipation  loperamide 2 milliGRAM(s) Oral daily PRN Diarrhea  ondansetron    Tablet 4 milliGRAM(s) Oral every 6 hours PRN Nausea and/or Vomiting  oxyCODONE    5 mG/acetaminophen 325 mG 1 Tablet(s) Oral every 6 hours PRN Moderate Pain (4 - 6)      FAMILY HISTORY:  Family history of malignant neoplasm (Child)      SOCIAL HISTORY:  no recent smoking     REVIEW OF SYSTEMS:  CONSTITUTIONAL:    No fatigue, malaise, lethargy.  No fever or chills.  HEENT:  Eyes:  No visual changes.     ENT:  No epistaxis.  No sinus pain.    RESPIRATORY:  c/o cough.  No wheeze.  No hemoptysis.  c/o  shortness of breath.  CARDIOVASCULAR:  No chest pains.  No palpitations. c/o shortness of breath, No orthopnea or PND.  GASTROINTESTINAL:  No abdominal pain.  No nausea or vomiting.    GENITOURINARY:    No hematuria.    MUSCULOSKELETAL:  No musculoskeletal pain.  No joint swelling.  No arthritis.  NEUROLOGICAL:  No tingling or numbness or weakness.  PSYCHIATRIC:  No confusion  SKIN:  No rashes.    ENDOCRINE:  No unexplained weight loss.  No polydipsia.   HEMATOLOGIC:  No anemia.  No prolonged or excessive bleeding.   ALLERGIC AND IMMUNOLOGIC:  No pruritus.          Vital Signs Last 24 Hrs  T(C): 36.7 (10 Hemant 2019 15:17), Max: 39 (10 Hemant 2019 10:51)  T(F): 98.1 (10 Hemant 2019 15:17), Max: 102.2 (10 Hemant 2019 10:51)  HR: 86 (10 Hemant 2019 15:17) (74 - 90)  BP: 113/58 (10 Hemant 2019 15:17) (100/55 - 143/87)  BP(mean): --  RR: 18 (10 Hemant 2019 15:17) (18 - 19)  SpO2: 99% (10 Hemant 2019 15:17) (99% - 100%)    PHYSICAL EXAM-    Constitutional: ill looking, alert on bipap    Head: Head is normocephalic and atraumatic.      Neck: There are strong carotid pulses bilaterally. No JVD.     Cardiovascular: Regular rate and rhythm without S3, S4. No murmurs or rubs are appreciated.      Respiratory: crackles b/l    Abdomen: Soft, nontender, nondistended with positive bowel sounds.      Extremity: No tenderness. trace  pitting edema b/l    Neurologic: The patient is alert   Skin: No rash, no obvious lesions noted.      Psychiatric: The patient appears alert      INTERPRETATION OF TELEMETRY: not on    ECG: Sinus rythm. LVH.     I&O's Detail      LABS:                        11.4   8.22  )-----------( 195      ( 10 Hemant 2019 10:28 )             36.3     01-10    136  |  102  |  23  ----------------------------<  110<H>  4.0   |  25  |  1.29    Ca    8.7      10 Hemant 2019 10:28    TPro  7.8  /  Alb  3.8  /  TBili  0.6  /  DBili  x   /  AST  32  /  ALT  21  /  AlkPhos  61  01-10    CARDIAC MARKERS ( 10 Hemant 2019 13:24 )  0.081 ng/mL / x     / x     / x     / x      CARDIAC MARKERS ( 10 Hemant 2019 10:28 )  0.043 ng/mL / x     / x     / x     / x          PT/INR - ( 10 Hemant 2019 10:28 )   PT: 12.8 sec;   INR: 1.15 ratio         PTT - ( 10 Hemant 2019 10:28 )  PTT:29.3 sec  Urinalysis Basic - ( 10 Hemant 2019 10:28 )    Color: Yellow / Appearance: Clear / S.020 / pH: x  Gluc: x / Ketone: Moderate  / Bili: Negative / Urobili: Negative mg/dL   Blood: x / Protein: 30 mg/dL / Nitrite: Negative   Leuk Esterase: Trace / RBC: 11-25 /HPF / WBC 3-5   Sq Epi: x / Non Sq Epi: x / Bacteria: Moderate      I&O's Summary    BNPSerum Pro-Brain Natriuretic Peptide: 81464 pg/mL (01-10 @ 10:28)    RADIOLOGY & ADDITIONAL STUDIES:  < from: CT Chest No Cont (01.10.19 @ 13:37) >  IMPRESSION:     Bilateral multifocal pneumonia.    Cardiomegaly and enlarged pulmonary artery suggestive of pulmonary   arterial hypertension. Left lower pulmonary artery cardioMEMS device in   place.                FRANCISCOSHANNAN OQUENDO   This document has been electronically signed. Hemant 10 2019  2:26PM        < end of copied text >

## 2019-01-13 NOTE — PROGRESS NOTE ADULT - SUBJECTIVE AND OBJECTIVE BOX
Subjective:    pat awake sitting in bed, Pat just came off bipap, tachpneic, on morphine, pat ate breakfast.    Home Medications:  amiodarone 200 mg oral tablet: 1 tab(s) orally once a day (11 Jan 2019 14:24)  aspirin 81 mg oral tablet, chewable: 1 tab(s) orally once a day (11 Jan 2019 14:24)  Bentyl 20 mg oral tablet: 1 tab(s) orally 3 times a day, As Needed (11 Jan 2019 14:24)  bumetanide: 3 milligram(s) orally once a day (11 Jan 2019 14:24)  enalapril 2.5 mg oral tablet: 1 tab(s) orally once a day (11 Jan 2019 14:24)  Imodium A-D 2 mg oral tablet: 1 tab(s) orally once a day, As Needed (11 Jan 2019 14:24)  lactulose 10 g/15 mL oral syrup: 15 milliliter(s) orally once a day, As Needed (11 Jan 2019 14:24)  magnesium oxide 200 mg oral tablet: 1 tab(s) orally once a day (11 Jan 2019 14:24)  Metoprolol Succinate ER 50 mg oral tablet, extended release: 1 tab(s) orally once a day (11 Jan 2019 14:24)  oxyCODONE-acetaminophen 7.5 mg-325 mg oral tablet: 1 tab(s) orally every 6 hours, As Needed (11 Jan 2019 14:24)  potassium chloride 20 mEq oral tablet, extended release: 2 tab(s) orally 2 times a day (11 Jan 2019 14:24)    MEDICATIONS  (STANDING):  amiodarone    Tablet 200 milliGRAM(s) Oral daily  aspirin  chewable 81 milliGRAM(s) Oral daily  atorvastatin 10 milliGRAM(s) Oral at bedtime  cefepime  Injectable. 1000 milliGRAM(s) IV Push every 12 hours  clopidogrel Tablet 75 milliGRAM(s) Oral daily  doxycycline IVPB      doxycycline IVPB 100 milliGRAM(s) IV Intermittent every 12 hours  heparin  Injectable 5000 Unit(s) SubCutaneous every 12 hours  magnesium oxide 200 milliGRAM(s) Oral daily  oxybutynin 5 milliGRAM(s) Oral two times a day  potassium chloride  10 mEq/100 mL IVPB 10 milliEquivalent(s) IV Intermittent every 1 hour    MEDICATIONS  (PRN):  acetaminophen   Tablet .. 650 milliGRAM(s) Oral every 6 hours PRN Temp greater or equal to 38C (100.4F), Mild Pain (1 - 3)  aluminum hydroxide/magnesium hydroxide/simethicone Suspension 30 milliLiter(s) Oral every 6 hours PRN Dyspepsia  dicyclomine 20 milliGRAM(s) Oral three times a day before meals PRN Indigestion  lactulose Syrup 20 Gram(s) Oral at bedtime PRN Constipation  loperamide 2 milliGRAM(s) Oral daily PRN Diarrhea  morphine  - Injectable 2 milliGRAM(s) IV Push every 4 hours PRN Moderate Pain (4 - 6)  morphine Concentrate 2.5 milliGRAM(s) SubLingual every 2 hours PRN dyspnea  ondansetron   Disintegrating Tablet 4 milliGRAM(s) Oral every 6 hours PRN Nausea and/or Vomiting  oxyCODONE    5 mG/acetaminophen 325 mG 1 Tablet(s) Oral every 6 hours PRN Moderate Pain (4 - 6)      Allergies    No Known Allergies    Intolerances        Vital Signs Last 24 Hrs  T(C): 36.9 (13 Jan 2019 05:23), Max: 39.2 (12 Jan 2019 18:30)  T(F): 98.4 (13 Jan 2019 05:23), Max: 102.5 (12 Jan 2019 18:30)  HR: 101 (13 Jan 2019 09:28) (78 - 105)  BP: 123/75 (13 Jan 2019 09:28) (107/63 - 129/73)  BP(mean): --  RR: 32 (13 Jan 2019 09:28) (18 - 32)  SpO2: 98% (13 Jan 2019 09:28) (97% - 99%)      PHYSICAL EXAMINATION:    NECK:  Supple. No lymphadenopathy. Jugular venous pressure not elevated. Carotids equal.   HEART:   The cardiac impulse has a normal quality. Reg., Nl S1 and S2.  There are no murmurs, rubs or gallops noted  CHEST:  Chest crackles to auscultation. Normal respiratory effort.  ABDOMEN:  Soft and nontender.   EXTREMITIES:  There is no edema.       LABS:                        9.8    10.42 )-----------( 156      ( 13 Jan 2019 07:27 )             30.8     01-13    142  |  99  |  32<H>  ----------------------------<  172<H>  2.5<LL>   |  32<H>  |  1.27    Ca    9.0      13 Jan 2019 07:27    TPro  6.2  /  Alb  2.7<L>  /  TBili  0.9  /  DBili  x   /  AST  41<H>  /  ALT  25  /  AlkPhos  38<L>  01-12

## 2019-01-13 NOTE — PROGRESS NOTE ADULT - SUBJECTIVE AND OBJECTIVE BOX
Date of service: 19 @ 12:37    Lying in bed in NAD  On BiPAP; more alert today  Dose not seem in pain  Mild SOB  Febrile to 102F    ROS:  unobtainable    MEDICATIONS  (STANDING):  amiodarone    Tablet 200 milliGRAM(s) Oral daily  aspirin  chewable 81 milliGRAM(s) Oral daily  atorvastatin 10 milliGRAM(s) Oral at bedtime  cefepime  Injectable. 1000 milliGRAM(s) IV Push every 12 hours  clopidogrel Tablet 75 milliGRAM(s) Oral daily  doxycycline IVPB      doxycycline IVPB 100 milliGRAM(s) IV Intermittent every 12 hours  heparin  Injectable 5000 Unit(s) SubCutaneous every 12 hours  magnesium oxide 200 milliGRAM(s) Oral daily  oxybutynin 5 milliGRAM(s) Oral two times a day  potassium chloride  10 mEq/100 mL IVPB 10 milliEquivalent(s) IV Intermittent every 1 hour      Vital Signs Last 24 Hrs  T(C): 36.9 (2019 05:23), Max: 39.2 (2019 18:30)  T(F): 98.4 (2019 05:23), Max: 102.5 (2019 18:30)  HR: 89 (2019 12:06) (83 - 105)  BP: 129/55 (2019 12:06) (119/87 - 129/73)  BP(mean): --  RR: 32 (2019 09:28) (18 - 32)  SpO2: 95% (2019 11:08) (95% - 99%)    Physical Exam:      Constitutional: frail looking  HEENT: NC/AT, EOMI, PERRLA, conjunctivae clear  Neck: supple; thyroid not palpable  Back: no tenderness  Respiratory: respiratory effort normal; crackles b/l; wheezing  Cardiovascular: S1S2 regular, no murmurs  Abdomen: soft, not tender, not distended, positive BS; no liver or spleen organomegaly  Genitourinary: no suprapubic tenderness  Lymphatic: no LN palpable  Musculoskeletal: no muscle tenderness, no joint swelling or tenderness  Extremities: no pedal edema  Neurological/ Psychiatric: AxOx3, judgement and insight normal; moving all extremities  Skin: no rashes; no palpable lesions    Labs: reviewed                        9.8    10.42 )-----------( 156      ( 2019 07:27 )             30.8     01-13    142  |  99  |  32<H>  ----------------------------<  172<H>  2.5<LL>   |  32<H>  |  1.27    Ca    9.0      2019 07:27    TPro  6.2  /  Alb  2.7<L>  /  TBili  0.9  /  DBili  x   /  AST  41<H>  /  ALT  25  /  AlkPhos  38<L>                          11.4   8.22  )-----------( 195      ( 10 Hemant 2019 10:28 )             36.3     01-10    136  |  102  |  23  ----------------------------<  110<H>  4.0   |  25  |  1.29    Ca    8.7      10 Hemant 2019 10:28    TPro  7.8  /  Alb  3.8  /  TBili  0.6  /  DBili  x   /  AST  32  /  ALT  21  /  AlkPhos  61  01-10     LIVER FUNCTIONS - ( 10 Hemant 2019 10:28 )  Alb: 3.8 g/dL / Pro: 7.8 gm/dL / ALK PHOS: 61 U/L / ALT: 21 U/L / AST: 32 U/L / GGT: x           Urinalysis Basic - ( 10 Hemant 2019 10:28 )    Color: Yellow / Appearance: Clear / S.020 / pH: x  Gluc: x / Ketone: Moderate  / Bili: Negative / Urobili: Negative mg/dL   Blood: x / Protein: 30 mg/dL / Nitrite: Negative   Leuk Esterase: Trace / RBC: 11-25 /HPF / WBC 3-5   Sq Epi: x / Non Sq Epi: x / Bacteria: Moderate    Rapid Respiratory Viral Panel (01.10.19 @ 10:28)    Rapid RVP Result: Detected:     hMPV (RapRVP): Detected      Culture - Urine (collected 10 Hemant 2019 10:28)  Source: .Urine None  Preliminary Report (2019 09:26):    10,000 - 49,000 CFU/mL Escherichia coli    Culture - Blood (01.10.19 @ 10:28)    Specimen Source: .Blood None    Culture Results:   No growth to date.    Radiology: all available radiological tests reviewed    < from: CT Chest No Cont (01.10.19 @ 13:37) >  Bilateral multifocal pneumonia.  Cardiomegaly and enlarged pulmonary artery suggestive of pulmonary   arterial hypertension. Left lower pulmonary artery cardioMEMS device in   place.    < end of copied text >      Advanced directives addressed: DNR

## 2019-01-13 NOTE — PROGRESS NOTE ADULT - SUBJECTIVE AND OBJECTIVE BOX
· Subjective and Objective: 	  87F w/ PMH of afib not on a/c for hx of hematuria & s/p ablation (?), CAD, HTN, HLD, sCHF (EF 20-25%) s/p cardiomems and AICD, GERD, OA, s/p L knee replacement, 4x c-sections comes in with worsening SOB/cough for past 3 days. Her daughter notes that she wasn't herself since Monday when she left company early (unlike her), and then complaining of SOB/cough. She went to see Dr. Dneny in the office yesterday, where she was supposedly ok and Dr. Denny did not make any changes to her medications at that time. Then today her SOB was so bad that she wanted to come to the hospital. She denies fever, chills, productive cough, N/V, abd pain, chest pain/pressure. She can lay flat when she sleeps and uses 2 pillows. Has not noted any increase in leg swelling recently. Denies eating very salty food recently or drinking excessive water. Pt comes from [facility]. According to her daughter her baseline is AOx3, walking w/ a walker, and needs help with some ADLs.     On arrival to ED pt had fever 102.2 F and was in respiratory distress with increased work of breathing so was put on bipap. Has since been taken off bipap and is currently on non-rebreather. Did not have episodes of desaturation, but was tachypneic. Patient has MOLST form which shows she is DNR/DNI.    1/11-c/o chest pain for 1 week  and chronic generalised bodyaches, rapid response on 1/10 for desaturation and abdominal pain, which resolved after patient had bowel movement, Abd US GB sludge  1/12-on BIPAP,awake , says she is hungryAppears very ill on BIPAP, but no respiratory distress on BIPAP now  1/13- RR40 /min, while on nasal cannula, now placing back on BIPAP  Patient appears to be in mild distress on nasal cannula , now placing backk on BIPAP  Respiratory: diffused inspiratory rales B/L  Cardiovascular: S1S2;  Gastrointestinal: Abdomen soft, non tender, Bowel Ssounds present  Extremities: 1+_ edema  Neurological: awake on BIPAP, follows commands  Breasts: Deferred  Genitourinary: deferred  Rectal: Deferred      PHYSICAL EXAM:    Daily     Daily     ICU Vital Signs Last 24 Hrs  T(C): 38.6 (13 Jan 2019 15:43), Max: 39.2 (12 Jan 2019 18:30)  T(F): 101.4 (13 Jan 2019 15:43), Max: 102.5 (12 Jan 2019 18:30)  HR: 90 (13 Jan 2019 15:43) (83 - 105)  BP: 110/66 (13 Jan 2019 15:43) (110/66 - 129/73)  BP(mean): --  ABP: --  ABP(mean): --  RR: 34 (13 Jan 2019 15:43) (18 - 34)  SpO2: 100% (13 Jan 2019 15:43) (95% - 100%)                                9.8    10.42 )-----------( 156      ( 13 Jan 2019 07:27 )             30.8       CBC Full  -  ( 13 Jan 2019 07:27 )  WBC Count : 10.42 K/uL  Hemoglobin : 9.8 g/dL  Hematocrit : 30.8 %  Platelet Count - Automated : 156 K/uL  Mean Cell Volume : 83.0 fl  Mean Cell Hemoglobin : 26.4 pg  Mean Cell Hemoglobin Concentration : 31.8 gm/dL  Auto Neutrophil # : 10.32 K/uL  Auto Lymphocyte # : 0.10 K/uL  Auto Monocyte # : 0.00 K/uL  Auto Eosinophil # : 0.00 K/uL  Auto Basophil # : 0.00 K/uL  Auto Neutrophil % : 94.0 %  Auto Lymphocyte % : 1.0 %  Auto Monocyte % : 0.0 %  Auto Eosinophil % : 0.0 %  Auto Basophil % : 0.0 %      01-13    142  |  99  |  32<H>  ----------------------------<  172<H>  2.5<LL>   |  32<H>  |  1.27    Ca    9.0      13 Jan 2019 07:27    TPro  6.2  /  Alb  2.7<L>  /  TBili  0.9  /  DBili  x   /  AST  41<H>  /  ALT  25  /  AlkPhos  38<L>  01-12      LIVER FUNCTIONS - ( 12 Jan 2019 06:49 )  Alb: 2.7 g/dL / Pro: 6.2 gm/dL / ALK PHOS: 38 U/L / ALT: 25 U/L / AST: 41 U/L / GGT: x                               MEDICATIONS  (STANDING):  amiodarone    Tablet 200 milliGRAM(s) Oral daily  aspirin  chewable 81 milliGRAM(s) Oral daily  atorvastatin 10 milliGRAM(s) Oral at bedtime  cefepime  Injectable. 1000 milliGRAM(s) IV Push every 12 hours  clopidogrel Tablet 75 milliGRAM(s) Oral daily  doxycycline IVPB      doxycycline IVPB 100 milliGRAM(s) IV Intermittent every 12 hours  heparin  Injectable 5000 Unit(s) SubCutaneous every 12 hours  magnesium oxide 200 milliGRAM(s) Oral daily  oxybutynin 5 milliGRAM(s) Oral two times a day  potassium chloride   Powder 40 milliEquivalent(s) Oral once  potassium chloride  10 mEq/100 mL IVPB 10 milliEquivalent(s) IV Intermittent every 1 hour

## 2019-01-13 NOTE — PROGRESS NOTE ADULT - ASSESSMENT
Pt is a 87y old Female with hx of A.fib not on a/c due to hematuria (s/p ablation), CAD, HTN, HLD, CHF (EF 20-25%) s/p AICD, GERD, OA,  left TKR, 4x C-sections  - was admitted on 1/10 for worsening SOB/cough for past 3 days PTA.  She saw PMD 1 d PTA,  but came to ED as her SOB worsened.. She denies fever, chills at home. She has dry cough. On arrival to ED pt had fever 102.2 F and was in respiratory distress, was placed on BiPAP. Work-up= bilat infiltrates, RVP + for human metapneumovirus she is being empirically treated for bacterial process as well. She is awake and "very hungry" - has been NPO since admission due to BiPAP mask.     1) Dyspnea  -Multifactorial : severe systolic CHF and pneumonia  -BiPAP mask use intermittently since admission (Today=#4)  -Destuarates quickly when off BiPAP  -Discussed use of this mask with HCP #1 and #2 - they would like to continue with this BUT they also want her to eat -  so they consent to removing it for meals (RT states cannot put back on until 2 hrs have elapsed since last po intake)   -O2 by N/C during meals, then NRBM x 2 hrs after meals before replacing mask  -Pt signed her own MOLST form in 3/2018, requesting DNR/DNI - nothing was marked re:BiPAP mask  -Roxanol ordered for dyspnea - will D/C as she is still on BiPAP mask  -She also has Morphine IVP ordered.     2)Pneumonia  -RVP + for Human metapneumovirus   -Appreciate ID input and case discussed with ID MD as well - on empiric antibacterial therapy, no change since admission      3)CHF  -Severe systolic CHF  -EF 20%  -Appreciate cardiology input    4) Malnutrition  -Albumin = 2.7  Pt hungry - will feed her, D/C all dietary restrictions as per family's wishes -   -She is edentulous so will give mechanical soft diet    5) Weakness  -Secondary to acute illness  -Was in Assisted Living PTA - cannot return there unless she is more independent  -Family is looking into [facility] facilities  -PT evaluation when able to participate    6)Pain  -Has chronic pain secondary to arthritis  -Takes Oxybutynin bid   -Do not stop opioids - continue her Oxybutynin bid   -Morphine IVP for severe pain as ordered   -Oxycodone for moderate pain as ordered     7)Advance directives  -Has HCP on chart - names daughter Ernie Burgos as #1 and granddaughter Renay #2  -Has MOLST from 3/2018 on chart, signed by pt - states DNR/DNI - nothing else was addressed  -Had a [facility] meeting with Renay in person and Gabrielle by phone - see separate note  -Family respects DNI/DNR order, want to continue with BiPAP mask for now but also want her to eat.... and do not want any diertary restrictions other than mechanical soft diet as she is edentulous  -Will follow up tomorrow - would check O2 sats on NRBM, if >92%, would try to wean

## 2019-01-13 NOTE — PROGRESS NOTE ADULT - ASSESSMENT
PROBLEMS:    AC hypoxaemic respiratory failur  Multifocal pneumonia  positive HMPV  Chronic systolic heart failure   CKD  Paroxysmal atrial fibrillation  Essential hypertension    PLAN:    IV CEFEPIME/ IV DOXYCYLIN  FIO2  BIPAP qhs/prn  on amiodarone  POOR PROGNOSIS  Comfort care  DVT PROPHYLASIX

## 2019-01-13 NOTE — PROGRESS NOTE ADULT - ASSESSMENT
SOB and cough- MPV infection. On Bipap.  She will need O2 montioring.  Will discuss further with Dr moore.  Management per primary team.     Acute on Chronic HFrEF , s/p ICD- euvolemic  DC iv lasix.  AM labs pending.   Please follow renal function and electorlytes.  She will need O2 montioring.     close monitoring of the renal function and electrolytes.  Goal potassium of 4 and magnesium of 2.   Strict I/O and daily wt checks. Low sodium diet. Nutrition education.     Afib s/p ablation-  not on full dose anticoagulation as pt had major hematuria in past and she adamantly refused it from then on after knowing the risks of CVA off anticoagulation.   Now in sinus rythm.    HTN- hold all antihypertensives and lasix for now.    MPV infection- Other medical issues- Management per primary team.     Other medical issues- Management per primary team.   Thank you for allowing me to participate in the care of this patient. Please feel free to contact me with any questions.

## 2019-01-13 NOTE — PROGRESS NOTE ADULT - ASSESSMENT
87F w/ PMH as above comes in w/ fever and respiratory distress due to CAP.    A/P  #Acute respiratory failure /B/L pneumonia hMPv/also suspected GNR/MRSA-no significant improvement  #suspected sepsi from PNA  #hx systolic CHF EF 20% acutely decomepnsated   #Acute toxic metabolic encephalopathy  #Afib with intermittent bradycardia upto 35bpm likely from hypoxia    -transfer to tele for bradycardia   -continue cefpime and doxy , abx per ID  -BIPAP prn, no repeat ABG as patient and HCP daughter at bed side declining intubation or resuscitation if need arises  -no further IVf due to CHF hx  -continue IV lasix dosing per cardio  -hold other antihypertensive meds for now    # s/p abdominal pain resolved after bowel movement/doubt acute cholecystitis   Abd US shows GB sludge   CT abd/pelvis no acute pathology  Right renal pelvis fullness but no gabriella hydrnephrosis, patient voiding on IV lasix  continue to monitor    # initial drop h/H likely dilutional, now stable   monitor     #Paroxysmal afib not on AC due to patient's refusal in the past and now  continue amio    # CAD  continue statin, asa, plavix    #CKD stage 3  monitor BMP    #DNR/DNI  poor prognosis  palliative care consult    #DVT prophylaxis  heparin sc    # Advanced care planning(ACP)- spent 20mins for ACP conversation   I spent 20 minutes discussing with patient and daugter at bedside about current management plans and diagnosis and spoke about advanced care planning. daughter  patient's HCP and patient declined intubation or ventilator or pressors or resuscitation if the need arises.if her condition worsens despite conservative management then they would prefer comfort care as at baseline line she has very poor quality of life and alsways in chronic pain as per daughter . daughter is ok with BIPAP

## 2019-01-14 LAB
ANION GAP SERPL CALC-SCNC: 9 MMOL/L — SIGNIFICANT CHANGE UP (ref 5–17)
BASOPHILS # BLD AUTO: 0.01 K/UL — SIGNIFICANT CHANGE UP (ref 0–0.2)
BASOPHILS NFR BLD AUTO: 0.1 % — SIGNIFICANT CHANGE UP (ref 0–2)
BUN SERPL-MCNC: 38 MG/DL — HIGH (ref 7–23)
CALCIUM SERPL-MCNC: 9.1 MG/DL — SIGNIFICANT CHANGE UP (ref 8.5–10.1)
CHLORIDE SERPL-SCNC: 100 MMOL/L — SIGNIFICANT CHANGE UP (ref 96–108)
CO2 SERPL-SCNC: 32 MMOL/L — HIGH (ref 22–31)
CREAT SERPL-MCNC: 1.26 MG/DL — SIGNIFICANT CHANGE UP (ref 0.5–1.3)
EOSINOPHIL # BLD AUTO: 0.01 K/UL — SIGNIFICANT CHANGE UP (ref 0–0.5)
EOSINOPHIL NFR BLD AUTO: 0.1 % — SIGNIFICANT CHANGE UP (ref 0–6)
GLUCOSE SERPL-MCNC: 125 MG/DL — HIGH (ref 70–99)
HCT VFR BLD CALC: 30.7 % — LOW (ref 34.5–45)
HGB BLD-MCNC: 9.6 G/DL — LOW (ref 11.5–15.5)
IMM GRANULOCYTES NFR BLD AUTO: 1.1 % — SIGNIFICANT CHANGE UP (ref 0–1.5)
LYMPHOCYTES # BLD AUTO: 0.83 K/UL — LOW (ref 1–3.3)
LYMPHOCYTES # BLD AUTO: 11.1 % — LOW (ref 13–44)
MCHC RBC-ENTMCNC: 26.2 PG — LOW (ref 27–34)
MCHC RBC-ENTMCNC: 31.3 GM/DL — LOW (ref 32–36)
MCV RBC AUTO: 83.7 FL — SIGNIFICANT CHANGE UP (ref 80–100)
MONOCYTES # BLD AUTO: 0.25 K/UL — SIGNIFICANT CHANGE UP (ref 0–0.9)
MONOCYTES NFR BLD AUTO: 3.3 % — SIGNIFICANT CHANGE UP (ref 2–14)
NEUTROPHILS # BLD AUTO: 6.33 K/UL — SIGNIFICANT CHANGE UP (ref 1.8–7.4)
NEUTROPHILS NFR BLD AUTO: 84.3 % — HIGH (ref 43–77)
NRBC # BLD: 0 /100 WBCS — SIGNIFICANT CHANGE UP (ref 0–0)
PLATELET # BLD AUTO: 152 K/UL — SIGNIFICANT CHANGE UP (ref 150–400)
POTASSIUM SERPL-MCNC: 4.2 MMOL/L — SIGNIFICANT CHANGE UP (ref 3.5–5.3)
POTASSIUM SERPL-SCNC: 4.2 MMOL/L — SIGNIFICANT CHANGE UP (ref 3.5–5.3)
RBC # BLD: 3.67 M/UL — LOW (ref 3.8–5.2)
RBC # FLD: 16.5 % — HIGH (ref 10.3–14.5)
SODIUM SERPL-SCNC: 141 MMOL/L — SIGNIFICANT CHANGE UP (ref 135–145)
WBC # BLD: 7.51 K/UL — SIGNIFICANT CHANGE UP (ref 3.8–10.5)
WBC # FLD AUTO: 7.51 K/UL — SIGNIFICANT CHANGE UP (ref 3.8–10.5)

## 2019-01-14 RX ORDER — MORPHINE SULFATE 50 MG/1
2.5 CAPSULE, EXTENDED RELEASE ORAL
Qty: 0 | Refills: 0 | Status: DISCONTINUED | OUTPATIENT
Start: 2019-01-14 | End: 2019-01-15

## 2019-01-14 RX ORDER — FUROSEMIDE 40 MG
40 TABLET ORAL DAILY
Qty: 0 | Refills: 0 | Status: DISCONTINUED | OUTPATIENT
Start: 2019-01-14 | End: 2019-01-16

## 2019-01-14 RX ADMIN — AMIODARONE HYDROCHLORIDE 200 MILLIGRAM(S): 400 TABLET ORAL at 05:44

## 2019-01-14 RX ADMIN — HEPARIN SODIUM 5000 UNIT(S): 5000 INJECTION INTRAVENOUS; SUBCUTANEOUS at 17:36

## 2019-01-14 RX ADMIN — MORPHINE SULFATE 2 MILLIGRAM(S): 50 CAPSULE, EXTENDED RELEASE ORAL at 09:00

## 2019-01-14 RX ADMIN — Medication 40 MILLIGRAM(S): at 11:53

## 2019-01-14 RX ADMIN — MORPHINE SULFATE 2 MILLIGRAM(S): 50 CAPSULE, EXTENDED RELEASE ORAL at 13:00

## 2019-01-14 RX ADMIN — Medication 110 MILLIGRAM(S): at 05:41

## 2019-01-14 RX ADMIN — Medication 110 MILLIGRAM(S): at 17:19

## 2019-01-14 RX ADMIN — HEPARIN SODIUM 5000 UNIT(S): 5000 INJECTION INTRAVENOUS; SUBCUTANEOUS at 05:44

## 2019-01-14 RX ADMIN — Medication 5 MILLIGRAM(S): at 17:20

## 2019-01-14 RX ADMIN — CEFEPIME 1000 MILLIGRAM(S): 1 INJECTION, POWDER, FOR SOLUTION INTRAMUSCULAR; INTRAVENOUS at 05:44

## 2019-01-14 RX ADMIN — OXYCODONE AND ACETAMINOPHEN 1 TABLET(S): 5; 325 TABLET ORAL at 17:20

## 2019-01-14 RX ADMIN — ATORVASTATIN CALCIUM 10 MILLIGRAM(S): 80 TABLET, FILM COATED ORAL at 21:01

## 2019-01-14 RX ADMIN — MAGNESIUM OXIDE 400 MG ORAL TABLET 200 MILLIGRAM(S): 241.3 TABLET ORAL at 11:53

## 2019-01-14 RX ADMIN — CEFEPIME 1000 MILLIGRAM(S): 1 INJECTION, POWDER, FOR SOLUTION INTRAMUSCULAR; INTRAVENOUS at 17:19

## 2019-01-14 RX ADMIN — CLOPIDOGREL BISULFATE 75 MILLIGRAM(S): 75 TABLET, FILM COATED ORAL at 11:53

## 2019-01-14 RX ADMIN — MORPHINE SULFATE 2 MILLIGRAM(S): 50 CAPSULE, EXTENDED RELEASE ORAL at 12:07

## 2019-01-14 RX ADMIN — Medication 81 MILLIGRAM(S): at 11:53

## 2019-01-14 RX ADMIN — Medication 5 MILLIGRAM(S): at 05:45

## 2019-01-14 RX ADMIN — MORPHINE SULFATE 2 MILLIGRAM(S): 50 CAPSULE, EXTENDED RELEASE ORAL at 08:02

## 2019-01-14 NOTE — PROGRESS NOTE ADULT - ASSESSMENT
· Assessment		  87F w/ PMH as above comes in w/ fever and respiratory distress due to CAP.    A/P  #Acute respiratory failure /B/L pneumonia hMPv/also suspected GNR/MRSA-no significant improvement  #suspected sepsi from PNA  #hx systolic CHF EF 20% acutely decomepnsated /Has CardioMems  #Acute toxic metabolic encephalopathy  #Afib with intermittent bradycardia upto 35bpm likely from hypoxia  # Tachypneic, alternately using nasal cannula and BIPAP    -transfered  to tele 1/13 for tarnsient  bradycardia 30's  related to hypoxia  -continue cefpime and doxy , abx per ID  BIPAP prn, no repeat ABG as patient and HCP daughter  declining intubation or resuscitation if need arises  -no further IVf due to CHF hx  -continue IV lasix dosing per cardio  -hold other antihypertensive meds for now    # s/p abdominal pain resolved after bowel movement/doubt acute cholecystitis   Abd US shows GB sludge   CT abd/pelvis no acute pathology  Right renal pelvis fullness but no gabriella hydrnephrosis, patient voiding on IV lasix  continue to monitor    # initial drop h/H likely dilutional, now stable   monitor     #Paroxysmal afib not on AC due to patient's refusal in the past and now  continue amio    # CAD  continue statin, asa, plavix    #CKD stage 3  monitor BMP    #DNR/DNI  poor prognosis  palliative care consult    #DVT prophylaxis  heparin sc    # Advanced care planning(ACP)- spent 20mins for ACP conversation   I spent 20 minutes discussing with patient and daugter at bedside about current management plans and diagnosis and spoke about advanced care planning. daughter  patient's HCP and patient declined intubation or ventilator or pressors or resuscitation if the need arises.if her condition declines further despite conservative management then they would prefer comfort care as at baseline line she has very poor quality of life and alsways in chronic pain as per daughter . daughter is ok with BIPAP for now

## 2019-01-14 NOTE — PROGRESS NOTE ADULT - SUBJECTIVE AND OBJECTIVE BOX
· Subjective and Objective: 	  87F w/ PMH of afib not on a/c for hx of hematuria & s/p ablation (?), CAD, HTN, HLD, sCHF (EF 20-25%) s/p cardiomems and AICD, GERD, OA, s/p L knee replacement, 4x c-sections comes in with worsening SOB/cough for past 3 days. Her daughter notes that she wasn't herself since Monday when she left company early (unlike her), and then complaining of SOB/cough. She went to see Dr. Denny in the office yesterday, where she was supposedly ok and Dr. Denny did not make any changes to her medications at that time. Then today her SOB was so bad that she wanted to come to the hospital. She denies fever, chills, productive cough, N/V, abd pain, chest pain/pressure. She can lay flat when she sleeps and uses 2 pillows. Has not noted any increase in leg swelling recently. Denies eating very salty food recently or drinking excessive water. Pt comes from [facility]. According to her daughter her baseline is AOx3, walking w/ a walker, and needs help with some ADLs.     On arrival to ED pt had fever 102.2 F and was in respiratory distress with increased work of breathing so was put on bipap. Has since been taken off bipap and is currently on non-rebreather. Did not have episodes of desaturation, but was tachypneic. Patient has MOLST form which shows she is DNR/DNI.    1/11-c/o chest pain for 1 week  and chronic generalised bodyaches, rapid response on 1/10 for desaturation and abdominal pain, which resolved after patient had bowel movement, Abd US GB sludge  1/12-on BIPAP,awake , says she is hungryAppears very ill on BIPAP, but no respiratory distress on BIPAP now  1/13- RR40 /min, while on nasal cannula, now placing back on BIPAP  1/14 tachypneic on nasal cannula  Patient appears to be in mild distress on nasal cannula ,   Respiratory: diffused inspiratory rales B/L  Cardiovascular: S1S2;  Gastrointestinal: Abdomen soft, non tender, Bowel Ssounds present  Extremities: 1+_ edema  Neurological: awake on BIPAP, follows commands  Breasts: Deferred  Genitourinary: deferred  Rectal: Deferred      PHYSICAL EXAM:    Daily     Daily     ICU Vital Signs Last 24 Hrs  T(C): 36.8 (14 Jan 2019 17:55), Max: 37.3 (14 Jan 2019 14:37)  T(F): 98.3 (14 Jan 2019 17:55), Max: 99.2 (14 Jan 2019 14:37)  HR: 83 (14 Jan 2019 18:00) (76 - 99)  BP: 119/67 (14 Jan 2019 18:00) (82/42 - 141/77)  BP(mean): 79 (14 Jan 2019 18:00) (52 - 103)  ABP: --  ABP(mean): --  RR: 28 (14 Jan 2019 18:00) (20 - 32)  SpO2: 99% (14 Jan 2019 18:00) (95% - 100%)                                  9.6    7.51  )-----------( 152      ( 14 Jan 2019 06:27 )             30.7       CBC Full  -  ( 14 Jan 2019 06:27 )  WBC Count : 7.51 K/uL  Hemoglobin : 9.6 g/dL  Hematocrit : 30.7 %  Platelet Count - Automated : 152 K/uL  Mean Cell Volume : 83.7 fl  Mean Cell Hemoglobin : 26.2 pg  Mean Cell Hemoglobin Concentration : 31.3 gm/dL  Auto Neutrophil # : 6.33 K/uL  Auto Lymphocyte # : 0.83 K/uL  Auto Monocyte # : 0.25 K/uL  Auto Eosinophil # : 0.01 K/uL  Auto Basophil # : 0.01 K/uL  Auto Neutrophil % : 84.3 %  Auto Lymphocyte % : 11.1 %  Auto Monocyte % : 3.3 %  Auto Eosinophil % : 0.1 %  Auto Basophil % : 0.1 %      01-14    141  |  100  |  38<H>  ----------------------------<  125<H>  4.2   |  32<H>  |  1.26    Ca    9.1      14 Jan 2019 06:27  Mg     2.2     01-13                              MEDICATIONS  (STANDING):  amiodarone    Tablet 200 milliGRAM(s) Oral daily  aspirin  chewable 81 milliGRAM(s) Oral daily  atorvastatin 10 milliGRAM(s) Oral at bedtime  cefepime  Injectable. 1000 milliGRAM(s) IV Push every 12 hours  clopidogrel Tablet 75 milliGRAM(s) Oral daily  doxycycline IVPB      doxycycline IVPB 100 milliGRAM(s) IV Intermittent every 12 hours  furosemide   Injectable 40 milliGRAM(s) IV Push daily  heparin  Injectable 5000 Unit(s) SubCutaneous every 12 hours  magnesium oxide 200 milliGRAM(s) Oral daily  oxybutynin 5 milliGRAM(s) Oral two times a day · Subjective and Objective: 	  87F w/ PMH of afib not on a/c for hx of hematuria & s/p ablation (?), CAD, HTN, HLD, sCHF (EF 20-25%) s/p cardiomems and AICD, GERD, OA, s/p L knee replacement, 4x c-sections comes in with worsening SOB/cough for past 3 days. Her daughter notes that she wasn't herself since Monday when she left company early (unlike her), and then complaining of SOB/cough. She went to see Dr. Denny in the office yesterday, where she was supposedly ok and Dr. Denny did not make any changes to her medications at that time. Then today her SOB was so bad that she wanted to come to the hospital. She denies fever, chills, productive cough, N/V, abd pain, chest pain/pressure. She can lay flat when she sleeps and uses 2 pillows. Has not noted any increase in leg swelling recently. Denies eating very salty food recently or drinking excessive water. Pt comes from [facility]. According to her daughter her baseline is AOx3, walking w/ a walker, and needs help with some ADLs.     On arrival to ED pt had fever 102.2 F and was in respiratory distress with increased work of breathing so was put on bipap. Has since been taken off bipap and is currently on non-rebreather. Did not have episodes of desaturation, but was tachypneic. Patient has MOLST form which shows she is DNR/DNI.    1/11-c/o chest pain for 1 week  and chronic generalised bodyaches, rapid response on 1/10 for desaturation and abdominal pain, which resolved after patient had bowel movement, Abd US GB sludge  1/12-on BIPAP,awake , says she is hungryAppears very ill on BIPAP, but no respiratory distress on BIPAP now  1/13- RR40 /min, while on nasal cannula, now placing back on BIPAP  1/14 tachypneic on nasal cannula,   Patient appears to be in mild distress on nasal cannula ,   Respiratory: diffused inspiratory rales B/L  Cardiovascular: S1S2;  Gastrointestinal: Abdomen soft, non tender, Bowel Ssounds present  Extremities: 1+_ edema  Neurological: awake on BIPAP, follows commands  Breasts: Deferred  Genitourinary: deferred  Rectal: Deferred      PHYSICAL EXAM:    Daily     Daily     ICU Vital Signs Last 24 Hrs  T(C): 36.8 (14 Jan 2019 17:55), Max: 37.3 (14 Jan 2019 14:37)  T(F): 98.3 (14 Jan 2019 17:55), Max: 99.2 (14 Jan 2019 14:37)  HR: 83 (14 Jan 2019 18:00) (76 - 99)  BP: 119/67 (14 Jan 2019 18:00) (82/42 - 141/77)  BP(mean): 79 (14 Jan 2019 18:00) (52 - 103)  ABP: --  ABP(mean): --  RR: 28 (14 Jan 2019 18:00) (20 - 32)  SpO2: 99% (14 Jan 2019 18:00) (95% - 100%)                                  9.6    7.51  )-----------( 152      ( 14 Jan 2019 06:27 )             30.7       CBC Full  -  ( 14 Jan 2019 06:27 )  WBC Count : 7.51 K/uL  Hemoglobin : 9.6 g/dL  Hematocrit : 30.7 %  Platelet Count - Automated : 152 K/uL  Mean Cell Volume : 83.7 fl  Mean Cell Hemoglobin : 26.2 pg  Mean Cell Hemoglobin Concentration : 31.3 gm/dL  Auto Neutrophil # : 6.33 K/uL  Auto Lymphocyte # : 0.83 K/uL  Auto Monocyte # : 0.25 K/uL  Auto Eosinophil # : 0.01 K/uL  Auto Basophil # : 0.01 K/uL  Auto Neutrophil % : 84.3 %  Auto Lymphocyte % : 11.1 %  Auto Monocyte % : 3.3 %  Auto Eosinophil % : 0.1 %  Auto Basophil % : 0.1 %      01-14    141  |  100  |  38<H>  ----------------------------<  125<H>  4.2   |  32<H>  |  1.26    Ca    9.1      14 Jan 2019 06:27  Mg     2.2     01-13                              MEDICATIONS  (STANDING):  amiodarone    Tablet 200 milliGRAM(s) Oral daily  aspirin  chewable 81 milliGRAM(s) Oral daily  atorvastatin 10 milliGRAM(s) Oral at bedtime  cefepime  Injectable. 1000 milliGRAM(s) IV Push every 12 hours  clopidogrel Tablet 75 milliGRAM(s) Oral daily  doxycycline IVPB      doxycycline IVPB 100 milliGRAM(s) IV Intermittent every 12 hours  furosemide   Injectable 40 milliGRAM(s) IV Push daily  heparin  Injectable 5000 Unit(s) SubCutaneous every 12 hours  magnesium oxide 200 milliGRAM(s) Oral daily  oxybutynin 5 milliGRAM(s) Oral two times a day

## 2019-01-14 NOTE — CHART NOTE - NSCHARTNOTEFT_GEN_A_CORE
Assessment:   *pt continues to be managed for acute respi failure 2/2 b/l pneumonia on CHF with acute toxic metabolic encephalopathy.  Pt is DNR/DNI; conservative management.  *received a re-consult for CHF diet education; pt remain poor candidate for CHF diet education given clinical status and mentation.  *pt with poor PO intake; consuming small amounts of food at each meal.  Diet liberalized to optimize PO intake. Pt continues to meet <75% of estimated nutr needs.  Rec'd encourage oral supplement between meals as feasible.  *no new wt noted; monitor wt weekly.  *(+) mild Rt/Lt ankle edema.  BM (+) 1/14.  *pt continues to meet criteria for severe malnutrition.    Recommendations:  1) change back to liberalized diet of mechanical soft diet with oral supplements encouraged between meals  2) add MVI with minerals daily to ensure 100% RDI met  3) obtain new wt when feasible      Diet Prescription: Diet, Mechanical Soft:   DASH/TLC {Sodium & Cholesterol Restricted} (DASH)  1500mL Fluid Restriction ([id number])  Low Sodium (01-14-19 @ 08:32)      Wt Hx:  Weight (kg): 68 (01-10-19 @ 10:00)        Estimated Needs:   [x] no change since previous assessment  Estimated Energy Needs (25-30 calories/kg):  · Weight  (lbs)  114.6 lb  · Weight (kg)  52 kg  · From (25cal/kg)  1300  · To (30cal/kg)  1560    Other Calculation:  · Other Calculation  Ht.   61   "        Wt.  52      kg               BMI       28           IBW  47     kg               Pt is at  144  %  IBW    Estimated Protein Needs (1.4-1.6 g/kg):  · Weight  (lbs)  114.6  · Weight (kg)  52 kg  · From (1.4 g/kg)  72.8  · To (1.6 g/kg)  83.2    Nutrition Diagnostic #1:  · Nutrition Diagnostic Terminology #1: Oral or Nutrition Support Intake; Nutrient  · Oral or Nutrition Support Intake: Inadequate oral intake  · Nutrient: Increased nutrient needs (specify); Malnutrition; Pt meets criteria for severe protein-calorie malnutrition in context of chronic disease  .   Pressure ulcers kay [address]  · Etiology: inadequate PO intake 2/2 confusion, CHF  · Signs/Symptoms: moderate/severe muscle wasting, moderate/severe fat depletion  · Nutrition Intervention: Meals and Snack; Medical Food Supplements  · Meals and Snacks: DASH diet, ensure pudding, gelatein  · Goal/Expected Outcome: Pt will consume and tolerate > 80% nutritional needs, no exacerbation of CHF, no s/s malnutrition      Nutrition Diagnosis is [x] ongoing  [ ] resolved [ ] not applicable     New Nutrition Diagnosis: [x] not applicable         Pertinent Medications: MEDICATIONS  (STANDING):  amiodarone    Tablet 200 milliGRAM(s) Oral daily  aspirin  chewable 81 milliGRAM(s) Oral daily  atorvastatin 10 milliGRAM(s) Oral at bedtime  cefepime  Injectable. 1000 milliGRAM(s) IV Push every 12 hours  clopidogrel Tablet 75 milliGRAM(s) Oral daily  doxycycline IVPB      doxycycline IVPB 100 milliGRAM(s) IV Intermittent every 12 hours  furosemide   Injectable 40 milliGRAM(s) IV Push daily  heparin  Injectable 5000 Unit(s) SubCutaneous every 12 hours  magnesium oxide 200 milliGRAM(s) Oral daily  oxybutynin 5 milliGRAM(s) Oral two times a day    MEDICATIONS  (PRN):  acetaminophen  Suppository .. 650 milliGRAM(s) Rectal every 6 hours PRN Temp greater or equal to 38C (100.4F), Mild Pain (1 - 3)  aluminum hydroxide/magnesium hydroxide/simethicone Suspension 30 milliLiter(s) Oral every 6 hours PRN Dyspepsia  dicyclomine 20 milliGRAM(s) Oral three times a day before meals PRN Indigestion  lactulose Syrup 20 Gram(s) Oral at bedtime PRN Constipation  loperamide 2 milliGRAM(s) Oral daily PRN Diarrhea  morphine  - Injectable 2 milliGRAM(s) IV Push every 4 hours PRN Moderate Pain (4 - 6)  morphine Concentrate 2.5 milliGRAM(s) SubLingual every 2 hours PRN dyspnea or pain  ondansetron   Disintegrating Tablet 4 milliGRAM(s) Oral every 6 hours PRN Nausea and/or Vomiting  oxyCODONE    5 mG/acetaminophen 325 mG 1 Tablet(s) Oral every 6 hours PRN Moderate Pain (4 - 6)    Pertinent Labs: 01-14 Na141 mmol/L Glu 125 mg/dL<H> K+ 4.2 mmol/L Cr  1.26 mg/dL BUN 38 mg/dL<H> 01-10 Phos 3.3 mg/dL 01-12 Alb 2.7 g/dL<L>      Skin: vic score = 11  PU:  b/l heel stage 1  sacrum stage 2    Monitoring and Evaluation:   [x] PO intake/Nutr support infusion [ x ] Tolerance to Nutr [ x ] weights [ x ] labs[ x ] follow up per protocol  [ ] other:

## 2019-01-14 NOTE — PROGRESS NOTE ADULT - SUBJECTIVE AND OBJECTIVE BOX
Subjective:    pat better, transfer to SD for bradyarrthmias, lying in bed, bipap qhs. Fio2 4LPM nc sat 100%.    Home Medications:  amiodarone 200 mg oral tablet: 1 tab(s) orally once a day (11 Jan 2019 14:24)  aspirin 81 mg oral tablet, chewable: 1 tab(s) orally once a day (11 Jan 2019 14:24)  Bentyl 20 mg oral tablet: 1 tab(s) orally 3 times a day, As Needed (11 Jan 2019 14:24)  bumetanide: 3 milligram(s) orally once a day (11 Jan 2019 14:24)  enalapril 2.5 mg oral tablet: 1 tab(s) orally once a day (11 Jan 2019 14:24)  Imodium A-D 2 mg oral tablet: 1 tab(s) orally once a day, As Needed (11 Jan 2019 14:24)  lactulose 10 g/15 mL oral syrup: 15 milliliter(s) orally once a day, As Needed (11 Jan 2019 14:24)  magnesium oxide 200 mg oral tablet: 1 tab(s) orally once a day (11 Jan 2019 14:24)  Metoprolol Succinate ER 50 mg oral tablet, extended release: 1 tab(s) orally once a day (11 Jan 2019 14:24)  oxyCODONE-acetaminophen 7.5 mg-325 mg oral tablet: 1 tab(s) orally every 6 hours, As Needed (11 Jan 2019 14:24)  potassium chloride 20 mEq oral tablet, extended release: 2 tab(s) orally 2 times a day (11 Jan 2019 14:24)    MEDICATIONS  (STANDING):  amiodarone    Tablet 200 milliGRAM(s) Oral daily  aspirin  chewable 81 milliGRAM(s) Oral daily  atorvastatin 10 milliGRAM(s) Oral at bedtime  cefepime  Injectable. 1000 milliGRAM(s) IV Push every 12 hours  clopidogrel Tablet 75 milliGRAM(s) Oral daily  doxycycline IVPB      doxycycline IVPB 100 milliGRAM(s) IV Intermittent every 12 hours  furosemide   Injectable 40 milliGRAM(s) IV Push daily  heparin  Injectable 5000 Unit(s) SubCutaneous every 12 hours  magnesium oxide 200 milliGRAM(s) Oral daily  oxybutynin 5 milliGRAM(s) Oral two times a day    MEDICATIONS  (PRN):  acetaminophen  Suppository .. 650 milliGRAM(s) Rectal every 6 hours PRN Temp greater or equal to 38C (100.4F), Mild Pain (1 - 3)  aluminum hydroxide/magnesium hydroxide/simethicone Suspension 30 milliLiter(s) Oral every 6 hours PRN Dyspepsia  dicyclomine 20 milliGRAM(s) Oral three times a day before meals PRN Indigestion  lactulose Syrup 20 Gram(s) Oral at bedtime PRN Constipation  loperamide 2 milliGRAM(s) Oral daily PRN Diarrhea  morphine  - Injectable 2 milliGRAM(s) IV Push every 4 hours PRN Moderate Pain (4 - 6)  morphine Concentrate 2.5 milliGRAM(s) SubLingual every 2 hours PRN dyspnea or pain  ondansetron   Disintegrating Tablet 4 milliGRAM(s) Oral every 6 hours PRN Nausea and/or Vomiting  oxyCODONE    5 mG/acetaminophen 325 mG 1 Tablet(s) Oral every 6 hours PRN Moderate Pain (4 - 6)      Allergies    No Known Allergies    Intolerances        Vital Signs Last 24 Hrs  T(C): 36.5 (14 Jan 2019 09:51), Max: 38.6 (13 Jan 2019 15:43)  T(F): 97.7 (14 Jan 2019 09:51), Max: 101.4 (13 Jan 2019 15:43)  HR: 94 (14 Jan 2019 12:00) (77 - 99)  BP: 119/98 (14 Jan 2019 12:00) (82/42 - 141/77)  BP(mean): 103 (14 Jan 2019 12:00) (52 - 103)  RR: 32 (14 Jan 2019 12:00) (20 - 34)  SpO2: 97% (14 Jan 2019 12:00) (95% - 100%)      PHYSICAL EXAMINATION:    NECK:  Supple. No lymphadenopathy. Jugular venous pressure not elevated. Carotids equal.   HEART:   The cardiac impulse has a normal quality. Reg., Nl S1 and S2.  There are no murmurs, rubs or gallops noted  CHEST:  Chest crackles to auscultation. Normal respiratory effort.  ABDOMEN:  Soft and nontender.   EXTREMITIES:  There is no edema.       LABS:                        9.6    7.51  )-----------( 152      ( 14 Jan 2019 06:27 )             30.7     01-14    141  |  100  |  38<H>  ----------------------------<  125<H>  4.2   |  32<H>  |  1.26    Ca    9.1      14 Jan 2019 06:27  Mg     2.2     01-13

## 2019-01-14 NOTE — PROGRESS NOTE ADULT - ASSESSMENT
SOB and cough- MPV infection. On ventimask , O2 sat normal on this.   Management per primary team.     Acute on Chronic HFrEF , s/p ICD- mild hypervolemia  resume daily iv lasix.   close monitoring of the renal function and electrolytes.  Goal potassium of 4 and magnesium of 2.   Strict I/O and daily wt checks. Low sodium diet. Nutrition education.     Afib s/p ablation-  not on full dose anticoagulation as pt had major hematuria in past and she adamantly refused it from then on after knowing the risks of CVA off anticoagulation.   Now in sinus rythm.    HTN- hold all antihypertensives     MPV infection- Other medical issues- Management per primary team.     Other medical issues- Management per primary team.   Thank you for allowing me to participate in the care of this patient. Please feel free to contact me with any questions.

## 2019-01-14 NOTE — PROGRESS NOTE ADULT - ASSESSMENT
88 y/o Female with h/o A.fib not on a/c due to hematuria & s/p ablation (?), CAD, HTN, HLD, CHF (EF 20-25%) s/p cardiomems and AICD, GERD, OA,  left TKR, 4x C-sections was admitted on 1/10 for worsening SOB/cough for past 3 days PTA. Her daughter notes that she wasn't herself and then complaining of SOB/cough. She went to see Dr. Denny in the office yesterday, where she was supposedly ok and Dr. Denny did not make any changes to her medications at that time. Then her SOB worsened and came to the hospital. She denies fever, chills at home. She has dry cough. On arrival to ED pt had fever 102.2 F and was in respiratory distress with increased work of breathing so was put on BiPAP. SHe received zosyn.    1. Febrile syndrome improving. Acute respiratory failure. Multifocal pneumonia with hMPv. ?superimposed bacterial pneumonia. CHF exacerbation.  -respiratory improved, but frail  -fever is down  -mild leukocytosis resolving  -f/u BC x 2  -on cefepime 1 gm IV q12h and doxycycline IV # 5  -tolerating abx well so far; no side effects noted  -respiratory care  -BiPAP as needed  -droplet isolation  -continue IV abx coverage   -prognosis is poor  -monitor temps  -f/u CBC  -supportive care  2. Other issues:   -care per medicine

## 2019-01-14 NOTE — PROGRESS NOTE ADULT - SUBJECTIVE AND OBJECTIVE BOX
Patient is a 87y old  Female who presents with a chief complaint of SOB, cough.   HPI:  87F w/ PMH of afib not on a/c for hx of hematuria & s/p ablation (?), CAD, HTN, HLD, sCHF (EF 20-25%) s/p cardiomems and AICD, GERD, OA, s/p L knee replacement, 4x c-sections comes in with worsening SOB/cough for past 3 days. She lives in assisted living and I have seen her in office yesterday.  She was told to increase diuretics in the past and she refused to take more than what she was already taking.   Family urged to change bumex to lasix secondary to cost.  With cough yesterday she denied any fever and I warned her about viral infections that are wide spread now.    On arrival to ED pt had fever 102.2 F and was in respiratory distress with increased work of breathing so was put on bipap.   Now on face mask.  No hypoxia.    No CP or pressure.     - pt seen and examined by me today. In respiratory distress to a mild degrees, hypoxia overnight     - pt seen and examined by me this am.     - Pt seen and examined by me today. Pt states that her SOB is improving.     - Pt seen and examined by me today. Pt is on ventimask now and overnight on bipap.  Mild tachypnea.     PAST MEDICAL & SURGICAL HISTORY:  Osteoarthritis of multiple joints, unspecified osteoarthritis type  Gastroesophageal reflux disease without esophagitis  Dyslipidemia  Essential hypertension  Paroxysmal atrial fibrillation  Coronary artery disease involving native coronary artery of native heart without angina pectoris  Chronic systolic congestive heart failure: EF 20-25%  History of : 4 c-sections  History of total left knee replacement (TKR)  S/P ablation of atrial fibrillation  AICD (automatic cardioverter/defibrillator) present      MEDICATIONS  (STANDING):  amiodarone    Tablet 200 milliGRAM(s) Oral daily  aspirin  chewable 81 milliGRAM(s) Oral daily  atorvastatin 10 milliGRAM(s) Oral at bedtime  cefepime  Injectable. 1000 milliGRAM(s) IV Push every 12 hours  clopidogrel Tablet 75 milliGRAM(s) Oral daily  enalapril 2.5 milliGRAM(s) Oral daily  heparin  Injectable 5000 Unit(s) SubCutaneous every 12 hours  magnesium oxide 200 milliGRAM(s) Oral daily  metoprolol succinate ER 50 milliGRAM(s) Oral daily  oxybutynin 5 milliGRAM(s) Oral two times a day  sodium chloride 0.9% Bolus 1000 milliLiter(s) IV Bolus once    MEDICATIONS  (PRN):  acetaminophen   Tablet .. 650 milliGRAM(s) Oral every 6 hours PRN Temp greater or equal to 38C (100.4F), Mild Pain (1 - 3)  dicyclomine 20 milliGRAM(s) Oral three times a day before meals PRN Indigestion  lactulose Syrup 20 Gram(s) Oral at bedtime PRN Constipation  loperamide 2 milliGRAM(s) Oral daily PRN Diarrhea  ondansetron    Tablet 4 milliGRAM(s) Oral every 6 hours PRN Nausea and/or Vomiting  oxyCODONE    5 mG/acetaminophen 325 mG 1 Tablet(s) Oral every 6 hours PRN Moderate Pain (4 - 6)      FAMILY HISTORY:  Family history of malignant neoplasm (Child)      SOCIAL HISTORY:  no recent smoking     REVIEW OF SYSTEMS:  CONSTITUTIONAL:    No fatigue, malaise, lethargy.  No fever or chills.  HEENT:  Eyes:  No visual changes.     ENT:  No epistaxis.  No sinus pain.    RESPIRATORY:  c/o cough.  No wheeze.  No hemoptysis.  c/o  shortness of breath.  CARDIOVASCULAR:  No chest pains.  No palpitations. c/o shortness of breath, No orthopnea or PND.  GASTROINTESTINAL:  No abdominal pain.  No nausea or vomiting.    GENITOURINARY:    No hematuria.    MUSCULOSKELETAL:  No musculoskeletal pain.  No joint swelling.  No arthritis.  NEUROLOGICAL:  No tingling or numbness or weakness.  PSYCHIATRIC:  No confusion  SKIN:  No rashes.    ENDOCRINE:  No unexplained weight loss.  No polydipsia.   HEMATOLOGIC:  No anemia.  No prolonged or excessive bleeding.   ALLERGIC AND IMMUNOLOGIC:  No pruritus.          Vital Signs Last 24 Hrs  T(C): 36.7 (10 Hemant 2019 15:17), Max: 39 (10 Hemant 2019 10:51)  T(F): 98.1 (10 Hemant 2019 15:17), Max: 102.2 (10 Hemant 2019 10:51)  HR: 86 (10 Hemant 2019 15:17) (74 - 90)  BP: 113/58 (10 Hemant 2019 15:17) (100/55 - 143/87)  BP(mean): --  RR: 18 (10 Hemant 2019 15:17) (18 - 19)  SpO2: 99% (10 Hemant 2019 15:17) (99% - 100%)    PHYSICAL EXAM-    Constitutional: ill looking, alert on bipap    Head: Head is normocephalic and atraumatic.      Neck: There are strong carotid pulses bilaterally. No JVD.     Cardiovascular: Regular rate and rhythm without S3, S4. No murmurs or rubs are appreciated.      Respiratory: crackles b/l    Abdomen: Soft, nontender, nondistended with positive bowel sounds.      Extremity: No tenderness. trace  pitting edema b/l    Neurologic: The patient is alert   Skin: No rash, no obvious lesions noted.      Psychiatric: The patient appears alert      INTERPRETATION OF TELEMETRY: SR   ECG: Sinus rythm. LVH.     I&O's Detail      LABS:                        11.4   8.22  )-----------( 195      ( 10 Hemant 2019 10:28 )             36.3     01-10    136  |  102  |  23  ----------------------------<  110<H>  4.0   |  25  |  1.29    Ca    8.7      10 Hemant 2019 10:28    TPro  7.8  /  Alb  3.8  /  TBili  0.6  /  DBili  x   /  AST  32  /  ALT  21  /  AlkPhos  61  01-10    CARDIAC MARKERS ( 10 Hemant 2019 13:24 )  0.081 ng/mL / x     / x     / x     / x      CARDIAC MARKERS ( 10 Hemant 2019 10:28 )  0.043 ng/mL / x     / x     / x     / x          PT/INR - ( 10 Hemant 2019 10:28 )   PT: 12.8 sec;   INR: 1.15 ratio         PTT - ( 10 Hemant 2019 10:28 )  PTT:29.3 sec  Urinalysis Basic - ( 10 Hemant 2019 10:28 )    Color: Yellow / Appearance: Clear / S.020 / pH: x  Gluc: x / Ketone: Moderate  / Bili: Negative / Urobili: Negative mg/dL   Blood: x / Protein: 30 mg/dL / Nitrite: Negative   Leuk Esterase: Trace / RBC: 11-25 /HPF / WBC 3-5   Sq Epi: x / Non Sq Epi: x / Bacteria: Moderate      I&O's Summary    BNPSerum Pro-Brain Natriuretic Peptide: 14464 pg/mL (01-10 @ 10:28)    RADIOLOGY & ADDITIONAL STUDIES:  < from: CT Chest No Cont (01.10.19 @ 13:37) >  IMPRESSION:     Bilateral multifocal pneumonia.    Cardiomegaly and enlarged pulmonary artery suggestive of pulmonary   arterial hypertension. Left lower pulmonary artery cardioMEMS device in   place.                FRANCISCOYokaJOSH   This document has been electronically signed. Hemant 10 2019  2:26PM        < end of copied text >

## 2019-01-14 NOTE — PROGRESS NOTE ADULT - ASSESSMENT
PROBLEMS:    AC hypoxaemic respiratory failur  Multifocal pneumonia  positive HMPV  Cardic arrthmias  Chronic systolic heart failure   CKD  Paroxysmal atrial fibrillation  Essential hypertension    PLAN:    titrate fio2  IV CEFEPIME/ IV DOXYCYLIN  BIPAP qhs/prn  on amiodarone  POOR PROGNOSIS  Comfort care  DVT PROPHYLASIX

## 2019-01-14 NOTE — PROGRESS NOTE ADULT - SUBJECTIVE AND OBJECTIVE BOX
Date of service: 19 @ 15:58    Lying in bed in NAD  SOB improved today  Has dry cough  Low grade fever    ROS: poorly verbal    MEDICATIONS  (STANDING):  amiodarone    Tablet 200 milliGRAM(s) Oral daily  aspirin  chewable 81 milliGRAM(s) Oral daily  atorvastatin 10 milliGRAM(s) Oral at bedtime  cefepime  Injectable. 1000 milliGRAM(s) IV Push every 12 hours  clopidogrel Tablet 75 milliGRAM(s) Oral daily  doxycycline IVPB      doxycycline IVPB 100 milliGRAM(s) IV Intermittent every 12 hours  furosemide   Injectable 40 milliGRAM(s) IV Push daily  heparin  Injectable 5000 Unit(s) SubCutaneous every 12 hours  magnesium oxide 200 milliGRAM(s) Oral daily  oxybutynin 5 milliGRAM(s) Oral two times a day      Vital Signs Last 24 Hrs  T(C): 37.3 (2019 14:37), Max: 38.3 (2019 17:17)  T(F): 99.2 (2019 14:37), Max: 100.9 (2019 17:17)  HR: 87 (2019 15:45) (76 - 99)  BP: 91/49 (2019 15:00) (82/42 - 141/77)  BP(mean): 59 (2019 15:00) (52 - 103)  RR: 23 (2019 15:00) (20 - 32)  SpO2: 100% (2019 15:45) (95% - 100%)    Physical Exam:      Constitutional: frail looking  HEENT: NC/AT, EOMI, PERRLA, conjunctivae clear  Neck: supple; thyroid not palpable  Back: no tenderness  Respiratory: respiratory effort normal; crackles b/l; wheezing  Cardiovascular: S1S2 regular, no murmurs  Abdomen: soft, not tender, not distended, positive BS; no liver or spleen organomegaly  Genitourinary: no suprapubic tenderness  Lymphatic: no LN palpable  Musculoskeletal: no muscle tenderness, no joint swelling or tenderness  Extremities: no pedal edema  Neurological/ Psychiatric: AxOx3, judgement and insight normal; moving all extremities  Skin: no rashes; no palpable lesions    Labs: reviewed                        9.6    7.51  )-----------( 152      ( 2019 06:27 )             30.7     -14    141  |  100  |  38<H>  ----------------------------<  125<H>  4.2   |  32<H>  |  1.26    Ca    9.1      2019 06:27  Mg     2.2                             11.4   8.22  )-----------( 195      ( 10 Hemant 2019 10:28 )             36.3     01-10    136  |  102  |  23  ----------------------------<  110<H>  4.0   |  25  |  1.29    Ca    8.7      10 Hemant 2019 10:28    TPro  7.8  /  Alb  3.8  /  TBili  0.6  /  DBili  x   /  AST  32  /  ALT  21  /  AlkPhos  61  01-10     LIVER FUNCTIONS - ( 10 Hemant 2019 10:28 )  Alb: 3.8 g/dL / Pro: 7.8 gm/dL / ALK PHOS: 61 U/L / ALT: 21 U/L / AST: 32 U/L / GGT: x           Urinalysis Basic - ( 10 Hemant 2019 10:28 )    Color: Yellow / Appearance: Clear / S.020 / pH: x  Gluc: x / Ketone: Moderate  / Bili: Negative / Urobili: Negative mg/dL   Blood: x / Protein: 30 mg/dL / Nitrite: Negative   Leuk Esterase: Trace / RBC: 11-25 /HPF / WBC 3-5   Sq Epi: x / Non Sq Epi: x / Bacteria: Moderate    Rapid Respiratory Viral Panel (01.10.19 @ 10:28)    Rapid RVP Result: Detected:     hMPV (RapRVP): Detected      Culture - Urine (collected 10 Hemant 2019 10:28)  Source: .Urine None  Preliminary Report (2019 09:26):    10,000 - 49,000 CFU/mL Escherichia coli    Culture - Blood (01.10.19 @ 10:28)    Specimen Source: .Blood None    Culture Results:   No growth to date.    Radiology: all available radiological tests reviewed    < from: CT Chest No Cont (01.10.19 @ 13:37) >  Bilateral multifocal pneumonia.  Cardiomegaly and enlarged pulmonary artery suggestive of pulmonary   arterial hypertension. Left lower pulmonary artery cardioMEMS device in   place.    < end of copied text >      Advanced directives addressed: DNR

## 2019-01-15 LAB
ANION GAP SERPL CALC-SCNC: 10 MMOL/L — SIGNIFICANT CHANGE UP (ref 5–17)
ANISOCYTOSIS BLD QL: SLIGHT — SIGNIFICANT CHANGE UP
BASOPHILS # BLD AUTO: 0.02 K/UL — SIGNIFICANT CHANGE UP (ref 0–0.2)
BASOPHILS NFR BLD AUTO: 0.3 % — SIGNIFICANT CHANGE UP (ref 0–2)
BUN SERPL-MCNC: 35 MG/DL — HIGH (ref 7–23)
CALCIUM SERPL-MCNC: 8.8 MG/DL — SIGNIFICANT CHANGE UP (ref 8.5–10.1)
CHLORIDE SERPL-SCNC: 98 MMOL/L — SIGNIFICANT CHANGE UP (ref 96–108)
CO2 SERPL-SCNC: 32 MMOL/L — HIGH (ref 22–31)
CREAT SERPL-MCNC: 1.04 MG/DL — SIGNIFICANT CHANGE UP (ref 0.5–1.3)
CULTURE RESULTS: SIGNIFICANT CHANGE UP
CULTURE RESULTS: SIGNIFICANT CHANGE UP
ELLIPTOCYTES BLD QL SMEAR: SLIGHT — SIGNIFICANT CHANGE UP
EOSINOPHIL # BLD AUTO: 0.03 K/UL — SIGNIFICANT CHANGE UP (ref 0–0.5)
EOSINOPHIL NFR BLD AUTO: 0.4 % — SIGNIFICANT CHANGE UP (ref 0–6)
GLUCOSE SERPL-MCNC: 107 MG/DL — HIGH (ref 70–99)
HCT VFR BLD CALC: 30.1 % — LOW (ref 34.5–45)
HGB BLD-MCNC: 9.3 G/DL — LOW (ref 11.5–15.5)
IMM GRANULOCYTES NFR BLD AUTO: 1.9 % — HIGH (ref 0–1.5)
LYMPHOCYTES # BLD AUTO: 0.82 K/UL — LOW (ref 1–3.3)
LYMPHOCYTES # BLD AUTO: 12.1 % — LOW (ref 13–44)
MACROCYTES BLD QL: SLIGHT — SIGNIFICANT CHANGE UP
MANUAL SMEAR VERIFICATION: SIGNIFICANT CHANGE UP
MCHC RBC-ENTMCNC: 26.3 PG — LOW (ref 27–34)
MCHC RBC-ENTMCNC: 30.9 GM/DL — LOW (ref 32–36)
MCV RBC AUTO: 85 FL — SIGNIFICANT CHANGE UP (ref 80–100)
MONOCYTES # BLD AUTO: 0.32 K/UL — SIGNIFICANT CHANGE UP (ref 0–0.9)
MONOCYTES NFR BLD AUTO: 4.7 % — SIGNIFICANT CHANGE UP (ref 2–14)
NEUTROPHILS # BLD AUTO: 5.48 K/UL — SIGNIFICANT CHANGE UP (ref 1.8–7.4)
NEUTROPHILS NFR BLD AUTO: 80.6 % — HIGH (ref 43–77)
NRBC # BLD: 0 /100 WBCS — SIGNIFICANT CHANGE UP (ref 0–0)
PLAT MORPH BLD: NORMAL — SIGNIFICANT CHANGE UP
PLATELET # BLD AUTO: 147 K/UL — LOW (ref 150–400)
POIKILOCYTOSIS BLD QL AUTO: SLIGHT — SIGNIFICANT CHANGE UP
POLYCHROMASIA BLD QL SMEAR: SLIGHT — SIGNIFICANT CHANGE UP
POTASSIUM SERPL-MCNC: 3.5 MMOL/L — SIGNIFICANT CHANGE UP (ref 3.5–5.3)
POTASSIUM SERPL-SCNC: 3.5 MMOL/L — SIGNIFICANT CHANGE UP (ref 3.5–5.3)
RBC # BLD: 3.54 M/UL — LOW (ref 3.8–5.2)
RBC # FLD: 16.2 % — HIGH (ref 10.3–14.5)
RBC BLD AUTO: ABNORMAL
SODIUM SERPL-SCNC: 140 MMOL/L — SIGNIFICANT CHANGE UP (ref 135–145)
SPECIMEN SOURCE: SIGNIFICANT CHANGE UP
SPECIMEN SOURCE: SIGNIFICANT CHANGE UP
WBC # BLD: 6.8 K/UL — SIGNIFICANT CHANGE UP (ref 3.8–10.5)
WBC # FLD AUTO: 6.8 K/UL — SIGNIFICANT CHANGE UP (ref 3.8–10.5)

## 2019-01-15 PROCEDURE — 71045 X-RAY EXAM CHEST 1 VIEW: CPT | Mod: 26

## 2019-01-15 RX ADMIN — CLOPIDOGREL BISULFATE 75 MILLIGRAM(S): 75 TABLET, FILM COATED ORAL at 13:03

## 2019-01-15 RX ADMIN — Medication 5 MILLIGRAM(S): at 17:15

## 2019-01-15 RX ADMIN — Medication 81 MILLIGRAM(S): at 13:03

## 2019-01-15 RX ADMIN — Medication 40 MILLIGRAM(S): at 13:03

## 2019-01-15 RX ADMIN — ATORVASTATIN CALCIUM 10 MILLIGRAM(S): 80 TABLET, FILM COATED ORAL at 22:25

## 2019-01-15 RX ADMIN — CEFEPIME 1000 MILLIGRAM(S): 1 INJECTION, POWDER, FOR SOLUTION INTRAMUSCULAR; INTRAVENOUS at 05:01

## 2019-01-15 RX ADMIN — MAGNESIUM OXIDE 400 MG ORAL TABLET 200 MILLIGRAM(S): 241.3 TABLET ORAL at 13:03

## 2019-01-15 RX ADMIN — CEFEPIME 1000 MILLIGRAM(S): 1 INJECTION, POWDER, FOR SOLUTION INTRAMUSCULAR; INTRAVENOUS at 17:15

## 2019-01-15 RX ADMIN — HEPARIN SODIUM 5000 UNIT(S): 5000 INJECTION INTRAVENOUS; SUBCUTANEOUS at 17:18

## 2019-01-15 RX ADMIN — Medication 5 MILLIGRAM(S): at 05:02

## 2019-01-15 RX ADMIN — HEPARIN SODIUM 5000 UNIT(S): 5000 INJECTION INTRAVENOUS; SUBCUTANEOUS at 05:01

## 2019-01-15 RX ADMIN — OXYCODONE AND ACETAMINOPHEN 1 TABLET(S): 5; 325 TABLET ORAL at 01:04

## 2019-01-15 RX ADMIN — OXYCODONE AND ACETAMINOPHEN 1 TABLET(S): 5; 325 TABLET ORAL at 08:04

## 2019-01-15 RX ADMIN — Medication 110 MILLIGRAM(S): at 17:19

## 2019-01-15 RX ADMIN — Medication 110 MILLIGRAM(S): at 05:03

## 2019-01-15 RX ADMIN — AMIODARONE HYDROCHLORIDE 200 MILLIGRAM(S): 400 TABLET ORAL at 05:02

## 2019-01-15 NOTE — PROGRESS NOTE ADULT - ASSESSMENT
SOB and cough- MPV infection. On NC , O2 sat normal on this.   Management per primary team.     Acute on Chronic HFrEF , s/p ICD- mild hypervolemia  continue daily iv lasix.   close monitoring of the renal function and electrolytes.  Goal potassium of 4 and magnesium of 2.   Strict I/O and daily wt checks. Low sodium diet. Nutrition education.   PO potassium ordered     Afib s/p ablation-  not on full dose anticoagulation as pt had major hematuria in past and she adamantly refused it from then on after knowing the risks of CVA off anticoagulation.   Now in sinus rythm.    HTN-BP optimal now.     MPV infection- Other medical issues- Management per primary team.     Other medical issues- Management per primary team.   Thank you for allowing me to participate in the care of this patient. Please feel free to contact me with any questions.

## 2019-01-15 NOTE — PROGRESS NOTE ADULT - SUBJECTIVE AND OBJECTIVE BOX
HPI: Pt is a 87y old Female with hx of A.fib not on a/c due to hematuria (s/p ablation), CAD, HTN, HLD, CHF (EF 20-25%) s/p AICD, GERD, OA,  left TKR, 4x C-sections  - was admitted on 1/10 for worsening SOB/cough for past 3 days PTA.  She saw PMD 1 d PTA,  but came to ED as her SOB worsened.. She denies fever, chills at home. She has dry cough. On arrival to ED pt had fever 102.2 F and was in respiratory distress, was placed on BiPAP. Work-up= bilat infiltrates, RVP + for human metapneumovirus she is being empirically treated for bacterial process as well.   1/15/19 Seen and examined at bedside with granddaughter present. Pt is in no distress with nasal O2 in place. Denies pain and dyspnea.     PAIN: ( )Yes   (X )No    DYSPNEA: (x ) Yes  ( ) No  Level: moderate-severe/improved        Review of Systems:    Anxiety-yes/improved  Physical Discomfort-denies  Dyspnea-moderate-severe/improved  Constipation-denies  Vomiting-no  Anorexia-no  Weight Loss-   Fatigue-mod  Weakness-mod  Delirium-no      Limited due to: lethargy        PHYSICAL EXAM:  ICU Vital Signs Last 24 Hrs  T(C): 37.1 (15 Hemant 2019 14:14), Max: 37.3 (14 Jan 2019 14:37)  T(F): 98.8 (15 Hemant 2019 14:14), Max: 99.2 (14 Jan 2019 14:37)  HR: 77 (15 Hemant 2019 12:00) (73 - 92)  BP: 113/74 (15 Hemant 2019 12:00) (89/46 - 134/73)  BP(mean): 83 (15 Hemant 2019 12:00) (57 - 99)  ABP: --  ABP(mean): --  RR: 17 (15 Hemant 2019 12:00) (17 - 28)  SpO2: 98% (15 Hemant 2019 12:00) (95% - 100%)      PPSV2:  20-30 %  FAST:    General: Awake, alert elderly female in bed in NAD  HEENT: Oral mucosa moist  Lungs: Crackling rales throughout  Cardiac:  RRR  GI: +BS , soft, no masses or tenderness  : voids  Ext: No edema  Neuro: No gross focal deficit      LABS:                              9.3    6.80  )-----------( 147      ( 15 Hemant 2019 05:57 )             30.1      01-15    140  |  98  |  35<H>  ----------------------------<  107<H>  3.5   |  32<H>  |  1.04    Ca    8.8      15 Hemant 2019 05:57  Mg     2.2     01-13        Albumin: Albumin, Serum: 2.7 g/dL (01-12 @ 06:49)      Allergies    No Known Allergies    Intolerances

## 2019-01-15 NOTE — PROGRESS NOTE ADULT - ASSESSMENT
· Assessment		  87F w/ PMH as above comes in w/ fever and respiratory distress due to CAP.    A/P  #Acute respiratory failure /B/L pneumonia hMPv/also suspected GNR/MRSA-no significant improvement  #suspected sepsis from PNA  #hx systolic CHF EF 20% acutely decomepnsated /Has CardioMems - no significant improvement  #Acute toxic metabolic encephalopathy - resolved  -may transfer to [facility]  -[facility] IV diuresis, monitor wts  -continue cefepime and doxy , abx per ID  -BIPAP prn, no repeat ABG as patient and HCP daughter  declining intubation or resuscitation if need arises      # initial drop h/H likely dilutional, now stable   monitor     #Paroxysmal afib not on AC due to severe hematuria  pt has had discussions with cardiology and is aware of risks of stroke if not on A/C  continue amio    # CAD  continue statin, asa, plavix    #CKD stage 3  monitor BMP    #DNR/DNI  poor prognosis  palliative care consult appreciated    #DVT prophylaxis  heparin sc

## 2019-01-15 NOTE — PROGRESS NOTE ADULT - SUBJECTIVE AND OBJECTIVE BOX
· Subjective and Objective: 	  87F w/ PMH of afib not on a/c for hx of hematuria & s/p ablation (?), CAD, HTN, HLD, sCHF (EF 20-25%) s/p cardiomems and AICD, GERD, OA, s/p L knee replacement, 4x c-sections comes in with worsening SOB  Hospital course: admitted for resp failure 2/2 to acute decom systolic CHF with superimposed hMPV and pna      Subjective: no o/n events per nursing. pt refusing to work with PT. Very withdrawn during interview. Says her end goal is to return to [facility].         ICU Vital Signs Last 24 Hrs  T(C): 36.1 (15 Hemant 2019 09:32), Max: 37.3 (14 Jan 2019 14:37)  T(F): 97 (15 Hemant 2019 09:32), Max: 99.2 (14 Jan 2019 14:37)  HR: 92 (15 Hemant 2019 08:01) (73 - 94)  BP: 132/74 (15 Hemant 2019 08:01) (89/46 - 134/73)  BP(mean): 88 (15 Hemant 2019 08:01) (57 - 103)  ABP: --  ABP(mean): --  RR: 28 (15 Hemant 2019 08:01) (19 - 32)  SpO2: 99% (15 Hemant 2019 08:01) (95% - 100%)      PHYSICAL EXAM:    Constitutional: NAD, awake and weak and frail  HEENT: PERR, EOMI,   Neck: Soft and supple, No LAD, No JVD  Respiratory: rales bilaterally  Cardiovascular: S1S2, RRR, no mumurs  Gastrointestinal: Bowel Sounds present, soft, nontender, nondistended, no guarding, no rebound  Extremities: No peripheral edema  Vascular: 2+ peripheral pulses  Neurological: A/O x 3, no focal deficits  Musculoskeletal: 5/5 strength b/l upper and lower extremities  Skin: No rashes                                    9.6    7.51  )-----------( 152      ( 14 Jan 2019 06:27 )             30.7       CBC Full  -  ( 14 Jan 2019 06:27 )  WBC Count : 7.51 K/uL  Hemoglobin : 9.6 g/dL  Hematocrit : 30.7 %  Platelet Count - Automated : 152 K/uL  Mean Cell Volume : 83.7 fl  Mean Cell Hemoglobin : 26.2 pg  Mean Cell Hemoglobin Concentration : 31.3 gm/dL  Auto Neutrophil # : 6.33 K/uL  Auto Lymphocyte # : 0.83 K/uL  Auto Monocyte # : 0.25 K/uL  Auto Eosinophil # : 0.01 K/uL  Auto Basophil # : 0.01 K/uL  Auto Neutrophil % : 84.3 %  Auto Lymphocyte % : 11.1 %  Auto Monocyte % : 3.3 %  Auto Eosinophil % : 0.1 %  Auto Basophil % : 0.1 %      01-14    141  |  100  |  38<H>  ----------------------------<  125<H>  4.2   |  32<H>  |  1.26    Ca    9.1      14 Jan 2019 06:27  Mg     2.2     01-13                              MEDICATIONS  (STANDING):  amiodarone    Tablet 200 milliGRAM(s) Oral daily  aspirin  chewable 81 milliGRAM(s) Oral daily  atorvastatin 10 milliGRAM(s) Oral at bedtime  cefepime  Injectable. 1000 milliGRAM(s) IV Push every 12 hours  clopidogrel Tablet 75 milliGRAM(s) Oral daily  doxycycline IVPB      doxycycline IVPB 100 milliGRAM(s) IV Intermittent every 12 hours  furosemide   Injectable 40 milliGRAM(s) IV Push daily  heparin  Injectable 5000 Unit(s) SubCutaneous every 12 hours  magnesium oxide 200 milliGRAM(s) Oral daily  oxybutynin 5 milliGRAM(s) Oral two times a day

## 2019-01-15 NOTE — PROGRESS NOTE ADULT - SUBJECTIVE AND OBJECTIVE BOX
Patient is a 87y old  Female who presents with a chief complaint of SOB, cough.   HPI:  87F w/ PMH of afib not on a/c for hx of hematuria & s/p ablation (?), CAD, HTN, HLD, sCHF (EF 20-25%) s/p cardiomems and AICD, GERD, OA, s/p L knee replacement, 4x c-sections comes in with worsening SOB/cough for past 3 days. She lives in assisted living and I have seen her in office yesterday.  She was told to increase diuretics in the past and she refused to take more than what she was already taking.   Family urged to change bumex to lasix secondary to cost.  With cough yesterday she denied any fever and I warned her about viral infections that are wide spread now.    On arrival to ED pt had fever 102.2 F and was in respiratory distress with increased work of breathing so was put on bipap.   Now on face mask.  No hypoxia.    No CP or pressure.     - pt seen and examined by me today. In respiratory distress to a mild degrees, hypoxia overnight     - pt seen and examined by me this am.     - Pt seen and examined by me today. Pt states that her SOB is improving.     - Pt seen and examined by me today. Pt is on ventimask now and overnight on bipap.  Mild tachypnea.     1/15- Pt seen and exmained by me today. Comfortable in bed on NC O2.     PAST MEDICAL & SURGICAL HISTORY:  Osteoarthritis of multiple joints, unspecified osteoarthritis type  Gastroesophageal reflux disease without esophagitis  Dyslipidemia  Essential hypertension  Paroxysmal atrial fibrillation  Coronary artery disease involving native coronary artery of native heart without angina pectoris  Chronic systolic congestive heart failure: EF 20-25%  History of : 4 c-sections  History of total left knee replacement (TKR)  S/P ablation of atrial fibrillation  AICD (automatic cardioverter/defibrillator) present      MEDICATIONS  (STANDING):  amiodarone    Tablet 200 milliGRAM(s) Oral daily  aspirin  chewable 81 milliGRAM(s) Oral daily  atorvastatin 10 milliGRAM(s) Oral at bedtime  cefepime  Injectable. 1000 milliGRAM(s) IV Push every 12 hours  clopidogrel Tablet 75 milliGRAM(s) Oral daily  enalapril 2.5 milliGRAM(s) Oral daily  heparin  Injectable 5000 Unit(s) SubCutaneous every 12 hours  magnesium oxide 200 milliGRAM(s) Oral daily  metoprolol succinate ER 50 milliGRAM(s) Oral daily  oxybutynin 5 milliGRAM(s) Oral two times a day  sodium chloride 0.9% Bolus 1000 milliLiter(s) IV Bolus once    MEDICATIONS  (PRN):  acetaminophen   Tablet .. 650 milliGRAM(s) Oral every 6 hours PRN Temp greater or equal to 38C (100.4F), Mild Pain (1 - 3)  dicyclomine 20 milliGRAM(s) Oral three times a day before meals PRN Indigestion  lactulose Syrup 20 Gram(s) Oral at bedtime PRN Constipation  loperamide 2 milliGRAM(s) Oral daily PRN Diarrhea  ondansetron    Tablet 4 milliGRAM(s) Oral every 6 hours PRN Nausea and/or Vomiting  oxyCODONE    5 mG/acetaminophen 325 mG 1 Tablet(s) Oral every 6 hours PRN Moderate Pain (4 - 6)      FAMILY HISTORY:  Family history of malignant neoplasm (Child)      SOCIAL HISTORY:  no recent smoking     REVIEW OF SYSTEMS:  CONSTITUTIONAL:    No fatigue, malaise, lethargy.  No fever or chills.  HEENT:  Eyes:  No visual changes.     ENT:  No epistaxis.  No sinus pain.    RESPIRATORY:  c/o cough.  No wheeze.  No hemoptysis.  c/o  shortness of breath.  CARDIOVASCULAR:  No chest pains.  No palpitations. c/o shortness of breath, No orthopnea or PND.  GASTROINTESTINAL:  No abdominal pain.  No nausea or vomiting.    GENITOURINARY:    No hematuria.    MUSCULOSKELETAL:  No musculoskeletal pain.  No joint swelling.  No arthritis.  NEUROLOGICAL:  No tingling or numbness or weakness.  PSYCHIATRIC:  No confusion  SKIN:  No rashes.    ENDOCRINE:  No unexplained weight loss.  No polydipsia.   HEMATOLOGIC:  No anemia.  No prolonged or excessive bleeding.   ALLERGIC AND IMMUNOLOGIC:  No pruritus.          Vital Signs Last 24 Hrs  T(C): 36.7 (10 Hemant 2019 15:17), Max: 39 (10 Hemant 2019 10:51)  T(F): 98.1 (10 Hemant 2019 15:17), Max: 102.2 (10 Hemant 2019 10:51)  HR: 86 (10 Hemant 2019 15:17) (74 - 90)  BP: 113/58 (10 Hemant 2019 15:17) (100/55 - 143/87)  BP(mean): --  RR: 18 (10 Hemant 2019 15:17) (18 - 19)  SpO2: 99% (10 Hemant 2019 15:17) (99% - 100%)    PHYSICAL EXAM-    Constitutional: ill looking, alert on NC    Head: Head is normocephalic and atraumatic.      Neck: There are strong carotid pulses bilaterally. No JVD.     Cardiovascular: Regular rate and rhythm without S3, S4. No murmurs or rubs are appreciated.      Respiratory: crackles b/l    Abdomen: Soft, nontender, nondistended with positive bowel sounds.      Extremity: No tenderness. trace  pitting edema b/l    Neurologic: The patient is alert   Skin: No rash, no obvious lesions noted.      Psychiatric: The patient appears alert      INTERPRETATION OF TELEMETRY: SR   ECG: Sinus rythm. LVH.     I&O's Detail      LABS:                        11.4   8.22  )-----------( 195      ( 10 Hemant 2019 10:28 )             36.3     01-10    136  |  102  |  23  ----------------------------<  110<H>  4.0   |  25  |  1.29    Ca    8.7      10 Hemant 2019 10:28    TPro  7.8  /  Alb  3.8  /  TBili  0.6  /  DBili  x   /  AST  32  /  ALT  21  /  AlkPhos  61  01-10    CARDIAC MARKERS ( 10 Hemant 2019 13:24 )  0.081 ng/mL / x     / x     / x     / x      CARDIAC MARKERS ( 10 Hemant 2019 10:28 )  0.043 ng/mL / x     / x     / x     / x          PT/INR - ( 10 Hemant 2019 10:28 )   PT: 12.8 sec;   INR: 1.15 ratio         PTT - ( 10 Hemant 2019 10:28 )  PTT:29.3 sec  Urinalysis Basic - ( 10 Hemant 2019 10:28 )    Color: Yellow / Appearance: Clear / S.020 / pH: x  Gluc: x / Ketone: Moderate  / Bili: Negative / Urobili: Negative mg/dL   Blood: x / Protein: 30 mg/dL / Nitrite: Negative   Leuk Esterase: Trace / RBC: 11-25 /HPF / WBC 3-5   Sq Epi: x / Non Sq Epi: x / Bacteria: Moderate      I&O's Summary    BNPSerum Pro-Brain Natriuretic Peptide: 31295 pg/mL (01-10 @ 10:28)    RADIOLOGY & ADDITIONAL STUDIES:  < from: CT Chest No Cont (01.10.19 @ 13:37) >  IMPRESSION:     Bilateral multifocal pneumonia.    Cardiomegaly and enlarged pulmonary artery suggestive of pulmonary   arterial hypertension. Left lower pulmonary artery cardioMEMS device in   place.                FRANCISCOSHANNAN OQUENDO   This document has been electronically signed. Hemant 10 2019  2:26PM        < end of copied text >

## 2019-01-15 NOTE — PROGRESS NOTE ADULT - ASSESSMENT
PROBLEMS:    AC hypoxaemic respiratory failur  Multifocal pneumonia  positive HMPV  Cardic arrthmias  Chronic systolic heart failure   CKD  Paroxysmal atrial fibrillation  Essential hypertension    PLAN:    titrate fio2  OOB to chair  IV CEFEPIME/ IV DOXYCYLIN  BIPAP qhs/prn  on amiodarone  POOR PROGNOSIS  Comfort care  DVT PROPHYLASIX

## 2019-01-15 NOTE — PROGRESS NOTE ADULT - SUBJECTIVE AND OBJECTIVE BOX
Subjective:    pat better, sitting in bed, no respiratory distress.    Home Medications:  amiodarone 200 mg oral tablet: 1 tab(s) orally once a day (11 Jan 2019 14:24)  aspirin 81 mg oral tablet, chewable: 1 tab(s) orally once a day (11 Jan 2019 14:24)  Bentyl 20 mg oral tablet: 1 tab(s) orally 3 times a day, As Needed (11 Jan 2019 14:24)  bumetanide: 3 milligram(s) orally once a day (11 Jan 2019 14:24)  enalapril 2.5 mg oral tablet: 1 tab(s) orally once a day (11 Jan 2019 14:24)  Imodium A-D 2 mg oral tablet: 1 tab(s) orally once a day, As Needed (11 Jan 2019 14:24)  lactulose 10 g/15 mL oral syrup: 15 milliliter(s) orally once a day, As Needed (11 Jan 2019 14:24)  magnesium oxide 200 mg oral tablet: 1 tab(s) orally once a day (11 Jan 2019 14:24)  Metoprolol Succinate ER 50 mg oral tablet, extended release: 1 tab(s) orally once a day (11 Jan 2019 14:24)  oxyCODONE-acetaminophen 7.5 mg-325 mg oral tablet: 1 tab(s) orally every 6 hours, As Needed (11 Jan 2019 14:24)  potassium chloride 20 mEq oral tablet, extended release: 2 tab(s) orally 2 times a day (11 Jan 2019 14:24)    MEDICATIONS  (STANDING):  amiodarone    Tablet 200 milliGRAM(s) Oral daily  aspirin  chewable 81 milliGRAM(s) Oral daily  atorvastatin 10 milliGRAM(s) Oral at bedtime  cefepime  Injectable. 1000 milliGRAM(s) IV Push every 12 hours  clopidogrel Tablet 75 milliGRAM(s) Oral daily  doxycycline IVPB      doxycycline IVPB 100 milliGRAM(s) IV Intermittent every 12 hours  furosemide   Injectable 40 milliGRAM(s) IV Push daily  heparin  Injectable 5000 Unit(s) SubCutaneous every 12 hours  magnesium oxide 200 milliGRAM(s) Oral daily  oxybutynin 5 milliGRAM(s) Oral two times a day    MEDICATIONS  (PRN):  acetaminophen  Suppository .. 650 milliGRAM(s) Rectal every 6 hours PRN Temp greater or equal to 38C (100.4F), Mild Pain (1 - 3)  aluminum hydroxide/magnesium hydroxide/simethicone Suspension 30 milliLiter(s) Oral every 6 hours PRN Dyspepsia  dicyclomine 20 milliGRAM(s) Oral three times a day before meals PRN Indigestion  lactulose Syrup 20 Gram(s) Oral at bedtime PRN Constipation  loperamide 2 milliGRAM(s) Oral daily PRN Diarrhea  morphine  - Injectable 2 milliGRAM(s) IV Push every 4 hours PRN Moderate Pain (4 - 6)  morphine Concentrate 2.5 milliGRAM(s) SubLingual every 2 hours PRN dyspnea or pain  ondansetron   Disintegrating Tablet 4 milliGRAM(s) Oral every 6 hours PRN Nausea and/or Vomiting  oxyCODONE    5 mG/acetaminophen 325 mG 1 Tablet(s) Oral every 6 hours PRN Moderate Pain (4 - 6)      Allergies    No Known Allergies    Intolerances        Vital Signs Last 24 Hrs  T(C): 36.1 (15 Hemant 2019 09:32), Max: 37.3 (14 Jan 2019 14:37)  T(F): 97 (15 Hemant 2019 09:32), Max: 99.2 (14 Jan 2019 14:37)  HR: 79 (15 Hemant 2019 10:01) (73 - 94)  BP: 111/53 (15 Hemant 2019 10:01) (89/46 - 134/73)  BP(mean): 68 (15 Hemant 2019 10:01) (57 - 103)  RR: 19 (15 Hemant 2019 10:01) (19 - 32)  SpO2: 98% (15 Hemant 2019 10:01) (95% - 100%)      PHYSICAL EXAMINATION:    NECK:  Supple. No lymphadenopathy. Jugular venous pressure not elevated. Carotids equal.   HEART:   The cardiac impulse has a normal quality. Reg., Nl S1 and S2.  There are no murmurs, rubs or gallops noted  CHEST:  Chest is clear to auscultation. Normal respiratory effort.  ABDOMEN:  Soft and nontender.   EXTREMITIES:  There is no edema.       LABS:                        9.3    6.80  )-----------( 147      ( 15 Hemant 2019 05:57 )             30.1     01-15    140  |  98  |  35<H>  ----------------------------<  107<H>  3.5   |  32<H>  |  1.04    Ca    8.8      15 Hemant 2019 05:57  Mg     2.2     01-13

## 2019-01-15 NOTE — PROGRESS NOTE ADULT - ASSESSMENT
Pt is a 87y old Female with hx of A.fib not on a/c due to hematuria (s/p ablation), CAD, HTN, HLD, CHF (EF 20-25%) s/p AICD, GERD, OA,  left TKR, 4x C-sections  - was admitted on 1/10 for worsening SOB/cough for past 3 days PTA.  She saw PMD 1 d PTA,  but came to ED as her SOB worsened.. She denies fever, chills at home. She has dry cough. On arrival to ED pt had fever 102.2 F and was in respiratory distress, was placed on BiPAP. Work-up= bilat infiltrates, RVP + for human metapneumovirus she is being empirically treated for bacterial process as well. She is awake and "very hungry" - has been NPO since admission due to BiPAP mask.     1) Dyspnea  -Multifactorial : severe systolic CHF and pneumonia  -BiPAP mask used intermittently since admission however much less  -Discussed use of this mask with HCP #1 and #2 - they would like to continue with this BUT they also want her to eat -  so they consent to removing it for meals (RT states cannot put back on until 2 hrs have elapsed since last po intake)   -O2 by N/C   -Pt signed her own MOLST form in 3/2018, requesting DNR/DNI - nothing was marked re:BiPAP mask  -D/C Roxanol 2.5 mg Q4H PRN as she is taking Oxycodone 5 mg PO Q4H PRN which will help her mod pain/dyspnea   -Cont Morphine IVP as ordered.     2)Pneumonia  -RVP + for Human metapneumovirus   -Appreciate ID input and case discussed with ID MD as well - on empiric antibacterial therapy, no change since admission      3)CHF  -Severe systolic CHF  -EF 20%  -Appreciate cardiology input    4) Malnutrition  -Albumin = 2.7  -Poor PO inatke      5) Weakness  -Secondary to acute illness  -Was in Assisted Living PTA - cannot return there unless she is more independent  -Family is looking into [facility] facilities  -PT evaluation     6)Pain  -Has chronic pain secondary to arthritis  -Takes Oxybutynin bid   -Do not stop opioids - continue her Oxybutynin bid   -Morphine IVP for severe pain as ordered   -Oxycodone for moderate pain as ordered     7)Advance directives  -Has HCP on chart - names daughter Ernie Burgos as #1 and granddaughter Renay #2  -Has MOLST from 3/2018 on chart, signed by pt - states DNR/DNI - nothing else was addressed  -Had a [facility] meeting with Renay in person and Gabrielle by phone - see separate note  -Family respects DNI/DNR order, want to continue with BiPAP mask for now but also want her to eat.... and do not want any diertary restrictions other than mechanical soft diet as she is edentulous  -Will follow up Pt is a 87y old Female with hx of A.fib not on a/c due to hematuria (s/p ablation), CAD, HTN, HLD, CHF (EF 20-25%) s/p AICD, GERD, OA,  left TKR, 4x C-sections  - was admitted on 1/10 for worsening SOB/cough for past 3 days PTA.  She saw PMD 1 d PTA,  but came to ED as her SOB worsened.. She denies fever, chills at home. She has dry cough. On arrival to ED pt had fever 102.2 F and was in respiratory distress, was placed on BiPAP. Work-up= bilat infiltrates, RVP + for human metapneumovirus she is being empirically treated for bacterial process as well. She is awake and "very hungry" - has been NPO since admission due to BiPAP mask.     1) Dyspnea  -Multifactorial : severe systolic CHF and pneumonia  -BiPAP mask used intermittently since admission however much less  -Discussed use of this mask with HCP #1 and #2 - they would like to continue with this BUT they also want her to eat -  so they consent to removing it for meals (RT states cannot put back on until 2 hrs have elapsed since last po intake)   -O2 by N/C   -Pt signed her own MOLST form in 3/2018, requesting DNR/DNI - nothing was marked re:BiPAP mask  -D/C Roxanol 2.5 mg Q4H PRN as she is taking Oxycodone 5 mg PO Q6H PRN which will help her mod pain/dyspnea   -Cont Morphine IVP as ordered.     2)Pneumonia  -RVP + for Human metapneumovirus   -Appreciate ID input and case discussed with ID MD as well - on empiric antibacterial therapy, no change since admission      3)CHF  -Severe systolic CHF  -EF 20%  -Appreciate cardiology input    4) Malnutrition  -Albumin = 2.7  -Poor PO inatke      5) Weakness  -Secondary to acute illness  -Was in Assisted Living PTA - cannot return there unless she is more independent  -Family is looking into [facility] facilities  -PT evaluation     6)Pain  -Has chronic pain secondary to arthritis  -Do not stop opioids   -Morphine IVP for severe pain as ordered   -Oxycodone for moderate pain as ordered     7)Advance directives  -Has HCP on chart - names daughter Gabrielle Burgos as #1 and granddaughter Renay #2  -Has MOLST from 3/2018 on chart, signed by pt - states DNR/DNI - nothing else was addressed  -Had a [facility] meeting with Renay in person and Gabrielle by phone - see separate note  -Family respects DNI/DNR order, want to continue with BiPAP mask for now but also want her to eat.... and do not want any diertary restrictions other than mechanical soft diet as she is edentulous  -Will follow up

## 2019-01-16 LAB
ANION GAP SERPL CALC-SCNC: 4 MMOL/L — LOW (ref 5–17)
BUN SERPL-MCNC: 29 MG/DL — HIGH (ref 7–23)
CALCIUM SERPL-MCNC: 8.6 MG/DL — SIGNIFICANT CHANGE UP (ref 8.5–10.1)
CHLORIDE SERPL-SCNC: 97 MMOL/L — SIGNIFICANT CHANGE UP (ref 96–108)
CO2 SERPL-SCNC: 35 MMOL/L — HIGH (ref 22–31)
CREAT SERPL-MCNC: 1 MG/DL — SIGNIFICANT CHANGE UP (ref 0.5–1.3)
GLUCOSE SERPL-MCNC: 96 MG/DL — SIGNIFICANT CHANGE UP (ref 70–99)
POTASSIUM SERPL-MCNC: 3.4 MMOL/L — LOW (ref 3.5–5.3)
POTASSIUM SERPL-SCNC: 3.4 MMOL/L — LOW (ref 3.5–5.3)
SODIUM SERPL-SCNC: 136 MMOL/L — SIGNIFICANT CHANGE UP (ref 135–145)

## 2019-01-16 RX ORDER — DIGOXIN 250 MCG
0.25 TABLET ORAL EVERY 8 HOURS
Qty: 0 | Refills: 0 | Status: COMPLETED | OUTPATIENT
Start: 2019-01-16 | End: 2019-01-16

## 2019-01-16 RX ORDER — METOPROLOL TARTRATE 50 MG
2.5 TABLET ORAL EVERY 4 HOURS
Qty: 0 | Refills: 0 | Status: DISCONTINUED | OUTPATIENT
Start: 2019-01-16 | End: 2019-01-21

## 2019-01-16 RX ORDER — AMIODARONE HYDROCHLORIDE 400 MG/1
150 TABLET ORAL ONCE
Qty: 0 | Refills: 0 | Status: COMPLETED | OUTPATIENT
Start: 2019-01-16 | End: 2019-01-16

## 2019-01-16 RX ORDER — DIGOXIN 250 MCG
0.12 TABLET ORAL DAILY
Qty: 0 | Refills: 0 | Status: DISCONTINUED | OUTPATIENT
Start: 2019-01-16 | End: 2019-01-22

## 2019-01-16 RX ORDER — METOPROLOL TARTRATE 50 MG
2.5 TABLET ORAL ONCE
Qty: 0 | Refills: 0 | Status: COMPLETED | OUTPATIENT
Start: 2019-01-16 | End: 2019-01-16

## 2019-01-16 RX ADMIN — AMIODARONE HYDROCHLORIDE 200 MILLIGRAM(S): 400 TABLET ORAL at 06:34

## 2019-01-16 RX ADMIN — Medication 5 MILLIGRAM(S): at 08:08

## 2019-01-16 RX ADMIN — Medication 5 MILLIGRAM(S): at 17:12

## 2019-01-16 RX ADMIN — HEPARIN SODIUM 5000 UNIT(S): 5000 INJECTION INTRAVENOUS; SUBCUTANEOUS at 06:34

## 2019-01-16 RX ADMIN — CEFEPIME 1000 MILLIGRAM(S): 1 INJECTION, POWDER, FOR SOLUTION INTRAMUSCULAR; INTRAVENOUS at 06:34

## 2019-01-16 RX ADMIN — CEFEPIME 1000 MILLIGRAM(S): 1 INJECTION, POWDER, FOR SOLUTION INTRAMUSCULAR; INTRAVENOUS at 17:13

## 2019-01-16 RX ADMIN — OXYCODONE AND ACETAMINOPHEN 1 TABLET(S): 5; 325 TABLET ORAL at 02:30

## 2019-01-16 RX ADMIN — ATORVASTATIN CALCIUM 10 MILLIGRAM(S): 80 TABLET, FILM COATED ORAL at 21:41

## 2019-01-16 RX ADMIN — OXYCODONE AND ACETAMINOPHEN 1 TABLET(S): 5; 325 TABLET ORAL at 01:36

## 2019-01-16 RX ADMIN — HEPARIN SODIUM 5000 UNIT(S): 5000 INJECTION INTRAVENOUS; SUBCUTANEOUS at 17:12

## 2019-01-16 RX ADMIN — Medication 110 MILLIGRAM(S): at 06:35

## 2019-01-16 RX ADMIN — Medication 2.5 MILLIGRAM(S): at 04:23

## 2019-01-16 RX ADMIN — Medication 2.5 MILLIGRAM(S): at 18:28

## 2019-01-16 RX ADMIN — AMIODARONE HYDROCHLORIDE 618 MILLIGRAM(S): 400 TABLET ORAL at 09:27

## 2019-01-16 RX ADMIN — Medication 81 MILLIGRAM(S): at 13:11

## 2019-01-16 RX ADMIN — Medication 0.25 MILLIGRAM(S): at 09:21

## 2019-01-16 RX ADMIN — CLOPIDOGREL BISULFATE 75 MILLIGRAM(S): 75 TABLET, FILM COATED ORAL at 13:10

## 2019-01-16 RX ADMIN — MAGNESIUM OXIDE 400 MG ORAL TABLET 200 MILLIGRAM(S): 241.3 TABLET ORAL at 13:14

## 2019-01-16 RX ADMIN — Medication 0.12 MILLIGRAM(S): at 13:10

## 2019-01-16 NOTE — PROGRESS NOTE ADULT - SUBJECTIVE AND OBJECTIVE BOX
HPI: Pt is a 87y old Female with hx of A.fib not on a/c due to hematuria (s/p ablation), CAD, HTN, HLD, CHF (EF 20-25%) s/p AICD, GERD, OA,  left TKR, 4x C-sections  - was admitted on 1/10 for worsening SOB/cough for past 3 days PTA.  She saw PMD 1 d PTA,  but came to ED as her SOB worsened.. She denies fever, chills at home. She has dry cough. On arrival to ED pt had fever 102.2 F and was in respiratory distress, was placed on BiPAP. Work-up= bilat infiltrates, RVP + for human metapneumovirus she is being empirically treated for bacterial process as well.   1/15/19 Seen and examined at bedside with granddaughter present. Pt is in no distress with nasal O2 in place. Denies pain and dyspnea.   1/16/19 Seen and examined at bedside with no family present.  Pt awake having breakfast. Appears to have mild dyspnea however denies  PAIN: ( )Yes   (X )No    DYSPNEA: (x ) Yes  ( ) No  Level: moderate-severe/improved        Review of Systems:    Anxiety-yes/improved  Physical Discomfort-denies  Dyspnea-moderate-severe/improved  Constipation-denies  Vomiting-no  Anorexia-no  Weight Loss-   Fatigue-mod  Weakness-mod  Delirium-no      Limited due to: lethargy        PHYSICAL EXAM:  ICU Vital Signs Last 24 Hrs  T(C): 36.8 (16 Jan 2019 14:29), Max: 37.2 (15 Hemant 2019 21:00)  T(F): 98.2 (16 Jan 2019 14:29), Max: 99 (15 Hemant 2019 21:00)  HR: 139 (16 Jan 2019 14:01) (76 - 139)  BP: 115/65 (16 Jan 2019 14:01) (88/58 - 129/63)  BP(mean): 71 (16 Jan 2019 14:01) (56 - 83)  ABP: --  ABP(mean): --  RR: 17 (16 Jan 2019 14:01) (14 - 25)  SpO2: 98% (16 Jan 2019 14:01) (97% - 100%)    PPSV2:  20-30 %  FAST:    General: Awake, alert elderly female in bed in NAD  HEENT: Oral mucosa moist  Lungs: Crackling rales throughout  Cardiac:  RRR  GI: +BS , soft, no masses or tenderness  : voids  Ext: No edema  Neuro: No gross focal deficit      LABS:                                 9.3    6.80  )-----------( 147      ( 15 Hemant 2019 05:57 )             30.1               140  |  98  |  35<H>  ----------------------------<  107<H>  3.5   |  32<H>  |  1.04    Ca    8.8      15 Hemant 2019 05:57  Mg     2.2     01-13        Albumin: Albumin, Serum: 2.7 g/dL (01-12 @ 06:49)      Allergies    No Known Allergies    Intolerances

## 2019-01-16 NOTE — PROGRESS NOTE ADULT - SUBJECTIVE AND OBJECTIVE BOX
Cardiology Progress Note  Patient is a 87y old  Female who presents with a chief complaint of SOB, cough.     HPI: 87F w/ PMH of afib not on a/c for hx of hematuria & s/p ablation (?), CAD, HTN, HLD, sCHF (EF 20-25%) s/p cardiomems and AICD, GERD, OA, s/p L knee replacement, 4x c-sections comes in with worsening SOB/cough for past 3 days. She lives in assisted living and I have seen her in office yesterday.  She was told to increase diuretics in the past and she refused to take more than what she was already taking.   Family urged to change bumex to lasix secondary to cost.  With cough yesterday she denied any fever and I warned her about viral infections that are wide spread now.  On arrival to ED pt had fever 102.2 F and was in respiratory distress with increased work of breathing so was put on bipap.   Now on face mask.  No hypoxia.    . Pt seen/examined in SD unit. No CP. Mild SOB with cough. Feels weak/tired.  Remains with afib with RVR.     PAST MEDICAL & SURGICAL HISTORY:  Osteoarthritis of multiple joints, unspecified osteoarthritis type  Gastroesophageal reflux disease without esophagitis  Dyslipidemia  Essential hypertension  Paroxysmal atrial fibrillation  Coronary artery disease involving native coronary artery of native heart without angina pectoris  Chronic systolic congestive heart failure: EF 20-25%  History of : 4 c-sections  History of total left knee replacement (TKR)  S/P ablation of atrial fibrillation  AICD (automatic cardioverter/defibrillator) present    MEDICATIONS  (STANDING):  amiodarone    Tablet 200 milliGRAM(s) Oral daily  aspirin  chewable 81 milliGRAM(s) Oral daily  atorvastatin 10 milliGRAM(s) Oral at bedtime  cefepime  Injectable. 1000 milliGRAM(s) IV Push every 12 hours  clopidogrel Tablet 75 milliGRAM(s) Oral daily  enalapril 2.5 milliGRAM(s) Oral daily  heparin  Injectable 5000 Unit(s) SubCutaneous every 12 hours  magnesium oxide 200 milliGRAM(s) Oral daily  metoprolol succinate ER 50 milliGRAM(s) Oral daily  oxybutynin 5 milliGRAM(s) Oral two times a day  sodium chloride 0.9% Bolus 1000 milliLiter(s) IV Bolus once    MEDICATIONS  (PRN):  acetaminophen   Tablet .. 650 milliGRAM(s) Oral every 6 hours PRN Temp greater or equal to 38C (100.4F), Mild Pain (1 - 3)  dicyclomine 20 milliGRAM(s) Oral three times a day before meals PRN Indigestion  lactulose Syrup 20 Gram(s) Oral at bedtime PRN Constipation  loperamide 2 milliGRAM(s) Oral daily PRN Diarrhea  ondansetron    Tablet 4 milliGRAM(s) Oral every 6 hours PRN Nausea and/or Vomiting  oxyCODONE    5 mG/acetaminophen 325 mG 1 Tablet(s) Oral every 6 hours PRN Moderate Pain (4 - 6)    FAMILY HISTORY: Family history of malignant neoplasm (Child)    SOCIAL HISTORY: no recent smoking     REVIEW OF SYSTEMS:  CONSTITUTIONAL:    No fatigue, malaise, lethargy.  No fever or chills.  HEENT:  Eyes:  No visual changes.    ENT:  No epistaxis.  No sinus pain.    RESPIRATORY:  c/o cough.  No wheeze.  No hemoptysis.  c/o  shortness of breath.  CARDIOVASCULAR:  No chest pains.  No palpitations. c/o shortness of breath, No orthopnea or PND.  GASTROINTESTINAL:  No abdominal pain.  No nausea or vomiting.    GENITOURINARY:    No hematuria.    MUSCULOSKELETAL:  No musculoskeletal pain.  No joint swelling.  No arthritis.  NEUROLOGICAL:  No tingling or numbness or weakness.    Vital Signs Last 24 Hrs  T(C): 36.7 (10 Hemant 2019 15:17), Max: 39 (10 Hemant 2019 10:51)  T(F): 98.1 (10 Hemant 2019 15:17), Max: 102.2 (10 Hemant 2019 10:51)  HR: 86 (10 Hemant 2019 15:17) (74 - 90)  BP: 113/58 (10 Hemant 2019 15:17) (100/55 - 143/87)  BP(mean): --  RR: 18 (10 Hemant 2019 15:17) (18 - 19)  SpO2: 99% (10 Hemant 2019 15:17) (99% - 100%)    PHYSICAL EXAM-    Constitutional: ill looking, alert on NC  Head: Head is normocephalic and atraumatic.    Neck: There are strong carotid pulses bilaterally. No JVD.   Cardiovascular: tachy, irregular rhythm without S3, S4. No murmurs or rubs are appreciated.    Respiratory: crackles b/l  Abdomen: Soft, nontender, nondistended with positive bowel sounds.    Extremity: No tenderness. trace  pitting edema b/l    INTERPRETATION OF TELEMETRY: SR   ECG: Sinus rythm. LVH.     I&O's Detail      LABS:                        11.4   8.22  )-----------( 195      ( 10 Hemant 2019 10:28 )             36.3     01-10    136  |  102  |  23  ----------------------------<  110<H>  4.0   |  25  |  1.29    Ca    8.7      10 Hemant 2019 10:28    TPro  7.8  /  Alb  3.8  /  TBili  0.6  /  DBili  x   /  AST  32  /  ALT  21  /  AlkPhos  61  01-10    CARDIAC MARKERS ( 10 Hemant 2019 13:24 )  0.081 ng/mL / x     / x     / x     / x      CARDIAC MARKERS ( 10 Hemant 2019 10:28 )  0.043 ng/mL / x     / x     / x     / x          PT/INR - ( 10 Hemant 2019 10:28 )   PT: 12.8 sec;   INR: 1.15 ratio         PTT - ( 10 Hemant 2019 10:28 )  PTT:29.3 sec  Urinalysis Basic - ( 10 Hemant 2019 10:28 )    Color: Yellow / Appearance: Clear / S.020 / pH: x  Gluc: x / Ketone: Moderate  / Bili: Negative / Urobili: Negative mg/dL   Blood: x / Protein: 30 mg/dL / Nitrite: Negative   Leuk Esterase: Trace / RBC: 11-25 /HPF / WBC 3-5   Sq Epi: x / Non Sq Epi: x / Bacteria: Moderate    I&O's Summary    BNPSerum Pro-Brain Natriuretic Peptide: 92089 pg/mL (01-10 @ 10:28)    RADIOLOGY & ADDITIONAL STUDIES:  < from: CT Chest No Cont (01.10.19 @ 13:37) >  IMPRESSION:     Bilateral multifocal pneumonia.    Cardiomegaly and enlarged pulmonary artery suggestive of pulmonary   arterial hypertension. Left lower pulmonary artery cardioMEMS device in   place.      < from: Transthoracic Echocardiogram (18 @ 10:47) >  The left ventricle cavity is moderately dilated.   Mild concentric left ventricular hypertrophy is present.   Severely Reduced left ventricular systolic function.   Estimated left ventricular ejection fraction is 30 %.   The left atrium is mildly dilated.   Pleural effusion - is present..   Fibrocalcific changes noted to the mitral valve leaflets with preserved   leaflet excursion.   Moderate (2+) mitral regurgitation is present.   Fibrocalcific changes noted to the Aortic valve leaflets with preserved   leaflet excursion.   The aortic root is mildly dilated.   Moderate (2+) aortic regurgitation is present.    < end of copied text >

## 2019-01-16 NOTE — PROGRESS NOTE ADULT - ASSESSMENT
Pt is a 87y old Female with hx of A.fib not on a/c due to hematuria (s/p ablation), CAD, HTN, HLD, CHF (EF 20-25%) s/p AICD, GERD, OA,  left TKR, 4x C-sections  - was admitted on 1/10 for worsening SOB/cough for past 3 days PTA.  She saw PMD 1 d PTA,  but came to ED as her SOB worsened.. She denies fever, chills at home. She has dry cough. On arrival to ED pt had fever 102.2 F and was in respiratory distress, was placed on BiPAP. Work-up= bilat infiltrates, RVP + for human metapneumovirus she is being empirically treated for bacterial process as well. She is awake and "very hungry" - has been NPO since admission due to BiPAP mask.     1) Dyspnea  -Multifactorial : severe systolic CHF and pneumonia  -BiPAP mask used intermittently since admission however much less  -Discussed use of this mask with HCP #1 and #2 - they would like to continue with this BUT they also want her to eat -  so they consent to removing it for meals (RT states cannot put back on until 2 hrs have elapsed since last po intake)   -O2 by N/C   -Pt signed her own MOLST form in 3/2018, requesting DNR/DNI - nothing was marked re:BiPAP mask  -D/C Roxanol 2.5 mg Q4H PRN as she is taking Oxycodone 5 mg PO Q6H PRN which will help her mod pain/dyspnea   -Cont Morphine IVP as ordered.     2)Pneumonia  -RVP + for Human metapneumovirus   -Appreciate ID input and case discussed with ID MD as well - on empiric antibacterial therapy, no change since admission      3)CHF  -Severe systolic CHF  -EF 20%  -Appreciate cardiology input    4) Malnutrition  -Albumin = 2.7  -Poor PO inatke      5) Weakness  -Secondary to acute illness  -Was in Assisted Living PTA - cannot return there unless she is more independent  -Family is looking into [facility] facilities  -PT evaluation / pt has refused    6)Pain  -Has chronic pain secondary to arthritis  -Do not stop opioids   -Morphine IVP for severe pain as ordered   -Oxycodone for moderate pain as ordered     7)Advance directives  -Has HCP on chart - names daughter Gabrielle Burgos as #1 and granddaughter Renay #2  -Has MOLST from 3/2018 on chart, signed by pt - states DNR/DNI - nothing else was addressed  -Had a [facility] meeting with Renay in person and Gabrielle by phone - see separate note  -Family respects DNI/DNR order, want to continue with BiPAP mask for now but also want her to eat.... and do not want any diertary restrictions other than mechanical soft diet as she is edentulous  -Will follow up with daughter Gabrielle Lance 1/20

## 2019-01-16 NOTE — PROGRESS NOTE ADULT - SUBJECTIVE AND OBJECTIVE BOX
Date of service: 19 @ 12:16    Lying in bed in NAD  Weak looking  Alert and somewhat confused  Able to take PO    ROS: no fever or chills; denies dizziness, no HA, no abdominal pain, no diarrhea or constipation; no dysuria, no legs pain, no rashes    MEDICATIONS  (STANDING):  amiodarone    Tablet 200 milliGRAM(s) Oral daily  aspirin  chewable 81 milliGRAM(s) Oral daily  atorvastatin 10 milliGRAM(s) Oral at bedtime  cefepime  Injectable. 1000 milliGRAM(s) IV Push every 12 hours  clopidogrel Tablet 75 milliGRAM(s) Oral daily  digoxin     Tablet 0.125 milliGRAM(s) Oral daily  digoxin  Injectable 0.25 milliGRAM(s) IV Push every 8 hours  furosemide   Injectable 40 milliGRAM(s) IV Push daily  heparin  Injectable 5000 Unit(s) SubCutaneous every 12 hours  magnesium oxide 200 milliGRAM(s) Oral daily  oxybutynin 5 milliGRAM(s) Oral two times a day      Vital Signs Last 24 Hrs  T(C): 36.8 (2019 05:00), Max: 37.2 (15 Hemant 2019 21:00)  T(F): 98.2 (2019 05:00), Max: 99 (15 Hemant 2019 21:00)  HR: 120 (2019 10:01) (73 - 128)  BP: 90/54 (2019 10:01) (90/54 - 129/63)  BP(mean): 64 (2019 10:01) (56 - 80)  RR: 23 (2019 10:01) (14 - 25)  SpO2: 100% (2019 10:01) (95% - 100%)    Physical Exam:      Constitutional: frail looking  HEENT: NC/AT, EOMI, PERRLA, conjunctivae clear  Neck: supple; thyroid not palpable  Back: no tenderness  Respiratory: respiratory effort normal; crackles at bases  Cardiovascular: S1S2 regular, no murmurs  Abdomen: soft, not tender, not distended, positive BS; no liver or spleen organomegaly  Genitourinary: no suprapubic tenderness  Lymphatic: no LN palpable  Musculoskeletal: no muscle tenderness, no joint swelling or tenderness  Extremities: no pedal edema  Neurological/ Psychiatric: AxOx3, judgement and insight normal; moving all extremities  Skin: no rashes; no palpable lesions    Labs: reviewed                        9.3    6.80  )-----------( 147      ( 15 Hemant 2019 05:57 )             30.1         136  |  97  |  29<H>  ----------------------------<  96  3.4<L>   |  35<H>  |  1.00    Ca    8.6      2019 05:26                        11.4   8.22  )-----------( 195      ( 10 Hemant 2019 10:28 )             36.3     01-10    136  |  102  |  23  ----------------------------<  110<H>  4.0   |  25  |  1.29    Ca    8.7      10 Hemant 2019 10:28    TPro  7.8  /  Alb  3.8  /  TBili  0.6  /  DBili  x   /  AST  32  /  ALT  21  /  AlkPhos  61  01-10     LIVER FUNCTIONS - ( 10 Hemant 2019 10:28 )  Alb: 3.8 g/dL / Pro: 7.8 gm/dL / ALK PHOS: 61 U/L / ALT: 21 U/L / AST: 32 U/L / GGT: x           Urinalysis Basic - ( 10 Hemant 2019 10:28 )    Color: Yellow / Appearance: Clear / S.020 / pH: x  Gluc: x / Ketone: Moderate  / Bili: Negative / Urobili: Negative mg/dL   Blood: x / Protein: 30 mg/dL / Nitrite: Negative   Leuk Esterase: Trace / RBC: 11-25 /HPF / WBC 3-5   Sq Epi: x / Non Sq Epi: x / Bacteria: Moderate    Rapid Respiratory Viral Panel (01.10.19 @ 10:28)    Rapid RVP Result: Detected:     hMPV (RapRVP): Detected      Culture - Urine (collected 10 Hemant 2019 10:28)  Source: .Urine None  Preliminary Report (2019 09:26):    10,000 - 49,000 CFU/mL Escherichia coli    Culture - Blood (01.10.19 @ 10:28)    Specimen Source: .Blood None    Culture Results:   No growth to date.    Radiology: all available radiological tests reviewed    < from: CT Chest No Cont (01.10.19 @ 13:37) >  Bilateral multifocal pneumonia.  Cardiomegaly and enlarged pulmonary artery suggestive of pulmonary   arterial hypertension. Left lower pulmonary artery cardio MEMS device in   place.    < end of copied text >      Advanced directives addressed: DNR

## 2019-01-16 NOTE — PROGRESS NOTE ADULT - ASSESSMENT
A/P: 87F w/ PMH of afib not on a/c for hx of hematuria & s/p ablation (?), CAD, HTN, HLD, sCHF (EF 20-25%) s/p cardiomems and AICD, GERD, OA, s/p L knee replacement, 4x c-sections comes in with worsening SOB/cough for past 3 days.    1. SOB and cough- MPV infection. On NC , O2 sat normal on this.   Management per primary team.     2. Acute on Chronic HFrEF , s/p ICD- mild hypervolemia  continue daily iv lasix. Close monitoring of the renal function and electrolytes.  Goal potassium of 4 and magnesium of 2.   Strict I/O and daily wt checks. Low sodium diet. Nutrition education.     3. Afib s/p ablation-  not on full dose anticoagulation as pt had major hematuria in past and she adamantly refused it from then on after knowing the risks of CVA off anticoagulation.   Pt now in afib with RVR. Will need further rate control as HR goes up to 170s this AM.   Pt also on amiodarone 200mg po daily.   Will avoid CCB with low ER, tenuous BP.   Will give Amiodarone bolus 150mg x 1 now. If remains in afib with RVR, can reload amio or dig load.     4. HTN-BP labile-  systolic. Cont to follow in SD unit.      5. MPV infection- Other medical issues- Management per primary team.

## 2019-01-16 NOTE — PROGRESS NOTE ADULT - ASSESSMENT
PROBLEMS:    AC hypoxaemic respiratory failur  Multifocal pneumonia  positive HMPV  Cardic arrthmias  Chronic systolic heart failure   CKD  Paroxysmal atrial fibrillation  Essential hypertension    PLAN:    pulmonary stable  titrate fio2  OOB to chair  IV CEFEPIME/ IV DOXYCYLIN  BIPAP qhs/prn  on amiodarone/digoxin  POOR PROGNOSIS  Comfort care  DVT PROPHYLASIX

## 2019-01-16 NOTE — PROGRESS NOTE ADULT - ASSESSMENT
· Assessment		  87F w/ PMH as above comes in w/ fever and respiratory distress due to CAP.    A/P  #Acute respiratory failure /B/L pneumonia hMPv/also suspected GNR/MRSA-no significant improvement  #suspected sepsis from PNA  #hx systolic CHF EF 20% acutely decomepnsated /Has CardioMems - no significant improvement  #Acute toxic metabolic encephalopathy - resolved  # AF RVR    - the main issue today is the AF RVR  - cardio to advise; EP eval  - palliative eval and GOC; maximized on Rx at this point    #Paroxysmal afib not on AC due to severe hematuria  pt has had discussions with cardiology and is aware of risks of stroke if not on A/C  continue amio    # CAD  continue statin, asa, plavix    #CKD stage 3  monitor BMP    #DNR/DNI  poor prognosis  palliative care consult appreciated    #DVT prophylaxis  heparin sc

## 2019-01-16 NOTE — CONSULT NOTE ADULT - ASSESSMENT
87 yr old female with above history now with atrial fib, RVR (not on OAC secondary to history of significant hematuria) s/p ablation in past. Known severely reduced LVEF.   S/P Amio bolus 150mg IV today, also IV Dig loading.    A/P:   Agree with continuation of Amio and Digoxin.   Limited use of AV nodals with hypotension.  Keep K> 4.0, Mg >2.0  Plan d/w Dr. Markham

## 2019-01-16 NOTE — PROGRESS NOTE ADULT - SUBJECTIVE AND OBJECTIVE BOX
D/w nursing.  Pt remains in afib with RVR despite amio bolus.  Will begin digoxin with load for HR control.

## 2019-01-16 NOTE — PROVIDER CONTACT NOTE (MEDICATION) - ACTION/TREATMENT ORDERED:
nursing already gave - 150 mg of amiodorone,  1 dose iv digoxin 0.5 , and po  digoxin as ordered   pt continues to have hr 130-150's . pt SBP is 80-90's   Md Cervantes advised not to give the 2nd IV dose of digoxin and he will call EPS services

## 2019-01-16 NOTE — CONSULT NOTE ADULT - SUBJECTIVE AND OBJECTIVE BOX
HPI:  87F w/ PMH of afib not on a/c for hx of hematuria & s/p ablation (?), CAD, HTN, HLD, sCHF (EF 20-25%) s/p cardiomems and AICD, GERD, OA, s/p L knee replacement, 4x c-sections comes in with worsening SOB/cough for past 3 days. Her daughter notes that she wasn't herself since Monday when she left company early (unlike her), and then complaining of SOB/cough. She went to see Dr. Denny in the office yesterday, where she was supposedly ok and Dr. Denny did not make any changes to her medications at that time. Then today her SOB was so bad that she wanted to come to the hospital. She denies fever, chills, productive cough, N/V, abd pain, chest pain/pressure. She can lay flat when she sleeps and uses 2 pillows. Has not noted any increase in leg swelling recently. Denies eating very salty food recently or drinking excessive water. Pt comes from [facility]. According to her daughter her baseline is AOx3, walking w/ a walker, and needs help with some ADLs.       19: EP called to assess pt for Atrial fib with RVR, 150-170 bpm.  She was also hypotensive.  TELE:  atrial fib, currently rate 100-120's  SBP better, currently 119/64        PAST MEDICAL & SURGICAL HISTORY:  Osteoarthritis of multiple joints, unspecified osteoarthritis type  Gastroesophageal reflux disease without esophagitis  Dyslipidemia  Essential hypertension  Paroxysmal atrial fibrillation  Coronary artery disease involving native coronary artery of native heart without angina pectoris  Chronic systolic congestive heart failure: EF 20-25%  History of : 4 c-sections  History of total left knee replacement (TKR)  S/P ablation of atrial fibrillation  AICD (automatic cardioverter/defibrillator) present      MEDICATIONS  (STANDING):  amiodarone    Tablet 200 milliGRAM(s) Oral daily  aspirin  chewable 81 milliGRAM(s) Oral daily  atorvastatin 10 milliGRAM(s) Oral at bedtime  cefepime  Injectable. 1000 milliGRAM(s) IV Push every 12 hours  clopidogrel Tablet 75 milliGRAM(s) Oral daily  digoxin     Tablet 0.125 milliGRAM(s) Oral daily  heparin  Injectable 5000 Unit(s) SubCutaneous every 12 hours  magnesium oxide 200 milliGRAM(s) Oral daily  oxybutynin 5 milliGRAM(s) Oral two times a day    MEDICATIONS  (PRN):  acetaminophen  Suppository .. 650 milliGRAM(s) Rectal every 6 hours PRN Temp greater or equal to 38C (100.4F), Mild Pain (1 - 3)  aluminum hydroxide/magnesium hydroxide/simethicone Suspension 30 milliLiter(s) Oral every 6 hours PRN Dyspepsia  dicyclomine 20 milliGRAM(s) Oral three times a day before meals PRN Indigestion  lactulose Syrup 20 Gram(s) Oral at bedtime PRN Constipation  loperamide 2 milliGRAM(s) Oral daily PRN Diarrhea  morphine  - Injectable 2 milliGRAM(s) IV Push every 4 hours PRN Moderate Pain (4 - 6)  ondansetron   Disintegrating Tablet 4 milliGRAM(s) Oral every 6 hours PRN Nausea and/or Vomiting  oxyCODONE    5 mG/acetaminophen 325 mG 1 Tablet(s) Oral every 6 hours PRN Moderate Pain (4 - 6)      Allergies    No Known Allergies    Intolerances        SOCIAL HISTORY: Lives with daughter.  Denies tobacco, etoh abuse or illicit drug use    FAMILY HISTORY:  Family history of malignant neoplasm (Child)      Vital Signs Last 24 Hrs  T(C): 36.8 (2019 14:29), Max: 37.2 (15 Hemant 2019 21:00)  T(F): 98.2 (2019 14:29), Max: 99 (15 Hemant 2019 21:00)  HR: 139 (2019 14:01) (76 - 139)  BP: 115/65 (2019 14:01) (88/58 - 122/53)  BP(mean): 71 (2019 14:01) (56 - 83)  RR: 17 (2019 14:01) (14 - 25)  SpO2: 98% (2019 14:01) (97% - 100%)    REVIEW OF SYSTEMS:    CONSTITUTIONAL:  As per HPI.  HEENT:  Eyes:  No diplopia or blurred vision. ENT:  No earache, sore throat or runny nose.  CARDIOVASCULAR:  No pressure, squeezing, strangling, tightness, heaviness or aching about the chest, neck, axilla or epigastrium.  RESPIRATORY:  No cough, shortness of breath, PND or orthopnea.  GASTROINTESTINAL:  No nausea, vomiting or diarrhea.  GENITOURINARY:  No dysuria, frequency or urgency.  MUSCULOSKELETAL:  As per HPI.  SKIN:  No change in skin, hair or nails.  NEUROLOGIC:  No paresthesias, fasciculations, seizures or weakness.  PSYCHIATRIC:  No disorder of thought or mood.  ENDOCRINE:  No heat or cold intolerance, polyuria or polydipsia.  HEMATOLOGICAL:  No easy bruising or bleedings:  .     PHYSICAL EXAMINATION:    GENERAL APPEARANCE:  Weak appearing elderly female.  HEENT:  Pupils are normal and react normally. No icterus. Mucous membranes well colored.  NECK:  Supple. No lymphadenopathy. Jugular venous pressure not elevated. Carotids equal.   HEART:  S1S2 irregular. There are no murmurs, rubs or gallops noted  CHEST:  Chest is clear to auscultation. Normal respiratory effort.  ABDOMEN:  Soft and nontender.   EXTREMITIES:  There is no edema.   SKIN:  No rash or significant lesions are noted.          LABS:                        9.3    6.80  )-----------( 147      ( 15 Hemant 2019 05:57 )             30.1     01-16    136  |  97  |  29<H>  ----------------------------<  96  3.4<L>   |  35<H>  |  1.00    Ca    8.6      2019 05:26           EXAM:  2D ECHOCARDIOGRAM AD         PROCEDURE DATE:  2018        INTERPRETATION:  Transthoracic Echocardiography Report (TTE)     Demographics     Patient name        OJ TAO    Age           86 year(s)     Impression     Summary     The left ventricle cavity is moderately dilated.   Mild concentric left ventricular hypertrophy is present.   Severely Reduced left ventricular systolic function.   Estimated left ventricular ejection fraction is 30 %.   The left atrium is mildly dilated.   Pleural effusion - is present..   Fibrocalcific changes noted to the mitral valve leaflets with preserved   leaflet excursion.   Moderate (2+) mitral regurgitation is present.   Fibrocalcific changes noted to the Aortic valve leaflets with preserved   leaflet excursion.   The aortic root is mildly dilated.   Moderate (2+) aortic regurgitation is present.     Signature     ----------------------------------------------------------------   Electronically signed by Jordan Martinez MD(Interpreting

## 2019-01-16 NOTE — PROGRESS NOTE ADULT - SUBJECTIVE AND OBJECTIVE BOX
Subjective:    pat afib with RVR, s/p iv amiodarne bolus, now getting digoxin. lying comfortbly.    Home Medications:  amiodarone 200 mg oral tablet: 1 tab(s) orally once a day (11 Jan 2019 14:24)  aspirin 81 mg oral tablet, chewable: 1 tab(s) orally once a day (11 Jan 2019 14:24)  Bentyl 20 mg oral tablet: 1 tab(s) orally 3 times a day, As Needed (11 Jan 2019 14:24)  bumetanide: 3 milligram(s) orally once a day (11 Jan 2019 14:24)  enalapril 2.5 mg oral tablet: 1 tab(s) orally once a day (11 Jan 2019 14:24)  Imodium A-D 2 mg oral tablet: 1 tab(s) orally once a day, As Needed (11 Jan 2019 14:24)  lactulose 10 g/15 mL oral syrup: 15 milliliter(s) orally once a day, As Needed (11 Jan 2019 14:24)  magnesium oxide 200 mg oral tablet: 1 tab(s) orally once a day (11 Jan 2019 14:24)  Metoprolol Succinate ER 50 mg oral tablet, extended release: 1 tab(s) orally once a day (11 Jan 2019 14:24)  oxyCODONE-acetaminophen 7.5 mg-325 mg oral tablet: 1 tab(s) orally every 6 hours, As Needed (11 Jan 2019 14:24)  potassium chloride 20 mEq oral tablet, extended release: 2 tab(s) orally 2 times a day (11 Jan 2019 14:24)    MEDICATIONS  (STANDING):  amiodarone    Tablet 200 milliGRAM(s) Oral daily  aspirin  chewable 81 milliGRAM(s) Oral daily  atorvastatin 10 milliGRAM(s) Oral at bedtime  cefepime  Injectable. 1000 milliGRAM(s) IV Push every 12 hours  clopidogrel Tablet 75 milliGRAM(s) Oral daily  digoxin     Tablet 0.125 milliGRAM(s) Oral daily  digoxin  Injectable 0.25 milliGRAM(s) IV Push every 8 hours  doxycycline IVPB      doxycycline IVPB 100 milliGRAM(s) IV Intermittent every 12 hours  furosemide   Injectable 40 milliGRAM(s) IV Push daily  heparin  Injectable 5000 Unit(s) SubCutaneous every 12 hours  magnesium oxide 200 milliGRAM(s) Oral daily  oxybutynin 5 milliGRAM(s) Oral two times a day    MEDICATIONS  (PRN):  acetaminophen  Suppository .. 650 milliGRAM(s) Rectal every 6 hours PRN Temp greater or equal to 38C (100.4F), Mild Pain (1 - 3)  aluminum hydroxide/magnesium hydroxide/simethicone Suspension 30 milliLiter(s) Oral every 6 hours PRN Dyspepsia  dicyclomine 20 milliGRAM(s) Oral three times a day before meals PRN Indigestion  lactulose Syrup 20 Gram(s) Oral at bedtime PRN Constipation  loperamide 2 milliGRAM(s) Oral daily PRN Diarrhea  morphine  - Injectable 2 milliGRAM(s) IV Push every 4 hours PRN Moderate Pain (4 - 6)  ondansetron   Disintegrating Tablet 4 milliGRAM(s) Oral every 6 hours PRN Nausea and/or Vomiting  oxyCODONE    5 mG/acetaminophen 325 mG 1 Tablet(s) Oral every 6 hours PRN Moderate Pain (4 - 6)      Allergies    No Known Allergies    Intolerances        Vital Signs Last 24 Hrs  T(C): 36.8 (16 Jan 2019 05:00), Max: 37.2 (15 Hemant 2019 21:00)  T(F): 98.2 (16 Jan 2019 05:00), Max: 99 (15 Hemant 2019 21:00)  HR: 110 (16 Jan 2019 06:00) (73 - 124)  BP: 90/60 (16 Jan 2019 06:00) (90/46 - 129/63)  BP(mean): 67 (16 Jan 2019 06:00) (56 - 83)  RR: 23 (16 Jan 2019 06:00) (14 - 25)  SpO2: 100% (16 Jan 2019 06:00) (95% - 100%)      PHYSICAL EXAMINATION:    NECK:  Supple. No lymphadenopathy. Jugular venous pressure not elevated. Carotids equal.   HEART:   The cardiac impulse has a normal quality. Reg., Nl S1 and S2.  There are no murmurs, rubs or gallops noted  CHEST:  Chest is clear to auscultation. Normal respiratory effort.  ABDOMEN:  Soft and nontender.   EXTREMITIES:  There is no edema.       LABS:                        9.3    6.80  )-----------( 147      ( 15 Hemant 2019 05:57 )             30.1     01-16    136  |  97  |  29<H>  ----------------------------<  96  3.4<L>   |  35<H>  |  1.00    Ca    8.6      16 Jan 2019 05:26

## 2019-01-16 NOTE — PROGRESS NOTE ADULT - ASSESSMENT
86 y/o Female with h/o A.fib not on a/c due to hematuria & s/p ablation (?), CAD, HTN, HLD, CHF (EF 20-25%) s/p cardiomems and AICD, GERD, OA,  left TKR, 4x C-sections was admitted on 1/10 for worsening SOB/cough for past 3 days PTA. Her daughter notes that she wasn't herself and then complaining of SOB/cough. She went to see Dr. Denny in the office yesterday, where she was supposedly ok and Dr. Denny did not make any changes to her medications at that time. Then her SOB worsened and came to the hospital. She denies fever, chills at home. She has dry cough. On arrival to ED pt had fever 102.2 F and was in respiratory distress with increased work of breathing so was put on BiPAP. SHe received zosyn.    1. Febrile syndrome resolving. Acute respiratory failure resolving. Multifocal pneumonia with hMPv. ?superimposed bacterial pneumonia. CHF exacerbation.  -respiratory improving  -fever is down  -mild leukocytosis resolving  -f/u BC x 2  -on cefepime 1 gm IV q12h and doxycycline IV # 6  -tolerating abx well so far; no side effects noted  -d/c doxycycline   -continue abx with cefepime[  -respiratory care  -BiPAP as needed  -monitor temps  -f/u CBC  -supportive care  2. Other issues:   -care per medicine

## 2019-01-17 LAB
ANION GAP SERPL CALC-SCNC: 7 MMOL/L — SIGNIFICANT CHANGE UP (ref 5–17)
BUN SERPL-MCNC: 29 MG/DL — HIGH (ref 7–23)
CALCIUM SERPL-MCNC: 8.3 MG/DL — LOW (ref 8.5–10.1)
CHLORIDE SERPL-SCNC: 97 MMOL/L — SIGNIFICANT CHANGE UP (ref 96–108)
CO2 SERPL-SCNC: 32 MMOL/L — HIGH (ref 22–31)
CREAT SERPL-MCNC: 0.94 MG/DL — SIGNIFICANT CHANGE UP (ref 0.5–1.3)
DIGOXIN SERPL-MCNC: 0.61 NG/ML — LOW (ref 0.8–2)
GLUCOSE SERPL-MCNC: 105 MG/DL — HIGH (ref 70–99)
MAGNESIUM SERPL-MCNC: 2 MG/DL — SIGNIFICANT CHANGE UP (ref 1.6–2.6)
POTASSIUM SERPL-MCNC: 3.5 MMOL/L — SIGNIFICANT CHANGE UP (ref 3.5–5.3)
POTASSIUM SERPL-SCNC: 3.5 MMOL/L — SIGNIFICANT CHANGE UP (ref 3.5–5.3)
SODIUM SERPL-SCNC: 136 MMOL/L — SIGNIFICANT CHANGE UP (ref 135–145)

## 2019-01-17 RX ORDER — CEFEPIME 1 G/1
1000 INJECTION, POWDER, FOR SOLUTION INTRAMUSCULAR; INTRAVENOUS EVERY 12 HOURS
Qty: 0 | Refills: 0 | Status: DISCONTINUED | OUTPATIENT
Start: 2019-01-17 | End: 2019-01-17

## 2019-01-17 RX ORDER — ACETAMINOPHEN 500 MG
650 TABLET ORAL EVERY 6 HOURS
Qty: 0 | Refills: 0 | Status: DISCONTINUED | OUTPATIENT
Start: 2019-01-17 | End: 2019-01-22

## 2019-01-17 RX ORDER — CEFEPIME 1 G/1
1000 INJECTION, POWDER, FOR SOLUTION INTRAMUSCULAR; INTRAVENOUS DAILY
Qty: 0 | Refills: 0 | Status: DISCONTINUED | OUTPATIENT
Start: 2019-01-17 | End: 2019-01-19

## 2019-01-17 RX ORDER — LISINOPRIL 2.5 MG/1
5 TABLET ORAL DAILY
Qty: 0 | Refills: 0 | Status: DISCONTINUED | OUTPATIENT
Start: 2019-01-17 | End: 2019-01-22

## 2019-01-17 RX ADMIN — Medication 5 MILLIGRAM(S): at 17:27

## 2019-01-17 RX ADMIN — Medication 5 MILLIGRAM(S): at 06:14

## 2019-01-17 RX ADMIN — LISINOPRIL 5 MILLIGRAM(S): 2.5 TABLET ORAL at 17:28

## 2019-01-17 RX ADMIN — Medication 650 MILLIGRAM(S): at 02:16

## 2019-01-17 RX ADMIN — CEFEPIME 1000 MILLIGRAM(S): 1 INJECTION, POWDER, FOR SOLUTION INTRAMUSCULAR; INTRAVENOUS at 17:27

## 2019-01-17 RX ADMIN — MORPHINE SULFATE 2 MILLIGRAM(S): 50 CAPSULE, EXTENDED RELEASE ORAL at 15:31

## 2019-01-17 RX ADMIN — Medication 0.12 MILLIGRAM(S): at 06:14

## 2019-01-17 RX ADMIN — AMIODARONE HYDROCHLORIDE 200 MILLIGRAM(S): 400 TABLET ORAL at 06:13

## 2019-01-17 RX ADMIN — MORPHINE SULFATE 2 MILLIGRAM(S): 50 CAPSULE, EXTENDED RELEASE ORAL at 15:50

## 2019-01-17 RX ADMIN — HEPARIN SODIUM 5000 UNIT(S): 5000 INJECTION INTRAVENOUS; SUBCUTANEOUS at 06:15

## 2019-01-17 RX ADMIN — Medication 650 MILLIGRAM(S): at 01:16

## 2019-01-17 RX ADMIN — MAGNESIUM OXIDE 400 MG ORAL TABLET 200 MILLIGRAM(S): 241.3 TABLET ORAL at 14:10

## 2019-01-17 RX ADMIN — CEFEPIME 1000 MILLIGRAM(S): 1 INJECTION, POWDER, FOR SOLUTION INTRAMUSCULAR; INTRAVENOUS at 06:13

## 2019-01-17 RX ADMIN — HEPARIN SODIUM 5000 UNIT(S): 5000 INJECTION INTRAVENOUS; SUBCUTANEOUS at 17:27

## 2019-01-17 RX ADMIN — ATORVASTATIN CALCIUM 10 MILLIGRAM(S): 80 TABLET, FILM COATED ORAL at 22:11

## 2019-01-17 RX ADMIN — CLOPIDOGREL BISULFATE 75 MILLIGRAM(S): 75 TABLET, FILM COATED ORAL at 13:55

## 2019-01-17 RX ADMIN — Medication 81 MILLIGRAM(S): at 13:54

## 2019-01-17 NOTE — PROVIDER CONTACT NOTE (OTHER) - ACTION/TREATMENT ORDERED:
Md Cervantes - give Digoxin - see orders
MD aware. No new orders at this time. Patient resting in bed in no acute distress. Will cont to monitor.
MD will come to assess pt. bolus was ordered, k riders were given, will repeat H&H in morning as this may be a lab error. will repeat trop and cbc, and lactate. and continue to monitor

## 2019-01-17 NOTE — PROGRESS NOTE ADULT - SUBJECTIVE AND OBJECTIVE BOX
Patient is a 87y old  Female who presents with a chief complaint of SOB, cough.   HPI:  87F w/ PMH of afib not on a/c for hx of hematuria & s/p ablation (?), CAD, HTN, HLD, sCHF (EF 20-25%) s/p cardiomems and AICD, GERD, OA, s/p L knee replacement, 4x c-sections comes in with worsening SOB/cough for past 3 days. She lives in assisted living and I have seen her in office yesterday.  She was told to increase diuretics in the past and she refused to take more than what she was already taking.   Family urged to change bumex to lasix secondary to cost.  With cough yesterday she denied any fever and I warned her about viral infections that are wide spread now.    On arrival to ED pt had fever 102.2 F and was in respiratory distress with increased work of breathing so was put on bipap.   Now on face mask.  No hypoxia.    No CP or pressure.     - pt seen and examined by me today. In respiratory distress to a mild degrees, hypoxia overnight     - pt seen and examined by me this am.     - Pt seen and examined by me today. Pt states that her SOB is improving.     - Pt seen and examined by me today. Pt is on ventimask now and overnight on bipap.  Mild tachypnea.     1/15- Pt seen and examined by me today. Comfortable in bed on NC O2.     - Pt seen and examined by me today.  Pt denies any CP or SOB.       PAST MEDICAL & SURGICAL HISTORY:  Osteoarthritis of multiple joints, unspecified osteoarthritis type  Gastroesophageal reflux disease without esophagitis  Dyslipidemia  Essential hypertension  Paroxysmal atrial fibrillation  Coronary artery disease involving native coronary artery of native heart without angina pectoris  Chronic systolic congestive heart failure: EF 20-25%  History of : 4 c-sections  History of total left knee replacement (TKR)  S/P ablation of atrial fibrillation  AICD (automatic cardioverter/defibrillator) present      MEDICATIONS  (STANDING):  amiodarone    Tablet 200 milliGRAM(s) Oral daily  aspirin  chewable 81 milliGRAM(s) Oral daily  atorvastatin 10 milliGRAM(s) Oral at bedtime  cefepime  Injectable. 1000 milliGRAM(s) IV Push every 12 hours  clopidogrel Tablet 75 milliGRAM(s) Oral daily  enalapril 2.5 milliGRAM(s) Oral daily  heparin  Injectable 5000 Unit(s) SubCutaneous every 12 hours  magnesium oxide 200 milliGRAM(s) Oral daily  metoprolol succinate ER 50 milliGRAM(s) Oral daily  oxybutynin 5 milliGRAM(s) Oral two times a day  sodium chloride 0.9% Bolus 1000 milliLiter(s) IV Bolus once    MEDICATIONS  (PRN):  acetaminophen   Tablet .. 650 milliGRAM(s) Oral every 6 hours PRN Temp greater or equal to 38C (100.4F), Mild Pain (1 - 3)  dicyclomine 20 milliGRAM(s) Oral three times a day before meals PRN Indigestion  lactulose Syrup 20 Gram(s) Oral at bedtime PRN Constipation  loperamide 2 milliGRAM(s) Oral daily PRN Diarrhea  ondansetron    Tablet 4 milliGRAM(s) Oral every 6 hours PRN Nausea and/or Vomiting  oxyCODONE    5 mG/acetaminophen 325 mG 1 Tablet(s) Oral every 6 hours PRN Moderate Pain (4 - 6)      FAMILY HISTORY:  Family history of malignant neoplasm (Child)      SOCIAL HISTORY:  no recent smoking     REVIEW OF SYSTEMS:  CONSTITUTIONAL:    No fatigue, malaise, lethargy.  No fever or chills.  HEENT:  Eyes:  No visual changes.     ENT:  No epistaxis.  No sinus pain.    RESPIRATORY:  c/o cough.  No wheeze.  No hemoptysis.  no  shortness of breath.  CARDIOVASCULAR:  No chest pains.  No palpitations. no shortness of breath, No orthopnea or PND.  GASTROINTESTINAL:  No abdominal pain.  No nausea or vomiting.    GENITOURINARY:    No hematuria.    MUSCULOSKELETAL:  No musculoskeletal pain.  No joint swelling.  No arthritis.  NEUROLOGICAL:  No tingling or numbness or weakness.  PSYCHIATRIC:  No confusion  SKIN:  No rashes.    ENDOCRINE:  No unexplained weight loss.  No polydipsia.   HEMATOLOGIC:  No anemia.  No prolonged or excessive bleeding.   ALLERGIC AND IMMUNOLOGIC:  No pruritus.          Vital Signs Last 24 Hrs  T(C): 36.7 (10 Hemant 2019 15:17), Max: 39 (10 Hemant 2019 10:51)  T(F): 98.1 (10 Hemant 2019 15:17), Max: 102.2 (10 Hemant 2019 10:51)  HR: 86 (10 Hemant 2019 15:17) (74 - 90)  BP: 113/58 (10 Hemant 2019 15:17) (100/55 - 143/87)  BP(mean): --  RR: 18 (10 Hemant 2019 15:17) (18 - 19)  SpO2: 99% (10 Hemant 2019 15:17) (99% - 100%)    PHYSICAL EXAM-    Constitutional: ill looking, alert on NC    Head: Head is normocephalic and atraumatic.      Neck: There are strong carotid pulses bilaterally. No JVD.     Cardiovascular: Regular rate and rhythm without S3, S4. No murmurs or rubs are appreciated.      Respiratory: crackles b/l    Abdomen: Soft, nontender, nondistended with positive bowel sounds.      Extremity: No tenderness. trace  pitting edema b/l    Neurologic: The patient is alert   Skin: No rash, no obvious lesions noted.      Psychiatric: The patient appears alert      INTERPRETATION OF TELEMETRY: SR.   ECG: Sinus rythm. LVH.     I&O's Detail      LABS:                        11.4   8.22  )-----------( 195      ( 10 Hemant 2019 10:28 )             36.3     01-10    136  |  102  |  23  ----------------------------<  110<H>  4.0   |  25  |  1.29    Ca    8.7      10 Hemant 2019 10:28    TPro  7.8  /  Alb  3.8  /  TBili  0.6  /  DBili  x   /  AST  32  /  ALT  21  /  AlkPhos  61  01-10    CARDIAC MARKERS ( 10 Hemant 2019 13:24 )  0.081 ng/mL / x     / x     / x     / x      CARDIAC MARKERS ( 10 Hemant 2019 10:28 )  0.043 ng/mL / x     / x     / x     / x          PT/INR - ( 10 Hemant 2019 10:28 )   PT: 12.8 sec;   INR: 1.15 ratio         PTT - ( 10 Hemant 2019 10:28 )  PTT:29.3 sec  Urinalysis Basic - ( 10 Hemant 2019 10:28 )    Color: Yellow / Appearance: Clear / S.020 / pH: x  Gluc: x / Ketone: Moderate  / Bili: Negative / Urobili: Negative mg/dL   Blood: x / Protein: 30 mg/dL / Nitrite: Negative   Leuk Esterase: Trace / RBC: 11-25 /HPF / WBC 3-5   Sq Epi: x / Non Sq Epi: x / Bacteria: Moderate      I&O's Summary    BNPSerum Pro-Brain Natriuretic Peptide: 17462 pg/mL (01-10 @ 10:28)    RADIOLOGY & ADDITIONAL STUDIES:  < from: CT Chest No Cont (01.10.19 @ 13:37) >  IMPRESSION:     Bilateral multifocal pneumonia.    Cardiomegaly and enlarged pulmonary artery suggestive of pulmonary   arterial hypertension. Left lower pulmonary artery cardioMEMS device in   place.                FRANCISCO JERE   This document has been electronically signed. Hemant 10 2019  2:26PM        < end of copied text >

## 2019-01-17 NOTE — PROGRESS NOTE ADULT - ASSESSMENT
SOB and cough- MPV infection. On NC , O2 sat normal on this.   Management per primary team.     Acute on Chronic HFrEF , s/p ICD- mild hypervolemia  continue daily iv lasix.   close monitoring of the renal function and electrolytes.  Goal potassium of 4 and magnesium of 2.   Strict I/O and daily wt checks. Low sodium diet. Nutrition education.   PO potassium ordered     Afib s/p ablation-  not on full dose anticoagulation as pt had major hematuria in past and she adamantly refused it from then on after knowing the risks of CVA off anticoagulation.   Now in sinus rythm.  Yesterday had episode of afib with RVR and she was given iv digoxin and amiodarone.   Continue po amiodarone and digoxin.    HTN-BP optimal now.       Other medical issues- Management per primary team.   Thank you for allowing me to participate in the care of this patient. Please feel free to contact me with any questions. SOB and cough- MPV infection. On NC , O2 sat normal on this.   Management per primary team.     Acute on Chronic HFrEF , s/p ICD- mild hypervolemia  off iv lasix since yesterday.  AM labs ordered.   close monitoring of the renal function and electrolytes.  Goal potassium of 4 and magnesium of 2.   Strict I/O and daily wt checks. Low sodium diet. Nutrition education.       Afib s/p ablation-  not on full dose anticoagulation as pt had major hematuria in past and she adamantly refused it from then on after knowing the risks of CVA off anticoagulation.   Now in sinus rythm.  Yesterday had episode of afib with RVR and she was given iv digoxin and amiodarone.   Continue po amiodarone and digoxin.    HTN-BP optimal now.       Other medical issues- Management per primary team.   Thank you for allowing me to participate in the care of this patient. Please feel free to contact me with any questions.

## 2019-01-17 NOTE — PROGRESS NOTE ADULT - ASSESSMENT
86 y/o Female with h/o A.fib not on a/c due to hematuria & s/p ablation (?), CAD, HTN, HLD, CHF (EF 20-25%) s/p cardiomems and AICD, GERD, OA,  left TKR, 4x C-sections was admitted on 1/10 for worsening SOB/cough for past 3 days PTA. Her daughter notes that she wasn't herself and then complaining of SOB/cough. She went to see Dr. Denny in the office yesterday, where she was supposedly ok and Dr. Denny did not make any changes to her medications at that time. Then her SOB worsened and came to the hospital. She denies fever, chills at home. She has dry cough. On arrival to ED pt had fever 102.2 F and was in respiratory distress with increased work of breathing so was put on BiPAP. SHe received zosyn.    1. Febrile syndrome resolved. Multifocal pneumonia with hMPv. ?superimposed bacterial pneumonia. CHF.  -respiratory improving  -fever is down  -mild leukocytosis resolving  -f/u BC x 2  -on cefepime 1 gm IV q12h # 8  -tolerating abx well so far; no side effects noted  -complete abx therapy today  -respiratory care  -BiPAP as needed  -monitor temps  -f/u CBC  -supportive care  2. Other issues:   -care per medicine

## 2019-01-17 NOTE — PROGRESS NOTE ADULT - ASSESSMENT
PROBLEMS:    AC hypoxaemic respiratory failur  Multifocal pneumonia  positive HMPV  Cardic arrthmias  Chronic systolic heart failure   CKD  Paroxysmal atrial fibrillation  Essential hypertension    PLAN:    pulmonary stable  titrate fio2  OOB to chair  IV CEFEPIME  BIPAP qhs/prn  on amiodarone/digoxin  POOR PROGNOSIS  Comfort care  DVT PROPHYLASIX

## 2019-01-17 NOTE — PROGRESS NOTE ADULT - SUBJECTIVE AND OBJECTIVE BOX
HPI: Pt is a 87y old Female with hx of A.fib not on a/c due to hematuria (s/p ablation), CAD, HTN, HLD, CHF (EF 20-25%) s/p AICD, GERD, OA,  left TKR, 4x C-sections  - was admitted on 1/10 for worsening SOB/cough for past 3 days PTA.  She saw PMD 1 d PTA,  but came to ED as her SOB worsened.. She denies fever, chills at home. She has dry cough. On arrival to ED pt had fever 102.2 F and was in respiratory distress, was placed on BiPAP. Work-up= bilat infiltrates, RVP + for human metapneumovirus she is being empirically treated for bacterial process as well.   1/15/19 Seen and examined at bedside with granddaughter present. Pt is in no distress with nasal O2 in place. Denies pain and dyspnea.   1/16/19 Seen and examined at bedside with no family present.  Pt awake having breakfast. Appears to have mild dyspnea however denies  1/17/19 Seen and examined at bedside with no family present. Denies dyspnea or pain.  PAIN: ( )Yes   (X )No    DYSPNEA: ( ) Yes  (X ) No  Level: moderate-severe/improved        Review of Systems:    Anxiety-yes/improved  Physical Discomfort-denies  Dyspnea-moderate-severe/improved  Constipation-denies  Vomiting-no  Anorexia-no  Weight Loss-   Fatigue-mod  Weakness-mod  Delirium-no      Limited due to: lethargy        PHYSICAL EXAM:  ICU Vital Signs Last 24 Hrs  T(C): 36.5 (17 Jan 2019 08:55), Max: 37.1 (16 Jan 2019 20:34)  T(F): 97.7 (17 Jan 2019 08:55), Max: 98.8 (16 Jan 2019 20:34)  HR: 69 (17 Jan 2019 12:00) (64 - 139)  BP: 150/70 (17 Jan 2019 11:00) (91/62 - 150/70)  BP(mean): 79 (17 Jan 2019 11:00) (66 - 88)  ABP: --  ABP(mean): --  RR: 20 (17 Jan 2019 12:00) (16 - 28)  SpO2: 100% (17 Jan 2019 12:00) (65% - 100%)      PPSV2:  20-30 %  FAST:    General: Awake, alert elderly female in bed in NAD  HEENT: Oral mucosa moist  Lungs: Crackling rales throughout  Cardiac:  RRR  GI: +BS , soft, no masses or tenderness  : voids  Ext: No edema  Neuro: No gross focal deficit      LABS:                                 9.3    6.80  )-----------( 147      ( 15 Hemant 2019 05:57 )             30.1               140  |  98  |  35<H>  ----------------------------<  107<H>  3.5   |  32<H>  |  1.04    Ca    8.8      15 Hemant 2019 05:57  Mg     2.2     01-13        Albumin: Albumin, Serum: 2.7 g/dL (01-12 @ 06:49)      Allergies    No Known Allergies    Intolerances

## 2019-01-17 NOTE — PROGRESS NOTE ADULT - SUBJECTIVE AND OBJECTIVE BOX
· Subjective and Objective: 	  87F w/ PMH of afib not on a/c for hx of hematuria & s/p ablation (?), CAD, HTN, HLD, sCHF (EF 20-25%) s/p cardiomems and AICD, GERD, OA, s/p L knee replacement, 4x c-sections comes in with worsening SOB  Hospital course: admitted for resp failure 2/2 to acute decom systolic CHF with superimposed hMPV and pna    1/16: AF with RVR not responding to present Rx; BP high 80's' has no c/o; if she sleeps HR in the 115's when she wakes up and talks: 130-140  1/17: back in NSR, no c/o; granddaughter present    Vital Signs Last 24 Hrs  T(C): 36.3 (17 Jan 2019 14:25), Max: 37.1 (16 Jan 2019 20:34)  T(F): 97.3 (17 Jan 2019 14:25), Max: 98.8 (16 Jan 2019 20:34)  HR: 69 (17 Jan 2019 12:00) (64 - 139)  BP: 150/70 (17 Jan 2019 11:00) (91/62 - 150/70)  BP(mean): 79 (17 Jan 2019 11:00) (66 - 88)  RR: 20 (17 Jan 2019 12:00) (16 - 28)  SpO2: 100% (17 Jan 2019 12:00) (65% - 100%)    PHYSICAL EXAM:      HEENT: PERR, EOMI,   Neck: Soft and supple, No LAD, No JVD  Respiratory: rales bilaterally  Cardiovascular: S1S2, AF no murmurs  Gastrointestinal: Bowel Sounds present, soft, nontender, nondistended, no guarding, no rebound  Extremities: No peripheral edema  Vascular: 2+ peripheral pulses  Neurological: A/O x 3, no focal deficits  Musculoskeletal: 4/5 strength b/l upper and lower extremities  Skin: No rashes

## 2019-01-17 NOTE — PROGRESS NOTE ADULT - SUBJECTIVE AND OBJECTIVE BOX
Date of service: 19 @ 11:35    Lying in bed in NAD  Weak looking  No SOB at rest    ROS: no fever or chills; denies dizziness, no HA, no SOB or cough, no abdominal pain, no diarrhea or constipation; no dysuria, no legs pain, no rashes    MEDICATIONS  (STANDING):  amiodarone    Tablet 200 milliGRAM(s) Oral daily  aspirin  chewable 81 milliGRAM(s) Oral daily  atorvastatin 10 milliGRAM(s) Oral at bedtime  cefepime  Injectable. 1000 milliGRAM(s) IV Push every 12 hours  clopidogrel Tablet 75 milliGRAM(s) Oral daily  digoxin     Tablet 0.125 milliGRAM(s) Oral daily  heparin  Injectable 5000 Unit(s) SubCutaneous every 12 hours  magnesium oxide 200 milliGRAM(s) Oral daily  oxybutynin 5 milliGRAM(s) Oral two times a day      Vital Signs Last 24 Hrs  T(C): 36.5 (2019 08:55), Max: 37.1 (2019 20:34)  T(F): 97.7 (2019 08:55), Max: 98.8 (2019 20:34)  HR: 75 (2019 10:23) (75 - 139)  BP: 124/62 (2019 10:23) (91/62 - 129/69)  BP(mean): 76 (2019 10:23) (66 - 88)  RR: 21 (2019 10:23) (17 - 28)  SpO2: 100% (2019 10:23) (92% - 100%)    Physical Exam:      Constitutional: frail looking  HEENT: NC/AT, EOMI, PERRLA, conjunctivae clear  Neck: supple; thyroid not palpable  Back: no tenderness  Respiratory: respiratory effort normal; crackles at bases  Cardiovascular: S1S2 regular, no murmurs  Abdomen: soft, not tender, not distended, positive BS; no liver or spleen organomegaly  Genitourinary: no suprapubic tenderness  Lymphatic: no LN palpable  Musculoskeletal: no muscle tenderness, no joint swelling or tenderness  Extremities: no pedal edema  Neurological/ Psychiatric: AxOx3, judgement and insight normal; moving all extremities  Skin: no rashes; no palpable lesions    Labs: reviewed                        9.3    6.80  )-----------( 147      ( 15 Hemant 2019 05:57 )             30.1         136  |  97  |  29<H>  ----------------------------<  96  3.4<L>   |  35<H>  |  1.00    Ca    8.6      2019 05:26                        11.4   8.22  )-----------( 195      ( 10 Hemant 2019 10:28 )             36.3     01-10    136  |  102  |  23  ----------------------------<  110<H>  4.0   |  25  |  1.29    Ca    8.7      10 Hemant 2019 10:28    TPro  7.8  /  Alb  3.8  /  TBili  0.6  /  DBili  x   /  AST  32  /  ALT  21  /  AlkPhos  61  01-10     LIVER FUNCTIONS - ( 10 Hemant 2019 10:28 )  Alb: 3.8 g/dL / Pro: 7.8 gm/dL / ALK PHOS: 61 U/L / ALT: 21 U/L / AST: 32 U/L / GGT: x           Urinalysis Basic - ( 10 Hemant 2019 10:28 )    Color: Yellow / Appearance: Clear / S.020 / pH: x  Gluc: x / Ketone: Moderate  / Bili: Negative / Urobili: Negative mg/dL   Blood: x / Protein: 30 mg/dL / Nitrite: Negative   Leuk Esterase: Trace / RBC: 11-25 /HPF / WBC 3-5   Sq Epi: x / Non Sq Epi: x / Bacteria: Moderate    Rapid Respiratory Viral Panel (01.10.19 @ 10:28)    Rapid RVP Result: Detected:     hMPV (RapRVP): Detected      Culture - Urine (collected 10 Hemant 2019 10:28)  Source: .Urine None  Preliminary Report (2019 09:26):    10,000 - 49,000 CFU/mL Escherichia coli    Culture - Blood (01.10.19 @ 10:28)    Specimen Source: .Blood None    Culture Results:   No growth to date.    Radiology: all available radiological tests reviewed    < from: CT Chest No Cont (01.10.19 @ 13:37) >  Bilateral multifocal pneumonia.  Cardiomegaly and enlarged pulmonary artery suggestive of pulmonary   arterial hypertension. Left lower pulmonary artery cardio MEMS device in   place.    < end of copied text >      Advanced directives addressed: DNR

## 2019-01-17 NOTE — PROGRESS NOTE ADULT - SUBJECTIVE AND OBJECTIVE BOX
Subjective:    pat lying in bed, no new complaint.    Home Medications:  amiodarone 200 mg oral tablet: 1 tab(s) orally once a day (11 Jan 2019 14:24)  aspirin 81 mg oral tablet, chewable: 1 tab(s) orally once a day (11 Jan 2019 14:24)  Bentyl 20 mg oral tablet: 1 tab(s) orally 3 times a day, As Needed (11 Jan 2019 14:24)  bumetanide: 3 milligram(s) orally once a day (11 Jan 2019 14:24)  enalapril 2.5 mg oral tablet: 1 tab(s) orally once a day (11 Jan 2019 14:24)  Imodium A-D 2 mg oral tablet: 1 tab(s) orally once a day, As Needed (11 Jan 2019 14:24)  lactulose 10 g/15 mL oral syrup: 15 milliliter(s) orally once a day, As Needed (11 Jan 2019 14:24)  magnesium oxide 200 mg oral tablet: 1 tab(s) orally once a day (11 Jan 2019 14:24)  Metoprolol Succinate ER 50 mg oral tablet, extended release: 1 tab(s) orally once a day (11 Jan 2019 14:24)  oxyCODONE-acetaminophen 7.5 mg-325 mg oral tablet: 1 tab(s) orally every 6 hours, As Needed (11 Jan 2019 14:24)  potassium chloride 20 mEq oral tablet, extended release: 2 tab(s) orally 2 times a day (11 Jan 2019 14:24)    MEDICATIONS  (STANDING):  amiodarone    Tablet 200 milliGRAM(s) Oral daily  aspirin  chewable 81 milliGRAM(s) Oral daily  atorvastatin 10 milliGRAM(s) Oral at bedtime  cefepime  Injectable. 1000 milliGRAM(s) IV Push every 12 hours  clopidogrel Tablet 75 milliGRAM(s) Oral daily  digoxin     Tablet 0.125 milliGRAM(s) Oral daily  heparin  Injectable 5000 Unit(s) SubCutaneous every 12 hours  magnesium oxide 200 milliGRAM(s) Oral daily  oxybutynin 5 milliGRAM(s) Oral two times a day    MEDICATIONS  (PRN):  acetaminophen   Tablet .. 650 milliGRAM(s) Oral every 6 hours PRN Temp greater or equal to 38C (100.4F), Mild Pain (1 - 3)  aluminum hydroxide/magnesium hydroxide/simethicone Suspension 30 milliLiter(s) Oral every 6 hours PRN Dyspepsia  dicyclomine 20 milliGRAM(s) Oral three times a day before meals PRN Indigestion  lactulose Syrup 20 Gram(s) Oral at bedtime PRN Constipation  loperamide 2 milliGRAM(s) Oral daily PRN Diarrhea  metoprolol tartrate Injectable 2.5 milliGRAM(s) IV Push every 4 hours PRN for HR>140  morphine  - Injectable 2 milliGRAM(s) IV Push every 4 hours PRN Moderate Pain (4 - 6)  ondansetron   Disintegrating Tablet 4 milliGRAM(s) Oral every 6 hours PRN Nausea and/or Vomiting  oxyCODONE    5 mG/acetaminophen 325 mG 1 Tablet(s) Oral every 6 hours PRN Moderate Pain (4 - 6)      Allergies    No Known Allergies    Intolerances        Vital Signs Last 24 Hrs  T(C): 36.5 (17 Jan 2019 08:55), Max: 37.1 (16 Jan 2019 20:34)  T(F): 97.7 (17 Jan 2019 08:55), Max: 98.8 (16 Jan 2019 20:34)  HR: 75 (17 Jan 2019 10:23) (75 - 139)  BP: 124/62 (17 Jan 2019 10:23) (88/58 - 129/69)  BP(mean): 76 (17 Jan 2019 10:23) (64 - 88)  RR: 21 (17 Jan 2019 10:23) (17 - 28)  SpO2: 100% (17 Jan 2019 10:23) (92% - 100%)      PHYSICAL EXAMINATION:    NECK:  Supple. No lymphadenopathy. Jugular venous pressure not elevated. Carotids equal.   HEART:   The cardiac impulse has a normal quality. Reg., Nl S1 and S2.  There are no murmurs, rubs or gallops noted  CHEST:  Chest is clear to auscultation. Normal respiratory effort.  ABDOMEN:  Soft and nontender.   EXTREMITIES:  There is no edema.       LABS:    01-16    136  |  97  |  29<H>  ----------------------------<  96  3.4<L>   |  35<H>  |  1.00    Ca    8.6      16 Jan 2019 05:26

## 2019-01-17 NOTE — PROGRESS NOTE ADULT - ASSESSMENT
· Assessment		  87F w/ PMH as above comes in w/ fever and respiratory distress due to CAP.    A/P  #Acute respiratory failure /B/L pneumonia hMPv/also suspected GNR/MRSA-no significant improvement  #suspected sepsis from PNA  #hx systolic CHF EF 20% acutely decomepnsated /Has CardioMems - no significant improvement  #Acute toxic metabolic encephalopathy - resolved  # AF RVR    - back in NSR; no c/o  - palliative eval and GOC; maximized on Rx at this point    #Paroxysmal afib not on AC due to severe hematuria in the past  -asa until we decide re: AC: per fam hematuria was in the remote past; I will d/w cardio chads 3  continue amio    # CAD  continue statin, asa, plavix    #CKD stage 3  monitor BMP    #DNR/DNI  poor prognosis  palliative care consult     Dispo: rehab · Assessment		  87F w/ PMH as above comes in w/ fever and respiratory distress due to CAP.    A/P  #Acute respiratory failure /B/L pneumonia hMPv/also suspected GNR/MRSA-no significant improvement  #suspected sepsis from PNA  #hx systolic CHF EF 20% acutely decomepnsated /Has CardioMems - no significant improvement  #Acute toxic metabolic encephalopathy - resolved  # AF RVR    - back in NSR; no c/o  - palliative eval and GOC; maximized on Rx at this point    #Paroxysmal afib not on AC due to severe hematuria in the past  -asa until we decide re: AC: per fam hematuria was in the remote past; I will d/w cardio chads 3  continue amio  - present ua with 11-25 rbc    # CAD  continue statin, asa, plavix    #CKD stage 3  monitor BMP    #DNR/DNI  poor prognosis  palliative care consult     Dispo: rehab · Assessment		  87F w/ PMH as above comes in w/ fever and respiratory distress due to CAP.    A/P  #Acute respiratory failure /B/L pneumonia hMPv/also suspected GNR/MRSA-no significant improvement  #suspected sepsis from PNA  #hx systolic CHF EF 20% acutely decomepnsated /Has CardioMems - no significant improvement  #Acute toxic metabolic encephalopathy - resolved  # AF RVR    - back in NSR; no c/o  - palliative eval and GOC; maximized on Rx at this point    #Paroxysmal afib not on AC due to severe hematuria in the past  -asa until we decide re: AC: per fam hematuria was in the remote past; I will d/w cardio chads 3  continue amio  - present ua with 11-25 rbc  - this is why she is on asa and plazix maybe ( unable to use anticoagulation)    # CAD  continue statin, asa, plavix    #CKD stage 3  monitor BMP    #DNR/DNI  poor prognosis  palliative care consult     Dispo: rehab

## 2019-01-17 NOTE — PROGRESS NOTE ADULT - ASSESSMENT
Pt is a 87y old Female with hx of A.fib not on a/c due to hematuria (s/p ablation), CAD, HTN, HLD, CHF (EF 20-25%) s/p AICD, GERD, OA,  left TKR, 4x C-sections  - was admitted on 1/10 for worsening SOB/cough for past 3 days PTA.  She saw PMD 1 d PTA,  but came to ED as her SOB worsened.. She denies fever, chills at home. She has dry cough. On arrival to ED pt had fever 102.2 F and was in respiratory distress, was placed on BiPAP. Work-up= bilat infiltrates, RVP + for human metapneumovirus she is being empirically treated for bacterial process as well. She is awake and "very hungry" - has been NPO since admission due to BiPAP mask.     1) Dyspnea  -Multifactorial : severe systolic CHF and pneumonia  -BiPAP mask used intermittently since admission however much less  -Discussed use of this mask with HCP #1 and #2 - they would like to continue with this BUT they also want her to eat -  so they consent to removing it for meals (RT states cannot put back on until 2 hrs have elapsed since last po intake)   -O2 by N/C   -Pt signed her own MOLST form in 3/2018, requesting DNR/DNI - nothing was marked re:BiPAP mask  -D/C Roxanol 2.5 mg Q4H PRN as she is taking Oxycodone 5 mg PO Q6H PRN which will help her mod pain/dyspnea   -Cont Morphine IVP as ordered.     2)Pneumonia  -RVP + for Human metapneumovirus   -Appreciate ID input and case discussed with ID MD as well  - empiric antibacterial therapy complete      3)CHF  -Severe systolic CHF  -EF 20%  -Appreciate cardiology input  -Persistent tachycardia  -On Amiodarone IV inf    4) Malnutrition  -Albumin = 2.7  -Poor PO intake       5) Weakness  -Secondary to acute illness  -Was in [facility]isted [facility] PTA - cannot return there unless she is more independent  -Family is looking into [facility] facilities  -PT evaluation / pt has refused to participate    6)Pain  -Has chronic pain secondary to arthritis  -Do not stop opioids   -Morphine IVP for severe pain as ordered   -Oxycodone for moderate pain as ordered     7)Advance directives  -Has HCP on chart - names daughter Gabrielle Burgos as #1 and granddaughter Renay #2  -Has MOLST from 3/2018 on chart, signed by pt - states DNR/DNI - nothing else was addressed  -Had a [facility] meeting with Renay in person and Gabrielle by phone - see separate note  -Family respects DNI/DNR orders  -Will follow up with luis Lance 1/20

## 2019-01-18 LAB
HCT VFR BLD CALC: 27.9 % — LOW (ref 34.5–45)
HGB BLD-MCNC: 8.9 G/DL — LOW (ref 11.5–15.5)
MCHC RBC-ENTMCNC: 26.2 PG — LOW (ref 27–34)
MCHC RBC-ENTMCNC: 31.9 GM/DL — LOW (ref 32–36)
MCV RBC AUTO: 82.1 FL — SIGNIFICANT CHANGE UP (ref 80–100)
NRBC # BLD: 0 /100 WBCS — SIGNIFICANT CHANGE UP (ref 0–0)
PLATELET # BLD AUTO: 235 K/UL — SIGNIFICANT CHANGE UP (ref 150–400)
RBC # BLD: 3.4 M/UL — LOW (ref 3.8–5.2)
RBC # FLD: 16.2 % — HIGH (ref 10.3–14.5)
WBC # BLD: 6.56 K/UL — SIGNIFICANT CHANGE UP (ref 3.8–10.5)
WBC # FLD AUTO: 6.56 K/UL — SIGNIFICANT CHANGE UP (ref 3.8–10.5)

## 2019-01-18 PROCEDURE — 93306 TTE W/DOPPLER COMPLETE: CPT | Mod: 26

## 2019-01-18 RX ORDER — MORPHINE SULFATE 50 MG/1
2 CAPSULE, EXTENDED RELEASE ORAL EVERY 4 HOURS
Qty: 0 | Refills: 0 | Status: DISCONTINUED | OUTPATIENT
Start: 2019-01-18 | End: 2019-01-22

## 2019-01-18 RX ADMIN — HEPARIN SODIUM 5000 UNIT(S): 5000 INJECTION INTRAVENOUS; SUBCUTANEOUS at 05:04

## 2019-01-18 RX ADMIN — MORPHINE SULFATE 2 MILLIGRAM(S): 50 CAPSULE, EXTENDED RELEASE ORAL at 17:40

## 2019-01-18 RX ADMIN — MAGNESIUM OXIDE 400 MG ORAL TABLET 200 MILLIGRAM(S): 241.3 TABLET ORAL at 11:53

## 2019-01-18 RX ADMIN — MORPHINE SULFATE 2 MILLIGRAM(S): 50 CAPSULE, EXTENDED RELEASE ORAL at 12:02

## 2019-01-18 RX ADMIN — LISINOPRIL 5 MILLIGRAM(S): 2.5 TABLET ORAL at 05:05

## 2019-01-18 RX ADMIN — CLOPIDOGREL BISULFATE 75 MILLIGRAM(S): 75 TABLET, FILM COATED ORAL at 11:46

## 2019-01-18 RX ADMIN — HEPARIN SODIUM 5000 UNIT(S): 5000 INJECTION INTRAVENOUS; SUBCUTANEOUS at 17:24

## 2019-01-18 RX ADMIN — AMIODARONE HYDROCHLORIDE 200 MILLIGRAM(S): 400 TABLET ORAL at 05:04

## 2019-01-18 RX ADMIN — Medication 5 MILLIGRAM(S): at 05:03

## 2019-01-18 RX ADMIN — Medication 5 MILLIGRAM(S): at 17:14

## 2019-01-18 RX ADMIN — Medication 81 MILLIGRAM(S): at 11:46

## 2019-01-18 RX ADMIN — Medication 0.12 MILLIGRAM(S): at 05:04

## 2019-01-18 RX ADMIN — CEFEPIME 1000 MILLIGRAM(S): 1 INJECTION, POWDER, FOR SOLUTION INTRAMUSCULAR; INTRAVENOUS at 12:03

## 2019-01-18 RX ADMIN — ATORVASTATIN CALCIUM 10 MILLIGRAM(S): 80 TABLET, FILM COATED ORAL at 21:45

## 2019-01-18 NOTE — PROGRESS NOTE ADULT - ASSESSMENT
SOB and cough- MPV infection. On NC , O2 sat normal on this.   Management per primary team.     Acute on Chronic HFrEF , s/p ICD-euvolemic  At the time of discharge please discharge pt on lasix 40mg po daily with f/u with me in one week in heart failure clinic.    close monitoring of the renal function and electrolytes.  Goal potassium of 4 and magnesium of 2.   Strict I/O and daily wt checks. Low sodium diet. Nutrition education.       Afib s/p ablation-  not on full dose anticoagulation as pt had major hematuria in past and she adamantly refused it from then on after knowing the risks of CVA off anticoagulation.   Now in sinus rythm.  No further afib yesterday.     HTN-BP optimal now.       Other medical issues- Management per primary team.   Thank you for allowing me to participate in the care of this patient. Please feel free to contact me with any questions.

## 2019-01-18 NOTE — PROGRESS NOTE ADULT - SUBJECTIVE AND OBJECTIVE BOX
Patient is a 87y old  Female who presents with a chief complaint of SOB, cough.   HPI:  87F w/ PMH of afib not on a/c for hx of hematuria & s/p ablation (?), CAD, HTN, HLD, sCHF (EF 20-25%) s/p cardiomems and AICD, GERD, OA, s/p L knee replacement, 4x c-sections comes in with worsening SOB/cough for past 3 days. She lives in assisted living and I have seen her in office yesterday.  She was told to increase diuretics in the past and she refused to take more than what she was already taking.   Family urged to change bumex to lasix secondary to cost.  With cough yesterday she denied any fever and I warned her about viral infections that are wide spread now.    On arrival to ED pt had fever 102.2 F and was in respiratory distress with increased work of breathing so was put on bipap.   Now on face mask.  No hypoxia.    No CP or pressure.     - pt seen and examined by me today. In respiratory distress to a mild degrees, hypoxia overnight     - pt seen and examined by me this am.     - Pt seen and examined by me today. Pt states that her SOB is improving.     - Pt seen and examined by me today. Pt is on ventimask now and overnight on bipap.  Mild tachypnea.     1/15- Pt seen and examined by me today. Comfortable in bed on NC O2.     - Pt seen and examined by me today.  Pt denies any CP or SOB.     - Pt seen and examined by me today. Pt denies any symptoms except her joint and back pain.       PAST MEDICAL & SURGICAL HISTORY:  Osteoarthritis of multiple joints, unspecified osteoarthritis type  Gastroesophageal reflux disease without esophagitis  Dyslipidemia  Essential hypertension  Paroxysmal atrial fibrillation  Coronary artery disease involving native coronary artery of native heart without angina pectoris  Chronic systolic congestive heart failure: EF 20-25%  History of : 4 c-sections  History of total left knee replacement (TKR)  S/P ablation of atrial fibrillation  AICD (automatic cardioverter/defibrillator) present      MEDICATIONS  (STANDING):  amiodarone    Tablet 200 milliGRAM(s) Oral daily  aspirin  chewable 81 milliGRAM(s) Oral daily  atorvastatin 10 milliGRAM(s) Oral at bedtime  cefepime  Injectable. 1000 milliGRAM(s) IV Push every 12 hours  clopidogrel Tablet 75 milliGRAM(s) Oral daily  enalapril 2.5 milliGRAM(s) Oral daily  heparin  Injectable 5000 Unit(s) SubCutaneous every 12 hours  magnesium oxide 200 milliGRAM(s) Oral daily  metoprolol succinate ER 50 milliGRAM(s) Oral daily  oxybutynin 5 milliGRAM(s) Oral two times a day  sodium chloride 0.9% Bolus 1000 milliLiter(s) IV Bolus once    MEDICATIONS  (PRN):  acetaminophen   Tablet .. 650 milliGRAM(s) Oral every 6 hours PRN Temp greater or equal to 38C (100.4F), Mild Pain (1 - 3)  dicyclomine 20 milliGRAM(s) Oral three times a day before meals PRN Indigestion  lactulose Syrup 20 Gram(s) Oral at bedtime PRN Constipation  loperamide 2 milliGRAM(s) Oral daily PRN Diarrhea  ondansetron    Tablet 4 milliGRAM(s) Oral every 6 hours PRN Nausea and/or Vomiting  oxyCODONE    5 mG/acetaminophen 325 mG 1 Tablet(s) Oral every 6 hours PRN Moderate Pain (4 - 6)      FAMILY HISTORY:  Family history of malignant neoplasm (Child)      SOCIAL HISTORY:  no recent smoking     REVIEW OF SYSTEMS:  CONSTITUTIONAL:    No fatigue, malaise, lethargy.  No fever or chills.  HEENT:  Eyes:  No visual changes.     ENT:  No epistaxis.  No sinus pain.    RESPIRATORY:  c/o cough.  No wheeze.  No hemoptysis.  no  shortness of breath.  CARDIOVASCULAR:  No chest pains.  No palpitations. no shortness of breath, No orthopnea or PND.  GASTROINTESTINAL:  No abdominal pain.  No nausea or vomiting.    GENITOURINARY:    No hematuria.    MUSCULOSKELETAL:  No musculoskeletal pain.  No joint swelling.  No arthritis.  NEUROLOGICAL:  No tingling or numbness or weakness.  PSYCHIATRIC:  No confusion  SKIN:  No rashes.    ENDOCRINE:  No unexplained weight loss.  No polydipsia.   HEMATOLOGIC:  No anemia.  No prolonged or excessive bleeding.   ALLERGIC AND IMMUNOLOGIC:  No pruritus.          Vital Signs Last 24 Hrs  T(C): 36.7 (10 Hemant 2019 15:17), Max: 39 (10 Hemant 2019 10:51)  T(F): 98.1 (10 Hemant 2019 15:17), Max: 102.2 (10 Hemant 2019 10:51)  HR: 86 (10 Hemant 2019 15:17) (74 - 90)  BP: 113/58 (10 Hemant 2019 15:17) (100/55 - 143/87)  BP(mean): --  RR: 18 (10 Hemant 2019 15:17) (18 - 19)  SpO2: 99% (10 Hemant 2019 15:17) (99% - 100%)    PHYSICAL EXAM-    Constitutional: ill looking, alert on NC    Head: Head is normocephalic and atraumatic.      Neck: There are strong carotid pulses bilaterally. No JVD.     Cardiovascular: Regular rate and rhythm without S3, S4. No murmurs or rubs are appreciated.      Respiratory:cta b/l     Abdomen: Soft, nontender, nondistended with positive bowel sounds.      Extremity: No tenderness. no  pitting edema b/l    Neurologic: The patient is alert   Skin: No rash, no obvious lesions noted.      Psychiatric: The patient appears alert      INTERPRETATION OF TELEMETRY: SR.   ECG: Sinus rythm. LVH.     I&O's Detail      LABS:                        11.4   8.22  )-----------( 195      ( 10 Hemant 2019 10:28 )             36.3     01-10    136  |  102  |  23  ----------------------------<  110<H>  4.0   |  25  |  1.29    Ca    8.7      10 Hemant 2019 10:28    TPro  7.8  /  Alb  3.8  /  TBili  0.6  /  DBili  x   /  AST  32  /  ALT  21  /  AlkPhos  61  01-10    CARDIAC MARKERS ( 10 Hemant 2019 13:24 )  0.081 ng/mL / x     / x     / x     / x      CARDIAC MARKERS ( 10 Hemant 2019 10:28 )  0.043 ng/mL / x     / x     / x     / x          PT/INR - ( 10 Hemant 2019 10:28 )   PT: 12.8 sec;   INR: 1.15 ratio         PTT - ( 10 Hemant 2019 10:28 )  PTT:29.3 sec  Urinalysis Basic - ( 10 Hemant 2019 10:28 )    Color: Yellow / Appearance: Clear / S.020 / pH: x  Gluc: x / Ketone: Moderate  / Bili: Negative / Urobili: Negative mg/dL   Blood: x / Protein: 30 mg/dL / Nitrite: Negative   Leuk Esterase: Trace / RBC: 11-25 /HPF / WBC 3-5   Sq Epi: x / Non Sq Epi: x / Bacteria: Moderate      I&O's Summary    BNPSerum Pro-Brain Natriuretic Peptide: 60989 pg/mL (01-10 @ 10:28)    RADIOLOGY & ADDITIONAL STUDIES:  < from: CT Chest No Cont (01.10.19 @ 13:37) >  IMPRESSION:     Bilateral multifocal pneumonia.    Cardiomegaly and enlarged pulmonary artery suggestive of pulmonary   arterial hypertension. Left lower pulmonary artery cardioMEMS device in   place.                FRANCISCO VidiowikiJOSH   This document has been electronically signed. Hemant 10 2019  2:26PM        < end of copied text >

## 2019-01-18 NOTE — PROGRESS NOTE ADULT - ASSESSMENT
87F w/ PMH as above comes in w/ fever and respiratory distress due to CAP.    A/P  #Acute respiratory failure /B/L pneumonia hMPv/also suspected GNR/MRSA-no significant improvement  #suspected sepsis from PNA  #hx systolic CHF EF 20% acutely decomepnsated /Has CardioMems - no significant improvement  #Acute toxic metabolic encephalopathy - resolved  # AF RVR  - back in NSR; no c/o  - palliative eval and GOC; maximized on Rx at this point  - on iv cefepime    #Paroxysmal afib not on AC due to severe hematuria in the past  -asa until we decide re: AC: per fam hematuria was in the remote past; I will d/w cardio chads 3  continue amio  - present ua with 11-25 rbc  - this is why she is on asa and plavix maybe ( unable to use anticoagulation)    # CAD  continue statin, asa, plavix    #CKD stage 3  monitor BMP    #DNR/DNI  poor prognosis  palliative care consult- family meeting 1/20 (sunday)

## 2019-01-18 NOTE — CHART NOTE - NSCHARTNOTEFT_GEN_A_CORE
Assessment:   *pt being managed for dyspnea 2/2 severe CHF and PNA.  Pt DNR/DNI.  Pt with poor prognosis, palliative following.  *pt continues with poor PO intake, likely meeting <75% of estimated nutr needs. Pt with oral supplements ordered; ensure pudding BID and gelatein BID.  However, RN unable to speak to if pt is actually consuming those supplements.  Rec'd encourage oral supplements between meals to optimize PO Intake  *no edema.  BM (+) 1/17  *no new wt since admission; obtain new wt when feasible to track/trend changes.    Recommendations:  1) encourage oral supplements  2) provide maximum assistance/encouragement with all PO intake  3) consider liberalizing diet to optimize PO intake  4) add MVI with minerals daily, vitamin C 500mg BID and zinc sulfate 220mg BID x 10 days to optimize wound healing      Diet Prescription: Diet, Mechanical Soft:   DASH/TLC {Sodium & Cholesterol Restricted} (DASH)  1500mL Fluid Restriction ([id number])  Low Sodium (01-14-19 @ 08:32)      Wt Hx:  Weight (kg): 68 (01-10-19 @ 10:00)        Estimated Needs:   [x] no change since previous assessment  Estimated Energy Needs (25-30 calories/kg):  · Weight  (lbs)  114.6 lb  · Weight (kg)  52 kg  · From (25cal/kg) 1300 To (30cal/kg) 1560    Other Calculation:  · Other Calculation  Ht.   61   "        Wt.  52      kg               BMI       28           IBW  47     kg               Pt is at  144  %  IBW    Estimated Protein Needs (1.4-1.6 g/kg):  · Weight  (lbs)  114.6  · Weight (kg)  52 kg  · From (1.4 g/kg) 72.8 To (1.6 g/kg) 83.2    Nutrition Diagnostic #1:  · Nutrition Diagnostic Terminology #1: Oral or Nutrition Support Intake; Nutrient  · Oral or Nutrition Support Intake: Inadequate oral intake  · Nutrient: Increased nutrient needs (specify); Malnutrition; Pt meets criteria for severe protein-calorie malnutrition in context of chronic disease  .   Pressure ulcers kay st [address]  · Etiology: inadequate PO intake 2/2 confusion, CHF  · Signs/Symptoms: moderate/severe muscle wasting, moderate/severe fat depletion  · Nutrition Intervention: Meals and Snack; Medical Food Supplements  · Meals and Snacks: DASH diet, ensure pudding, gelatein  · Goal/Expected Outcome: Pt will consume and tolerate > 80% nutritional needs, no exacerbation of CHF, no s/s malnutrition      Nutrition Diagnosis is [x] ongoing  [ ] resolved [ ] not applicable       New Nutrition Diagnosis: [x] not applicable         Pertinent Medications: MEDICATIONS  (STANDING):  amiodarone    Tablet 200 milliGRAM(s) Oral daily  aspirin  chewable 81 milliGRAM(s) Oral daily  atorvastatin 10 milliGRAM(s) Oral at bedtime  cefepime  Injectable. 1000 milliGRAM(s) IV Push daily  clopidogrel Tablet 75 milliGRAM(s) Oral daily  digoxin     Tablet 0.125 milliGRAM(s) Oral daily  heparin  Injectable 5000 Unit(s) SubCutaneous every 12 hours  lisinopril 5 milliGRAM(s) Oral daily  magnesium oxide 200 milliGRAM(s) Oral daily  oxybutynin 5 milliGRAM(s) Oral two times a day    MEDICATIONS  (PRN):  acetaminophen   Tablet .. 650 milliGRAM(s) Oral every 6 hours PRN Temp greater or equal to 38C (100.4F), Mild Pain (1 - 3)  aluminum hydroxide/magnesium hydroxide/simethicone Suspension 30 milliLiter(s) Oral every 6 hours PRN Dyspepsia  dicyclomine 20 milliGRAM(s) Oral three times a day before meals PRN Indigestion  lactulose Syrup 20 Gram(s) Oral at bedtime PRN Constipation  loperamide 2 milliGRAM(s) Oral daily PRN Diarrhea  metoprolol tartrate Injectable 2.5 milliGRAM(s) IV Push every 4 hours PRN for HR>140  morphine  - Injectable 2 milliGRAM(s) IV Push every 4 hours PRN Moderate Pain (4 - 6)  ondansetron   Disintegrating Tablet 4 milliGRAM(s) Oral every 6 hours PRN Nausea and/or Vomiting    Pertinent Labs: 01-17 Na136 mmol/L Glu 105 mg/dL<H> K+ 3.5 mmol/L Cr  0.94 mg/dL BUN 29 mg/dL<H> 01-12 Alb 2.7 g/dL<L>      Skin: vic score = 11  PU:  stage 1: b/l heels  stage 2: sacrum    Monitoring and Evaluation:   [x] PO intake/Nutr support infusion [ x ] Tolerance to Nutr [ x ] weights [ x ] labs[ x ] follow up per protocol  [ ] other:

## 2019-01-18 NOTE — PROGRESS NOTE ADULT - SUBJECTIVE AND OBJECTIVE BOX
INTERVAL HPI/OVERNIGHT EVENTS:  87F w/ PMH of afib not on a/c for hx of hematuria & s/p ablation (?), CAD, HTN, HLD, sCHF (EF 20-25%) s/p cardiomems and AICD, GERD, OA, s/p L knee replacement, 4x c-sections comes in with worsening SOB  Hospital course: admitted for resp failure 2/2 to acute decom systolic CHF with superimposed hMPV and pna    1/16: AF with RVR not responding to present Rx; BP high 80's' has no c/o; if she sleeps HR in the 115's when she wakes up and talks: 130-140  1/17: back in NSR, no c/o; granddaughter present  1/18/19- Patient seen and examined at bedside. States she does not feel too well, but can't explain why. Does not believe breathing is labored.     MEDICATIONS  (STANDING):  amiodarone    Tablet 200 milliGRAM(s) Oral daily  aspirin  chewable 81 milliGRAM(s) Oral daily  atorvastatin 10 milliGRAM(s) Oral at bedtime  cefepime  Injectable. 1000 milliGRAM(s) IV Push daily  clopidogrel Tablet 75 milliGRAM(s) Oral daily  digoxin     Tablet 0.125 milliGRAM(s) Oral daily  heparin  Injectable 5000 Unit(s) SubCutaneous every 12 hours  lisinopril 5 milliGRAM(s) Oral daily  magnesium oxide 200 milliGRAM(s) Oral daily  oxybutynin 5 milliGRAM(s) Oral two times a day    MEDICATIONS  (PRN):  acetaminophen   Tablet .. 650 milliGRAM(s) Oral every 6 hours PRN Temp greater or equal to 38C (100.4F), Mild Pain (1 - 3)  aluminum hydroxide/magnesium hydroxide/simethicone Suspension 30 milliLiter(s) Oral every 6 hours PRN Dyspepsia  dicyclomine 20 milliGRAM(s) Oral three times a day before meals PRN Indigestion  lactulose Syrup 20 Gram(s) Oral at bedtime PRN Constipation  loperamide 2 milliGRAM(s) Oral daily PRN Diarrhea  metoprolol tartrate Injectable 2.5 milliGRAM(s) IV Push every 4 hours PRN for HR>140  morphine  - Injectable 2 milliGRAM(s) IV Push every 4 hours PRN Moderate Pain (4 - 6)  ondansetron   Disintegrating Tablet 4 milliGRAM(s) Oral every 6 hours PRN Nausea and/or Vomiting      Allergies    No Known Allergies    Intolerances      ROS  CONSTITUTIONAL: +weakness, no fevers or chills  EYES/ENT: No visual changes;  No vertigo or throat pain   NECK: No pain or stiffness  RESPIRATORY: No cough, wheezing, hemoptysis; No shortness of breath  CARDIOVASCULAR: No chest pain or palpitations  GASTROINTESTINAL: No abdominal or epigastric pain. No nausea, vomiting, or hematemesis  GENITOURINARY: No dysuria, frequency or hematuria  NEUROLOGICAL: No numbness  SKIN: No itching, burning, rashes, or lesions   All other review of systems is negative unless indicated above.    Vital Signs Last 24 Hrs  T(C): 36.7 (18 Jan 2019 17:08), Max: 36.8 (18 Jan 2019 10:22)  T(F): 98.1 (18 Jan 2019 17:08), Max: 98.2 (18 Jan 2019 10:22)  HR: 68 (18 Jan 2019 17:08) (68 - 72)  BP: 108/57 (18 Jan 2019 17:08) (93/47 - 118/55)  BP(mean): --  RR: 17 (18 Jan 2019 10:22) (17 - 18)  SpO2: 100% (18 Jan 2019 17:08) (95% - 100%)    Physical Exam:  General: WN/WD NAD  Neurology: A&Ox3, nonfocal, GUTIERRES x 4  Respiratory: CTA B/L  CV: RRR, S1S2, no murmurs, rubs or gallops  Abdominal: Soft, NT, ND +BS  Extremities: No edema, + peripheral pulses      LABS:                        8.9    6.56  )-----------( 235      ( 18 Jan 2019 06:12 )             27.9     01-17    136  |  97  |  29<H>  ----------------------------<  105<H>  3.5   |  32<H>  |  0.94    Ca    8.3<L>      17 Jan 2019 10:32  Mg     2.0     01-17            RADIOLOGY & ADDITIONAL TESTS:

## 2019-01-18 NOTE — PROGRESS NOTE ADULT - SUBJECTIVE AND OBJECTIVE BOX
Subjective:    pat better, sitting in chair, no respiratory distress.    Home Medications:  amiodarone 200 mg oral tablet: 1 tab(s) orally once a day (11 Jan 2019 14:24)  aspirin 81 mg oral tablet, chewable: 1 tab(s) orally once a day (11 Jan 2019 14:24)  Bentyl 20 mg oral tablet: 1 tab(s) orally 3 times a day, As Needed (11 Jan 2019 14:24)  bumetanide: 3 milligram(s) orally once a day (11 Jan 2019 14:24)  enalapril 2.5 mg oral tablet: 1 tab(s) orally once a day (11 Jan 2019 14:24)  Imodium A-D 2 mg oral tablet: 1 tab(s) orally once a day, As Needed (11 Jan 2019 14:24)  lactulose 10 g/15 mL oral syrup: 15 milliliter(s) orally once a day, As Needed (11 Jan 2019 14:24)  magnesium oxide 200 mg oral tablet: 1 tab(s) orally once a day (11 Jan 2019 14:24)  Metoprolol Succinate ER 50 mg oral tablet, extended release: 1 tab(s) orally once a day (11 Jan 2019 14:24)  oxyCODONE-acetaminophen 7.5 mg-325 mg oral tablet: 1 tab(s) orally every 6 hours, As Needed (11 Jan 2019 14:24)  potassium chloride 20 mEq oral tablet, extended release: 2 tab(s) orally 2 times a day (11 Jan 2019 14:24)    MEDICATIONS  (STANDING):  amiodarone    Tablet 200 milliGRAM(s) Oral daily  aspirin  chewable 81 milliGRAM(s) Oral daily  atorvastatin 10 milliGRAM(s) Oral at bedtime  cefepime  Injectable. 1000 milliGRAM(s) IV Push daily  clopidogrel Tablet 75 milliGRAM(s) Oral daily  digoxin     Tablet 0.125 milliGRAM(s) Oral daily  heparin  Injectable 5000 Unit(s) SubCutaneous every 12 hours  lisinopril 5 milliGRAM(s) Oral daily  magnesium oxide 200 milliGRAM(s) Oral daily  oxybutynin 5 milliGRAM(s) Oral two times a day    MEDICATIONS  (PRN):  acetaminophen   Tablet .. 650 milliGRAM(s) Oral every 6 hours PRN Temp greater or equal to 38C (100.4F), Mild Pain (1 - 3)  aluminum hydroxide/magnesium hydroxide/simethicone Suspension 30 milliLiter(s) Oral every 6 hours PRN Dyspepsia  dicyclomine 20 milliGRAM(s) Oral three times a day before meals PRN Indigestion  lactulose Syrup 20 Gram(s) Oral at bedtime PRN Constipation  loperamide 2 milliGRAM(s) Oral daily PRN Diarrhea  metoprolol tartrate Injectable 2.5 milliGRAM(s) IV Push every 4 hours PRN for HR>140  morphine  - Injectable 2 milliGRAM(s) IV Push every 4 hours PRN Moderate Pain (4 - 6)  ondansetron   Disintegrating Tablet 4 milliGRAM(s) Oral every 6 hours PRN Nausea and/or Vomiting      Allergies    No Known Allergies    Intolerances        Vital Signs Last 24 Hrs  T(C): 36.4 (18 Jan 2019 04:38), Max: 36.7 (17 Jan 2019 17:14)  T(F): 97.5 (18 Jan 2019 04:38), Max: 98 (17 Jan 2019 17:14)  HR: 72 (18 Jan 2019 04:38) (64 - 77)  BP: 109/55 (18 Jan 2019 04:38) (107/56 - 150/70)  BP(mean): 76 (17 Jan 2019 16:00) (68 - 79)  RR: 18 (17 Jan 2019 23:15) (16 - 24)  SpO2: 95% (18 Jan 2019 04:38) (95% - 100%)      PHYSICAL EXAMINATION:    NECK:  Supple. No lymphadenopathy. Jugular venous pressure not elevated. Carotids equal.   HEART:   The cardiac impulse has a normal quality. Reg., Nl S1 and S2.  There are no murmurs, rubs or gallops noted  CHEST:  Chest is clear to auscultation. Normal respiratory effort.  ABDOMEN:  Soft and nontender.   EXTREMITIES:  There is no edema.       LABS:                        8.9    6.56  )-----------( 235      ( 18 Jan 2019 06:12 )             27.9     01-17    136  |  97  |  29<H>  ----------------------------<  105<H>  3.5   |  32<H>  |  0.94    Ca    8.3<L>      17 Jan 2019 10:32  Mg     2.0     01-17

## 2019-01-18 NOTE — PROGRESS NOTE ADULT - ASSESSMENT
PROBLEMS:    AC hypoxaemic respiratory failur  Multifocal pneumonia  positive HMPV  Cardic arrthmias  Chronic systolic heart failure   CKD  Paroxysmal atrial fibrillation  Essential hypertension    PLAN:    pulmonary stable  getting echo today  IV CEFEPIME  BIPAP qhs/prn  on amiodarone/digoxin  POOR PROGNOSIS  Comfort care  DVT PROPHYLASIX

## 2019-01-19 LAB
ANION GAP SERPL CALC-SCNC: 6 MMOL/L — SIGNIFICANT CHANGE UP (ref 5–17)
BUN SERPL-MCNC: 15 MG/DL — SIGNIFICANT CHANGE UP (ref 7–23)
CALCIUM SERPL-MCNC: 8.7 MG/DL — SIGNIFICANT CHANGE UP (ref 8.5–10.1)
CHLORIDE SERPL-SCNC: 100 MMOL/L — SIGNIFICANT CHANGE UP (ref 96–108)
CO2 SERPL-SCNC: 30 MMOL/L — SIGNIFICANT CHANGE UP (ref 22–31)
CREAT SERPL-MCNC: 0.78 MG/DL — SIGNIFICANT CHANGE UP (ref 0.5–1.3)
GLUCOSE SERPL-MCNC: 94 MG/DL — SIGNIFICANT CHANGE UP (ref 70–99)
HCT VFR BLD CALC: 28.7 % — LOW (ref 34.5–45)
HGB BLD-MCNC: 9.1 G/DL — LOW (ref 11.5–15.5)
MCHC RBC-ENTMCNC: 26.5 PG — LOW (ref 27–34)
MCHC RBC-ENTMCNC: 31.7 GM/DL — LOW (ref 32–36)
MCV RBC AUTO: 83.4 FL — SIGNIFICANT CHANGE UP (ref 80–100)
NRBC # BLD: 0 /100 WBCS — SIGNIFICANT CHANGE UP (ref 0–0)
PLATELET # BLD AUTO: 271 K/UL — SIGNIFICANT CHANGE UP (ref 150–400)
POTASSIUM SERPL-MCNC: 3.9 MMOL/L — SIGNIFICANT CHANGE UP (ref 3.5–5.3)
POTASSIUM SERPL-SCNC: 3.9 MMOL/L — SIGNIFICANT CHANGE UP (ref 3.5–5.3)
RBC # BLD: 3.44 M/UL — LOW (ref 3.8–5.2)
RBC # FLD: 16.5 % — HIGH (ref 10.3–14.5)
SODIUM SERPL-SCNC: 136 MMOL/L — SIGNIFICANT CHANGE UP (ref 135–145)
WBC # BLD: 6.99 K/UL — SIGNIFICANT CHANGE UP (ref 3.8–10.5)
WBC # FLD AUTO: 6.99 K/UL — SIGNIFICANT CHANGE UP (ref 3.8–10.5)

## 2019-01-19 RX ORDER — OXYCODONE AND ACETAMINOPHEN 5; 325 MG/1; MG/1
1 TABLET ORAL EVERY 6 HOURS
Qty: 0 | Refills: 0 | Status: DISCONTINUED | OUTPATIENT
Start: 2019-01-19 | End: 2019-01-22

## 2019-01-19 RX ORDER — CEFUROXIME AXETIL 250 MG
500 TABLET ORAL EVERY 12 HOURS
Qty: 0 | Refills: 0 | Status: DISCONTINUED | OUTPATIENT
Start: 2019-01-19 | End: 2019-01-20

## 2019-01-19 RX ADMIN — Medication 81 MILLIGRAM(S): at 11:52

## 2019-01-19 RX ADMIN — Medication 5 MILLIGRAM(S): at 06:12

## 2019-01-19 RX ADMIN — OXYCODONE AND ACETAMINOPHEN 1 TABLET(S): 5; 325 TABLET ORAL at 18:20

## 2019-01-19 RX ADMIN — CLOPIDOGREL BISULFATE 75 MILLIGRAM(S): 75 TABLET, FILM COATED ORAL at 11:52

## 2019-01-19 RX ADMIN — AMIODARONE HYDROCHLORIDE 200 MILLIGRAM(S): 400 TABLET ORAL at 06:11

## 2019-01-19 RX ADMIN — HEPARIN SODIUM 5000 UNIT(S): 5000 INJECTION INTRAVENOUS; SUBCUTANEOUS at 06:12

## 2019-01-19 RX ADMIN — LISINOPRIL 5 MILLIGRAM(S): 2.5 TABLET ORAL at 06:11

## 2019-01-19 RX ADMIN — ATORVASTATIN CALCIUM 10 MILLIGRAM(S): 80 TABLET, FILM COATED ORAL at 20:46

## 2019-01-19 RX ADMIN — Medication 5 MILLIGRAM(S): at 16:57

## 2019-01-19 RX ADMIN — Medication 0.12 MILLIGRAM(S): at 06:12

## 2019-01-19 RX ADMIN — MAGNESIUM OXIDE 400 MG ORAL TABLET 200 MILLIGRAM(S): 241.3 TABLET ORAL at 11:52

## 2019-01-19 RX ADMIN — HEPARIN SODIUM 5000 UNIT(S): 5000 INJECTION INTRAVENOUS; SUBCUTANEOUS at 16:57

## 2019-01-19 RX ADMIN — Medication 500 MILLIGRAM(S): at 16:57

## 2019-01-19 NOTE — PROGRESS NOTE ADULT - ASSESSMENT
A/P: 87F w/ PMH of afib not on a/c for hx of hematuria & s/p ablation (?), CAD, HTN, HLD, sCHF (EF 20-25%) s/p cardiomems and ICD, GERD, OA, s/p L knee replacement, 4x c-sections comes in with worsening SOB/cough.    1. SOB and cough- MPV infection. On NC , O2 sat normal on this.   Management per primary team.     2. Acute on Chronic HFrEF , s/p ICD-euvolemic  At the time of discharge please discharge pt on lasix 40mg po daily.  Close monitoring of the renal function and electrolytes.  Goal potassium of 4 and magnesium of 2.   Strict I/O and daily wt checks. Low sodium diet. Nutrition education.     3. Afib s/p ablation-  not on full dose anticoagulation as pt had major hematuria in past and she adamantly refused it from then on after knowing the risks of CVA off anticoagulation.   Now in sinus rythm. No further afib yesterday.   Cont tele for now.    4. HTN- BP optimal now. Cont current regimen.     5. DVT proph.

## 2019-01-19 NOTE — PROGRESS NOTE ADULT - ASSESSMENT
PROBLEMS:    AC hypoxaemic respiratory failur  Multifocal pneumonia  positive HMPV  Cardic arrthmias  Chronic systolic heart failure   CKD  Paroxysmal atrial fibrillation  Essential hypertension    PLAN:    pulmonary stable  getting echo today  change to po ceftin  BIPAP qhs/prn  on amiodarone/digoxin  POOR PROGNOSIS  Comfort care  DVT PROPHYLASIX

## 2019-01-19 NOTE — PROGRESS NOTE ADULT - SUBJECTIVE AND OBJECTIVE BOX
INTERVAL HPI/OVERNIGHT EVENTS:  87F w/ PMH of afib not on a/c for hx of hematuria & s/p ablation (?), CAD, HTN, HLD, sCHF (EF 20-25%) s/p cardiomems and AICD, GERD, OA, s/p L knee replacement, 4x c-sections comes in with worsening SOB  Hospital course: admitted for resp failure 2/2 to acute decom systolic CHF with superimposed hMPV and pna    1/16: AF with RVR not responding to present Rx; BP high 80's' has no c/o; if she sleeps HR in the 115's when she wakes up and talks: 130-140  1/17: back in NSR, no c/o; granddaughter present  1/18/19- Patient seen and examined at bedside. States she does not feel too well, but can't explain why. Does not believe breathing is labored.   1/19: + diarrhea; no other c/o      Vital Signs Last 24 Hrs  T(C): 36.7 (19 Jan 2019 11:13), Max: 36.7 (18 Jan 2019 17:08)  T(F): 98.1 (19 Jan 2019 11:13), Max: 98.1 (18 Jan 2019 17:08)  HR: 70 (19 Jan 2019 11:13) (66 - 70)  BP: 128/58 (19 Jan 2019 11:13) (108/57 - 139/48)  BP(mean): --  RR: 17 (19 Jan 2019 11:13) (17 - 17)  SpO2: 94% (19 Jan 2019 11:13) (94% - 100%)    Physical Exam:  General: WN/WD NAD  Neurology: A&Ox3, nonfocal, GUTIERRES x 4  Respiratory: CTA B/L  CV: RRR, S1S2, no murmurs, rubs or gallops  Abdominal: Soft, NT, ND +BS  Extremities: No edema, + peripheral pulses      LABS:                        8.9    6.56  )-----------( 235      ( 18 Jan 2019 06:12 )             27.9     01-17    136  |  97  |  29<H>  ----------------------------<  105<H>  3.5   |  32<H>  |  0.94    Ca    8.3<L>      17 Jan 2019 10:32  Mg     2.0     01-17            RADIOLOGY & ADDITIONAL TESTS:

## 2019-01-19 NOTE — PROGRESS NOTE ADULT - SUBJECTIVE AND OBJECTIVE BOX
Cardiology Progress Note  Patient is a 87y old  Female who presents with a chief complaint of SOB, cough.     HPI: 87F w/ PMH of afib not on a/c for hx of hematuria & s/p ablation (?), CAD, HTN, HLD, sCHF (EF 20-25%) s/p cardiomems and AICD, GERD, OA, s/p L knee replacement, 4x c-sections comes in with worsening SOB/cough for past 3 days. She lives in assisted living and I have seen her in office yesterday.  She was told to increase diuretics in the past and she refused to take more than what she was already taking.   Family urged to change bumex to lasix secondary to cost.  With cough yesterday she denied any fever and I warned her about viral infections that are wide spread now.  On arrival to ED pt had fever 102.2 F and was in respiratory distress with increased work of breathing so was put on bipap.     . SR. Apaced on tele. No events last pm. No CP.  No fevers.      PAST MEDICAL & SURGICAL HISTORY:  Osteoarthritis of multiple joints, unspecified osteoarthritis type  Gastroesophageal reflux disease without esophagitis  Dyslipidemia  Essential hypertension  Paroxysmal atrial fibrillation  Coronary artery disease involving native coronary artery of native heart without angina pectoris  Chronic systolic congestive heart failure: EF 20-25%  History of : 4 c-sections  History of total left knee replacement (TKR)  S/P ablation of atrial fibrillation  AICD (automatic cardioverter/defibrillator) present    MEDICATIONS  (STANDING):  amiodarone    Tablet 200 milliGRAM(s) Oral daily  aspirin  chewable 81 milliGRAM(s) Oral daily  atorvastatin 10 milliGRAM(s) Oral at bedtime  cefepime  Injectable. 1000 milliGRAM(s) IV Push every 12 hours  clopidogrel Tablet 75 milliGRAM(s) Oral daily  enalapril 2.5 milliGRAM(s) Oral daily  heparin  Injectable 5000 Unit(s) SubCutaneous every 12 hours  magnesium oxide 200 milliGRAM(s) Oral daily  metoprolol succinate ER 50 milliGRAM(s) Oral daily  oxybutynin 5 milliGRAM(s) Oral two times a day  sodium chloride 0.9% Bolus 1000 milliLiter(s) IV Bolus once    MEDICATIONS  (PRN):  acetaminophen   Tablet .. 650 milliGRAM(s) Oral every 6 hours PRN Temp greater or equal to 38C (100.4F), Mild Pain (1 - 3)  dicyclomine 20 milliGRAM(s) Oral three times a day before meals PRN Indigestion  lactulose Syrup 20 Gram(s) Oral at bedtime PRN Constipation  loperamide 2 milliGRAM(s) Oral daily PRN Diarrhea  ondansetron    Tablet 4 milliGRAM(s) Oral every 6 hours PRN Nausea and/or Vomiting  oxyCODONE    5 mG/acetaminophen 325 mG 1 Tablet(s) Oral every 6 hours PRN Moderate Pain (4 - 6)    FAMILY HISTORY: Family history of malignant neoplasm (Child)    SOCIAL HISTORY: no recent smoking     REVIEW OF SYSTEMS:  CONSTITUTIONAL:    No fatigue, malaise, lethargy.  No fever or chills.  HEENT:  Eyes:  No visual changes.    ENT:  No epistaxis.  No sinus pain.    RESPIRATORY:  c/o cough.  No wheeze.  No hemoptysis.  no  shortness of breath.  CARDIOVASCULAR:  No chest pains.  No palpitations. no shortness of breath, No orthopnea or PND.  GASTROINTESTINAL:  No abdominal pain.  No nausea or vomiting.    GENITOURINARY:    No hematuria.    MUSCULOSKELETAL:  No musculoskeletal pain.  No joint swelling.  No arthritis.  NEUROLOGICAL:  No tingling or numbness or weakness.    Vital Signs Last 24 Hrs  T(C): 36.7 (10 Hemant 2019 15:17), Max: 39 (10 Hemant 2019 10:51)  T(F): 98.1 (10 Hemant 2019 15:17), Max: 102.2 (10 Hemant 2019 10:51)  HR: 86 (10 Hemant 2019 15:17) (74 - 90)  BP: 113/58 (10 Hemant 2019 15:17) (100/55 - 143/87)  BP(mean): --  RR: 18 (10 Hemant 2019 15:17) (18 - 19)  SpO2: 99% (10 Hemant 2019 15:17) (99% - 100%)    PHYSICAL EXAM-    Constitutional: ill looking, alert on NC    Head: Head is normocephalic and atraumatic.      Neck: There are strong carotid pulses bilaterally. No JVD.     Cardiovascular: Regular rate and rhythm without S3, S4. No murmurs or rubs are appreciated.      Respiratory:cta b/l     Abdomen: Soft, nontender, nondistended with positive bowel sounds.      Extremity: No tenderness. no  pitting edema b/l    Neurologic: The patient is alert   Skin: No rash, no obvious lesions noted.      Psychiatric: The patient appears alert      INTERPRETATION OF TELEMETRY: SR.   ECG: Sinus rythm. LVH.     I&O's Detail      LABS:                        11.4   8.22  )-----------( 195      ( 10 Hemant 2019 10:28 )             36.3     01-10    136  |  102  |  23  ----------------------------<  110<H>  4.0   |  25  |  1.29    Ca    8.7      10 Hemant 2019 10:28    TPro  7.8  /  Alb  3.8  /  TBili  0.6  /  DBili  x   /  AST  32  /  ALT  21  /  AlkPhos  61  01-10    CARDIAC MARKERS ( 10 Hemant 2019 13:24 )  0.081 ng/mL / x     / x     / x     / x      CARDIAC MARKERS ( 10 Hemant 2019 10:28 )  0.043 ng/mL / x     / x     / x     / x          PT/INR - ( 10 Hemant 2019 10:28 )   PT: 12.8 sec;   INR: 1.15 ratio         PTT - ( 10 Hemant 2019 10:28 )  PTT:29.3 sec  Urinalysis Basic - ( 10 Hemant 2019 10:28 )    Color: Yellow / Appearance: Clear / S.020 / pH: x  Gluc: x / Ketone: Moderate  / Bili: Negative / Urobili: Negative mg/dL   Blood: x / Protein: 30 mg/dL / Nitrite: Negative   Leuk Esterase: Trace / RBC: 11-25 /HPF / WBC 3-5   Sq Epi: x / Non Sq Epi: x / Bacteria: Moderate      I&O's Summary    BNPSerum Pro-Brain Natriuretic Peptide: 51187 pg/mL (01-10 @ 10:28)    RADIOLOGY & ADDITIONAL STUDIES:  < from: CT Chest No Cont (01.10.19 @ 13:37) >  IMPRESSION:     Bilateral multifocal pneumonia.    Cardiomegaly and enlarged pulmonary artery suggestive of pulmonary   arterial hypertension. Left lower pulmonary artery cardioMEMS device in   place.                FRANCISCOStar [email or web address]   This document has been electronically signed. Hemant 10 2019  2:26PM        < end of copied text >

## 2019-01-19 NOTE — PROGRESS NOTE ADULT - ASSESSMENT
87F w/ PMH as above comes in w/ fever and respiratory distress due to CAP.    A/P  #Acute respiratory failure /B/L pneumonia hMPv/also suspected GNR/MRSA-no significant improvement  #suspected sepsis from PNA  #hx systolic CHF EF 20% acutely decomepnsated /Has CardioMems - no significant improvement  #Acute toxic metabolic encephalopathy - resolved  # AF RVR  - back in NSR; no c/o  - palliative eval and GOC; maximized on Rx at this point      # Diarrhea r/o c diff     #Paroxysmal afib not on AC due to severe hematuria in the past  - per fam hematuria was in the remote past;   - continue amio  - present ua with 11-25 rbc  - this is why she is on asa and plavix maybe ( unable to use anticoagulation)    # CAD  continue statin, asa, plavix    #CKD stage 3  monitor BMP    #DNR/DNI  poor prognosis  palliative care consult- family meeting 1/20 (sunday)

## 2019-01-19 NOTE — PROGRESS NOTE ADULT - SUBJECTIVE AND OBJECTIVE BOX
Subjective:    pat better, sitting in chair, no respiratory distress.    Home Medications:  amiodarone 200 mg oral tablet: 1 tab(s) orally once a day (11 Jan 2019 14:24)  aspirin 81 mg oral tablet, chewable: 1 tab(s) orally once a day (11 Jan 2019 14:24)  Bentyl 20 mg oral tablet: 1 tab(s) orally 3 times a day, As Needed (11 Jan 2019 14:24)  bumetanide: 3 milligram(s) orally once a day (11 Jan 2019 14:24)  enalapril 2.5 mg oral tablet: 1 tab(s) orally once a day (11 Jan 2019 14:24)  Imodium A-D 2 mg oral tablet: 1 tab(s) orally once a day, As Needed (11 Jan 2019 14:24)  lactulose 10 g/15 mL oral syrup: 15 milliliter(s) orally once a day, As Needed (11 Jan 2019 14:24)  magnesium oxide 200 mg oral tablet: 1 tab(s) orally once a day (11 Jan 2019 14:24)  Metoprolol Succinate ER 50 mg oral tablet, extended release: 1 tab(s) orally once a day (11 Jan 2019 14:24)  oxyCODONE-acetaminophen 7.5 mg-325 mg oral tablet: 1 tab(s) orally every 6 hours, As Needed (11 Jan 2019 14:24)  potassium chloride 20 mEq oral tablet, extended release: 2 tab(s) orally 2 times a day (11 Jan 2019 14:24)    MEDICATIONS  (STANDING):  amiodarone    Tablet 200 milliGRAM(s) Oral daily  aspirin  chewable 81 milliGRAM(s) Oral daily  atorvastatin 10 milliGRAM(s) Oral at bedtime  cefepime  Injectable. 1000 milliGRAM(s) IV Push daily  clopidogrel Tablet 75 milliGRAM(s) Oral daily  digoxin     Tablet 0.125 milliGRAM(s) Oral daily  heparin  Injectable 5000 Unit(s) SubCutaneous every 12 hours  lisinopril 5 milliGRAM(s) Oral daily  magnesium oxide 200 milliGRAM(s) Oral daily  oxybutynin 5 milliGRAM(s) Oral two times a day    MEDICATIONS  (PRN):  acetaminophen   Tablet .. 650 milliGRAM(s) Oral every 6 hours PRN Temp greater or equal to 38C (100.4F), Mild Pain (1 - 3)  aluminum hydroxide/magnesium hydroxide/simethicone Suspension 30 milliLiter(s) Oral every 6 hours PRN Dyspepsia  dicyclomine 20 milliGRAM(s) Oral three times a day before meals PRN Indigestion  lactulose Syrup 20 Gram(s) Oral at bedtime PRN Constipation  loperamide 2 milliGRAM(s) Oral daily PRN Diarrhea  metoprolol tartrate Injectable 2.5 milliGRAM(s) IV Push every 4 hours PRN for HR>140  morphine  - Injectable 2 milliGRAM(s) IV Push every 4 hours PRN Moderate Pain (4 - 6)  ondansetron   Disintegrating Tablet 4 milliGRAM(s) Oral every 6 hours PRN Nausea and/or Vomiting      Allergies    No Known Allergies    Intolerances        Vital Signs Last 24 Hrs  T(C): 36.6 (19 Jan 2019 04:48), Max: 36.8 (18 Jan 2019 10:22)  T(F): 97.9 (19 Jan 2019 04:48), Max: 98.2 (18 Jan 2019 10:22)  HR: 66 (19 Jan 2019 04:48) (66 - 69)  BP: 139/48 (19 Jan 2019 04:48) (93/47 - 139/48)  BP(mean): --  RR: 17 (18 Jan 2019 10:22) (17 - 17)  SpO2: 95% (19 Jan 2019 04:48) (95% - 100%)      PHYSICAL EXAMINATION:    NECK:  Supple. No lymphadenopathy. Jugular venous pressure not elevated. Carotids equal.   HEART:   The cardiac impulse has a normal quality. Reg., Nl S1 and S2.  There are no murmurs, rubs or gallops noted  CHEST:  Chest is clear to auscultation. Normal respiratory effort.  ABDOMEN:  Soft and nontender.   EXTREMITIES:  There is no edema.       LABS:                        9.1    6.99  )-----------( 271      ( 19 Jan 2019 06:44 )             28.7     01-19    136  |  100  |  15  ----------------------------<  94  3.9   |  30  |  0.78    Ca    8.7      19 Jan 2019 06:44  Mg     2.0     01-17

## 2019-01-20 ENCOUNTER — TRANSCRIPTION ENCOUNTER (OUTPATIENT)
Age: 84
End: 2019-01-20

## 2019-01-20 LAB
C DIFF BY PCR RESULT: SIGNIFICANT CHANGE UP
C DIFF TOX GENS STL QL NAA+PROBE: SIGNIFICANT CHANGE UP

## 2019-01-20 RX ADMIN — Medication 0.12 MILLIGRAM(S): at 05:21

## 2019-01-20 RX ADMIN — Medication 500 MILLIGRAM(S): at 05:21

## 2019-01-20 RX ADMIN — OXYCODONE AND ACETAMINOPHEN 1 TABLET(S): 5; 325 TABLET ORAL at 05:25

## 2019-01-20 RX ADMIN — ATORVASTATIN CALCIUM 10 MILLIGRAM(S): 80 TABLET, FILM COATED ORAL at 21:42

## 2019-01-20 RX ADMIN — Medication 81 MILLIGRAM(S): at 11:28

## 2019-01-20 RX ADMIN — AMIODARONE HYDROCHLORIDE 200 MILLIGRAM(S): 400 TABLET ORAL at 05:21

## 2019-01-20 RX ADMIN — LISINOPRIL 5 MILLIGRAM(S): 2.5 TABLET ORAL at 05:21

## 2019-01-20 RX ADMIN — Medication 5 MILLIGRAM(S): at 16:47

## 2019-01-20 RX ADMIN — Medication 5 MILLIGRAM(S): at 05:21

## 2019-01-20 RX ADMIN — OXYCODONE AND ACETAMINOPHEN 1 TABLET(S): 5; 325 TABLET ORAL at 16:46

## 2019-01-20 RX ADMIN — OXYCODONE AND ACETAMINOPHEN 1 TABLET(S): 5; 325 TABLET ORAL at 17:30

## 2019-01-20 RX ADMIN — CLOPIDOGREL BISULFATE 75 MILLIGRAM(S): 75 TABLET, FILM COATED ORAL at 11:28

## 2019-01-20 RX ADMIN — HEPARIN SODIUM 5000 UNIT(S): 5000 INJECTION INTRAVENOUS; SUBCUTANEOUS at 16:47

## 2019-01-20 RX ADMIN — MAGNESIUM OXIDE 400 MG ORAL TABLET 200 MILLIGRAM(S): 241.3 TABLET ORAL at 11:28

## 2019-01-20 RX ADMIN — OXYCODONE AND ACETAMINOPHEN 1 TABLET(S): 5; 325 TABLET ORAL at 21:44

## 2019-01-20 RX ADMIN — HEPARIN SODIUM 5000 UNIT(S): 5000 INJECTION INTRAVENOUS; SUBCUTANEOUS at 05:22

## 2019-01-20 NOTE — DISCHARGE NOTE ADULT - OTHER SIGNIFICANT FINDINGS
< from: Xray Chest 1 View- PORTABLE-Routine (01.15.19 @ 09:36) >  Limited by rotation. New parenchymal opacity right upper lobe   superimposed on interstitial edema. No large effusion. Pacemaker battery   pack obscures the left mid chest. Cardiomegaly. Patient is osteopenia and   degenerative changes. Follow-up to full resolution is suggested.    < end of copied text >  < from: US Abdomen Complete (01.11.19 @ 10:19) >  IMPRESSION:     Minimal sludge in gallbladder. Mild RIGHT renal pelvic fullness.    < end of copied text >  < from: CT Abdomen and Pelvis No Cont (01.10.19 @ 20:34) >  IMPRESSION:  Extensive airspace opacities in the visualized portions of the bilateral   lower lobes, may reflect multifocal pneumonia in the appropriate clinical   context.     Distended gallbladder with minimal layering sludge.    Rest of the chronic findings as above.    < end of copied text >

## 2019-01-20 NOTE — DISCHARGE NOTE ADULT - CARE PLAN
Principal Discharge DX:	Paroxysmal atrial fibrillation  Goal:	stable  Assessment and plan of treatment:	resolved; continue with all home meds

## 2019-01-20 NOTE — DISCHARGE NOTE ADULT - HOSPITAL COURSE
EVENTS:  87F w/ PMH of afib not on a/c for hx of hematuria & s/p ablation (?), CAD, HTN, HLD, sCHF (EF 20-25%) s/p cardiomems and AICD, GERD, OA, s/p L knee replacement, 4x c-sections comes in with worsening SOB  Hospital course: admitted for resp failure 2/2 to acute decom systolic CHF with superimposed hMPV and pna. Now back to baseline,       Physical Exam:  General: WN/WD NAD  Neurology: A&Ox3, nonfocal, GUTIERRES x 4  Respiratory: CTA B/L  CV: RRR, S1S2, no murmurs, rubs or gallops  Abdominal: Soft, NT, ND +BS  Extremities: No edema, + peripheral pulses        Assessment and Plan:   · Assessment	  87F w/ PMH as above comes in w/ fever and respiratory distress due to CAP.    A/P  #Acute respiratory failure /B/L pneumonia hMPv/also suspected GNR/MRSA-resolved  #suspected sepsis from PNA  #hx systolic CHF EF 20% acutely decompensated /Has CardioMems - no significant improvement  #Acute toxic metabolic encephalopathy - resolved  # AF RVR  - back in NSR; no c/o  - palliative eval and GOC; maximized on Rx at this point      # Diarrhea neg c diff     #Paroxysmal afib not on AC due to severe hematuria in the past  - per fam hematuria was in the remote past;   - continue amio  - present ua with 11-25 rbc  - this is why she is on asa and plavix  ( unable to use anticoagulation)    # CAD  continue statin, asa, plavix    #CKD stage 3  monitor BMP    #DNR/DNI  poor prognosis  palliative care consult- family meeting 1/20 (sunday)- should address the sending back to hospital if she deteriorates EVENTS:  87F w/ PMH of afib not on a/c for hx of hematuria & s/p ablation (?), CAD, HTN, HLD, sCHF (EF 20-25%) s/p cardiomems and AICD, GERD, OA, s/p L knee replacement, 4x c-sections comes in with worsening SOB  Hospital course: admitted for resp failure 2/2 to acute decom systolic CHF with superimposed hMPV and pna. Now back to baseline,       Physical Exam:  General: WN/WD NAD  Neurology: A&Ox3, nonfocal, GUTIERRES x 4  Respiratory: CTA B/L  CV: RRR, S1S2, no murmurs, rubs or gallops  Abdominal: Soft, NT, ND +BS  Extremities: No edema, + peripheral pulses        Assessment and Plan:   · Assessment	  87F w/ PMH as above comes in w/ fever and respiratory distress due to CAP.    A/P  #Acute respiratory failure /B/L pneumonia hMPv/also suspected GNR/MRSA-resolved  #suspected sepsis from PNA  #hx systolic CHF EF 20% acutely decompensated /Has CardioMems - no significant improvement  #Acute toxic metabolic encephalopathy - resolved  # AF RVR  - back in NSR; no c/o  - palliative eval and GOC; maximized on Rx at this point  - to return to [facility] and requests an eval with [facility] services.  HCP  does not want to keep returning to the hospital as she has had mult adm over the past 1-2 years related to heart failure/dyspnea. DNR/DNI on MOLST. She requests that AICD be terminated as she has placed that order.    #Paroxysmal afib not on AC due to severe hematuria in the past  - per fam hematuria was in the remote past;   - continue amio  - this is why she is on asa and plavix  ( unable to use anticoagulation)      #DNR/DNI  poor prognosis  palliative care consult- family meeting 1/21: AICD deactivated hospice care CC - cough, dyspnea    87F w/ PMH of afib not on a/c for hx of hematuria & s/p ablation (?), CAD, HTN, HLD, sCHF (EF 20-25%) s/p cardiomems and AICD, GERD, OA, s/p L knee replacement, 4x c-sections comes in with worsening SOB on 1/10/19   Hospital course: admitted for resp failure 2/2 to acute decom systolic CHF with superimposed hMPV and pna    1/16: AF with RVR not responding to present Rx; BP high 80's' has no c/o; if she sleeps HR in the 115's when she wakes up and talks: 130-140  1/17: back in NSR, no c/o; granddaughter present  1/18/19- Patient seen and examined at bedside. States she does not feel too well, but can't explain why. Does not believe breathing is labored.   1/19: + diarrhea; no other c/o   1/21- pt seen and examined, confused, denies cp, + cough, afebrile  1/22 - pt seen and examined, no events overnight, stable  for discharge  ROS - all other systems negative except mention above    T(C): 36.9 (01-22-19 @ 11:26), Max: 36.9 (01-22-19 @ 11:26)  T(F): 98.5 (01-22-19 @ 11:26), Max: 98.5 (01-22-19 @ 11:26)  HR: 67 (01-22-19 @ 11:26) (67 - 75)  BP: 107/48 (01-22-19 @ 11:26) (107/48 - 136/62)  RR: 18 (01-22-19 @ 11:26) (16 - 20)  SpO2: 95% (01-22-19 @ 11:26) (94% - 98%)  Wt(kg): --  Physical Exam:  General: WN/WD NAD  Neurology: A&Ox3, nonfocal, GUTIERRES x 4  Respiratory: CTA B/L  CV: RRR, S1S2, no murmurs, rubs or gallops  Abdominal: Soft, NT, ND +BS  Extremities: No edema, + peripheral pulses  87F w/ PMH as above comes in w/ fever and respiratory distress due to CAP.    A/P  #Acute respiratory failure /B/L pneumonia hMPv/also suspected GNR/MRSA-no significant improvement  #suspected sepsis from PNA  #hx systolic CHF EF 20% acutely decomepnsated /Has CardioMems - no significant improvement  #Acute toxic metabolic encephalopathy - resolved  # AF RVR  - back in NSR; no c/o  - palliative eval and GOC; maximized on Rx at this point      # Diarrhea ruled out C diff      #Paroxysmal afib not on AC due to severe hematuria in the past  - per fam hematuria was in the remote past;   - continue amio  - present ua with 11-25 rbc  - this is why she is on asa and plavix maybe ( unable to use anticoagulation)  # CAD  continue statin, asa, plavix    #CKD stage 3  monitor BMP    #DNR/DNI  poor prognosis  palliative care consult- family meeting 1/20  - DNR/DNI, AICD deactivated, d/c planning to  [facility] with transition to hospice    Disposition - medically optimized to be discharged  to [facility] with close follow up with PCP within 1 week  return to ED if fever, abdominal pain, nausea, vomiting, chest pain, dyspnea  Discharge plan discussed with patient, RN  Patient advised to follow up with PCP within 3-7 days  time spend 40 min  Discharge note faxed to PCP with my contact information to call me back

## 2019-01-20 NOTE — DISCHARGE NOTE ADULT - MEDICATION SUMMARY - MEDICATIONS TO TAKE
I will START or STAY ON the medications listed below when I get home from the hospital:    aspirin 81 mg oral tablet, chewable  -- 1 tab(s) by mouth once a day  -- Indication: For all the meds bellow are home meds    oxyCODONE-acetaminophen 7.5 mg-325 mg oral tablet  -- 1 tab(s) by mouth every 6 hours, As Needed  -- Indication: For .    enalapril 2.5 mg oral tablet  -- 1 tab(s) by mouth once a day  -- Indication: For .    amiodarone 200 mg oral tablet  -- 1 tab(s) by mouth once a day  -- Indication: For .    Imodium A-D 2 mg oral tablet  -- 1 tab(s) by mouth once a day, As Needed  -- Indication: For .    ondansetron 4 mg oral tablet  -- 1 tab(s) by mouth every 4 hours, As Needed for nausea  -- Indication: For .    atorvastatin 10 mg oral tablet  -- 1 tab(s) by mouth once a day (at bedtime)   -- Avoid grapefruit and grapefruit juice while taking this medication.  Do not take this drug if you are pregnant.  It is very important that you take or use this exactly as directed.  Do not skip doses or discontinue unless directed by your doctor.  Obtain medical advice before taking any non-prescription drugs as some may affect the action of this medication.  Take with food or milk.    -- Indication: For .    clopidogrel 75 mg oral tablet  -- 1 tab(s) by mouth once a day  -- Indication: For .    Metoprolol Succinate ER 50 mg oral tablet, extended release  -- 1 tab(s) by mouth once a day  -- Indication: For ..    bumetanide  -- 3 milligram(s) by mouth once a day  -- Indication: For .    Bentyl 20 mg oral tablet  -- 1 tab(s) by mouth 3 times a day, As Needed  -- Indication: For .    lactulose 10 g/15 mL oral syrup  -- 15 milliliter(s) by mouth once a day, As Needed  -- Indication: For .    magnesium oxide 200 mg oral tablet  -- 1 tab(s) by mouth once a day  -- Indication: For .    potassium chloride 20 mEq oral tablet, extended release  -- 2 tab(s) by mouth 2 times a day  -- Indication: For .    oxybutynin 5 mg oral tablet  -- 1 tab(s) by mouth 2 times a day  -- Indication: For . I will START or STAY ON the medications listed below when I get home from the hospital:    aspirin 81 mg oral tablet, chewable  -- 1 tab(s) by mouth once a day  -- Indication: For all the meds bellow are home meds    oxyCODONE-acetaminophen 7.5 mg-325 mg oral tablet  -- 1 tab(s) by mouth every 6 hours, As Needed  -- Indication: For .all the meds bellow are home meds    enalapril 2.5 mg oral tablet  -- 1 tab(s) by mouth once a day  -- Indication: For .all the meds bellow are home meds    amiodarone 200 mg oral tablet  -- 1 tab(s) by mouth once a day  -- Indication: For .all the meds bellow are home meds    Imodium A-D 2 mg oral tablet  -- 1 tab(s) by mouth once a day, As Needed  -- Indication: For .all the meds bellow are home meds    ondansetron 4 mg oral tablet  -- 1 tab(s) by mouth every 4 hours, As Needed for nausea  -- Indication: For .all the meds bellow are home meds    atorvastatin 10 mg oral tablet  -- 1 tab(s) by mouth once a day (at bedtime)   -- Avoid grapefruit and grapefruit juice while taking this medication.  Do not take this drug if you are pregnant.  It is very important that you take or use this exactly as directed.  Do not skip doses or discontinue unless directed by your doctor.  Obtain medical advice before taking any non-prescription drugs as some may affect the action of this medication.  Take with food or milk.    -- Indication: For .all the meds bellow are home meds    clopidogrel 75 mg oral tablet  -- 1 tab(s) by mouth once a day  -- Indication: For .all the meds bellow are home meds    Metoprolol Succinate ER 50 mg oral tablet, extended release  -- 1 tab(s) by mouth once a day  -- Indication: For ..all the meds bellow are home meds    bumetanide  -- 3 milligram(s) by mouth once a day  -- Indication: For .all the meds bellow are home meds    guaiFENesin 100 mg/5 mL oral liquid  -- 10 milliliter(s) by mouth every 6 hours, As needed, Cough  -- Indication: For all the meds bellow are home meds    Bentyl 20 mg oral tablet  -- 1 tab(s) by mouth 3 times a day, As Needed  -- Indication: For .all the meds bellow are home meds    lactulose 10 g/15 mL oral syrup  -- 15 milliliter(s) by mouth once a day, As Needed  -- Indication: For .all the meds bellow are home meds    magnesium oxide 200 mg oral tablet  -- 1 tab(s) by mouth once a day  -- Indication: For .all the meds bellow are home meds    potassium chloride 20 mEq oral tablet, extended release  -- 2 tab(s) by mouth 2 times a day  -- Indication: For .all the meds bellow are home meds    melatonin 5 mg oral tablet  -- 1 tab(s) by mouth once a day (at bedtime)  -- Indication: For all the meds bellow are home meds    oxybutynin 5 mg oral tablet  -- 1 tab(s) by mouth 2 times a day  -- Indication: For .all the meds bellow are home meds I will START or STAY ON the medications listed below when I get home from the hospital:    aspirin 81 mg oral tablet, chewable  -- 1 tab(s) by mouth once a day  -- Indication: For all the meds bellow are home meds    oxyCODONE-acetaminophen 7.5 mg-325 mg oral tablet  -- 1 tab(s) by mouth every 6 hours, As Needed  -- Indication: For .all the meds bellow are home meds    enalapril 2.5 mg oral tablet  -- 1 tab(s) by mouth once a day  -- Indication: For .all the meds bellow are home meds    amiodarone 200 mg oral tablet  -- 1 tab(s) by mouth once a day  -- Indication: For .all the meds bellow are home meds    digoxin 125 mcg (0.125 mg) oral tablet  -- 1 tab(s) by mouth once a day  -- Indication: For Paroxysmal atrial fibrillation    Imodium A-D 2 mg oral tablet  -- 1 tab(s) by mouth once a day, As Needed  -- Indication: For .all the meds bellow are home meds    ondansetron 4 mg oral tablet  -- 1 tab(s) by mouth every 4 hours, As Needed for nausea  -- Indication: For .all the meds bellow are home meds    atorvastatin 10 mg oral tablet  -- 1 tab(s) by mouth once a day (at bedtime)   -- Avoid grapefruit and grapefruit juice while taking this medication.  Do not take this drug if you are pregnant.  It is very important that you take or use this exactly as directed.  Do not skip doses or discontinue unless directed by your doctor.  Obtain medical advice before taking any non-prescription drugs as some may affect the action of this medication.  Take with food or milk.    -- Indication: For .all the meds bellow are home meds    clopidogrel 75 mg oral tablet  -- 1 tab(s) by mouth once a day  -- Indication: For .all the meds bellow are home meds    Metoprolol Succinate ER 50 mg oral tablet, extended release  -- 1 tab(s) by mouth once a day  -- Indication: For ..all the meds bellow are home meds    bumetanide  -- 3 milligram(s) by mouth once a day  -- Indication: For .all the meds bellow are home meds    guaiFENesin 100 mg/5 mL oral liquid  -- 10 milliliter(s) by mouth every 6 hours, As needed, Cough  -- Indication: For all the meds bellow are home meds    Bentyl 20 mg oral tablet  -- 1 tab(s) by mouth 3 times a day, As Needed  -- Indication: For .all the meds bellow are home meds    lactulose 10 g/15 mL oral syrup  -- 15 milliliter(s) by mouth once a day, As Needed  -- Indication: For .all the meds bellow are home meds    magnesium oxide 200 mg oral tablet  -- 1 tab(s) by mouth once a day  -- Indication: For .all the meds bellow are home meds    potassium chloride 20 mEq oral tablet, extended release  -- 2 tab(s) by mouth 2 times a day  -- Indication: For .all the meds bellow are home meds    melatonin 5 mg oral tablet  -- 1 tab(s) by mouth once a day (at bedtime)  -- Indication: For all the meds bellow are home meds    oxybutynin 5 mg oral tablet  -- 1 tab(s) by mouth 2 times a day  -- Indication: For .all the meds bellow are home meds

## 2019-01-20 NOTE — PROCEDURE NOTE - ADDITIONAL PROCEDURE DETAILS
Called by Palliative Care team for ICD tachy detection OFF prior to transfer to Hospice-pt is a DNR and both patient and daughter consented at bedside to the tachy detection OFF today-All TACHY DETECTION therapies have been programmed OFF at this time

## 2019-01-20 NOTE — PROGRESS NOTE ADULT - ASSESSMENT
PROBLEMS:    AC hypoxaemic respiratory failur  Multifocal pneumonia  positive HMPV  Cardic arrthmias  Chronic systolic heart failure   CKD  Paroxysmal atrial fibrillation  Essential hypertension    PLAN:    isolated  pulmonary stable  decd ceftin  BIPAP qhs/prn  on amiodarone/digoxin  POOR PROGNOSIS  Comfort care  DVT PROPHYLASIX

## 2019-01-20 NOTE — PROGRESS NOTE ADULT - ASSESSMENT
Pt is a 87y old Female with hx of A.fib not on a/c due to hematuria (s/p ablation), CAD, HTN, HLD, CHF (EF 20-25%) s/p AICD, GERD, OA,  left TKR, 4x C-sections  - was admitted on 1/10 for worsening SOB/cough for past 3 days PTA.  She saw PMD 1 d PTA,  but came to ED as her SOB worsened.. She denies fever, chills at home. She has dry cough. On arrival to ED pt had fever 102.2 F and was in respiratory distress, was placed on BiPAP. Work-up= bilat infiltrates, RVP + for human metapneumovirus she is being empirically treated for bacterial process as well. She is awake and "very hungry" - has been NPO since admission due to BiPAP mask.     1) Dyspnea  -Multifactorial : severe systolic CHF and pneumonia  -BiPAP mask used intermittently since admission however much less  -Discussed use of this mask with HCP #1 and #2 - they would like to continue with this BUT they also want her to eat -  so they consent to removing it for meals (RT states cannot put back on until 2 hrs have elapsed since last po intake)   -O2 by N/C   -Pt signed her own MOLST form in 3/2018, requesting DNR/DNI - nothing was marked re:BiPAP mask  -D/C Roxanol 2.5 mg Q4H PRN as she is taking Oxycodone 5 mg PO Q6H PRN which will help her mod pain/dyspnea   -Cont Morphine IVP as ordered.     2) Diarrhea  -Cdiff stool neg  -supportive care    3)Pneumonia  -RVP + for Human metapneumovirus   -Appreciate ID input and case discussed with ID MD as well  -empiric antibacterial therapy complete      4)CHF  -Severe systolic CHF  -EF 20%  -Appreciate cardiology input  -Persistent tachycardia  -On Amiodarone IV inf    5) Malnutrition  -Albumin = 2.7  -Poor PO intake       6) Weakness  -Secondary to acute illness  -Was in Assisted Living PTA - cannot return there unless she is more independent  -Family is looking into [facility] facilities  -PT evaluation / pt has refused to participate    7)Pain  -Has chronic pain secondary to arthritis  -Do not stop opioids   -Morphine IVP for severe pain as ordered   -Oxycodone for moderate pain as ordered     8)Advance directives  -Has HCP on chart - names daughter Gabrielle Burgos as #1 and granddaughter Renay #2  -Has MOLST from 3/2018 on chart, signed by pt - states DNR/DNI - nothing else was addressed  -Had a [facility] meeting with Renay in person and Gabrielle by phone - see separate note  -Family respects DNI/DNR orders  -Will follow up with luis Anthony Casi 1/20

## 2019-01-20 NOTE — GOALS OF CARE CONVERSATION - PERSONAL ADVANCE DIRECTIVE - CONVERSATION DETAILS
Reviewed PMH and recent illness. Family understands that pt has bilat pneumonia - most likely secondary to human metapneumovirus; is also being treated as bacterial infection. Has required BiPAP mask to maintain oxygenation. We discussed this treatment as optional - it is more "invasive" than a NRBM, but it is not intubation. She is requiring a  at bedside to prevent her from removing this mask. The family opted to give her a little more time with this as she has demonstrated that her O2 levels plummet with removal. They do want ther to eat and do not want her diet restricted, therefore I ordered a mechanical diet (she has no teeth) and I ordered O2 by N/C during meals, then a NRBM x 2 hrs after po food or fluid (RT's policy).   Pt has signed MOLST on chart from March 2018 - states DNR and DNI but nothing was checked around issue of BiPAP mask. Before she leaves hospital, will fill out an ENTIRE MOLST form - she will be going to [facility] before returning to assisted living facility.   Also discussed Roxanol PRN with family - they agree.
The role of palliative medicine was reviewed with the pt and her daughter. We discussed disch options including [facility] and home hospice services at [facility]. The pt would love to return to [facility] and requests an eval with [facility] services.  We discussed her heart failure and limitations for exercise tolerance. She does not want to keep returning to the hospital as she has had mult adm over the past 1-2 years related to heart failure/dyspnea. DNR/DNI on MOLST. She requests that AICD be terminated as she has placed that order. Discussed with EP Jocelyne José. 45 minutes spent discussing advanced care planning

## 2019-01-20 NOTE — PROGRESS NOTE ADULT - SUBJECTIVE AND OBJECTIVE BOX
HPI: Pt is a 87y old Female with hx of A.fib not on a/c due to hematuria (s/p ablation), CAD, HTN, HLD, CHF (EF 20-25%) s/p AICD, GERD, OA,  left TKR, 4x C-sections  - was admitted on 1/10 for worsening SOB/cough for past 3 days PTA.  She saw PMD 1 d PTA,  but came to ED as her SOB worsened.. She denies fever, chills at home. She has dry cough. On arrival to ED pt had fever 102.2 F and was in respiratory distress, was placed on BiPAP. Work-up= bilat infiltrates, RVP + for human metapneumovirus she is being empirically treated for bacterial process as well.   1/15/19 Seen and examined at bedside with granddaughter present. Pt is in no distress with nasal O2 in place. Denies pain and dyspnea.   1/16/19 Seen and examined at bedside with no family present.  Pt awake having breakfast. Appears to have mild dyspnea however denies  1/17/19 Seen and examined at bedside with no family present. Denies dyspnea or pain.  1/20/19 Seen and examined at bedside with no family present. OOB/chair eating breakfast. Denies pain or dyspnea at present. C/O diarrhea last PM  PAIN: ( )Yes   (X )No    DYSPNEA: ( ) Yes  (X ) No  Level: moderate-severe/improved        Review of Systems:    Anxiety-yes/improved  Physical Discomfort-denies  Dyspnea-moderate-severe/improved  Constipation-denies  Diarrhea -yes mod  Vomiting-no  Anorexia-no  Weight Loss-   Fatigue-mod  Weakness-mod  Delirium-no      Limited due to: lethargy        PHYSICAL EXAM:  ICU Vital Signs Last 24 Hrs  T(C): 36.4 (20 Jan 2019 10:57), Max: 36.9 (19 Jan 2019 16:57)  T(F): 97.5 (20 Jan 2019 10:57), Max: 98.4 (19 Jan 2019 16:57)  HR: 69 (20 Jan 2019 10:57) (69 - 77)  BP: 108/55 (20 Jan 2019 10:57) (108/55 - 132/57)  BP(mean): --  ABP: --  ABP(mean): --  RR: 17 (20 Jan 2019 05:16) (17 - 18)  SpO2: 100% (20 Jan 2019 10:57) (94% - 100%)        PPSV2:  20-30 %  FAST:    General: Awake, alert elderly female in bed in NAD  HEENT: Oral mucosa moist  Lungs: Crackling rales throughout  Cardiac:  RRR  GI: +BS , soft, no masses or tenderness  : voids  Ext: No edema  Neuro: No gross focal deficit      LABS:                                  9.1    6.99  )-----------( 271      ( 19 Jan 2019 06:44 )             28.7                        01-19    136  |  100  |  15  ----------------------------<  94  3.9   |  30  |  0.78    Ca    8.7      19 Jan 2019 06:44    Mg     2.2     01-13        Albumin: Albumin, Serum: 2.7 g/dL (01-12 @ 06:49)      Allergies    No Known Allergies    Intolerances

## 2019-01-20 NOTE — PROGRESS NOTE ADULT - SUBJECTIVE AND OBJECTIVE BOX
Subjective:    pat isolated for diarrhea for r/o cdiff, no respiratory distress.    Home Medications:  amiodarone 200 mg oral tablet: 1 tab(s) orally once a day (11 Jan 2019 14:24)  aspirin 81 mg oral tablet, chewable: 1 tab(s) orally once a day (11 Jan 2019 14:24)  Bentyl 20 mg oral tablet: 1 tab(s) orally 3 times a day, As Needed (11 Jan 2019 14:24)  bumetanide: 3 milligram(s) orally once a day (11 Jan 2019 14:24)  enalapril 2.5 mg oral tablet: 1 tab(s) orally once a day (11 Jan 2019 14:24)  Imodium A-D 2 mg oral tablet: 1 tab(s) orally once a day, As Needed (11 Jan 2019 14:24)  lactulose 10 g/15 mL oral syrup: 15 milliliter(s) orally once a day, As Needed (11 Jan 2019 14:24)  magnesium oxide 200 mg oral tablet: 1 tab(s) orally once a day (11 Jan 2019 14:24)  Metoprolol Succinate ER 50 mg oral tablet, extended release: 1 tab(s) orally once a day (11 Jan 2019 14:24)  oxyCODONE-acetaminophen 7.5 mg-325 mg oral tablet: 1 tab(s) orally every 6 hours, As Needed (11 Jan 2019 14:24)  potassium chloride 20 mEq oral tablet, extended release: 2 tab(s) orally 2 times a day (11 Jan 2019 14:24)    MEDICATIONS  (STANDING):  amiodarone    Tablet 200 milliGRAM(s) Oral daily  aspirin  chewable 81 milliGRAM(s) Oral daily  atorvastatin 10 milliGRAM(s) Oral at bedtime  clopidogrel Tablet 75 milliGRAM(s) Oral daily  digoxin     Tablet 0.125 milliGRAM(s) Oral daily  heparin  Injectable 5000 Unit(s) SubCutaneous every 12 hours  lisinopril 5 milliGRAM(s) Oral daily  magnesium oxide 200 milliGRAM(s) Oral daily  oxybutynin 5 milliGRAM(s) Oral two times a day    MEDICATIONS  (PRN):  acetaminophen   Tablet .. 650 milliGRAM(s) Oral every 6 hours PRN Temp greater or equal to 38C (100.4F), Mild Pain (1 - 3)  aluminum hydroxide/magnesium hydroxide/simethicone Suspension 30 milliLiter(s) Oral every 6 hours PRN Dyspepsia  dicyclomine 20 milliGRAM(s) Oral three times a day before meals PRN Indigestion  lactulose Syrup 20 Gram(s) Oral at bedtime PRN Constipation  loperamide 2 milliGRAM(s) Oral daily PRN Diarrhea  metoprolol tartrate Injectable 2.5 milliGRAM(s) IV Push every 4 hours PRN for HR>140  morphine  - Injectable 2 milliGRAM(s) IV Push every 4 hours PRN Moderate Pain (4 - 6)  ondansetron   Disintegrating Tablet 4 milliGRAM(s) Oral every 6 hours PRN Nausea and/or Vomiting  oxyCODONE    5 mG/acetaminophen 325 mG 1 Tablet(s) Oral every 6 hours PRN Moderate Pain (4 - 6)      Allergies    No Known Allergies    Intolerances        Vital Signs Last 24 Hrs  T(C): 36.4 (20 Jan 2019 10:57), Max: 36.9 (19 Jan 2019 16:57)  T(F): 97.5 (20 Jan 2019 10:57), Max: 98.4 (19 Jan 2019 16:57)  HR: 69 (20 Jan 2019 10:57) (69 - 77)  BP: 108/55 (20 Jan 2019 10:57) (108/55 - 132/57)  BP(mean): --  RR: 17 (20 Jan 2019 05:16) (17 - 18)  SpO2: 100% (20 Jan 2019 10:57) (97% - 100%)      PHYSICAL EXAMINATION:    NECK:  Supple. No lymphadenopathy. Jugular venous pressure not elevated. Carotids equal.   HEART:   The cardiac impulse has a normal quality. Reg., Nl S1 and S2.  There are no murmurs, rubs or gallops noted  CHEST:  Chest is clear to auscultation. Normal respiratory effort.  ABDOMEN:  Soft and nontender.   EXTREMITIES:  There is no edema.       LABS:                        9.1    6.99  )-----------( 271      ( 19 Jan 2019 06:44 )             28.7     01-19    136  |  100  |  15  ----------------------------<  94  3.9   |  30  |  0.78    Ca    8.7      19 Jan 2019 06:44

## 2019-01-20 NOTE — PROGRESS NOTE ADULT - ASSESSMENT
A/P: 87F w/ PMH of afib not on a/c for hx of hematuria & s/p ablation (?), CAD, HTN, HLD, sCHF (EF 20-25%) s/p cardiomems and ICD, GERD, OA, s/p L knee replacement, 4x c-sections comes in with worsening SOB/cough.    1. SOB and cough- MPV infection/PNA. On NC , O2 sat normal on this.   Management per primary team.     2. Acute on Chronic HFrEF , s/p ICD-euvolemic  At the time of discharge please discharge pt on lasix 40mg po daily.  Close monitoring of the renal function and electrolytes.  Goal potassium of 4 and magnesium of 2.   Strict I/O and daily wt checks. Low sodium diet. Nutrition education.     3. Afib s/p ablation-  not on full dose anticoagulation as pt had major hematuria in past and she adamantly refused it from then on after knowing the risks of CVA off anticoagulation.   Now in sinus rythm. No further afib yesterday.   Cont tele for now.    4. HTN- BP optimal now. Cont current regimen.     5. DVT proph.

## 2019-01-20 NOTE — GOALS OF CARE CONVERSATION - PERSONAL ADVANCE DIRECTIVE - NS PRO AD PATIENT TYPE
Health Care Proxy (HCP)/Medical Orders for Life-Sustaining Treatment (MOLST)
Medical Orders for Life-Sustaining Treatment (MOLST)/Do Not Resuscitate (DNR)/Health Care Proxy (HCP)

## 2019-01-20 NOTE — DISCHARGE NOTE ADULT - CARE PROVIDER_API CALL
Te Cervantes (DO), Cardiology; Internal Medicine  [address] [place] [address]  Phone: [phone]  Fax: [phone] Te Cervantes (), Cardiology; Internal Medicine  [address] [place] [address]  Phone: [phone]  Fax: [phone]    Aneesh Penny), Internal Medicine  [address] [place] [address]  Phone: [phone]  Fax: [phone]

## 2019-01-20 NOTE — DISCHARGE NOTE ADULT - CARE PROVIDERS DIRECT ADDRESSES
,[email or web address]

## 2019-01-20 NOTE — GOALS OF CARE CONVERSATION - PERSONAL ADVANCE DIRECTIVE - NS PRO AD PATIENT TYPE ON CHART
Do Not Resuscitate (DNR)/Health Care Proxy (HCP)/Medical Orders for Life-Sustaining Treatment (MOLST)
Medical Orders for Life-Sustaining Treatment (MOLST)/Health Care Proxy (HCP)/Do Not Resuscitate (DNR)

## 2019-01-20 NOTE — PROGRESS NOTE ADULT - SUBJECTIVE AND OBJECTIVE BOX
Cardiology Progress Note  Patient is a 87y old  Female who presents with a chief complaint of SOB, cough.     HPI: 87F w/ PMH of afib not on a/c for hx of hematuria & s/p ablation (?), CAD, HTN, HLD, sCHF (EF 20-25%) s/p cardiomems and AICD, GERD, OA, s/p L knee replacement, 4x c-sections comes in with worsening SOB/cough for past 3 days. She lives in assisted living and I have seen her in office yesterday.  She was told to increase diuretics in the past and she refused to take more than what she was already taking.   Family urged to change bumex to lasix secondary to cost.  With cough yesterday she denied any fever and I warned her about viral infections that are wide spread now.  On arrival to ED pt had fever 102.2 F and was in respiratory distress with increased work of breathing so was put on bipap.     . SR. Apaced on tele. No events last pm. No CP.  No fevers.      . SR/Apaced on tele. No events last pm. Sitting in chair eating breakfast.   No CP/SOB.    PAST MEDICAL & SURGICAL HISTORY:  Osteoarthritis of multiple joints, unspecified osteoarthritis type  Gastroesophageal reflux disease without esophagitis  Dyslipidemia  Essential hypertension  Paroxysmal atrial fibrillation  Coronary artery disease involving native coronary artery of native heart without angina pectoris  Chronic systolic congestive heart failure: EF 20-25%  History of : 4 c-sections  History of total left knee replacement (TKR)  S/P ablation of atrial fibrillation  AICD (automatic cardioverter/defibrillator) present    MEDICATIONS  (STANDING):  amiodarone    Tablet 200 milliGRAM(s) Oral daily  aspirin  chewable 81 milliGRAM(s) Oral daily  atorvastatin 10 milliGRAM(s) Oral at bedtime  cefepime  Injectable. 1000 milliGRAM(s) IV Push every 12 hours  clopidogrel Tablet 75 milliGRAM(s) Oral daily  enalapril 2.5 milliGRAM(s) Oral daily  heparin  Injectable 5000 Unit(s) SubCutaneous every 12 hours  magnesium oxide 200 milliGRAM(s) Oral daily  metoprolol succinate ER 50 milliGRAM(s) Oral daily  oxybutynin 5 milliGRAM(s) Oral two times a day  sodium chloride 0.9% Bolus 1000 milliLiter(s) IV Bolus once    MEDICATIONS  (PRN):  acetaminophen   Tablet .. 650 milliGRAM(s) Oral every 6 hours PRN Temp greater or equal to 38C (100.4F), Mild Pain (1 - 3)  dicyclomine 20 milliGRAM(s) Oral three times a day before meals PRN Indigestion  lactulose Syrup 20 Gram(s) Oral at bedtime PRN Constipation  loperamide 2 milliGRAM(s) Oral daily PRN Diarrhea  ondansetron    Tablet 4 milliGRAM(s) Oral every 6 hours PRN Nausea and/or Vomiting  oxyCODONE    5 mG/acetaminophen 325 mG 1 Tablet(s) Oral every 6 hours PRN Moderate Pain (4 - 6)    FAMILY HISTORY: Family history of malignant neoplasm (Child)    SOCIAL HISTORY: no recent smoking     REVIEW OF SYSTEMS:  CONSTITUTIONAL:    No fatigue, malaise, lethargy.  No fever or chills.  HEENT:  Eyes:  No visual changes.    ENT:  No epistaxis.  No sinus pain.    RESPIRATORY:  c/o cough.  No wheeze.  No hemoptysis.  no  shortness of breath.  CARDIOVASCULAR:  No chest pains.  No palpitations. no shortness of breath, No orthopnea or PND.  GASTROINTESTINAL:  No abdominal pain.  No nausea or vomiting.    GENITOURINARY:    No hematuria.    MUSCULOSKELETAL:  No musculoskeletal pain.  No joint swelling.  No arthritis.  NEUROLOGICAL:  No tingling or numbness or weakness.    Vital Signs Last 24 Hrs  T(C): 36.7 (10 Hemant 2019 15:17), Max: 39 (10 Hemant 2019 10:51)  T(F): 98.1 (10 Hemant 2019 15:17), Max: 102.2 (10 Hemant 2019 10:51)  HR: 86 (10 Hemant 2019 15:17) (74 - 90)  BP: 113/58 (10 Hemant 2019 15:17) (100/55 - 143/87)  BP(mean): --  RR: 18 (10 Hemant 2019 15:17) (18 - 19)  SpO2: 99% (10 Hemant 2019 15:17) (99% - 100%)    PHYSICAL EXAM-    Constitutional: ill looking, alert on NC    Head: Head is normocephalic and atraumatic.      Neck: There are strong carotid pulses bilaterally. No JVD.     Cardiovascular: Regular rate and rhythm without S3, S4. No murmurs or rubs are appreciated.      Respiratory:cta b/l     Abdomen: Soft, nontender, nondistended with positive bowel sounds.      Extremity: No tenderness. no  pitting edema b/l    Neurologic: The patient is alert   Skin: No rash, no obvious lesions noted.      Psychiatric: The patient appears alert      INTERPRETATION OF TELEMETRY: SR.   ECG: Sinus rythm. LVH.     I&O's Detail      LABS:                        11.4   8.22  )-----------( 195      ( 10 Hemant 2019 10:28 )             36.3     01-10    136  |  102  |  23  ----------------------------<  110<H>  4.0   |  25  |  1.29    Ca    8.7      10 Hemant 2019 10:28    TPro  7.8  /  Alb  3.8  /  TBili  0.6  /  DBili  x   /  AST  32  /  ALT  21  /  AlkPhos  61  10    CARDIAC MARKERS ( 10 Hemant 2019 13:24 )  0.081 ng/mL / x     / x     / x     / x      CARDIAC MARKERS ( 10 Hemant 2019 10:28 )  0.043 ng/mL / x     / x     / x     / x          PT/INR - ( 10 Hemant 2019 10:28 )   PT: 12.8 sec;   INR: 1.15 ratio         PTT - ( 10 Hemant 2019 10:28 )  PTT:29.3 sec  Urinalysis Basic - ( 10 Hemant 2019 10:28 )    Color: Yellow / Appearance: Clear / S.020 / pH: x  Gluc: x / Ketone: Moderate  / Bili: Negative / Urobili: Negative mg/dL   Blood: x / Protein: 30 mg/dL / Nitrite: Negative   Leuk Esterase: Trace / RBC: 11-25 /HPF / WBC 3-5   Sq Epi: x / Non Sq Epi: x / Bacteria: Moderate      I&O's Summary    BNPSerum Pro-Brain Natriuretic Peptide: 81626 pg/mL (01-10 @ 10:28)    RADIOLOGY & ADDITIONAL STUDIES:  < from: CT Chest No Cont (01.10.19 @ 13:37) >  IMPRESSION:   Bilateral multifocal pneumonia.  Cardiomegaly and enlarged pulmonary artery suggestive of pulmonary   arterial hypertension. Left lower pulmonary artery cardioMEMS device in   place.

## 2019-01-20 NOTE — DISCHARGE NOTE ADULT - MEDICATION SUMMARY - MEDICATIONS TO CHANGE
I will SWITCH the dose or number of times a day I take the medications listed below when I get home from the hospital:    potassium chloride 20 mEq oral tablet, extended release  -- 2 tab(s) by mouth once a day I will SWITCH the dose or number of times a day I take the medications listed below when I get home from the hospital:  None

## 2019-01-20 NOTE — DISCHARGE NOTE ADULT - PATIENT PORTAL LINK FT
You can access the [facility] Patient Portal, offered by [facility], by registering with the following website: [email or web address]

## 2019-01-21 LAB — DIGOXIN SERPL-MCNC: 1.4 NG/ML — SIGNIFICANT CHANGE UP (ref 0.8–2)

## 2019-01-21 RX ORDER — FUROSEMIDE 40 MG
80 TABLET ORAL ONCE
Qty: 0 | Refills: 0 | Status: COMPLETED | OUTPATIENT
Start: 2019-01-21 | End: 2019-01-21

## 2019-01-21 RX ORDER — METOPROLOL TARTRATE 50 MG
12.5 TABLET ORAL ONCE
Qty: 0 | Refills: 0 | Status: DISCONTINUED | OUTPATIENT
Start: 2019-01-21 | End: 2019-01-22

## 2019-01-21 RX ORDER — LANOLIN ALCOHOL/MO/W.PET/CERES
5 CREAM (GRAM) TOPICAL AT BEDTIME
Qty: 0 | Refills: 0 | Status: DISCONTINUED | OUTPATIENT
Start: 2019-01-21 | End: 2019-01-22

## 2019-01-21 RX ORDER — FUROSEMIDE 40 MG
40 TABLET ORAL DAILY
Qty: 0 | Refills: 0 | Status: DISCONTINUED | OUTPATIENT
Start: 2019-01-21 | End: 2019-01-21

## 2019-01-21 RX ADMIN — ATORVASTATIN CALCIUM 10 MILLIGRAM(S): 80 TABLET, FILM COATED ORAL at 20:55

## 2019-01-21 RX ADMIN — Medication 0.12 MILLIGRAM(S): at 05:10

## 2019-01-21 RX ADMIN — MAGNESIUM OXIDE 400 MG ORAL TABLET 200 MILLIGRAM(S): 241.3 TABLET ORAL at 11:19

## 2019-01-21 RX ADMIN — AMIODARONE HYDROCHLORIDE 200 MILLIGRAM(S): 400 TABLET ORAL at 05:10

## 2019-01-21 RX ADMIN — Medication 81 MILLIGRAM(S): at 11:19

## 2019-01-21 RX ADMIN — OXYCODONE AND ACETAMINOPHEN 1 TABLET(S): 5; 325 TABLET ORAL at 20:54

## 2019-01-21 RX ADMIN — LISINOPRIL 5 MILLIGRAM(S): 2.5 TABLET ORAL at 05:10

## 2019-01-21 RX ADMIN — Medication 5 MILLIGRAM(S): at 17:28

## 2019-01-21 RX ADMIN — CLOPIDOGREL BISULFATE 75 MILLIGRAM(S): 75 TABLET, FILM COATED ORAL at 11:19

## 2019-01-21 RX ADMIN — HEPARIN SODIUM 5000 UNIT(S): 5000 INJECTION INTRAVENOUS; SUBCUTANEOUS at 17:28

## 2019-01-21 RX ADMIN — Medication 5 MILLIGRAM(S): at 23:46

## 2019-01-21 RX ADMIN — Medication 80 MILLIGRAM(S): at 11:18

## 2019-01-21 RX ADMIN — Medication 5 MILLIGRAM(S): at 05:10

## 2019-01-21 RX ADMIN — HEPARIN SODIUM 5000 UNIT(S): 5000 INJECTION INTRAVENOUS; SUBCUTANEOUS at 05:10

## 2019-01-21 NOTE — PROGRESS NOTE ADULT - SUBJECTIVE AND OBJECTIVE BOX
Patient is a 87y old  Female who presents with a chief complaint of SOB, cough.   HPI:  87F w/ PMH of afib not on a/c for hx of hematuria & s/p ablation (?), CAD, HTN, HLD, sCHF (EF 20-25%) s/p cardiomems and AICD, GERD, OA, s/p L knee replacement, 4x c-sections comes in with worsening SOB/cough for past 3 days. She lives in assisted living and I have seen her in office yesterday.  She was told to increase diuretics in the past and she refused to take more than what she was already taking.   Family urged to change bumex to lasix secondary to cost.  With cough yesterday she denied any fever and I warned her about viral infections that are wide spread now.    On arrival to ED pt had fever 102.2 F and was in respiratory distress with increased work of breathing so was put on bipap.   Now on face mask.  No hypoxia.    No CP or pressure.     - pt seen and examined by me today. In respiratory distress to a mild degrees, hypoxia overnight     - pt seen and examined by me this am.     - Pt seen and examined by me today. Pt states that her SOB is improving.     - Pt seen and examined by me today. Pt is on ventimask now and overnight on bipap.  Mild tachypnea.     1/15- Pt seen and examined by me today. Comfortable in bed on NC O2.     - Pt seen and examined by me today.  Pt denies any CP or SOB.     - Pt seen and examined by me today. Pt denies any symptoms except her joint and back pain.     - pT seen and examined. Denies any SOB at rest.     PAST MEDICAL & SURGICAL HISTORY:  Osteoarthritis of multiple joints, unspecified osteoarthritis type  Gastroesophageal reflux disease without esophagitis  Dyslipidemia  Essential hypertension  Paroxysmal atrial fibrillation  Coronary artery disease involving native coronary artery of native heart without angina pectoris  Chronic systolic congestive heart failure: EF 20-25%  History of : 4 c-sections  History of total left knee replacement (TKR)  S/P ablation of atrial fibrillation  AICD (automatic cardioverter/defibrillator) present      MEDICATIONS  (STANDING):  amiodarone    Tablet 200 milliGRAM(s) Oral daily  aspirin  chewable 81 milliGRAM(s) Oral daily  atorvastatin 10 milliGRAM(s) Oral at bedtime  cefepime  Injectable. 1000 milliGRAM(s) IV Push every 12 hours  clopidogrel Tablet 75 milliGRAM(s) Oral daily  enalapril 2.5 milliGRAM(s) Oral daily  heparin  Injectable 5000 Unit(s) SubCutaneous every 12 hours  magnesium oxide 200 milliGRAM(s) Oral daily  metoprolol succinate ER 50 milliGRAM(s) Oral daily  oxybutynin 5 milliGRAM(s) Oral two times a day  sodium chloride 0.9% Bolus 1000 milliLiter(s) IV Bolus once    MEDICATIONS  (PRN):  acetaminophen   Tablet .. 650 milliGRAM(s) Oral every 6 hours PRN Temp greater or equal to 38C (100.4F), Mild Pain (1 - 3)  dicyclomine 20 milliGRAM(s) Oral three times a day before meals PRN Indigestion  lactulose Syrup 20 Gram(s) Oral at bedtime PRN Constipation  loperamide 2 milliGRAM(s) Oral daily PRN Diarrhea  ondansetron    Tablet 4 milliGRAM(s) Oral every 6 hours PRN Nausea and/or Vomiting  oxyCODONE    5 mG/acetaminophen 325 mG 1 Tablet(s) Oral every 6 hours PRN Moderate Pain (4 - 6)      FAMILY HISTORY:  Family history of malignant neoplasm (Child)      SOCIAL HISTORY:  no recent smoking     REVIEW OF SYSTEMS:  CONSTITUTIONAL:    No fatigue, malaise, lethargy.  No fever or chills.  HEENT:  Eyes:  No visual changes.     ENT:  No epistaxis.  No sinus pain.    RESPIRATORY:  c/o cough.  No wheeze.  No hemoptysis.  no  shortness of breath.  CARDIOVASCULAR:  No chest pains.  No palpitations. no shortness of breath, No orthopnea or PND.  GASTROINTESTINAL:  No abdominal pain.  No nausea or vomiting.    GENITOURINARY:    No hematuria.    MUSCULOSKELETAL:  No musculoskeletal pain.  No joint swelling.  No arthritis.  NEUROLOGICAL:  No tingling or numbness or weakness.  PSYCHIATRIC:  No confusion  SKIN:  No rashes.    ENDOCRINE:  No unexplained weight loss.  No polydipsia.   HEMATOLOGIC:  No anemia.  No prolonged or excessive bleeding.   ALLERGIC AND IMMUNOLOGIC:  No pruritus.          Vital Signs Last 24 Hrs  T(C): 36.7 (10 Hemant 2019 15:17), Max: 39 (10 Hemant 2019 10:51)  T(F): 98.1 (10 Hemant 2019 15:17), Max: 102.2 (10 Hemant 2019 10:51)  HR: 86 (10 Hemant 2019 15:17) (74 - 90)  BP: 113/58 (10 Hemant 2019 15:17) (100/55 - 143/87)  BP(mean): --  RR: 18 (10 Hemant 2019 15:17) (18 - 19)  SpO2: 99% (10 Hemant 2019 15:17) (99% - 100%)    PHYSICAL EXAM-    Constitutional: ill looking, alert on NC    Head: Head is normocephalic and atraumatic.      Neck: There are strong carotid pulses bilaterally. No JVD.     Cardiovascular: Regular rate and rhythm without S3, S4. No murmurs or rubs are appreciated.      Respiratory:crackles b/l all lung fields    Abdomen: Soft, nontender, nondistended with positive bowel sounds.      Extremity: No tenderness. no  pitting edema b/l    Neurologic: The patient is alert   Skin: No rash, no obvious lesions noted.      Psychiatric: The patient appears alert      INTERPRETATION OF TELEMETRY: SR.   ECG: Sinus rythm. LVH.     I&O's Detail      LABS:                        11.4   8.22  )-----------( 195      ( 10 Hemant 2019 10:28 )             36.3     01-10    136  |  102  |  23  ----------------------------<  110<H>  4.0   |  25  |  1.29    Ca    8.7      10 Hemant 2019 10:28    TPro  7.8  /  Alb  3.8  /  TBili  0.6  /  DBili  x   /  AST  32  /  ALT  21  /  AlkPhos  61  01-10    CARDIAC MARKERS ( 10 Hemant 2019 13:24 )  0.081 ng/mL / x     / x     / x     / x      CARDIAC MARKERS ( 10 Hemant 2019 10:28 )  0.043 ng/mL / x     / x     / x     / x          PT/INR - ( 10 Hemant 2019 10:28 )   PT: 12.8 sec;   INR: 1.15 ratio         PTT - ( 10 Hemant 2019 10:28 )  PTT:29.3 sec  Urinalysis Basic - ( 10 Hemant 2019 10:28 )    Color: Yellow / Appearance: Clear / S.020 / pH: x  Gluc: x / Ketone: Moderate  / Bili: Negative / Urobili: Negative mg/dL   Blood: x / Protein: 30 mg/dL / Nitrite: Negative   Leuk Esterase: Trace / RBC: 11-25 /HPF / WBC 3-5   Sq Epi: x / Non Sq Epi: x / Bacteria: Moderate      I&O's Summary    BNPSerum Pro-Brain Natriuretic Peptide: 71735 pg/mL (01-10 @ 10:28)    RADIOLOGY & ADDITIONAL STUDIES:  < from: CT Chest No Cont (01.10.19 @ 13:37) >  IMPRESSION:     Bilateral multifocal pneumonia.    Cardiomegaly and enlarged pulmonary artery suggestive of pulmonary   arterial hypertension. Left lower pulmonary artery cardioMEMS device in   place.                FRANCISCOSHANNAN OQUENDO   This document has been electronically signed. Hemant 10 2019  2:26PM        < end of copied text >

## 2019-01-21 NOTE — PROGRESS NOTE ADULT - ASSESSMENT
SOB and cough- MPV infection. On NC , O2 sat normal on this.   Management per primary team.     Acute on Chronic HFrEF , s/p ICD-hypervolemic  Will give one dose iv lasix.  Pt planned to be discharged on hospice care.  can be discharged on lasix po 80mg daily.  ICD tachy detection turned off per pts request and family request.      Afib s/p ablation-  not on full dose anticoagulation as pt had major hematuria in past and she adamantly refused it from then on after knowing the risks of CVA off anticoagulation.   Now in sinus rythm.      HTN-BP optimal now.       Other medical issues- Management per primary team.   Thank you for allowing me to participate in the care of this patient. Please feel free to contact me with any questions.

## 2019-01-21 NOTE — PROGRESS NOTE ADULT - ASSESSMENT
PROBLEMS:    AC hypoxaemic respiratory failur  Multifocal pneumonia  positive HMPV  Cardic arrthmias  Chronic systolic heart failure   CKD  Paroxysmal atrial fibrillation  Essential hypertension    PLAN:    cdiff neg  pulmonary stable  keep neg balance  pulse oximetry on RA  on amiodarone/digoxin  POOR PROGNOSIS  Comfort care  DVT PROPHYLASIX

## 2019-01-21 NOTE — PROGRESS NOTE ADULT - SUBJECTIVE AND OBJECTIVE BOX
Subjective:    pat sitting in chair, possible home hospice, given lasix by cardiology.    Home Medications:  amiodarone 200 mg oral tablet: 1 tab(s) orally once a day (11 Jan 2019 14:24)  aspirin 81 mg oral tablet, chewable: 1 tab(s) orally once a day (11 Jan 2019 14:24)  Bentyl 20 mg oral tablet: 1 tab(s) orally 3 times a day, As Needed (11 Jan 2019 14:24)  bumetanide: 3 milligram(s) orally once a day (11 Jan 2019 14:24)  enalapril 2.5 mg oral tablet: 1 tab(s) orally once a day (11 Jan 2019 14:24)  Imodium A-D 2 mg oral tablet: 1 tab(s) orally once a day, As Needed (11 Jan 2019 14:24)  lactulose 10 g/15 mL oral syrup: 15 milliliter(s) orally once a day, As Needed (11 Jan 2019 14:24)  magnesium oxide 200 mg oral tablet: 1 tab(s) orally once a day (11 Jan 2019 14:24)  Metoprolol Succinate ER 50 mg oral tablet, extended release: 1 tab(s) orally once a day (11 Jan 2019 14:24)  oxyCODONE-acetaminophen 7.5 mg-325 mg oral tablet: 1 tab(s) orally every 6 hours, As Needed (11 Jan 2019 14:24)  potassium chloride 20 mEq oral tablet, extended release: 2 tab(s) orally 2 times a day (11 Jan 2019 14:24)    MEDICATIONS  (STANDING):  amiodarone    Tablet 200 milliGRAM(s) Oral daily  aspirin  chewable 81 milliGRAM(s) Oral daily  atorvastatin 10 milliGRAM(s) Oral at bedtime  clopidogrel Tablet 75 milliGRAM(s) Oral daily  digoxin     Tablet 0.125 milliGRAM(s) Oral daily  furosemide   Injectable 40 milliGRAM(s) IV Push daily  heparin  Injectable 5000 Unit(s) SubCutaneous every 12 hours  lisinopril 5 milliGRAM(s) Oral daily  magnesium oxide 200 milliGRAM(s) Oral daily  oxybutynin 5 milliGRAM(s) Oral two times a day    MEDICATIONS  (PRN):  acetaminophen   Tablet .. 650 milliGRAM(s) Oral every 6 hours PRN Temp greater or equal to 38C (100.4F), Mild Pain (1 - 3)  aluminum hydroxide/magnesium hydroxide/simethicone Suspension 30 milliLiter(s) Oral every 6 hours PRN Dyspepsia  dicyclomine 20 milliGRAM(s) Oral three times a day before meals PRN Indigestion  lactulose Syrup 20 Gram(s) Oral at bedtime PRN Constipation  loperamide 2 milliGRAM(s) Oral daily PRN Diarrhea  metoprolol tartrate Injectable 2.5 milliGRAM(s) IV Push every 4 hours PRN for HR>140  morphine  - Injectable 2 milliGRAM(s) IV Push every 4 hours PRN Moderate Pain (4 - 6)  ondansetron   Disintegrating Tablet 4 milliGRAM(s) Oral every 6 hours PRN Nausea and/or Vomiting  oxyCODONE    5 mG/acetaminophen 325 mG 1 Tablet(s) Oral every 6 hours PRN Moderate Pain (4 - 6)      Allergies    No Known Allergies    Intolerances        Vital Signs Last 24 Hrs  T(C): 36.3 (21 Jan 2019 05:05), Max: 36.9 (20 Jan 2019 16:17)  T(F): 97.3 (21 Jan 2019 05:05), Max: 98.5 (20 Jan 2019 16:17)  HR: 65 (21 Jan 2019 05:05) (65 - 79)  BP: 139/56 (21 Jan 2019 05:05) (108/55 - 139/56)  BP(mean): --  RR: 17 (21 Jan 2019 05:05) (17 - 18)  SpO2: 94% (21 Jan 2019 05:05) (94% - 100%)      PHYSICAL EXAMINATION:    NECK:  Supple. No lymphadenopathy. Jugular venous pressure not elevated. Carotids equal.   HEART:   The cardiac impulse has a normal quality. Reg., Nl S1 and S2.  There are no murmurs, rubs or gallops noted  CHEST:  Chest crackles to auscultation. Normal respiratory effort.  ABDOMEN:  Soft and nontender.   EXTREMITIES:  There is no edema.       LABS:

## 2019-01-21 NOTE — PROGRESS NOTE ADULT - SUBJECTIVE AND OBJECTIVE BOX
CC - cough, dyspnea    87F w/ PMH of afib not on a/c for hx of hematuria & s/p ablation (?), CAD, HTN, HLD, sCHF (EF 20-25%) s/p cardiomems and AICD, GERD, OA, s/p L knee replacement, 4x c-sections comes in with worsening SOB on 1/10/19   Hospital course: admitted for resp failure 2/2 to acute decom systolic CHF with superimposed hMPV and pna    1/16: AF with RVR not responding to present Rx; BP high 80's' has no c/o; if she sleeps HR in the 115's when she wakes up and talks: 130-140  1/17: back in NSR, no c/o; granddaughter present  1/18/19- Patient seen and examined at bedside. States she does not feel too well, but can't explain why. Does not believe breathing is labored.   1/19: + diarrhea; no other c/o   1/21- pt seen and examined, confused, denies cp, + cough, afebrile  ROS - all other systems negative except mention above    T(C): 36.4 (01-21-19 @ 17:31), Max: 36.4 (01-21-19 @ 10:27)  T(F): 97.5 (01-21-19 @ 17:31), Max: 97.5 (01-21-19 @ 10:27)  HR: 75 (01-21-19 @ 17:31) (65 - 91)  BP: 108/51 (01-21-19 @ 17:31) (108/51 - 139/56)  RR: 18 (01-21-19 @ 17:31) (17 - 18)  SpO2: 95% (01-21-19 @ 17:31) (94% - 95%)  Wt(kg): --    Physical Exam:  General: WN/WD NAD  Neurology: A&Ox3, nonfocal, GUTIERRES x 4  Respiratory: CTA B/L  CV: RRR, S1S2, no murmurs, rubs or gallops  Abdominal: Soft, NT, ND +BS  Extremities: No edema, + peripheral pulses      LABS:                          8.9    6.56  )-----------( 235      ( 18 Jan 2019 06:12 )             27.9     01-17    136  |  97  |  29<H>  ----------------------------<  105<H>  3.5   |  32<H>  |  0.94    Ca    8.3<L>      17 Jan 2019 10:32  Mg     2.0     01-17      < from: 12 Lead ECG (01.11.19 @ 07:09) >  Diagnosis Line Normal sinus rhythm  Possible Left atrial enlargement  Non-specific intra-ventricular conduction block  Nonspecific T wave abnormality  Abnormal ECG  When compared with ECG of 10-STEPHANIE-2019 20:02,  Significant changes have occurred    < end of copied text >  Troponin I, Serum (01.10.19 @ 19:17)    Troponin I, Serum: 0.087: High Sensitivity Troponin and new reference  range effective 7/6/2016 ng/mL    Troponin I, Serum (01.10.19 @ 16:50)    Troponin I, Serum: 0.074: High Sensitivity Troponin and new reference  range effective 7/6/2016 ng/mL    Serum Pro-Brain Natriuretic Peptide (01.10.19 @ 10:28)    Serum Pro-Brain Natriuretic Peptide: 39421 pg/mL    < from: Xray Chest 1 View- PORTABLE-Routine (01.15.19 @ 09:36) >    Limited by rotation. New parenchymal opacity right upper lobe   superimposed on interstitial edema. No large effusion. Pacemaker battery   pack obscures the left mid chest. Cardiomegaly. Patient is osteopenia and   degenerative changes. Follow-up to full resolution is suggested.    < end of copied text >        RADIOLOGY & ADDITIONAL TESTS:

## 2019-01-21 NOTE — PROGRESS NOTE ADULT - ASSESSMENT
87F w/ PMH as above comes in w/ fever and respiratory distress due to CAP.    A/P  #Acute respiratory failure /B/L pneumonia hMPv/also suspected GNR/MRSA-no significant improvement  #suspected sepsis from PNA  #hx systolic CHF EF 20% acutely decomepnsated /Has CardioMems - no significant improvement  #Acute toxic metabolic encephalopathy - resolved  # AF RVR  - back in NSR; no c/o  - palliative eval and GOC; maximized on Rx at this point      # Diarrhea r/o c diff     #Paroxysmal afib not on AC due to severe hematuria in the past  - per fam hematuria was in the remote past;   - continue amio  - present ua with 11-25 rbc  - this is why she is on asa and plavix maybe ( unable to use anticoagulation)    # CAD  continue statin, asa, plavix    #CKD stage 3  monitor BMP    #DNR/DNI  poor prognosis  palliative care consult- family meeting 1/20  - DNR/DNI, AICD deactivated, d/c planning to hospice

## 2019-01-22 VITALS
OXYGEN SATURATION: 95 % | HEART RATE: 67 BPM | SYSTOLIC BLOOD PRESSURE: 107 MMHG | TEMPERATURE: 98 F | DIASTOLIC BLOOD PRESSURE: 48 MMHG | RESPIRATION RATE: 18 BRPM

## 2019-01-22 RX ORDER — DIGOXIN 250 MCG
1 TABLET ORAL
Qty: 0 | Refills: 0 | COMMUNITY
Start: 2019-01-22

## 2019-01-22 RX ORDER — LANOLIN ALCOHOL/MO/W.PET/CERES
1 CREAM (GRAM) TOPICAL
Qty: 0 | Refills: 0 | COMMUNITY
Start: 2019-01-22

## 2019-01-22 RX ADMIN — Medication 5 MILLIGRAM(S): at 05:23

## 2019-01-22 RX ADMIN — LISINOPRIL 5 MILLIGRAM(S): 2.5 TABLET ORAL at 06:48

## 2019-01-22 RX ADMIN — AMIODARONE HYDROCHLORIDE 200 MILLIGRAM(S): 400 TABLET ORAL at 05:23

## 2019-01-22 RX ADMIN — OXYCODONE AND ACETAMINOPHEN 1 TABLET(S): 5; 325 TABLET ORAL at 08:37

## 2019-01-22 RX ADMIN — HEPARIN SODIUM 5000 UNIT(S): 5000 INJECTION INTRAVENOUS; SUBCUTANEOUS at 05:23

## 2019-01-22 RX ADMIN — MAGNESIUM OXIDE 400 MG ORAL TABLET 200 MILLIGRAM(S): 241.3 TABLET ORAL at 12:06

## 2019-01-22 RX ADMIN — OXYCODONE AND ACETAMINOPHEN 1 TABLET(S): 5; 325 TABLET ORAL at 07:38

## 2019-01-22 RX ADMIN — CLOPIDOGREL BISULFATE 75 MILLIGRAM(S): 75 TABLET, FILM COATED ORAL at 11:30

## 2019-01-22 RX ADMIN — Medication 81 MILLIGRAM(S): at 11:30

## 2019-01-22 RX ADMIN — Medication 200 MILLIGRAM(S): at 12:05

## 2019-01-22 RX ADMIN — Medication 0.12 MILLIGRAM(S): at 05:23

## 2019-01-22 NOTE — PROGRESS NOTE ADULT - SUBJECTIVE AND OBJECTIVE BOX
CC - cough, dyspnea    87F w/ PMH of afib not on a/c for hx of hematuria & s/p ablation (?), CAD, HTN, HLD, sCHF (EF 20-25%) s/p cardiomems and AICD, GERD, OA, s/p L knee replacement, 4x c-sections comes in with worsening SOB on 1/10/19   Hospital course: admitted for resp failure 2/2 to acute decom systolic CHF with superimposed hMPV and pna    1/16: AF with RVR not responding to present Rx; BP high 80's' has no c/o; if she sleeps HR in the 115's when she wakes up and talks: 130-140  1/17: back in NSR, no c/o; granddaughter present  1/18/19- Patient seen and examined at bedside. States she does not feel too well, but can't explain why. Does not believe breathing is labored.   1/19: + diarrhea; no other c/o   1/21- pt seen and examined, confused, denies cp, + cough, afebrile  1/22 - pt seen and examined, no events overnight, stable  for discharge  ROS - all other systems negative except mention above    T(C): 36.9 (01-22-19 @ 11:26), Max: 36.9 (01-22-19 @ 11:26)  T(F): 98.5 (01-22-19 @ 11:26), Max: 98.5 (01-22-19 @ 11:26)  HR: 67 (01-22-19 @ 11:26) (67 - 75)  BP: 107/48 (01-22-19 @ 11:26) (107/48 - 136/62)  RR: 18 (01-22-19 @ 11:26) (16 - 20)  SpO2: 95% (01-22-19 @ 11:26) (94% - 98%)  Wt(kg): --    Physical Exam:  General: WN/WD NAD  Neurology: A&Ox3, nonfocal, GUTIERRES x 4  Respiratory: CTA B/L  CV: RRR, S1S2, no murmurs, rubs or gallops  Abdominal: Soft, NT, ND +BS  Extremities: No edema, + peripheral pulses      LABS:                            8.9    6.56  )-----------( 235      ( 18 Jan 2019 06:12 )             27.9     01-17    136  |  97  |  29<H>  ----------------------------<  105<H>  3.5   |  32<H>  |  0.94    Ca    8.3<L>      17 Jan 2019 10:32  Mg     2.0     01-17      < from: 12 Lead ECG (01.11.19 @ 07:09) >  Diagnosis Line Normal sinus rhythm  Possible Left atrial enlargement  Non-specific intra-ventricular conduction block  Nonspecific T wave abnormality  Abnormal ECG  When compared with ECG of 10-STEPHANIE-2019 20:02,  Significant changes have occurred    < end of copied text >  Troponin I, Serum (01.10.19 @ 19:17)    Troponin I, Serum: 0.087: High Sensitivity Troponin and new reference  range effective 7/6/2016 ng/mL    Troponin I, Serum (01.10.19 @ 16:50)    Troponin I, Serum: 0.074: High Sensitivity Troponin and new reference  range effective 7/6/2016 ng/mL    Serum Pro-Brain Natriuretic Peptide (01.10.19 @ 10:28)    Serum Pro-Brain Natriuretic Peptide: 53676 pg/mL    < from: Xray Chest 1 View- PORTABLE-Routine (01.15.19 @ 09:36) >    Limited by rotation. New parenchymal opacity right upper lobe   superimposed on interstitial edema. No large effusion. Pacemaker battery   pack obscures the left mid chest. Cardiomegaly. Patient is osteopenia and   degenerative changes. Follow-up to full resolution is suggested.    < end of copied text >        RADIOLOGY & ADDITIONAL TESTS:

## 2019-01-22 NOTE — PROGRESS NOTE ADULT - SUBJECTIVE AND OBJECTIVE BOX
Subjective:    pat no new complaint, lying in bed, no respiratory distress.    Home Medications:  amiodarone 200 mg oral tablet: 1 tab(s) orally once a day (11 Jan 2019 14:24)  aspirin 81 mg oral tablet, chewable: 1 tab(s) orally once a day (11 Jan 2019 14:24)  Bentyl 20 mg oral tablet: 1 tab(s) orally 3 times a day, As Needed (11 Jan 2019 14:24)  bumetanide: 3 milligram(s) orally once a day (11 Jan 2019 14:24)  enalapril 2.5 mg oral tablet: 1 tab(s) orally once a day (11 Jan 2019 14:24)  Imodium A-D 2 mg oral tablet: 1 tab(s) orally once a day, As Needed (11 Jan 2019 14:24)  lactulose 10 g/15 mL oral syrup: 15 milliliter(s) orally once a day, As Needed (11 Jan 2019 14:24)  magnesium oxide 200 mg oral tablet: 1 tab(s) orally once a day (11 Jan 2019 14:24)  Metoprolol Succinate ER 50 mg oral tablet, extended release: 1 tab(s) orally once a day (11 Jan 2019 14:24)  oxyCODONE-acetaminophen 7.5 mg-325 mg oral tablet: 1 tab(s) orally every 6 hours, As Needed (11 Jan 2019 14:24)  potassium chloride 20 mEq oral tablet, extended release: 2 tab(s) orally 2 times a day (11 Jan 2019 14:24)    MEDICATIONS  (STANDING):  amiodarone    Tablet 200 milliGRAM(s) Oral daily  aspirin  chewable 81 milliGRAM(s) Oral daily  atorvastatin 10 milliGRAM(s) Oral at bedtime  clopidogrel Tablet 75 milliGRAM(s) Oral daily  digoxin     Tablet 0.125 milliGRAM(s) Oral daily  heparin  Injectable 5000 Unit(s) SubCutaneous every 12 hours  lisinopril 5 milliGRAM(s) Oral daily  magnesium oxide 200 milliGRAM(s) Oral daily  melatonin 5 milliGRAM(s) Oral at bedtime  metoprolol tartrate 12.5 milliGRAM(s) Oral once  oxybutynin 5 milliGRAM(s) Oral two times a day    MEDICATIONS  (PRN):  acetaminophen   Tablet .. 650 milliGRAM(s) Oral every 6 hours PRN Temp greater or equal to 38C (100.4F), Mild Pain (1 - 3)  aluminum hydroxide/magnesium hydroxide/simethicone Suspension 30 milliLiter(s) Oral every 6 hours PRN Dyspepsia  dicyclomine 20 milliGRAM(s) Oral three times a day before meals PRN Indigestion  guaiFENesin    Syrup 200 milliGRAM(s) Oral every 6 hours PRN Cough  lactulose Syrup 20 Gram(s) Oral at bedtime PRN Constipation  loperamide 2 milliGRAM(s) Oral daily PRN Diarrhea  morphine  - Injectable 2 milliGRAM(s) IV Push every 4 hours PRN Moderate Pain (4 - 6)  ondansetron   Disintegrating Tablet 4 milliGRAM(s) Oral every 6 hours PRN Nausea and/or Vomiting  oxyCODONE    5 mG/acetaminophen 325 mG 1 Tablet(s) Oral every 6 hours PRN Moderate Pain (4 - 6)      Allergies    No Known Allergies    Intolerances        Vital Signs Last 24 Hrs  T(C): 36.7 (22 Jan 2019 07:33), Max: 36.7 (22 Jan 2019 04:47)  T(F): 98 (22 Jan 2019 07:33), Max: 98.1 (22 Jan 2019 04:47)  HR: 72 (22 Jan 2019 07:33) (72 - 75)  BP: 136/62 (22 Jan 2019 07:33) (108/51 - 136/62)  BP(mean): --  RR: 20 (22 Jan 2019 07:33) (16 - 20)  SpO2: 94% (22 Jan 2019 07:33) (94% - 98%)      PHYSICAL EXAMINATION:    NECK:  Supple. No lymphadenopathy. Jugular venous pressure not elevated. Carotids equal.   HEART:   The cardiac impulse has a normal quality. Reg., Nl S1 and S2.  There are no murmurs, rubs or gallops noted  CHEST:  Chest crackles to auscultation. Normal respiratory effort.  ABDOMEN:  Soft and nontender.   EXTREMITIES:  There is no edema.       LABS:

## 2019-01-22 NOTE — PROGRESS NOTE ADULT - REASON FOR ADMISSION
SOB, cough

## 2019-01-22 NOTE — PROGRESS NOTE ADULT - PROVIDER SPECIALTY LIST ADULT
Cardiology
Hospitalist
Infectious Disease
Palliative Care
Pulmonology
Infectious Disease
Cardiology

## 2019-01-22 NOTE — PROGRESS NOTE ADULT - ASSESSMENT
PROBLEMS:    AC hypoxaemic respiratory failur  Multifocal pneumonia  positive HMPV  Cardic arrthmias  Chronic systolic heart failure   CKD  Paroxysmal atrial fibrillation  Essential hypertension    PLAN:    pulmonary stable  keep neg balance  pulse oximetry on RA  on amiodarone/digoxin  POOR PROGNOSIS  Comfort care  DVT PROPHYLASIX

## 2019-01-22 NOTE — PROGRESS NOTE ADULT - ASSESSMENT
87F w/ PMH as above comes in w/ fever and respiratory distress due to CAP.    A/P  #Acute respiratory failure /B/L pneumonia hMPv/also suspected GNR/MRSA-no significant improvement  #suspected sepsis from PNA  #hx systolic CHF EF 20% acutely decomepnsated /Has CardioMems - no significant improvement  #Acute toxic metabolic encephalopathy - resolved  # AF RVR  - back in NSR; no c/o  - palliative eval and GOC; maximized on Rx at this point      # Diarrhea ruled out C diff      #Paroxysmal afib not on AC due to severe hematuria in the past  - per fam hematuria was in the remote past;   - continue amio  - present ua with 11-25 rbc  - this is why she is on asa and plavix maybe ( unable to use anticoagulation)    # CAD  continue statin, asa, plavix    #CKD stage 3  monitor BMP    #DNR/DNI  poor prognosis  palliative care consult- family meeting 1/20  - DNR/DNI, AICD deactivated, d/c planning to  [facility] with transition to hospice

## 2019-01-24 DIAGNOSIS — K21.9 GASTRO-ESOPHAGEAL REFLUX DISEASE WITHOUT ESOPHAGITIS: ICD-10-CM

## 2019-01-24 DIAGNOSIS — A41.9 SEPSIS, UNSPECIFIED ORGANISM: ICD-10-CM

## 2019-01-24 DIAGNOSIS — Z95.810 PRESENCE OF AUTOMATIC (IMPLANTABLE) CARDIAC DEFIBRILLATOR: ICD-10-CM

## 2019-01-24 DIAGNOSIS — M15.9 POLYOSTEOARTHRITIS, UNSPECIFIED: ICD-10-CM

## 2019-01-24 DIAGNOSIS — J96.01 ACUTE RESPIRATORY FAILURE WITH HYPOXIA: ICD-10-CM

## 2019-01-24 DIAGNOSIS — G89.29 OTHER CHRONIC PAIN: ICD-10-CM

## 2019-01-24 DIAGNOSIS — J15.212 PNEUMONIA DUE TO METHICILLIN RESISTANT STAPHYLOCOCCUS AUREUS: ICD-10-CM

## 2019-01-24 DIAGNOSIS — I48.0 PAROXYSMAL ATRIAL FIBRILLATION: ICD-10-CM

## 2019-01-24 DIAGNOSIS — I13.0 HYPERTENSIVE HEART AND CHRONIC KIDNEY DISEASE WITH HEART FAILURE AND STAGE 1 THROUGH STAGE 4 CHRONIC KIDNEY DISEASE, OR UNSPECIFIED CHRONIC KIDNEY DISEASE: ICD-10-CM

## 2019-01-24 DIAGNOSIS — E78.5 HYPERLIPIDEMIA, UNSPECIFIED: ICD-10-CM

## 2019-01-24 DIAGNOSIS — R07.9 CHEST PAIN, UNSPECIFIED: ICD-10-CM

## 2019-01-24 DIAGNOSIS — Z96.652 PRESENCE OF LEFT ARTIFICIAL KNEE JOINT: ICD-10-CM

## 2019-01-24 DIAGNOSIS — R19.7 DIARRHEA, UNSPECIFIED: ICD-10-CM

## 2019-01-24 DIAGNOSIS — I25.10 ATHEROSCLEROTIC HEART DISEASE OF NATIVE CORONARY ARTERY WITHOUT ANGINA PECTORIS: ICD-10-CM

## 2019-01-24 DIAGNOSIS — J12.1 RESPIRATORY SYNCYTIAL VIRUS PNEUMONIA: ICD-10-CM

## 2019-01-24 DIAGNOSIS — Z66 DO NOT RESUSCITATE: ICD-10-CM

## 2019-01-24 DIAGNOSIS — Z79.82 LONG TERM (CURRENT) USE OF ASPIRIN: ICD-10-CM

## 2019-01-24 DIAGNOSIS — G92 TOXIC ENCEPHALOPATHY: ICD-10-CM

## 2019-01-24 DIAGNOSIS — Z79.891 LONG TERM (CURRENT) USE OF OPIATE ANALGESIC: ICD-10-CM

## 2019-01-24 DIAGNOSIS — N18.3 CHRONIC KIDNEY DISEASE, STAGE 3 (MODERATE): ICD-10-CM

## 2019-01-24 DIAGNOSIS — I50.23 ACUTE ON CHRONIC SYSTOLIC (CONGESTIVE) HEART FAILURE: ICD-10-CM

## 2019-01-24 DIAGNOSIS — E43 UNSPECIFIED SEVERE PROTEIN-CALORIE MALNUTRITION: ICD-10-CM

## 2019-01-24 DIAGNOSIS — J12.3 HUMAN METAPNEUMOVIRUS PNEUMONIA: ICD-10-CM

## 2019-05-24 NOTE — CDI QUERY NOTE - NSCDIOTHERTXTBX_GEN_ALL_CORE_HH
Addended by: JOHANNA FRAZIER on: 5/24/2019 04:08 PM     Modules accepted: Orders     Pt presents with influenza, and arf on ckd 2. develops episode of hypotension (61/33,  50/31, 68/40) and confusion and hypovolemia. Patient given 1 liter NS IV bolus Documented as secondary to digoxin toxicity and given digifab. if clinically significant please specify the diagnoses associated with these findings:   A. metabolic encephalopathy/toxic metabolic encephalopathy  B. delirium  C. other

## 2019-05-30 NOTE — ED ADULT TRIAGE NOTE - CCCP TRG CHIEF CMPLNT
chest pain I have seen and examined the patient today and agree with  the  evaluation, assessment and plan of the surgical house officer

## 2019-06-10 NOTE — PROGRESS NOTE ADULT - ASSESSMENT
Vanessa Troy  1959  female  Medical Record Number: [id number]    Patient informed that I am an Infectious Disease physician and permission obtained from the patient to speak in front of any visitors prior to any discussion for HIPPA purposes. HPI: Patient with R BKA stump wound ( BKA 3 years ago )   With tissue necrosis and erythema    Blood cultures negative to date. Subjectively, no new complaints at this time. Erythema improving, area of tissue necrosis remains the same.      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     Athscl heart disease of native coronary artery w/o ang pctrs     CAD (coronary artery disease)     Cancer (HCC)     right BKA    Constipation     Constipation     Depression     MAJOR DEPRESSIVE DISORDER    Diabetes mellitus ([facility]     TYPE II    Diabetic neuropathy ([facility]     Hyperlipidemia     Hypertension     Insomnia     Muscle weakness     [facility]) [facility]     Seizures ([facility])     Vascular dementia     Vitamin D deficiency     Weakness        Past Surgical History:   Procedure Laterality Date    LEG AMPUTATION BELOW KNEE Right        Social History     Socioeconomic History    Marital status: Single     Spouse name: Not on file    Number of children: Not on file    Years of education: Not on file    Highest education level: Not on file   Occupational History    Not on file   Social Needs    Financial resource strain: Not on file    Food insecurity:     Worry: Not on file     Inability: Not on file    Transportation needs:     Medical: Not on file     Non-medical: Not on file   Tobacco Use    Smoking status: Current Every Day Smoker     Packs/day: 0.50     Years: 13.00     Pack years: 6.50     Types: Cigarettes    Smokeless tobacco: Never Used   Substance and Sexual Activity    Alcohol use: No     Comment: in nursing home for 3 years    Drug use: No    Sexual activity: Not Currently   Lifestyle    Physical activity:     Days per week: Not on file     Minutes per session: Not on file    Stress: Not on file   Relationships    Social connections:     Talks on phone: Not on file     Gets together: Not on file     Attends Sikh service: Not on file     Active member of club or organization: Not on file     Attends meetings of clubs or organizations: Not on file     Relationship status: Not on file    Intimate partner violence:     Fear of current or ex partner: Not on file     Emotionally abused: Not on file     Physically abused: Not on file     Forced sexual activity: Not on file   Other Topics Concern    Not on file   Social History Narrative    Not on file       History reviewed. No pertinent family history. No current facility-administered medications on file prior to encounter. Current Outpatient Medications on File Prior to Encounter   Medication Sig Dispense Refill    divalproex (DEPAKOTE SPRINKLE) 125 MG capsule Take 125 mg by mouth 3 times daily      furosemide (LASIX) 10 MG/ML injection Infuse 40 mg intravenously daily      LORazepam (ATIVAN) 0.5 MG tablet Take 0.25 mg by mouth every 8 hours as needed for Anxiety.  LORazepam (ATIVAN) 1 MG tablet Take 1 mg by mouth every 4 hours as needed (Seizures).  oxyCODONE (ROXICODONE) 5 MG immediate release tablet Take 5 mg by mouth every 8 hours as needed for Pain.       potassium chloride (MICRO-K) 10 MEQ extended release capsule Take 10 mEq by mouth daily      ipratropium-albuterol (DUONEB) 0.5-2.5 (3) MG/3ML SOLN nebulizer solution Inhale 1 vial into the lungs every 8 hours as needed for Shortness of Breath (COPD)      [START ON 6/13/2019] divalproex (DEPAKOTE SPRINKLE) 125 MG capsule Take 125 mg by mouth 2 times daily      A/P- 85 yo female with PMH as per hPI here with fever and found be positive for flu.    1. Digoxin toxicity a/w level of 2.69, now level is 1.69 after digifab. No further digoxin especially in setting of recent KATIA, advanced age.     2. HTN- slowly resuming BP meds. Bbl restarted- BP well controlled today.    3. +Troponins- low level. Likely demand ischemia in setting of low EF, flu. Check 2Decho. Conservative medical mgmt. No ischemic eval at this time.     4. +Flu A- continue Tamiflu as per primary team. Avoid excessive fluids in setting of low EF.    5. Elevated LFTs- Much improved. ?2/2 flu vs amio toxicity vs autoimmune hepatitis. GI following. No further Amio, statin for now. W/u ongoing.     6. Colitis of descending and sigmoid colon- w/u. mgmt as per GI.     7. Chronic systolic dysfunction- last know LVEF 25-30%. Watch for signs of fluid overload. Daily wts, strict I/Os. Resume outpt ACEi, Bbl, lasix. Appears euvolemic presently.    8. PAF- Not on AC 2/2 hematuria and pts wishes. Cont asa.     9. DVT proph.     10. Continue CCU observation today. [START ON 6/19/2019] divalproex (DEPAKOTE SPRINKLE) 125 MG capsule Take 125 mg by mouth daily      DULoxetine (CYMBALTA) 30 MG extended release capsule Take 1 capsule by mouth daily 30 capsule 3    phenytoin (DILANTIN) 100 MG ER capsule Take 200 mg by mouth 2 times daily MORNING AND AT BEDTIME      traZODone (DESYREL) 50 MG tablet Take 50 mg by mouth nightly      docusate sodium (COLACE) 100 MG capsule Take 100 mg by mouth every morning      aspirin 81 MG chewable tablet Take 81 mg by mouth daily      atorvastatin (LIPITOR) 10 MG tablet Take 10 mg by mouth nightly       vitamin D 1000 UNITS CAPS Take 1 capsule by mouth daily       isosorbide mononitrate (IMDUR) 30 MG extended release tablet Take 30 mg by mouth daily      furosemide (LASIX) 20 MG tablet Take 40 mg by mouth daily       magnesium hydroxide (MILK OF MAGNESIA) 400 MG/5ML suspension Take 30 mLs by mouth daily as needed for Constipation      phenytoin (DILANTIN) 100 MG ER capsule Take 100 mg by mouth Daily with lunch       metoprolol succinate (TOPROL XL) 25 MG extended release tablet Take 25 mg by mouth daily      zonisamide (ZONEGRAN) 100 MG capsule Take 100 mg by mouth daily         Physical Exam:  Sitting in a chair  General: Patient appears in no acute distress, cooperative  Skin: no new rashes   Obese patient. HEENT: EOMI, MMM, Neck is supple  Heart: S1 S2  Lungs: bilaterally clear to auscultation  Abdomen: soft, ND, NTTP, +BS  Extrem:+RLE stump erythema improving with necrotic area  Neuro exam: CN II-XII intact, facial expressions symmertrical bilaterally, no slurred speech      Labs: I have reviewed all lab results by electronic record, including most recent CBC, metabolic panel, and pertinent abnormalities were addressed from an infectious disease perspective. WBC trends are being monitored.     Lab Results   Component Value Date     06/10/2019    K 3.8 06/10/2019     06/10/2019    CO2 26 06/10/2019    BUN 6 06/10/2019 CREATININE 0.40 06/10/2019    GLUCOSE 86 06/10/2019    GLUCOSE 94 12/28/2011    CALCIUM 8.3 06/10/2019      Lab Results   Component Value Date    WBC 6.2 06/10/2019    HGB 12.3 06/10/2019    HCT 35.9 (L) 06/10/2019    .1 (H) 06/10/2019     06/10/2019       Radiology:   I have reviewed imaging results per electronic record and most pertinent abnormalities are being addressed from an infectious disease standpoint. Assessment:  R BKA wound 6x4cm  Obese patient  Cellulitis of RLE      Plan:  Wound culture. Better to get tissue specimen from surgery tomorrow  Discussed with primary  Blood cultures are negative to date  Vascular on consult  Vanco and Zosyn for now.        Martha Rick D.O.

## 2020-05-20 NOTE — DISCHARGE NOTE ADULT - PRINCIPAL DIAGNOSIS
Acute on chronic systolic congestive heart failure Dupixent Counseling: I discussed with the patient the risks of dupilumab including but not limited to eye infection and irritation, cold sores, injection site reactions, worsening of asthma, allergic reactions and increased risk of parasitic infection.  Live vaccines should be avoided while taking dupilumab. Dupilumab will also interact with certain medications such as warfarin and cyclosporine. The patient understands that monitoring is required and they must alert us or the primary physician if symptoms of infection or other concerning signs are noted.

## 2020-08-05 NOTE — PATIENT PROFILE ADULT. - NS TRANSFER EYEGLASSES PAIRS
Umbilical hernia with very thin skin. Will need repair . Needs cards clearance  
1 pair
Partial Purse String (Intermediate) Text: Given the location of the defect and the characteristics of the surrounding skin an intermediate purse string closure was deemed most appropriate.  Undermining was performed circumferentially around the surgical defect.  A purse string suture was then placed and tightened. Wound tension of the circular defect prevented complete closure of the wound.

## 2020-09-16 NOTE — PHYSICAL THERAPY INITIAL EVALUATION ADULT - LIGHT TOUCH SENSATION, LUE, REHAB EVAL
Entered as requested.     Thanks.    Age-related physical debility  -     Ambulatory referral/consult to Home Health; Future; Expected date: 09/23/2020  Essential (primary) hypertension  Other hyperlipidemia  Decreased mobility  -     Ambulatory referral/consult to Home Health; Future; Expected date: 09/23/2020  CKD (chronic kidney disease) stage 3, GFR 30-59 ml/min         within normal limits

## 2020-10-05 NOTE — DISCHARGE NOTE ADULT - PHYSICIAN SECTION COMPLETE
[facility] ICU RESPIRATORY NOTE        Date of Admission: 9/5/2020    Date of Intubation (most recent): 9/11/2020    Reason for Mechanical Ventilation: AW Protection    Number of Days on Mechanical Ventilation: 24    Met Criteria for Spontaneous Breathing Trial: Yes - 10/5 since 10:15 am      Significant Events Today: Pt going to [facility] this afternoon.     ABG Results: No lab results found in last 7 days.    Current Vent Settings: Ventilation Mode: CPAP/PS  (Continuous positive airway pressure with Pressure Support)  FiO2 (%): 25 %  Rate Set (breaths/minute): 12 breaths/min  Tidal Volume Set (mL): 500 mL  PEEP (cm H2O): 5 cmH2O  Pressure Support (cm H2O): 10 cmH2O  Oxygen Concentration (%): 25 %  Resp: 18          Plan: Will continue full ventilatory support for now and assess for weaning readiness daily.               Sherrell Beckham, RT     Yes

## 2020-12-11 NOTE — PATIENT PROFILE ADULT. - VISION (WITH CORRECTIVE LENSES IF THE PATIENT USUALLY WEARS THEM):
home ABX 
Partially impaired: cannot see medication labels or newsprint, but can see obstacles in path, and the surrounding layout; can count fingers at arm's length

## 2021-08-11 NOTE — SWALLOW BEDSIDE ASSESSMENT ADULT - SWALLOW EVAL: DIAGNOSIS
1) The pt was mildly dyspneic, anxious and irritable. She often moaned without clear intent. With that being stated, she was able to verbalize during communicative probes/in conversation without evidence of a primary motor speech or primary linguistic pathology. Pt was able to effectively verbalize her needs and is at reported communicative baseline.  2) Pt's willingness to accept PO was psychogenically reduced at times. When willing to eat, her Oral Processing abilities were mildly to moderately prolonged with foods requiring mastication in setting of edentulous status but felt to be mechanically functional nonetheless. Oral Swallowing abilities are unremarkable with pureed foods/liquids. Her underlying pharyngeal integrity subjectively appeared to be within functional parameters for age. NO behavioral aspiration signs exhibited on exam.
04327 Exp Problem Focused - Mod. Complex

## 2021-09-11 NOTE — CONSULT NOTE ADULT - PROBLEM SELECTOR RECOMMENDATION 9
- Onset is today and correlates with symptoms  - discussed risk of CVA with AF in conjunction with the patients CHADS-VASc score  - discussed lifestyle modification including avoiding stimulants (caffeine, decongestants, cocaine)  - start amiodarone 400mg po tid while hospitalized, switch to 400mg po bid for one week at discharge followed by 200mg po qdaily indefinitely thereafter  - Baseline PFT's as outpatient then yearly to monitor pulmonary toxicity  - baseline and yearly ophthomology exam to monitor for amiodarone induced iritis  - baseline and yearly TFT's  - baseline and yearly LFT's ,[email or web address]

## 2021-09-26 NOTE — H&P ADULT - NSHPPOAPULMEMBOLUS_GEN_A_CORE
Problem: Cardiac Surgery  Goal: Hemodynamic stability achieved/maintained  Description: Hemodynamic instability may include VS or rhythm changes or s/s FVE.  AHA guidelines: Keep BP>90 mm Hg.  Outcome: Outcome Not Met, Continue to Monitor  Goal: Elimination status is maintained/returned to baseline  Outcome: Outcome Not Met, Continue to Monitor  Goal: Verbalizes/demonstrates understanding of heart disease, risk factors, treatment plan, and d/c instructions  Description: Document on Patient Education Activity   Outcome: Outcome Not Met, Continue to Monitor     Problem: Postoperative Care  Goal: Elimination status is maintained/returned to baseline  Outcome: Outcome Not Met, Continue to Monitor  Goal: Activity level is resumed to level needed for d/c  Outcome: Outcome Not Met, Continue to Monitor  Goal: Verbalizes understanding of postoperative care in the hospital and after d/c  Description: Document on Patient Education Activity  Outcome: Outcome Not Met, Continue to Monitor     Problem: VTE, Risk for  Goal: # No s/s of VTE  Outcome: Outcome Not Met, Continue to Monitor  Goal: # Verbalizes understanding of VTE risk factors and prevention  Description: Document education using the patient education activity.   Outcome: Outcome Not Met, Continue to Monitor      no

## 2022-08-24 NOTE — ED ADULT TRIAGE NOTE - MODE OF ARRIVAL
EMS 7 Mm Punch Excision Text: A 7 mm punch biopsy was used to excise the lesion to the level of the subcutaneous fat.  Blunt dissection was used to free the lesion from the surrounding tissues and the lesion was removed.

## 2022-10-17 NOTE — PATIENT PROFILE ADULT. - ABILITY TO HEAR (WITH HEARING AID OR HEARING APPLIANCE IF NORMALLY USED):
Mildly to Moderately Impaired: difficulty hearing in some environments or speaker may need to increase volume or speak distinctly
no

## 2022-11-10 NOTE — ED ADULT NURSE REASSESSMENT NOTE - NS ED NURSE REASSESS COMMENT FT1
Patient currently at US. RVP pending; isolation precautions in place. will continue to monitor.
Patient returned from US. Warm blanket provided. patient updated on plan of care. will continue to monitor.
Patient sleeping comfortably. no distress noted. VSS. Cardiac Monitoring in place. Isolation precautions in place. updated on plan of care. will continue to monitor.
Pt rec'd from LEILANI Niño. AxOx3, no acute distress. No resp distress. Skin care rendered. Tolerating PO intake well. Cardiac monitoring in place. Awaiting for bed assignment. Safety and comfort maintained.
patient turned and positioned. toileting assistance provided. isolation precautions in place. patient updated on plan of care. will continue to monitor.
97.3
Patient received from resource LEILANI Barlow. Patient resting comfortably in bed. Safety and comfortably in bed. Safety and comfort maintained. will continue to montior.

## 2022-11-13 NOTE — PHYSICAL THERAPY INITIAL EVALUATION ADULT - LEVEL OF INDEPENDENCE: SUPINE/SIT, REHAB EVAL
Please refer to progress note from earlier today at [place]. Patient transferred to [facility] for further evaluation management of elevated troponin/NSTEMI. minimum assist (75% patients effort)

## 2023-07-13 NOTE — CONSULT NOTE ADULT - CONSULT REQUESTED DATE/TIME
7/13/2023     1:46 PM   PEG Score   PEG Total Score 7     
18-Feb-2018 10:15
20-Feb-2018 10:00

## 2023-10-27 NOTE — ED PROCEDURE NOTE - NS ED PROC PERFORMED BY1 FT
Continue all current medications as prescribed.   Followup with Dr. Montes in 1 year, sooner should any issues or concerns arise before then.     If you have any questions or cardiac concerns, please call our office at [phone].   
[facility]

## 2024-01-17 NOTE — PROGRESS NOTE ADULT - REASON FOR ADMISSION
Medication:     tadalafil (CIALIS) 20 MG tablet 10 tablet 1 12/1/2023 --    Sig - Route: Take 1 tablet by mouth daily as needed for Erectile Dysfunction. - Oral      Last office visit date: 01/17/2024  Next appointment scheduled?: Yes ; 12/09/2024  Number of refills given: provider preference   [facility]

## 2025-04-03 NOTE — CONSULT NOTE ADULT - PROBLEM SELECTOR PROBLEM 2
Addended by: ANISHA DENTON on: 4/3/2025 12:31 PM     Modules accepted: Orders    
Coronary artery disease involving native coronary artery of native heart without angina pectoris

## 2025-06-02 NOTE — H&P ADULT - SKIN/BREAST
negative Preparing to see the patient including review of tests and other providers' notes, confirming history with patient/family member, performing medical examination and evaluation, counseling and educating the patient/family/caregiver, ordering medications, tests and procedures, communicating with other health care professionals, documenting clinical information in the EMR, independently interpreting results and communicating results to the patient/family/caregiver, care coordination